# Patient Record
Sex: MALE | Race: BLACK OR AFRICAN AMERICAN | NOT HISPANIC OR LATINO | URBAN - METROPOLITAN AREA
[De-identification: names, ages, dates, MRNs, and addresses within clinical notes are randomized per-mention and may not be internally consistent; named-entity substitution may affect disease eponyms.]

---

## 2019-10-19 ENCOUNTER — INPATIENT (INPATIENT)
Facility: HOSPITAL | Age: 79
LOS: 1 days | Discharge: HOME | DRG: 312 | End: 2019-10-21
Attending: HOSPITALIST | Admitting: HOSPITALIST
Payer: COMMERCIAL

## 2019-10-19 VITALS
HEART RATE: 79 BPM | TEMPERATURE: 98 F | DIASTOLIC BLOOD PRESSURE: 77 MMHG | RESPIRATION RATE: 16 BRPM | OXYGEN SATURATION: 96 % | WEIGHT: 164.91 LBS | HEIGHT: 68 IN | SYSTOLIC BLOOD PRESSURE: 122 MMHG

## 2019-10-19 LAB
APTT BLD: 30 SEC — SIGNIFICANT CHANGE UP (ref 27.5–36.3)
BASOPHILS # BLD AUTO: 0.04 K/UL — SIGNIFICANT CHANGE UP (ref 0–0.2)
BASOPHILS NFR BLD AUTO: 0.4 % — SIGNIFICANT CHANGE UP (ref 0–2)
EOSINOPHIL # BLD AUTO: 0.06 K/UL — SIGNIFICANT CHANGE UP (ref 0–0.5)
EOSINOPHIL NFR BLD AUTO: 0.7 % — SIGNIFICANT CHANGE UP (ref 0–6)
HCT VFR BLD CALC: 40.6 % — SIGNIFICANT CHANGE UP (ref 39–50)
HGB BLD-MCNC: 13 G/DL — SIGNIFICANT CHANGE UP (ref 13–17)
IMM GRANULOCYTES NFR BLD AUTO: 0.2 % — SIGNIFICANT CHANGE UP (ref 0–1.5)
INR BLD: 0.98 RATIO — SIGNIFICANT CHANGE UP (ref 0.88–1.16)
LYMPHOCYTES # BLD AUTO: 1.27 K/UL — SIGNIFICANT CHANGE UP (ref 1–3.3)
LYMPHOCYTES # BLD AUTO: 14.3 % — SIGNIFICANT CHANGE UP (ref 13–44)
MCHC RBC-ENTMCNC: 24.6 PG — LOW (ref 27–34)
MCHC RBC-ENTMCNC: 32 GM/DL — SIGNIFICANT CHANGE UP (ref 32–36)
MCV RBC AUTO: 76.7 FL — LOW (ref 80–100)
MONOCYTES # BLD AUTO: 0.4 K/UL — SIGNIFICANT CHANGE UP (ref 0–0.9)
MONOCYTES NFR BLD AUTO: 4.5 % — SIGNIFICANT CHANGE UP (ref 2–14)
NEUTROPHILS # BLD AUTO: 7.12 K/UL — SIGNIFICANT CHANGE UP (ref 1.8–7.4)
NEUTROPHILS NFR BLD AUTO: 79.9 % — HIGH (ref 43–77)
OB PNL STL: NEGATIVE — SIGNIFICANT CHANGE UP
PLATELET # BLD AUTO: 272 K/UL — SIGNIFICANT CHANGE UP (ref 150–400)
PROTHROM AB SERPL-ACNC: 11.3 SEC — SIGNIFICANT CHANGE UP (ref 10–12.9)
RBC # BLD: 5.29 M/UL — SIGNIFICANT CHANGE UP (ref 4.2–5.8)
RBC # FLD: 14.7 % — HIGH (ref 10.3–14.5)
WBC # BLD: 8.91 K/UL — SIGNIFICANT CHANGE UP (ref 3.8–10.5)
WBC # FLD AUTO: 8.91 K/UL — SIGNIFICANT CHANGE UP (ref 3.8–10.5)

## 2019-10-19 PROCEDURE — 93010 ELECTROCARDIOGRAM REPORT: CPT

## 2019-10-19 PROCEDURE — 70450 CT HEAD/BRAIN W/O DYE: CPT | Mod: 26

## 2019-10-19 PROCEDURE — 99285 EMERGENCY DEPT VISIT HI MDM: CPT

## 2019-10-19 PROCEDURE — 71046 X-RAY EXAM CHEST 2 VIEWS: CPT | Mod: 26

## 2019-10-19 RX ORDER — PANTOPRAZOLE SODIUM 20 MG/1
40 TABLET, DELAYED RELEASE ORAL ONCE
Refills: 0 | Status: COMPLETED | OUTPATIENT
Start: 2019-10-19 | End: 2019-10-19

## 2019-10-19 NOTE — ED ADULT NURSE NOTE - OBJECTIVE STATEMENT
pt lying in bed on a cardiac monitor and pulse ox with family at bedside. pt is alert and oriented x3. pt c/o of syncopal episode at home witnessed by family. as per pt he vomited dark brown emesis then "passed out". as per pt family he did not hit his head

## 2019-10-19 NOTE — ED PROVIDER NOTE - PHYSICAL EXAMINATION
Constitutional : Appears comfortably, talking in full sentences  Head :NC AT , no swelling  Eyes :eomi, no swelling  Mouth :mm moist,  Neck : supple, trachea in midline  Chest :Sebastien air entry, symm chest expansion, no distress  Heart :S1 S2 distant  Abdomen :abd soft, non tender, anal tone intact, stool looks yellow in color  Musc/Skel :ext no swelling, no deformity, no spine tenderness, distal pulses present  Neuro: AAO 3 no focal deficits

## 2019-10-19 NOTE — ED PROVIDER NOTE - ATTESTATION, MLM
I have reviewed and confirmed nurses' triage notes for patient's medications, allergies, medical history, and surgical history.

## 2019-10-19 NOTE — ED PROVIDER NOTE - OBJECTIVE STATEMENT
80 y/o M pt with PMHx of DM and HTN presents to the ED c/o 1x episode of syncope 3 hours ago. Pt reports that he ate well yesterday, slept well last night, woke up this morning at 7AM, ate fish/french fries this morning for breakfast at 9:30AM, ate fried chicken at 1PM for lunch, drank orange juice and coffee at 7:30PM. After drinking coffee, pt felt "hot and sweaty" and vomited immediately after. His emesis was orange and dark "like coffee." Pt reports that the next thing he remembers was that he woke up outside his house, but was feeling "his normal self" at the time. His wife convinced him to call EMS. No nausea, CP, SOB, palpitations, fever, chills, abd pain, urinary symptoms, numbness, tingling, vision changes.   Pt says that he went to [place] 20 years ago, took medication for his prostate while exhausted, and passed out. No cardiac PMHx.   PMD: [place]. 78 y/o M pt with PMHx of DM and HTN presents to the ED c/o 1x episode of syncope 3 hours ago. Pt reports that he ate well yesterday, slept well last night, woke up this morning at 7AM, ate fish/french fries this morning for breakfast at 9:30AM, ate fried chicken at 1PM for lunch, drank orange juice and coffee at 7:30PM. After drinking coffee, pt felt "hot and sweaty" and vomited immediately after. His emesis was orange and dark "like coffee." Pt reports that the next thing he remembers was that he woke up outside his house, but was feeling "his normal self" at the time. His wife convinced him to call EMS. No nausea, CP, SOB, palpitations, fever, chills, abd pain, urinary symptoms, numbness, tingling, vision changes.   Pt says that similarly, he went to [place] 20 years ago, "took medication for his prostate" while exhausted, and passed out. No cardiac PMHx.   PMD: [place].

## 2019-10-19 NOTE — ED ADULT NURSE NOTE - CHIEF COMPLAINT QUOTE
Pt A&Ox4 states "I was drinking coffee and I got warm and vomited what looked like coffee and they said I passed out."  BIBA c/o vomiting and syncope. Patient has DM, had syncopal episode after vomiting dark in color. EKG completed

## 2019-10-19 NOTE — ED PROVIDER NOTE - CLINICAL SUMMARY MEDICAL DECISION MAKING FREE TEXT BOX
80 y/o M with Hx DM, HTN presents to the ED c/o loss of time post vomiting coffee grounds, pt was drinking coffee prior to episode, plan to check serial cbc, CT, labs, guaiac stool, and admit to hospital.

## 2019-10-20 DIAGNOSIS — R55 SYNCOPE AND COLLAPSE: ICD-10-CM

## 2019-10-20 LAB
ALBUMIN SERPL ELPH-MCNC: 4.2 G/DL — SIGNIFICANT CHANGE UP (ref 3.3–5.2)
ALP SERPL-CCNC: 102 U/L — SIGNIFICANT CHANGE UP (ref 40–120)
ALT FLD-CCNC: 15 U/L — SIGNIFICANT CHANGE UP
ANION GAP SERPL CALC-SCNC: 16 MMOL/L — SIGNIFICANT CHANGE UP (ref 5–17)
APPEARANCE UR: CLEAR — SIGNIFICANT CHANGE UP
AST SERPL-CCNC: 15 U/L — SIGNIFICANT CHANGE UP
BILIRUB SERPL-MCNC: 0.2 MG/DL — LOW (ref 0.4–2)
BILIRUB UR-MCNC: NEGATIVE — SIGNIFICANT CHANGE UP
BUN SERPL-MCNC: 16 MG/DL — SIGNIFICANT CHANGE UP (ref 8–20)
CALCIUM SERPL-MCNC: 10.2 MG/DL — SIGNIFICANT CHANGE UP (ref 8.6–10.2)
CHLORIDE SERPL-SCNC: 101 MMOL/L — SIGNIFICANT CHANGE UP (ref 98–107)
CO2 SERPL-SCNC: 24 MMOL/L — SIGNIFICANT CHANGE UP (ref 22–29)
COLOR SPEC: YELLOW — SIGNIFICANT CHANGE UP
CREAT SERPL-MCNC: 1.11 MG/DL — SIGNIFICANT CHANGE UP (ref 0.5–1.3)
DIFF PNL FLD: NEGATIVE — SIGNIFICANT CHANGE UP
GLUCOSE BLDC GLUCOMTR-MCNC: 100 MG/DL — HIGH (ref 70–99)
GLUCOSE BLDC GLUCOMTR-MCNC: 101 MG/DL — HIGH (ref 70–99)
GLUCOSE BLDC GLUCOMTR-MCNC: 106 MG/DL — HIGH (ref 70–99)
GLUCOSE BLDC GLUCOMTR-MCNC: 93 MG/DL — SIGNIFICANT CHANGE UP (ref 70–99)
GLUCOSE SERPL-MCNC: 115 MG/DL — SIGNIFICANT CHANGE UP (ref 70–115)
GLUCOSE UR QL: NEGATIVE MG/DL — SIGNIFICANT CHANGE UP
KETONES UR-MCNC: NEGATIVE — SIGNIFICANT CHANGE UP
LEUKOCYTE ESTERASE UR-ACNC: NEGATIVE — SIGNIFICANT CHANGE UP
MAGNESIUM SERPL-MCNC: 2.2 MG/DL — SIGNIFICANT CHANGE UP (ref 1.6–2.6)
NITRITE UR-MCNC: NEGATIVE — SIGNIFICANT CHANGE UP
PH UR: 8 — SIGNIFICANT CHANGE UP (ref 5–8)
POTASSIUM SERPL-MCNC: 4.7 MMOL/L — SIGNIFICANT CHANGE UP (ref 3.5–5.3)
POTASSIUM SERPL-SCNC: 4.7 MMOL/L — SIGNIFICANT CHANGE UP (ref 3.5–5.3)
PROT SERPL-MCNC: 7.6 G/DL — SIGNIFICANT CHANGE UP (ref 6.6–8.7)
PROT UR-MCNC: NEGATIVE MG/DL — SIGNIFICANT CHANGE UP
SODIUM SERPL-SCNC: 141 MMOL/L — SIGNIFICANT CHANGE UP (ref 135–145)
SP GR SPEC: 1.01 — SIGNIFICANT CHANGE UP (ref 1.01–1.02)
TROPONIN T SERPL-MCNC: <0.01 NG/ML — SIGNIFICANT CHANGE UP (ref 0–0.06)
TSH SERPL-MCNC: 2.18 UIU/ML — SIGNIFICANT CHANGE UP (ref 0.27–4.2)
UROBILINOGEN FLD QL: NEGATIVE MG/DL — SIGNIFICANT CHANGE UP

## 2019-10-20 PROCEDURE — 12345: CPT | Mod: NC

## 2019-10-20 PROCEDURE — 95819 EEG AWAKE AND ASLEEP: CPT | Mod: 26

## 2019-10-20 PROCEDURE — 70498 CT ANGIOGRAPHY NECK: CPT | Mod: 26

## 2019-10-20 PROCEDURE — 70450 CT HEAD/BRAIN W/O DYE: CPT | Mod: 26,59

## 2019-10-20 PROCEDURE — 70496 CT ANGIOGRAPHY HEAD: CPT | Mod: 26

## 2019-10-20 PROCEDURE — 99223 1ST HOSP IP/OBS HIGH 75: CPT

## 2019-10-20 RX ORDER — INSULIN LISPRO 100/ML
VIAL (ML) SUBCUTANEOUS
Refills: 0 | Status: DISCONTINUED | OUTPATIENT
Start: 2019-10-20 | End: 2019-10-21

## 2019-10-20 RX ORDER — DEXTROSE 50 % IN WATER 50 %
25 SYRINGE (ML) INTRAVENOUS ONCE
Refills: 0 | Status: DISCONTINUED | OUTPATIENT
Start: 2019-10-20 | End: 2019-10-21

## 2019-10-20 RX ORDER — SODIUM CHLORIDE 9 MG/ML
1000 INJECTION, SOLUTION INTRAVENOUS
Refills: 0 | Status: DISCONTINUED | OUTPATIENT
Start: 2019-10-20 | End: 2019-10-21

## 2019-10-20 RX ORDER — ONDANSETRON 8 MG/1
4 TABLET, FILM COATED ORAL EVERY 6 HOURS
Refills: 0 | Status: DISCONTINUED | OUTPATIENT
Start: 2019-10-20 | End: 2019-10-21

## 2019-10-20 RX ORDER — DEXTROSE 50 % IN WATER 50 %
15 SYRINGE (ML) INTRAVENOUS ONCE
Refills: 0 | Status: DISCONTINUED | OUTPATIENT
Start: 2019-10-20 | End: 2019-10-21

## 2019-10-20 RX ORDER — INSULIN LISPRO 100/ML
VIAL (ML) SUBCUTANEOUS AT BEDTIME
Refills: 0 | Status: DISCONTINUED | OUTPATIENT
Start: 2019-10-20 | End: 2019-10-21

## 2019-10-20 RX ORDER — GLUCAGON INJECTION, SOLUTION 0.5 MG/.1ML
1 INJECTION, SOLUTION SUBCUTANEOUS ONCE
Refills: 0 | Status: DISCONTINUED | OUTPATIENT
Start: 2019-10-20 | End: 2019-10-21

## 2019-10-20 RX ORDER — DEXTROSE 50 % IN WATER 50 %
12.5 SYRINGE (ML) INTRAVENOUS ONCE
Refills: 0 | Status: DISCONTINUED | OUTPATIENT
Start: 2019-10-20 | End: 2019-10-21

## 2019-10-20 RX ADMIN — PANTOPRAZOLE SODIUM 40 MILLIGRAM(S): 20 TABLET, DELAYED RELEASE ORAL at 00:19

## 2019-10-20 RX ADMIN — Medication 5 MILLIGRAM(S): at 06:37

## 2019-10-20 NOTE — ED ADULT NURSE REASSESSMENT NOTE - GENERAL PATIENT STATE
comfortable appearance
comfortable appearance/resting/sleeping/family/SO at bedside
comfortable appearance/smiling/interactive

## 2019-10-20 NOTE — CONSULT NOTE ADULT - SUBJECTIVE AND OBJECTIVE BOX
CHIEF COMPLAINT: syncope    HPI: 79yMale  78 y/o male with PMH of DM-2, prostate ca came to the ED complaining s/p syncope. Patient said he felt sick after drinking coffee; he was nauseous and sweaty, ran to the bathroom and vomited once; dark color emesis. His grandson followed him and caught him as he was fall on the bathroom floor. The next thing he remember was being outside. As per the wife at bed side, he was out for about 2-5 minutes. He has no HA, trauma to head, weakness, numbness, bladder or bowel incontinence, blurry vision, dizziness, chest pain, shortness of breath, palpitation, fever, chills.     No h/o cva, tia, seizures.  Has bird shot above left eye- no MRi    PAST MEDICAL & SURGICAL HISTORY:  DM (diabetes mellitus)  Prostate cancer    MEDICATIONS  (STANDING):  dextrose 5%. 1000 milliLiter(s) (50 mL/Hr) IV Continuous <Continuous>  dextrose 50% Injectable 12.5 Gram(s) IV Push once  dextrose 50% Injectable 25 Gram(s) IV Push once  dextrose 50% Injectable 25 Gram(s) IV Push once  enalapril 5 milliGRAM(s) Oral daily  insulin lispro (HumaLOG) corrective regimen sliding scale   SubCutaneous three times a day before meals  insulin lispro (HumaLOG) corrective regimen sliding scale   SubCutaneous at bedtime    MEDICATIONS  (PRN):  dextrose 40% Gel 15 Gram(s) Oral once PRN Blood Glucose LESS THAN 70 milliGRAM(s)/deciliter  glucagon  Injectable 1 milliGRAM(s) IntraMuscular once PRN Glucose LESS THAN 70 milligrams/deciliter  ondansetron Injectable 4 milliGRAM(s) IV Push every 6 hours PRN Nausea and/or Vomiting    Allergies    No Known Allergies    Intolerances        FAMILY HISTORY:  FH: diabetes mellitus          SOCIAL HISTORY:    Tobacco:  no  Alcohol:  no  Drugs:  no        REVIEW OF SYSTEMS:    Relevant systems are negative except as noted in the chart, HPI, and PMH      VITAL SIGNS:  Vital Signs Last 24 Hrs  T(C): 37.1 (20 Oct 2019 07:41), Max: 37.1 (20 Oct 2019 07:41)  T(F): 98.7 (20 Oct 2019 07:41), Max: 98.7 (20 Oct 2019 07:41)  HR: 64 (20 Oct 2019 07:41) (64 - 80)  BP: 132/66 (20 Oct 2019 07:41) (122/77 - 132/66)  BP(mean): --  RR: 18 (20 Oct 2019 07:41) (16 - 20)  SpO2: 97% (20 Oct 2019 07:41) (96% - 98%)    PHYSICAL EXAMINATION:    General: Well-developed, well nourished, in no acute distress.  Cardiac:  Regular rate and rhythm. No carotid bruits appreciated.  Eyes: Fundoscopic examination was deferred.  Neurologic:  - Mental Status:  Alert, awake, oriented to person, place, and time; Speech is fluent. Language is normal. Follows commands well.  Insight and knowledge appear appropriate.  Cranial Nerves II-XII:    II:  Visual acuity is normal for age ; Visual fields are full to confrontation; Pupils are equal, round, and reactive to light.  III, IV, VI:  Extraocular movements are intact without nystagmus.  V:  Facial sensation is intact in the V1-V3 distribution bilaterally.  VII:  Face is symmetric with normal eye closure and smile  VIII:  Hearing is grossly intact  IX, X, XII:  speech is clear  XI:  Head turning and shoulder shrug are intact.  - Motor:  Strength is 5/5 x 4.   There is no pronator drift. .  - Reflexes:  2+ and symmetric at the knees.  Plantar responses flexor.  - Sensory:  Symmetric to light touch  - Coordination:  Finger-nose-finger is normal. Rapid alternating hand and foot  movements are intact. Dexterity appears normal      LABS:                          13.0   8.91  )-----------( 272      ( 19 Oct 2019 23:32 )             40.6     19 Oct 2019 23:32    141    |  101    |  16.0   ----------------------------<  115    4.7     |  24.0   |  1.11     Ca    10.2       19 Oct 2019 23:32  Mg     2.2       19 Oct 2019 23:32    TPro  7.6    /  Alb  4.2    /  TBili  0.2    /  DBili  x      /  AST  15     /  ALT  15     /  AlkPhos  102    19 Oct 2019 23:32    LIVER FUNCTIONS - ( 19 Oct 2019 23:32 )  Alb: 4.2 g/dL / Pro: 7.6 g/dL / ALK PHOS: 102 U/L / ALT: 15 U/L / AST: 15 U/L / GGT: x           PT/INR - ( 19 Oct 2019 23:33 )   PT: 11.3 sec;   INR: 0.98 ratio         PTT - ( 19 Oct 2019 23:33 )  PTT:30.0 sec      RADIOLOGY & ADDITIONAL STUDIES:    < from: CT Head No Cont (10.20.19 @ 06:52) >  IMPRESSION:   1.  Stable hyperdense lesion in the left frontal lobe. This may represent   a cavernoma, though other etiologies are not excluded.   2.  Two 3 mm round metallic foreign bodies in the left orbit.    < end of copied text >      IMPRESSION:    Syncope    PLAN:  1. Medical and Cardiac evaluation and treatment as indicated  2.  CTA  3. EEG  4. Outpatient autonomic testing  5.

## 2019-10-20 NOTE — H&P ADULT - HISTORY OF PRESENT ILLNESS
78 y/o male with PMH of DM-2, prostate ca came to the ED complaining s/p syncope. Patient said he felt sick after drinking coffee; he was nauseous and sweaty, ran to the bathroom and vomited once; dark color emesis. His grandson followed him and caught him as he was fall on the bathroom floor. The next thing he remember was being outside. As per the wife at bed side, he was out for about 2-5 minutes. He has no HA, trauma to head, weakness, numbness, bladder or bowel incontinence, blurry vision, dizziness, chest pain, shortness of breath, palpitation, fever, chills.

## 2019-10-20 NOTE — H&P ADULT - NEUROLOGICAL DETAILS
normal strength/sensation intact/deep reflexes intact/cranial nerves intact/alert and oriented x 3/responds to verbal commands

## 2019-10-20 NOTE — CHART NOTE - NSCHARTNOTEFT_GEN_A_CORE
patient is seen and evaluated, chart reviewed, Patient CT head showed Stable hyperdense lesion in the left frontal lobe. This may represent a cavernoma, though other etiologies are not excluded, Two 3 mm round metallic foreign bodies in the left orbit, seen by Neurosurgery recommended MRI, can not get it as patient has metallic foreign bodies, seen by Neurology, CT angio of head and neck, EEG, will get Orthostatics, Neuro checks, PT eval.

## 2019-10-20 NOTE — H&P ADULT - NSHPSOCIALHISTORY_GEN_ALL_CORE
Active smoker 3-4 cigarette per day, drinks alcohol socially, no illicit drug use. , lives with family

## 2019-10-20 NOTE — CONSULT NOTE ADULT - ASSESSMENT
79yM PMH DM2 on Metformin, Enalapril, Prostate CA treated in 2010, on ASA 81, presents to [facility] s/p syncope after vomiting  - CT head with small left indeterminate frontal lesion without surrounding edema, favoring cavernoma although neoplasm cannot be excluded with possible hemorrhagic component  - GCS 15, no focal neurologic deficit    PLAN:  - Will d/w attending  - Given possible hemorrhage, admit to ICU, Q1 neuro checks, HOB 30 degrees  - Repeat CT head ~ 06:00 AM, stat with any AMS  - MRI brain w/wo contrast pending  - Normotensive BP goals  - Nausea control PRN- currently resolved  - Further recommendations pending imaging results  - Hold ACT/APT for now  - SCDs for DVT ppx  - Supportive care/further medical management per primary team

## 2019-10-20 NOTE — H&P ADULT - ASSESSMENT
80 y/o male with PMH of DM-2, prostate ca came to the ED complaining s/p syncope. Patient said he felt sick after drinking coffee; he was nauseous and sweaty, ran to the bathroom and vomited once; dark color emesis. His grandson followed him and caught him as he was fall on the bathroom floor. The next thing he remember was being outside. As per the wife at bed side, he was out for about 2-5 minutes.    Syncope likely neurological etiology   Admit to SDU   CT head with small left indeterminate frontal lesion without surrounding edema, favoring cavernoma although neoplasm cannot be excluded with possible hemorrhagic component  Neuro check q2h   Neurosurgery on board   Neurology consult   Repeat CT head @ 6AM ordered   MRI brain ordered     Brain lesion   Plan as above     DM-2   Hold PO medications  Insulin sliding scale     Prostate ca   On Lupron q6h    Supportive   DVT prophylaxis: CSD   Diet: consistent carbohydrate

## 2019-10-20 NOTE — CONSULT NOTE ADULT - SUBJECTIVE AND OBJECTIVE BOX
HISTORY OF PRESENT ILLNESS:   79yM PMH DM2 on Metformin, Enalapril, Prostate CA treated in 2010, on ASA 81, presents to [facility] after syncope while vomiting. Daughter at bedside states he did not eat well yesterday- since he is a diabetic, usually watches what he eats but yesterday ate a lot of food he usually doesn't such as fried foods and syrup. Patient states he then drank some coffee, and after he finished, he felt "warm" throughout his entire body, and felt nauseous, going to the bathroom to vomit. Attests to orange and dark colored emesis, "like coffee." He does not remember what happens after he vomited other than waking up outside his house with his family at his side. Daughter endorses that her son found him passed out in the bathroom, they brought him outside, and he woke up within 2-3 minutes. Denies current nausea. Denies headache, weakness, paresthesias, seizure like activity, dizziness, chest pain, palpitations, SOB, abdominal pain.    PAST MEDICAL & SURGICAL HISTORY:  DM2 on Metformin, Enalapril  Takes ASA 81  Prostate CA treated in 2010    SOCIAL HISTORY:  Lives at home with his wife    Allergies  No Known Allergies    REVIEW OF SYSTEMS  Negative except as noted in HPI    HOME MEDICATIONS:  Home Medications:  Metformin 500mg  Enalapril 5 mg  ASA 81    MEDICATIONS:  Antibiotics:    Neuro:    Anticoagulation:    OTHER:    IVF:    Vital Signs Last 24 Hrs  T(C): 36.6 (19 Oct 2019 21:07), Max: 36.6 (19 Oct 2019 21:07)  T(F): 97.9 (19 Oct 2019 21:07), Max: 97.9 (19 Oct 2019 21:07)  HR: 79 (19 Oct 2019 21:07) (79 - 79)  BP: 122/77 (19 Oct 2019 21:07) (122/77 - 122/77)  BP(mean): --  RR: 16 (19 Oct 2019 21:07) (16 - 16)  SpO2: 96% (19 Oct 2019 21:07) (96% - 96%)    PHYSICAL EXAM:  GENERAL: NAD, well-groomed, well-developed  HEAD:  Atraumatic, normocephalic  EYES: Conjunctiva and sclera clear  ENMT: moist mucous membranes, good dentition, no lesions  NECK: Supple  YANIRA COMA SCORE: E- 4 V- 5 M- 6=15  MENTAL STATUS: AAO x3; Appropriately conversant without aphasia; following commands  CRANIAL NERVES: PERRL. EOMI without nystagmus. Facial sensation intact V1-3 distribution b/l. Face symmetric w/ normal eye closure and smile, tongue midline. Hearing grossly intact. Speech clear  MOTOR: strength 5/5 b/l upper and lower extremities; no drift  SENSATION: grossly intact to light touch all extremities  CHEST/LUNG: Nonlabored breathing, no respiratory distress  HEART: Regular rate  ABDOMEN: Soft, nontender, nondistended    LABS:                        13.0   8.91  )-----------( 272      ( 19 Oct 2019 23:32 )             40.6     10-19    141  |  101  |  16.0  ----------------------------<  115  4.7   |  24.0  |  1.11    Ca    10.2      19 Oct 2019 23:32  Mg     2.2     10-19    TPro  7.6  /  Alb  4.2  /  TBili  0.2<L>  /  DBili  x   /  AST  15  /  ALT  15  /  AlkPhos  102  10-19    PT/INR - ( 19 Oct 2019 23:33 )   PT: 11.3 sec;   INR: 0.98 ratio       PTT - ( 19 Oct 2019 23:33 )  PTT:30.0 sec    RADIOLOGY & ADDITIONAL STUDIES:  CT Head No Cont (10.19.19 @ 23:45)  IMPRESSION:   Small indeterminate left frontal lesion without significant surrounding   edema, favoring a cavernoma although underlying neoplasm (possibly   hemorrhagic) cannot be excluded. Follow-up nonemergent contrast-enhanced   MRI would be helpful for further evaluation.  Nonspecific right mastoid opacification. Recommend clinical correlation   to assess acute mastoiditis.  Additional findings as described.  Discussed with Dr. Bloom.

## 2019-10-20 NOTE — ED ADULT NURSE REASSESSMENT NOTE - NS ED NURSE REASSESS COMMENT FT1
pt sleeping in bed on a cardiac monitor and pulse ox. pt is arousable to verbal stimuli. pt denies any pain or discomfort at this time. repeat vital signs taken. will continue to monitor and reassess
Neuro PA at bedside for evaluation. pt remains in stable condition at this time. pt awaiting bed on floor. wife remains at bedside. pt educated on plan of care, pt able to successfully teach back plan of care to RN, RN will continue to reeducate pt during hospital stay.
EEG at bedside. Pt is resting in bed comfortably at this time, no apparent distress noted at this time. pt safety maintained. Pt denies any complaints at this time. pt educated on plan of care, plan of care taught back to RN. plan of care education deemed proficient through successful teach back. will continue to reeducate pt regarding plan of care.
pt care assumed at 0730, no apparent distress noted at this time, charting as noted. pt received Alert and Oriented to person, place, situation and time resting in bed comfortably with wife at bedside. pt denies complaints at this time. vital signs remain stable. pt awaiting bed on 4brk. pt is NSR on cardiac monitor. pt educated on plan of care, pt able to successfully teach back plan of care to RN, RN will continue to reeducate pt during hospital stay.

## 2019-10-21 ENCOUNTER — TRANSCRIPTION ENCOUNTER (OUTPATIENT)
Age: 79
End: 2019-10-21

## 2019-10-21 VITALS
SYSTOLIC BLOOD PRESSURE: 112 MMHG | OXYGEN SATURATION: 100 % | DIASTOLIC BLOOD PRESSURE: 70 MMHG | HEART RATE: 76 BPM | RESPIRATION RATE: 18 BRPM

## 2019-10-21 LAB
ANION GAP SERPL CALC-SCNC: 14 MMOL/L — SIGNIFICANT CHANGE UP (ref 5–17)
BUN SERPL-MCNC: 15 MG/DL — SIGNIFICANT CHANGE UP (ref 8–20)
CALCIUM SERPL-MCNC: 9.3 MG/DL — SIGNIFICANT CHANGE UP (ref 8.6–10.2)
CHLORIDE SERPL-SCNC: 102 MMOL/L — SIGNIFICANT CHANGE UP (ref 98–107)
CHOLEST SERPL-MCNC: 149 MG/DL — SIGNIFICANT CHANGE UP (ref 110–199)
CO2 SERPL-SCNC: 22 MMOL/L — SIGNIFICANT CHANGE UP (ref 22–29)
CREAT SERPL-MCNC: 0.87 MG/DL — SIGNIFICANT CHANGE UP (ref 0.5–1.3)
CULTURE RESULTS: NO GROWTH — SIGNIFICANT CHANGE UP
GLUCOSE BLDC GLUCOMTR-MCNC: 107 MG/DL — HIGH (ref 70–99)
GLUCOSE BLDC GLUCOMTR-MCNC: 133 MG/DL — HIGH (ref 70–99)
GLUCOSE SERPL-MCNC: 94 MG/DL — SIGNIFICANT CHANGE UP (ref 70–115)
HBA1C BLD-MCNC: 7.1 % — HIGH (ref 4–5.6)
HCT VFR BLD CALC: 39 % — SIGNIFICANT CHANGE UP (ref 39–50)
HDLC SERPL-MCNC: 38 MG/DL — LOW
HGB BLD-MCNC: 12.7 G/DL — LOW (ref 13–17)
LIPID PNL WITH DIRECT LDL SERPL: 81 MG/DL — SIGNIFICANT CHANGE UP
MCHC RBC-ENTMCNC: 24.4 PG — LOW (ref 27–34)
MCHC RBC-ENTMCNC: 32.6 GM/DL — SIGNIFICANT CHANGE UP (ref 32–36)
MCV RBC AUTO: 75 FL — LOW (ref 80–100)
PLATELET # BLD AUTO: 243 K/UL — SIGNIFICANT CHANGE UP (ref 150–400)
POTASSIUM SERPL-MCNC: 4.6 MMOL/L — SIGNIFICANT CHANGE UP (ref 3.5–5.3)
POTASSIUM SERPL-SCNC: 4.6 MMOL/L — SIGNIFICANT CHANGE UP (ref 3.5–5.3)
RBC # BLD: 5.2 M/UL — SIGNIFICANT CHANGE UP (ref 4.2–5.8)
RBC # FLD: 14.5 % — SIGNIFICANT CHANGE UP (ref 10.3–14.5)
SODIUM SERPL-SCNC: 138 MMOL/L — SIGNIFICANT CHANGE UP (ref 135–145)
SPECIMEN SOURCE: SIGNIFICANT CHANGE UP
TOTAL CHOLESTEROL/HDL RATIO MEASUREMENT: 4 RATIO — SIGNIFICANT CHANGE UP (ref 3.4–9.6)
TRIGL SERPL-MCNC: 150 MG/DL — SIGNIFICANT CHANGE UP (ref 10–200)
TROPONIN T SERPL-MCNC: <0.01 NG/ML — SIGNIFICANT CHANGE UP (ref 0–0.06)
WBC # BLD: 5.51 K/UL — SIGNIFICANT CHANGE UP (ref 3.8–10.5)
WBC # FLD AUTO: 5.51 K/UL — SIGNIFICANT CHANGE UP (ref 3.8–10.5)

## 2019-10-21 PROCEDURE — 99232 SBSQ HOSP IP/OBS MODERATE 35: CPT

## 2019-10-21 PROCEDURE — 99239 HOSP IP/OBS DSCHRG MGMT >30: CPT

## 2019-10-21 PROCEDURE — 93306 TTE W/DOPPLER COMPLETE: CPT | Mod: 26

## 2019-10-21 RX ORDER — ASPIRIN/CALCIUM CARB/MAGNESIUM 324 MG
1 TABLET ORAL
Qty: 0 | Refills: 0 | DISCHARGE

## 2019-10-21 RX ORDER — ASPIRIN/CALCIUM CARB/MAGNESIUM 324 MG
1 TABLET ORAL
Qty: 0 | Refills: 0 | DISCHARGE
Start: 2019-10-21

## 2019-10-21 RX ORDER — ASPIRIN/CALCIUM CARB/MAGNESIUM 324 MG
81 TABLET ORAL DAILY
Refills: 0 | Status: DISCONTINUED | OUTPATIENT
Start: 2019-10-21 | End: 2019-10-21

## 2019-10-21 RX ADMIN — Medication 81 MILLIGRAM(S): at 12:00

## 2019-10-21 RX ADMIN — Medication 5 MILLIGRAM(S): at 05:31
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
## 2019-10-21 NOTE — DISCHARGE NOTE PROVIDER - CARE PROVIDERS DIRECT ADDRESSES
,DirectAddress_Unknown,[email or web address],DirectAddress_Unknown ,DirectAddress_Unknown,DirectAddress_Unknown,[email or web address] ,DirectAddress_Unknown,DirectAddress_Unknown,[email or web address]

## 2019-10-21 NOTE — DISCHARGE NOTE NURSING/CASE MANAGEMENT/SOCIAL WORK - PATIENT PORTAL LINK FT
You can access the FollowMyHealth Patient Portal offered by [facility] by registering at the following website: [email or web address]. By joining [facility]’s FollowMyHealth portal, you will also be able to view your health information using other applications (apps) compatible with our system.

## 2019-10-21 NOTE — DISCHARGE NOTE PROVIDER - CARE PROVIDER_API CALL
Charles Smith (MD)  Medicine  [facility] [place] [address]  Phone: [phone]  Fax: [phone]  Follow Up Time:     Kemar Birch; PhD)  Neurosurgery  [address] [facility], [address] [place] [address]  Phone: [phone]  Fax: [phone]  Follow Up Time:     Matt Munoz)  Neurology  [address] [place] [address]  Phone: [phone]  Fax: [phone]  Follow Up Time: Charles Smith)  Medicine  [address] [place] [address]  Phone: [phone]  Fax: [phone]  Follow Up Time:     Matt Munoz)  Neurology  [address] [place] [address]  Phone: [phone]  Fax: [phone]  Follow Up Time:     Quoc Wright)  Neurological Surgery  [address] [place] [address]  Phone: [phone]  Fax: [phone]  Follow Up Time: Charles Smith)  Medicine  [address] [place] [address]  Phone: [phone]  Fax: [phone]  Follow Up Time:     Matt Munoz)  Neurology  [address] [place] [address]  Phone: [phone]  Fax: [phone]  Follow Up Time:     Quoc Wright)  Neurological Surgery  [address] [place] [address]  Phone: [phone]  Fax: [phone]  Follow Up Time:     Marvel Mitchell; MPH)  Cardiology; Internal Medicine  [address] [place] [address]  Phone: [phone]  Fax: [phone]  Follow Up Time:
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
## 2019-10-21 NOTE — DISCHARGE NOTE PROVIDER - NSDCCPCAREPLAN_GEN_ALL_CORE_FT
PRINCIPAL DISCHARGE DIAGNOSIS  Diagnosis: Syncope  Assessment and Plan of Treatment: Follow up with cardiology and neurology as outpatient.      SECONDARY DISCHARGE DIAGNOSES  Diagnosis: Hypertension  Assessment and Plan of Treatment: Continue home meds.    Diagnosis: Diabetes mellitus  Assessment and Plan of Treatment: Continue home meds.    Diagnosis: Prostate CA  Assessment and Plan of Treatment: Continue home meds.    Diagnosis: Cerebral mass  Assessment and Plan of Treatment: Follow up with neurosurgery as outpatient. PRINCIPAL DISCHARGE DIAGNOSIS  Diagnosis: Syncope  Assessment and Plan of Treatment: Stable. Follow up with cardiology and neurosurgey as an outpatient      SECONDARY DISCHARGE DIAGNOSES  Diagnosis: Brain lesion  Assessment and Plan of Treatment: You were seen by neurosurgery team in the [facility]. No acute intervention was needed. Please follow up with neurosurgeon Dr. Wright on discharge in 2 weeks. Contact info provided.    Diagnosis: Hypertension  Assessment and Plan of Treatment: Continue home meds    Diagnosis: Diabetes mellitus  Assessment and Plan of Treatment: Continue home meds    Diagnosis: Prostate CA  Assessment and Plan of Treatment: Continue home meds. PRINCIPAL DISCHARGE DIAGNOSIS  Diagnosis: Syncope  Assessment and Plan of Treatment: Stable. Follow up with cardiology and neurosurgey as an outpatient- will need further cardiac stress testing outpatient      SECONDARY DISCHARGE DIAGNOSES  Diagnosis: Brain lesion  Assessment and Plan of Treatment: You were seen by neurosurgery team in the [facility]. No acute intervention was needed. Please follow up with neurosurgeon Dr. Wright on discharge in 2 weeks. Contact info provided.    Diagnosis: Hypertension  Assessment and Plan of Treatment: Continue home meds    Diagnosis: Diabetes mellitus  Assessment and Plan of Treatment: Continue home meds    Diagnosis: Prostate CA  Assessment and Plan of Treatment: Continue home meds.

## 2019-10-21 NOTE — PROGRESS NOTE ADULT - SUBJECTIVE AND OBJECTIVE BOX
Patient is a 79y old  Male who presents with a chief complaint of Syncope (21 Oct 2019 10:09)      Patient seen and examined at bedside.   ALLERGIES:  No Known Allergies    MEDICATIONS  (STANDING):  aspirin enteric coated 81 milliGRAM(s) Oral daily  dextrose 5%. 1000 milliLiter(s) (50 mL/Hr) IV Continuous <Continuous>  dextrose 50% Injectable 12.5 Gram(s) IV Push once  dextrose 50% Injectable 25 Gram(s) IV Push once  dextrose 50% Injectable 25 Gram(s) IV Push once  enalapril 5 milliGRAM(s) Oral daily  insulin lispro (HumaLOG) corrective regimen sliding scale   SubCutaneous three times a day before meals  insulin lispro (HumaLOG) corrective regimen sliding scale   SubCutaneous at bedtime    MEDICATIONS  (PRN):  dextrose 40% Gel 15 Gram(s) Oral once PRN Blood Glucose LESS THAN 70 milliGRAM(s)/deciliter  glucagon  Injectable 1 milliGRAM(s) IntraMuscular once PRN Glucose LESS THAN 70 milligrams/deciliter  ondansetron Injectable 4 milliGRAM(s) IV Push every 6 hours PRN Nausea and/or Vomiting    Vital Signs Last 24 Hrs  T(F): 98 (21 Oct 2019 08:03), Max: 98.8 (20 Oct 2019 17:15)  HR: 71 (21 Oct 2019 08:00) (71 - 78)  BP: 119/69 (21 Oct 2019 08:00) (105/60 - 149/91)  RR: 18 (21 Oct 2019 08:00) (18 - 21)  SpO2: 97% (21 Oct 2019 08:00) (96% - 100%)  I&O's Summary    20 Oct 2019 07:  -  21 Oct 2019 07:00  --------------------------------------------------------  IN: 120 mL / OUT: 950 mL / NET: -830 mL    21 Oct 2019 07:  -  21 Oct 2019 11:25  --------------------------------------------------------  IN: 0 mL / OUT: 350 mL / NET: -350 mL      PHYSICAL EXAM:  General: NAD, A/O x 3, elderly  ENT: MMM, no thrush  Neck: Supple, No JVD  Lungs: Clear to auscultation bilaterally, good air entry, non-labored breathing  Cardio: +s1/s2, No pitting edema  Abdomen: Soft, Nontender, Nondistended; Bowel sounds present  Extremities: No calf tenderness    LABS:                        12.7   5.51  )-----------( 243      ( 21 Oct 2019 06:32 )             39.0     10-21    138  |  102  |  15.0  ----------------------------<  94  4.6   |  22.0  |  0.87    Ca    9.3      21 Oct 2019 06:32  Mg     2.2     10-19    TPro  7.6  /  Alb  4.2  /  TBili  0.2  /  DBili  x   /  AST  15  /  ALT  15  /  AlkPhos  102  10-19      Lipase, Serum: 31 U/L (10-19-19 @ 23:32)    eGFR if Non African American: 82 mL/min/1.73M2 (10-21-19 @ 06:32)  eGFR if African American: 94 mL/min/1.73M2 (10-21-19 @ 06:32)    PT/INR - ( 19 Oct 2019 23:33 )   PT: 11.3 sec;   INR: 0.98 ratio    PTT - ( 19 Oct 2019 23:33 )  PTT:30.0 sec      CARDIAC MARKERS ( 21 Oct 2019 06:32 )  x     / <0.01 ng/mL / x     / x     / x      CARDIAC MARKERS ( 19 Oct 2019 23:32 )  x     / <0.01 ng/mL / x     / x     / x          10-21 Chol 149 mg/dL LDL 81 mg/dL HDL 38 mg/dL Trig 150 mg/dL  TSH 2.18   TSH with FT4 reflex --  Total T3 --    Glucose  POCT Blood Glucose.: 107 mg/dL (21 Oct 2019 08:06)  POCT Blood Glucose.: 93 mg/dL (20 Oct 2019 21:21)  POCT Blood Glucose.: 106 mg/dL (20 Oct 2019 16:51)  POCT Blood Glucose.: 101 mg/dL (20 Oct 2019 13:01)    10-21 [id number] 7.1    Urinalysis Basic - ( 20 Oct 2019 03:26 )  Color: Yellow / Appearance: Clear / S.010 / pH: x  Gluc: x / Ketone: Negative  / Bili: Negative / Urobili: Negative mg/dL   Blood: x / Protein: Negative mg/dL / Nitrite: Negative   Leuk Esterase: Negative / RBC: x / WBC x   Sq Epi: x / Non Sq Epi: x / Bacteria: x    Culture  Culture - Urine (collected 20 Oct 2019 03:26)  Source: .Urine  Final Report (21 Oct 2019 09:31):    No growth      RADIOLOGY & ADDITIONAL TESTS:  < from: CT Angio Neck w/ IV Cont (10.20.19 @ 13:42) >  IMPRESSION:   CTA head and neck:  1.  Essentially no contrast enhancement in the left vertebral arteryV1   and V2 segments suggesting occlusion. Contrast opacification of the left   V3 and V4 segments suggests reconstitution from collateral flow versus   retrograde flow. Moderate to severe stenosis of the left V3 and V4   segments.   2.  Mild narrowing of the left subclavian artery proximal to the   vertebral artery origin due to atherosclerotic plaque.  3.  No evidence of large arteriovenous reformation.  Brain CT without contrast:   Stable hyperdense lesion in the left frontal lobe.  < end of copied text >    < from: Xray Chest 2 Views PA/Lat (10.19.19 @ 23:59) >  Impression:  Elevation of left hemidiaphragm with mild left basilar atelectasis..  < end of copied text >    < from: CT Head No Cont (10.19.19 @ 23:45) >  IMPRESSION:   Small indeterminate left frontal lesion without significant surrounding   edema, favoring a cavernoma although underlying neoplasm (possibly   hemorrhagic) cannot be excluded. Follow-up nonemergent contrast-enhanced   MRI would be helpful for further evaluation.  Nonspecific right mastoid opacification. Recommend clinical correlation   to assess acute mastoiditis.  < end of copied text >    EEG SUMMARY/CLASSIFICATION  Normal EEG in the awake, drowsy and asleep states.  _____________________________________________________________  EEG IMPRESSION/CLINICAL CORRELATE    Normal EEG study.  No epileptiform pattern or seizure seen.
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
## 2019-10-21 NOTE — PROGRESS NOTE ADULT - SUBJECTIVE AND OBJECTIVE BOX
INTERVAL HISTORY:  no further spells      VITAL SIGNS:  Vital Signs Last 24 Hrs  T(C): 36.7 (21 Oct 2019 08:03), Max: 37.1 (20 Oct 2019 17:15)  T(F): 98 (21 Oct 2019 08:03), Max: 98.8 (20 Oct 2019 17:15)  HR: 71 (21 Oct 2019 08:00) (71 - 78)  BP: 119/69 (21 Oct 2019 08:00) (105/60 - 149/91)  BP(mean): 81 (21 Oct 2019 04:06) (75 - 86)  RR: 18 (21 Oct 2019 08:00) (18 - 21)  SpO2: 97% (21 Oct 2019 08:00) (96% - 100%)    PHYSICAL EXAMINATION:    Mentation:  nl  Language/Speech: nl  CN: nl  Visual Fields: full  Motor: nl  Sensory:nl  DTR:  Babinski:      MEDS:  MEDICATIONS  (STANDING):  aspirin enteric coated 81 milliGRAM(s) Oral daily  dextrose 5%. 1000 milliLiter(s) (50 mL/Hr) IV Continuous <Continuous>  dextrose 50% Injectable 12.5 Gram(s) IV Push once  dextrose 50% Injectable 25 Gram(s) IV Push once  dextrose 50% Injectable 25 Gram(s) IV Push once  enalapril 5 milliGRAM(s) Oral daily  insulin lispro (HumaLOG) corrective regimen sliding scale   SubCutaneous three times a day before meals  insulin lispro (HumaLOG) corrective regimen sliding scale   SubCutaneous at bedtime    MEDICATIONS  (PRN):  dextrose 40% Gel 15 Gram(s) Oral once PRN Blood Glucose LESS THAN 70 milliGRAM(s)/deciliter  glucagon  Injectable 1 milliGRAM(s) IntraMuscular once PRN Glucose LESS THAN 70 milligrams/deciliter  ondansetron Injectable 4 milliGRAM(s) IV Push every 6 hours PRN Nausea and/or Vomiting      LABS:                          12.7   5.51  )-----------( 243      ( 21 Oct 2019 06:32 )             39.0     10-21    138  |  102  |  15.0  ----------------------------<  94  4.6   |  22.0  |  0.87    Ca    9.3      21 Oct 2019 06:32  Mg     2.2     10-19    TPro  7.6  /  Alb  4.2  /  TBili  0.2<L>  /  DBili  x   /  AST  15  /  ALT  15  /  AlkPhos  102  10-19    LIVER FUNCTIONS - ( 19 Oct 2019 23:32 )  Alb: 4.2 g/dL / Pro: 7.6 g/dL / ALK PHOS: 102 U/L / ALT: 15 U/L / AST: 15 U/L / GGT: x               RADIOLOGY & ADDITIONAL STUDIES:    EEG- normal  CTA  < from: CT Angio Head w/ IV Cont (10.20.19 @ 13:42) >  CTA head and neck:  1.  Essentially no contrast enhancement in the left vertebral arteryV1   and V2 segments suggesting occlusion. Contrast opacification of the left   V3 and V4 segments suggests reconstitution from collateral flow versus   retrograde flow. Moderate to severe stenosis of the left V3 and V4   segments.   2.  Mild narrowing of the left subclavian artery proximal to the   vertebral artery origin due to atherosclerotic plaque.  3.  No evidence of large arteriovenous reformation.    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 Doubt cause of syncope episode    REC;  Cerebrovascular risk factor assessment and management  Antiplatelet therapy as prescribed.  Medical and Cardiac evaluation and treatment as indicated  Will not actively follow.   Neurologically cleared for discharge/disposition.  Please recontact as needed.

## 2019-10-21 NOTE — EEG REPORT - NS EEG TEXT BOX
[facility]   COMPREHENSIVE EPILEPSY CENTER   REPORT OF ROUTINE VIDEO EEG     [facility]: [address], [place] [address], Ph#: [phone]  [facility]: [address], [place] [address], Ph#: [phone]  [facility]: [address] [place] [address], Ph#: [phone]    Patient Name: PETER GAMBOA  Age and : 79y (39)  MRN #: [id number]  Location: [place] [address]  Referring Physician: Bautista Cuevas    Study Date: 10-20-19    _____________________________________________________________  TECHNICAL INFORMATION    Placement and Labeling of Electrodes:  The EEG was performed utilizing 20 channels referential EEG connections (coronal over temporal over parasagittal montage) using all standard 10-20 electrode placements with EKG.  Recording was at a sampling rate of 256 samples per second per channel.  Time synchronized digital video recording was done simultaneously with EEG recording.  A low light infrared camera was used for low light recording.  Mil and seizure detection algorithms were utilized.    _____________________________________________________________  HISTORY    Patient is a 79y old  Male who presents with a chief complaint of Syncope (21 Oct 2019 00:31)      PERTINENT MEDICATION:  none    _____________________________________________________________  STUDY INTERPRETATION    Findings: The background was continuous, spontaneously variable and reactive. During wakefulness, the posterior dominant rhythm consisted of symmetric, well-modulated 9 Hz activity, with amplitude to 30 uV, that attenuated to eye opening.  Low amplitude frontal beta was noted in wakefulness.    Background Slowing:  No generalized background slowing was present.    Focal Slowing:   None were present.    Sleep Background:  Drowsiness was characterized by fragmentation, attenuation, and slowing of the background activity.    Sleep was characterized by the presence of vertex waves, symmetric sleep spindles and K-complexes.    Other Non-Epileptiform Findings:  None were present.    Interictal Epileptiform Activity:   None were present.    Events:  Clinical events: None recorded.  Seizures: None recorded.    Activation Procedures:   Hyperventilation was not performed.    Photic stimulation was performed and did not elicit any abnormality.     Artifacts:  Intermittent myogenic and movement artifacts were noted.    ECG:  The heart rate on single channel ECG was predominantly between 70-80 BPM.    _____________________________________________________________  EEG SUMMARY/CLASSIFICATION    Normal EEG in the awake, drowsy and asleep states.    _____________________________________________________________  EEG IMPRESSION/CLINICAL CORRELATE    Normal EEG study.  No epileptiform pattern or seizure seen.    _____________________________________________________________    Lesia Harrington MD  Attending Physician, [facility]ile[facility] [place]

## 2019-10-21 NOTE — DISCHARGE NOTE PROVIDER - PROVIDER TOKENS
PROVIDER:[[id number]]],PROVIDER:[[id number]]],PROVIDER:[[id number]]] PROVIDER:[TOKEN:[[id number]]],PROVIDER:[TOKEN:[[id number]]],PROVIDER:[TOKEN:[[id number]]] PROVIDER:[TOKEN:[[id number]]],PROVIDER:[TOKEN:[[id number]]],PROVIDER:[[id number]]],PROVIDER:[TOKEN:[[id number]]]

## 2019-10-21 NOTE — PHYSICAL THERAPY INITIAL EVALUATION ADULT - ADDITIONAL COMMENTS
Pt lives in a mother-daughter house with spouse, daughter and grandchildren (who live upstairs) with 3 ARJUN no hrs front entrance, 0 ARJUN side door entrance and 5 + 5 stairs 2 hrs to 2nd floor. Pt's PLOF was independent in all ADL's + ambulation without an AD and +driving.
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
## 2019-11-12 PROCEDURE — 80061 LIPID PANEL: CPT

## 2019-11-12 PROCEDURE — 70496 CT ANGIOGRAPHY HEAD: CPT

## 2019-11-12 PROCEDURE — 96374 THER/PROPH/DIAG INJ IV PUSH: CPT

## 2019-11-12 PROCEDURE — 70450 CT HEAD/BRAIN W/O DYE: CPT

## 2019-11-12 PROCEDURE — 87086 URINE CULTURE/COLONY COUNT: CPT

## 2019-11-12 PROCEDURE — 84443 ASSAY THYROID STIM HORMONE: CPT

## 2019-11-12 PROCEDURE — 71046 X-RAY EXAM CHEST 2 VIEWS: CPT

## 2019-11-12 PROCEDURE — 97110 THERAPEUTIC EXERCISES: CPT

## 2019-11-12 PROCEDURE — 99285 EMERGENCY DEPT VISIT HI MDM: CPT | Mod: 25

## 2019-11-12 PROCEDURE — 83036 HEMOGLOBIN GLYCOSYLATED A1C: CPT

## 2019-11-12 PROCEDURE — 83735 ASSAY OF MAGNESIUM: CPT

## 2019-11-12 PROCEDURE — 82272 OCCULT BLD FECES 1-3 TESTS: CPT

## 2019-11-12 PROCEDURE — 85610 PROTHROMBIN TIME: CPT

## 2019-11-12 PROCEDURE — 36415 COLL VENOUS BLD VENIPUNCTURE: CPT

## 2019-11-12 PROCEDURE — 85730 THROMBOPLASTIN TIME PARTIAL: CPT

## 2019-11-12 PROCEDURE — 83690 ASSAY OF LIPASE: CPT

## 2019-11-12 PROCEDURE — 82962 GLUCOSE BLOOD TEST: CPT

## 2019-11-12 PROCEDURE — 85027 COMPLETE CBC AUTOMATED: CPT

## 2019-11-12 PROCEDURE — 70498 CT ANGIOGRAPHY NECK: CPT

## 2019-11-12 PROCEDURE — 81003 URINALYSIS AUTO W/O SCOPE: CPT

## 2019-11-12 PROCEDURE — 80048 BASIC METABOLIC PNL TOTAL CA: CPT

## 2019-11-12 PROCEDURE — 93005 ELECTROCARDIOGRAM TRACING: CPT

## 2019-11-12 PROCEDURE — 80053 COMPREHEN METABOLIC PANEL: CPT

## 2019-11-12 PROCEDURE — 84484 ASSAY OF TROPONIN QUANT: CPT

## 2019-11-12 PROCEDURE — 97116 GAIT TRAINING THERAPY: CPT

## 2019-11-12 PROCEDURE — 95819 EEG AWAKE AND ASLEEP: CPT

## 2019-11-12 PROCEDURE — 97163 PT EVAL HIGH COMPLEX 45 MIN: CPT

## 2019-11-12 PROCEDURE — 93306 TTE W/DOPPLER COMPLETE: CPT

## 2020-07-22 PROBLEM — E11.9 TYPE 2 DIABETES MELLITUS WITHOUT COMPLICATIONS: Chronic | Status: ACTIVE | Noted: 2019-10-20

## 2020-07-22 PROBLEM — C61 MALIGNANT NEOPLASM OF PROSTATE: Chronic | Status: ACTIVE | Noted: 2019-10-20

## 2020-07-23 ENCOUNTER — OUTPATIENT (OUTPATIENT)
Dept: OUTPATIENT SERVICES | Facility: HOSPITAL | Age: 80
LOS: 1 days | Discharge: HOME | End: 2020-07-23
Payer: MEDICARE

## 2020-07-23 DIAGNOSIS — C18.9 MALIGNANT NEOPLASM OF COLON, UNSPECIFIED: ICD-10-CM

## 2020-07-24 DIAGNOSIS — Z00.00 ENCOUNTER FOR GENERAL ADULT MEDICAL EXAMINATION W/OUT ABNORMAL FINDINGS: ICD-10-CM

## 2020-07-27 ENCOUNTER — RESULT REVIEW (OUTPATIENT)
Age: 80
End: 2020-07-27

## 2020-07-27 ENCOUNTER — APPOINTMENT (OUTPATIENT)
Dept: HEMATOLOGY ONCOLOGY | Facility: CLINIC | Age: 80
End: 2020-07-27
Payer: MEDICARE

## 2020-07-27 VITALS
DIASTOLIC BLOOD PRESSURE: 63 MMHG | HEART RATE: 70 BPM | HEIGHT: 66 IN | BODY MASS INDEX: 20.77 KG/M2 | WEIGHT: 129.25 LBS | OXYGEN SATURATION: 100 % | SYSTOLIC BLOOD PRESSURE: 106 MMHG

## 2020-07-27 LAB
ANISOCYTOSIS BLD QL: SIGNIFICANT CHANGE UP
BASOPHILS # BLD AUTO: 0 K/UL — SIGNIFICANT CHANGE UP (ref 0–0.2)
BASOPHILS NFR BLD AUTO: 0.8 % — SIGNIFICANT CHANGE UP (ref 0–2)
ELLIPTOCYTES BLD QL SMEAR: SLIGHT — SIGNIFICANT CHANGE UP
EOSINOPHIL # BLD AUTO: 0.1 K/UL — SIGNIFICANT CHANGE UP (ref 0–0.5)
EOSINOPHIL NFR BLD AUTO: 1.2 % — SIGNIFICANT CHANGE UP (ref 0–6)
HCT VFR BLD CALC: 27.6 % — LOW (ref 39–50)
HGB BLD-MCNC: 8.1 G/DL — LOW (ref 13–17)
LG PLATELETS BLD QL AUTO: SLIGHT — SIGNIFICANT CHANGE UP
LYMPHOCYTES # BLD AUTO: 1.5 K/UL — SIGNIFICANT CHANGE UP (ref 1–3.3)
LYMPHOCYTES # BLD AUTO: 28.9 % — SIGNIFICANT CHANGE UP (ref 13–44)
MACROCYTES BLD QL: SLIGHT — SIGNIFICANT CHANGE UP
MCHC RBC-ENTMCNC: 17.7 PG — LOW (ref 27–34)
MCHC RBC-ENTMCNC: 29.3 G/DL — LOW (ref 32–36)
MCV RBC AUTO: 60.3 FL — LOW (ref 80–100)
MICROCYTES BLD QL: SIGNIFICANT CHANGE UP
MONOCYTES # BLD AUTO: 0.5 K/UL — SIGNIFICANT CHANGE UP (ref 0–0.9)
MONOCYTES NFR BLD AUTO: 8.9 % — SIGNIFICANT CHANGE UP (ref 2–14)
NEUTROPHILS # BLD AUTO: 3.1 K/UL — SIGNIFICANT CHANGE UP (ref 1.8–7.4)
NEUTROPHILS NFR BLD AUTO: 60.1 % — SIGNIFICANT CHANGE UP (ref 43–77)
OVALOCYTES BLD QL SMEAR: SLIGHT — SIGNIFICANT CHANGE UP
PLAT MORPH BLD: NORMAL — SIGNIFICANT CHANGE UP
PLATELET # BLD AUTO: 360 K/UL — SIGNIFICANT CHANGE UP (ref 150–400)
POIKILOCYTOSIS BLD QL AUTO: SLIGHT — SIGNIFICANT CHANGE UP
POLYCHROMASIA BLD QL SMEAR: SLIGHT — SIGNIFICANT CHANGE UP
RBC # BLD: 4.57 M/UL — SIGNIFICANT CHANGE UP (ref 4.2–5.8)
RBC # FLD: 21.1 % — HIGH (ref 10.3–14.5)
RBC BLD AUTO: ABNORMAL
SCHISTOCYTES BLD QL AUTO: SLIGHT — SIGNIFICANT CHANGE UP
TARGETS BLD QL SMEAR: SIGNIFICANT CHANGE UP
WBC # BLD: 5.1 K/UL — SIGNIFICANT CHANGE UP (ref 3.8–10.5)
WBC # FLD AUTO: 5.1 K/UL — SIGNIFICANT CHANGE UP (ref 3.8–10.5)

## 2020-07-27 PROCEDURE — 99205 OFFICE O/P NEW HI 60 MIN: CPT

## 2020-07-28 LAB
ALBUMIN SERPL ELPH-MCNC: 3.3 G/DL
ALP BLD-CCNC: 99 U/L
ALT SERPL-CCNC: 8 U/L
ANION GAP SERPL CALC-SCNC: 11 MMOL/L
AST SERPL-CCNC: 11 U/L
BILIRUB SERPL-MCNC: <0.2 MG/DL
BUN SERPL-MCNC: 10 MG/DL
CALCIUM SERPL-MCNC: 8.8 MG/DL
CEA SERPL-MCNC: 10.8 NG/ML
CHLORIDE SERPL-SCNC: 103 MMOL/L
CO2 SERPL-SCNC: 24 MMOL/L
CREAT SERPL-MCNC: 0.87 MG/DL
GLUCOSE SERPL-MCNC: 85 MG/DL
HBV CORE IGG+IGM SER QL: NONREACTIVE
HBV SURFACE AB SER QL: NONREACTIVE
HBV SURFACE AG SER QL: NONREACTIVE
HCV AB SER QL: NONREACTIVE
HCV S/CO RATIO: 0.11 S/CO
INR PPP: 1 RATIO
MAGNESIUM SERPL-MCNC: 2.2 MG/DL
POTASSIUM SERPL-SCNC: 4.3 MMOL/L
PROT SERPL-MCNC: 5.6 G/DL
PT BLD: 11.8 SEC
SODIUM SERPL-SCNC: 138 MMOL/L

## 2020-07-28 PROCEDURE — 93010 ELECTROCARDIOGRAM REPORT: CPT

## 2020-07-29 ENCOUNTER — RESULT REVIEW (OUTPATIENT)
Age: 80
End: 2020-07-29

## 2020-07-29 PROCEDURE — 88321 CONSLTJ&REPRT SLD PREP ELSWR: CPT

## 2020-07-30 LAB — SURGICAL PATHOLOGY STUDY: SIGNIFICANT CHANGE UP

## 2020-07-31 DIAGNOSIS — Z01.818 ENCOUNTER FOR OTHER PREPROCEDURAL EXAMINATION: ICD-10-CM

## 2020-08-01 ENCOUNTER — APPOINTMENT (OUTPATIENT)
Dept: DISASTER EMERGENCY | Facility: CLINIC | Age: 80
End: 2020-08-01

## 2020-08-02 LAB — SARS-COV-2 N GENE NPH QL NAA+PROBE: NOT DETECTED

## 2020-08-05 ENCOUNTER — RESULT REVIEW (OUTPATIENT)
Age: 80
End: 2020-08-05

## 2020-08-05 ENCOUNTER — OUTPATIENT (OUTPATIENT)
Dept: OUTPATIENT SERVICES | Facility: HOSPITAL | Age: 80
LOS: 1 days | End: 2020-08-05
Payer: COMMERCIAL

## 2020-08-05 ENCOUNTER — APPOINTMENT (OUTPATIENT)
Dept: INTERVENTIONAL RADIOLOGY/VASCULAR | Facility: CLINIC | Age: 80
End: 2020-08-05

## 2020-08-05 VITALS
TEMPERATURE: 98 F | DIASTOLIC BLOOD PRESSURE: 70 MMHG | RESPIRATION RATE: 16 BRPM | OXYGEN SATURATION: 99 % | HEART RATE: 70 BPM | SYSTOLIC BLOOD PRESSURE: 122 MMHG

## 2020-08-05 VITALS
HEART RATE: 70 BPM | OXYGEN SATURATION: 100 % | DIASTOLIC BLOOD PRESSURE: 61 MMHG | HEIGHT: 67 IN | WEIGHT: 128.97 LBS | SYSTOLIC BLOOD PRESSURE: 132 MMHG | TEMPERATURE: 98 F

## 2020-08-05 DIAGNOSIS — C16.9 MALIGNANT NEOPLASM OF STOMACH, UNSPECIFIED: ICD-10-CM

## 2020-08-05 PROCEDURE — 76942 ECHO GUIDE FOR BIOPSY: CPT

## 2020-08-05 PROCEDURE — C1751: CPT

## 2020-08-05 PROCEDURE — 76937 US GUIDE VASCULAR ACCESS: CPT | Mod: 26

## 2020-08-05 PROCEDURE — 36561 INSERT TUNNELED CV CATH: CPT | Mod: RT

## 2020-08-05 PROCEDURE — 77001 FLUOROGUIDE FOR VEIN DEVICE: CPT

## 2020-08-05 PROCEDURE — 77001 FLUOROGUIDE FOR VEIN DEVICE: CPT | Mod: 26

## 2020-08-05 PROCEDURE — 36561 INSERT TUNNELED CV CATH: CPT

## 2020-08-05 RX ORDER — CEFAZOLIN SODIUM 1 G
2000 VIAL (EA) INJECTION ONCE
Refills: 0 | Status: COMPLETED | OUTPATIENT
Start: 2020-08-05 | End: 2020-08-05

## 2020-08-05 RX ADMIN — Medication 100 MILLIGRAM(S): at 13:09

## 2020-08-07 NOTE — ASSESSMENT
[FreeTextEntry1] : The incidence of gastric cancer has been declining steadily since the 1930s, yet it remains a major cause of cancer death in the [place].  The high mortality rate reflects the prevalence of advanced disease at presentation. In population based series, the five-year survival rate for patients with resected stage I gastric cancer is approximately 70 to 75 percent, and it drops to 35 percent or less for stage IIB disease and beyond.  Efforts to improve treatment results beyond those obtained with surgery alone have included adjuvant and neoadjuvant strategies. The positive impact of such therapies on survival in patients with resected gastric cancer has become clearer over time, although there is no consensus as to the best approach.\par \par For patients with potentially resectable noncardia gastric cancer, randomized trials and meta-analyses indicate a significant survival benefit over surgery alone for a number of approaches, including adjuvant chemoradiotherapy (as was used in two [place] intergroup trials: Intergroup [INT] 0116 and Cancer and Leukemia Group B [CALGB] 50672), perioperative chemotherapy as was used in the MAGIC trial, and docetaxel , oxaliplatin , leucovorin, and 5FU in the FLOT trials. \par \par For most patients with resectable gastric adenocarcinoma with invasion beyond the submucosa (clinical stage T2N0 or higher, combined modality therapy is recommended over surgery alone.  Studies suggest neoadjuvant therapy over initial surgery followed by adjuvant therapy, especially for those with a high likelihood of developing distant metastases (bulky T3/T4 tumors, perigastric nodes, or linitis plastica). There are no randomized trials demonstrating better outcomes from neoadjuvant therapy versus initial surgery followed by any form of adjuvant therapy. Neoadjuvant chemotherapy may be administered as a means of "downstaging" a locally advanced tumor prior to an attempt at curative resection.  However, given the greater chance of delivering therapy in the preoperative setting and the fact that patients who are at high risk of developing distant metastases may be spared the morbidity of unnecessary gastrectomy if evidence of distant metastases emerges after chemotherapy, neoadjuvant chemotherapy is recommended as an upfront approach. Upfront surgery followed by adjuvant therapy remains an accepted approach, especially for patients with clinically staged, nonbulky T2 or T3 tumors without visible perigastric nodes.\par \par For most patients with a good performance status and without significant comorbidities who are able to tolerate intensive chemotherapy, the FLOT regimen is recommended rather than ECF or ECX, given the survival benefit for FLOT compared with ECF in the FLOT4-AIO trial.  The FLOT regimen includes docetaxel (50 mg/m2),, oxaliplatin (85 mg/m2), LV (200 mg/m2) with short-term infusional FU (2600 mg/m2 as a 24-hour infusion), on day 1 q2 weeks. The FLOT4-AIO trial compared the FLOT regimen (4 preoperative and 4 postoperative two-week cycles) with epirubicin-based triplet therapy (3 preoperative and 3 postoperative three-week cycles of epirubicin [50 mg/m2] and cisplatin [60 mg/m2], both on day 1, and either FU [200 mg/m2 daily as a continuous infusion] on days 1 to 21 [ECF] or capecitabine [1250 mg/m2 orally daily] on days 1 to 21 [ECX]).  In a report of the 300 patients with gastric or EGJ adenocarcinoma who were enrolled in the open-label phase II part of the trial, the FLOT regimen was associated with a higher pathologic complete response rate (16 versus 8 percent), and toxicity appeared generally more favorable. The phase III component of the trial enrolled 716 patients with resectable gastric (44 percent) or GEJ (56 percent) tumors.  The primary endpoint was overall survival. At a median follow-up of 43 months, FLOT was associated with significantly greater median overall survival (50 versus 35 months) and three-year overall survival (57 versus 48 percent). For patients with a lower performance status or multiple comorbidities, FOLFOX and CAPOX are appropriate alternative regimens.

## 2020-08-07 NOTE — RESULTS/DATA
[FreeTextEntry1] : 7/20/20 PET:\par Small hypermetabolic foci in the mediastinum and kings, corresponding to small mediastinal and bilateral hilar lymph nodes on CT.  The imaging appearance of these lymph nodes is nonspecific and favors reactive lymph nodes.  Hypermetabolic activity in the right upper quadrant abdomen, corresponding to extensive thickening of the distal stomach.  4.7 cm of stomach is involved.  SUV 8.2-11.2.  Proximal to this area of hypermetabolic thickening of the distal stomach, the proximal stomach is distended and contained air fluid contrast level.  The hypermetabolic activity in the thickening of the stomach is inseparable from perigastric lymph nodes.  Perigastric lymph node measures 2.5x1.6 cm SUV 12.5.  No evidence of additional enlarged intra-abdominal, pelvic or inguinal lymphadenopathy.  Radiation seeds noted in prostate.  No other abnormal hypermetabolic activity to suggest additional sites of malignancy.\par \par 7/10/20 Pathology:\par Gastric, Antrum, Ulcerated mass: moderately differentiated adenocarcinoma\par \par 6/19/20 CT A/P:\par In the upper central abdomen abutting the posterior antrum of the stomach and neck of the pancreas, there is a nonspecific heterogeneously enhancing circumscribed 3 x 2 x 3 cm mass.  There is an additional heterogeneous enhancing 2.7 x 2.3 x 2.5 lobulated mass in the ventral upper abdominal peritoneal cavity anterosuperiorly to the antrum of the stomach with internal and surrounding enhancing vessels.  Extensive enhancing severe bowel wall thickening of the entire coon and rectum compatible with colitis/proctitis which may be infectious/inflammatory. Moderate rugal fold thickening of the fundus of the stomach which may represent gastritis.

## 2020-08-07 NOTE — CONSULT LETTER
[Dear  ___] : Dear  [unfilled], [Consult Letter:] : I had the pleasure of evaluating your patient, [unfilled]. [Please see my note below.] : Please see my note below. [Consult Closing:] : Thank you very much for allowing me to participate in the care of this patient.  If you have any questions, please do not hesitate to contact me. [Sincerely,] : Sincerely, [FreeTextEntry3] : Dr. Eugenia Glass

## 2020-08-07 NOTE — HISTORY OF PRESENT ILLNESS
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  He was sent for CT by his PCP, Dr. Charles Smith, due to anemia.  CT showed in the upper central abdomen abutting the posterior antrum of the stomach and neck of the pancreas there is a  3 x 2 x 3 cm mass.  There is an additional heterogeneous enhancing 2.7 x 2.3 x 2.5 lobulated mass in the ventral upper abdominal peritoneal cavity anterosuperiorly to the antrum of the stomach with internal and surrounding enhancing vessels.  Endoscopy with biopsy of the gastric mass was c/w moderately differentiated adenocarcinoma.  Staging PET showed hypermetabolic thickening of the distal stomach, consistent with primary gastric cancer.  Hypermetabolic activity in the distal stomach, inseparable from the perigastric lymphadenopathy, consistent with metastatic kong disease. [de-identified] : Pt reports weight loss of ~ 50 pounds over past 5 months.  No N/V/D/C/.  No abndominal pain.  Poor appetitie.  No melena or BRBPR.  Denies all other ROS,.  See detailes below.

## 2020-08-13 ENCOUNTER — APPOINTMENT (OUTPATIENT)
Dept: HEMATOLOGY ONCOLOGY | Facility: CLINIC | Age: 80
End: 2020-08-13
Payer: MEDICARE

## 2020-08-13 ENCOUNTER — APPOINTMENT (OUTPATIENT)
Dept: HEMATOLOGY ONCOLOGY | Facility: CLINIC | Age: 80
End: 2020-08-13

## 2020-08-13 ENCOUNTER — LABORATORY RESULT (OUTPATIENT)
Age: 80
End: 2020-08-13

## 2020-08-13 ENCOUNTER — APPOINTMENT (OUTPATIENT)
Dept: SURGICAL ONCOLOGY | Facility: CLINIC | Age: 80
End: 2020-08-13
Payer: MEDICARE

## 2020-08-13 VITALS
HEART RATE: 79 BPM | TEMPERATURE: 98.6 F | BODY MASS INDEX: 20.25 KG/M2 | SYSTOLIC BLOOD PRESSURE: 125 MMHG | OXYGEN SATURATION: 100 % | HEIGHT: 67 IN | WEIGHT: 129 LBS | RESPIRATION RATE: 16 BRPM | DIASTOLIC BLOOD PRESSURE: 86 MMHG

## 2020-08-13 PROCEDURE — 99204 OFFICE O/P NEW MOD 45 MIN: CPT

## 2020-08-13 NOTE — ASSESSMENT
[FreeTextEntry1] : IMP:\par Newly diagnosed gastric cancer, staged at least T3N1 by EUS criteria.  Given his excellent performance status and despite his age, I would aggressively treat with perioperative chemotherapy FLOT regimen.  He does not have any clear evidence of metastatic disease on imaging.\par \par PLAN:\par 1) Neoadjuvant chemotherapy per medical oncology\par 2) RTO after 3rd cycle of chemotherapy in order to tentatively plan for surgical exploration and preoperative imaging

## 2020-08-13 NOTE — HISTORY OF PRESENT ILLNESS
[de-identified] : 79 year-old male presents for an initial consultation, referred by Dr. Eugenia Glass.  In June 2020 his PCP referred him for a CT scan due to anemia.  Study revealed a 3 cm mass in the upper central abdomen abutting the posterior antrum of the stomach and neck of the pancreas.   There is an additional heterogeneous enhancing 2.7 cm lobulated mass in the ventral upper abdominal peritoneal cavity anterosuperiorly to the antrum of the stomach with internal and surrounding enhancing vessels. \par \par EUS was performed on 7/10/20 and revealed a large firm malignant appearing circumferential in the gastric antrum, causing partial gastric outlet obstruction.  There are several necrotic and malignant appearing lymph nodes in the peritumoral region.  There is transmural involvement concerning for at least a T3N1 lesion. Pathology was consistent with moderately differentiated adenocarcinoma.\par \par Staging PET showed hypermetabolic thickening of the distal stomach, consistent with primary gastric cancer. Hypermetabolic activity in the distal stomach, inseparable from the perigastric lymphadenopathy, consistent with metastatic kong disease.\par \par 7/27/20 CEA: 10.8 ng/ml\par \par He consulted with Dr. Eugenia Glass from medical oncology who has recommended neoadjuvant chemotherapy (FLOT regimen).  Patient agreed to proceed.\par \par His past medical history includes type 2 diabetes, COPD and prostate cancer s/p brachytherapy 2010.\par \par 50 lb weight loss.

## 2020-08-13 NOTE — CONSULT LETTER
[Dear  ___] : Dear  [unfilled], [Courtesy Letter:] : I had the pleasure of seeing your patient, [unfilled], in my office today. [Sincerely,] : Sincerely, [Consult Closing:] : Thank you very much for allowing me to participate in the care of this patient.  If you have any questions, please do not hesitate to contact me. [Please see my note below.] : Please see my note below. [FreeTextEntry3] : Franklin Young MD, MPH, FACS\par Surgical Oncology\par [facility]\par Associate Professor of Surgery\par Department of General Surgery\par [employer] at [facility]  [DrRobert  ___] : Dr. LARRY

## 2020-08-17 ENCOUNTER — RESULT REVIEW (OUTPATIENT)
Age: 80
End: 2020-08-17

## 2020-08-17 ENCOUNTER — APPOINTMENT (OUTPATIENT)
Dept: HEMATOLOGY ONCOLOGY | Facility: CLINIC | Age: 80
End: 2020-08-17

## 2020-08-17 ENCOUNTER — APPOINTMENT (OUTPATIENT)
Age: 80
End: 2020-08-17

## 2020-08-17 ENCOUNTER — OUTPATIENT (OUTPATIENT)
Dept: OUTPATIENT SERVICES | Facility: HOSPITAL | Age: 80
LOS: 1 days | End: 2020-08-17
Payer: COMMERCIAL

## 2020-08-17 ENCOUNTER — LABORATORY RESULT (OUTPATIENT)
Age: 80
End: 2020-08-17

## 2020-08-17 DIAGNOSIS — C18.9 MALIGNANT NEOPLASM OF COLON, UNSPECIFIED: ICD-10-CM

## 2020-08-17 LAB
ABO RH CONFIRMATION: SIGNIFICANT CHANGE UP
BASOPHILS # BLD AUTO: 0.1 K/UL — SIGNIFICANT CHANGE UP (ref 0–0.2)
BASOPHILS NFR BLD AUTO: 1 % — SIGNIFICANT CHANGE UP (ref 0–2)
BLD GP AB SCN SERPL QL: SIGNIFICANT CHANGE UP
EOSINOPHIL # BLD AUTO: 0.1 K/UL — SIGNIFICANT CHANGE UP (ref 0–0.5)
EOSINOPHIL NFR BLD AUTO: 1.9 % — SIGNIFICANT CHANGE UP (ref 0–6)
HCT VFR BLD CALC: 27.6 % — LOW (ref 39–50)
HGB BLD-MCNC: 7.9 G/DL — LOW (ref 13–17)
LYMPHOCYTES # BLD AUTO: 1.9 K/UL — SIGNIFICANT CHANGE UP (ref 1–3.3)
LYMPHOCYTES # BLD AUTO: 24.7 % — SIGNIFICANT CHANGE UP (ref 13–44)
MCHC RBC-ENTMCNC: 17.5 PG — LOW (ref 27–34)
MCHC RBC-ENTMCNC: 28.7 G/DL — LOW (ref 32–36)
MCV RBC AUTO: 61 FL — LOW (ref 80–100)
MONOCYTES # BLD AUTO: 0.7 K/UL — SIGNIFICANT CHANGE UP (ref 0–0.9)
MONOCYTES NFR BLD AUTO: 9.3 % — SIGNIFICANT CHANGE UP (ref 2–14)
NEUTROPHILS # BLD AUTO: 4.8 K/UL — SIGNIFICANT CHANGE UP (ref 1.8–7.4)
NEUTROPHILS NFR BLD AUTO: 63 % — SIGNIFICANT CHANGE UP (ref 43–77)
PLATELET # BLD AUTO: 288 K/UL — SIGNIFICANT CHANGE UP (ref 150–400)
RBC # BLD: 4.52 M/UL — SIGNIFICANT CHANGE UP (ref 4.2–5.8)
RBC # FLD: 18.8 % — HIGH (ref 10.3–14.5)
WBC # BLD: 7.7 K/UL — SIGNIFICANT CHANGE UP (ref 3.8–10.5)
WBC # FLD AUTO: 7.7 K/UL — SIGNIFICANT CHANGE UP (ref 3.8–10.5)

## 2020-08-17 RX ORDER — METFORMIN HYDROCHLORIDE 850 MG/1
2 TABLET ORAL
Qty: 0 | Refills: 0 | DISCHARGE

## 2020-08-18 ENCOUNTER — APPOINTMENT (OUTPATIENT)
Age: 80
End: 2020-08-18

## 2020-08-18 DIAGNOSIS — C16.9 MALIGNANT NEOPLASM OF STOMACH, UNSPECIFIED: ICD-10-CM

## 2020-08-18 DIAGNOSIS — Z51.11 ENCOUNTER FOR ANTINEOPLASTIC CHEMOTHERAPY: ICD-10-CM

## 2020-08-18 DIAGNOSIS — R11.2 NAUSEA WITH VOMITING, UNSPECIFIED: ICD-10-CM

## 2020-08-18 PROCEDURE — P9040: CPT

## 2020-08-18 PROCEDURE — 36415 COLL VENOUS BLD VENIPUNCTURE: CPT

## 2020-08-18 PROCEDURE — 86900 BLOOD TYPING SEROLOGIC ABO: CPT

## 2020-08-18 PROCEDURE — 86923 COMPATIBILITY TEST ELECTRIC: CPT

## 2020-08-18 PROCEDURE — 86901 BLOOD TYPING SEROLOGIC RH(D): CPT

## 2020-08-18 PROCEDURE — 86850 RBC ANTIBODY SCREEN: CPT

## 2020-08-19 ENCOUNTER — APPOINTMENT (OUTPATIENT)
Age: 80
End: 2020-08-19

## 2020-08-19 ENCOUNTER — APPOINTMENT (OUTPATIENT)
Dept: HEMATOLOGY ONCOLOGY | Facility: CLINIC | Age: 80
End: 2020-08-19
Payer: MEDICARE

## 2020-08-19 VITALS
SYSTOLIC BLOOD PRESSURE: 110 MMHG | HEIGHT: 67 IN | DIASTOLIC BLOOD PRESSURE: 69 MMHG | TEMPERATURE: 97.3 F | HEART RATE: 79 BPM | BODY MASS INDEX: 20.57 KG/M2 | WEIGHT: 131.06 LBS | OXYGEN SATURATION: 95 %

## 2020-08-19 PROCEDURE — 99213 OFFICE O/P EST LOW 20 MIN: CPT

## 2020-08-19 NOTE — HISTORY OF PRESENT ILLNESS
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  He was sent for CT by his PCP, Dr. Charles Smith, due to anemia.  CT showed in the upper central abdomen abutting the posterior antrum of the stomach and neck of the pancreas there is a  3 x 2 x 3 cm mass.  There is an additional heterogeneous enhancing 2.7 x 2.3 x 2.5 lobulated mass in the ventral upper abdominal peritoneal cavity anterosuperiorly to the antrum of the stomach with internal and surrounding enhancing vessels.  Endoscopy with biopsy of the gastric mass was c/w moderately differentiated adenocarcinoma.  Staging PET showed hypermetabolic thickening of the distal stomach, consistent with primary gastric cancer.  Hypermetabolic activity in the distal stomach, inseparable from the perigastric lymphadenopathy, consistent with metastatic kong disease. [de-identified] : Patient presents on C1D3 FLOT (docetaxel (50 mg/m2), oxaliplatin (85 mg/m2), LV (200 mg/m2) with short-term infusional FU (2600 mg/m2 as a 24-hour infusion) Q2 weeks for gastric adenocarcinoma.  \par + Edema.  No fatigue.  No alopecia.  No N/V/D.  No stomatitis, mucositis.  No myalgia. No cold intolerance.  No H/F syndrome.  No neuropathy.  No fevers, no cough, no SOB.

## 2020-08-20 DIAGNOSIS — Z51.89 ENCOUNTER FOR OTHER SPECIFIED AFTERCARE: ICD-10-CM

## 2020-08-26 ENCOUNTER — OUTPATIENT (OUTPATIENT)
Dept: OUTPATIENT SERVICES | Facility: HOSPITAL | Age: 80
LOS: 1 days | Discharge: HOME | End: 2020-08-26

## 2020-08-26 DIAGNOSIS — C16.9 MALIGNANT NEOPLASM OF STOMACH, UNSPECIFIED: ICD-10-CM

## 2020-09-01 ENCOUNTER — APPOINTMENT (OUTPATIENT)
Age: 80
End: 2020-09-01

## 2020-09-01 ENCOUNTER — RESULT REVIEW (OUTPATIENT)
Age: 80
End: 2020-09-01

## 2020-09-01 ENCOUNTER — APPOINTMENT (OUTPATIENT)
Dept: HEMATOLOGY ONCOLOGY | Facility: CLINIC | Age: 80
End: 2020-09-01

## 2020-09-01 LAB
ANISOCYTOSIS BLD QL: SIGNIFICANT CHANGE UP
BASOPHILS # BLD AUTO: 0.1 K/UL — SIGNIFICANT CHANGE UP (ref 0–0.2)
BASOPHILS NFR BLD AUTO: 2 % — SIGNIFICANT CHANGE UP (ref 0–2)
BITE CELLS BLD QL SMEAR: PRESENT — SIGNIFICANT CHANGE UP
ELLIPTOCYTES BLD QL SMEAR: SLIGHT — SIGNIFICANT CHANGE UP
EOSINOPHIL # BLD AUTO: 0.1 K/UL — SIGNIFICANT CHANGE UP (ref 0–0.5)
GIANT PLATELETS BLD QL SMEAR: PRESENT — SIGNIFICANT CHANGE UP
HCT VFR BLD CALC: 31.8 % — LOW (ref 39–50)
HGB BLD-MCNC: 9.1 G/DL — LOW (ref 13–17)
HYPOCHROMIA BLD QL: SIGNIFICANT CHANGE UP
LG PLATELETS BLD QL AUTO: SLIGHT — SIGNIFICANT CHANGE UP
LYMPHOCYTES # BLD AUTO: 1.2 K/UL — SIGNIFICANT CHANGE UP (ref 1–3.3)
LYMPHOCYTES # BLD AUTO: 49 % — HIGH (ref 13–44)
MACROCYTES BLD QL: SLIGHT — SIGNIFICANT CHANGE UP
MCHC RBC-ENTMCNC: 18.6 PG — LOW (ref 27–34)
MCHC RBC-ENTMCNC: 28.8 G/DL — LOW (ref 32–36)
MCV RBC AUTO: 64.7 FL — LOW (ref 80–100)
MICROCYTES BLD QL: SIGNIFICANT CHANGE UP
MONOCYTES # BLD AUTO: 0.5 K/UL — SIGNIFICANT CHANGE UP (ref 0–0.9)
MONOCYTES NFR BLD AUTO: 21 % — HIGH (ref 2–14)
NEUTROPHILS # BLD AUTO: 0.7 K/UL — LOW (ref 1.8–7.4)
NEUTROPHILS NFR BLD AUTO: 27 % — LOW (ref 43–77)
OVALOCYTES BLD QL SMEAR: SLIGHT — SIGNIFICANT CHANGE UP
PLAT MORPH BLD: NORMAL — SIGNIFICANT CHANGE UP
PLATELET # BLD AUTO: 272 K/UL — SIGNIFICANT CHANGE UP (ref 150–400)
POIKILOCYTOSIS BLD QL AUTO: SLIGHT — SIGNIFICANT CHANGE UP
POLYCHROMASIA BLD QL SMEAR: SLIGHT — SIGNIFICANT CHANGE UP
RBC # BLD: 4.91 M/UL — SIGNIFICANT CHANGE UP (ref 4.2–5.8)
RBC # FLD: 20.5 % — HIGH (ref 10.3–14.5)
RBC BLD AUTO: ABNORMAL
ROULEAUX BLD QL SMEAR: PRESENT — SIGNIFICANT CHANGE UP
SCHISTOCYTES BLD QL AUTO: SLIGHT — SIGNIFICANT CHANGE UP
TARGETS BLD QL SMEAR: SIGNIFICANT CHANGE UP
VARIANT LYMPHS # BLD: 1 % — SIGNIFICANT CHANGE UP (ref 0–6)
WBC # BLD: 2.7 K/UL — LOW (ref 3.8–10.5)
WBC # FLD AUTO: 2.7 K/UL — LOW (ref 3.8–10.5)

## 2020-09-02 ENCOUNTER — APPOINTMENT (OUTPATIENT)
Age: 80
End: 2020-09-02

## 2020-09-02 DIAGNOSIS — Z51.89 ENCOUNTER FOR OTHER SPECIFIED AFTERCARE: ICD-10-CM

## 2020-09-03 ENCOUNTER — APPOINTMENT (OUTPATIENT)
Age: 80
End: 2020-09-03

## 2020-09-09 ENCOUNTER — LABORATORY RESULT (OUTPATIENT)
Age: 80
End: 2020-09-09

## 2020-09-09 ENCOUNTER — APPOINTMENT (OUTPATIENT)
Age: 80
End: 2020-09-09

## 2020-09-09 ENCOUNTER — APPOINTMENT (OUTPATIENT)
Dept: HEMATOLOGY ONCOLOGY | Facility: CLINIC | Age: 80
End: 2020-09-09

## 2020-09-09 ENCOUNTER — RESULT REVIEW (OUTPATIENT)
Age: 80
End: 2020-09-09

## 2020-09-09 LAB
BASOPHILS # BLD AUTO: 0.2 K/UL — SIGNIFICANT CHANGE UP (ref 0–0.2)
BASOPHILS NFR BLD AUTO: 1.8 % — SIGNIFICANT CHANGE UP (ref 0–2)
EOSINOPHIL # BLD AUTO: 0.2 K/UL — SIGNIFICANT CHANGE UP (ref 0–0.5)
EOSINOPHIL NFR BLD AUTO: 2.1 % — SIGNIFICANT CHANGE UP (ref 0–6)
HCT VFR BLD CALC: 30.5 % — LOW (ref 39–50)
HGB BLD-MCNC: 9.1 G/DL — LOW (ref 13–17)
LYMPHOCYTES # BLD AUTO: 1.8 K/UL — SIGNIFICANT CHANGE UP (ref 1–3.3)
LYMPHOCYTES # BLD AUTO: 19.8 % — SIGNIFICANT CHANGE UP (ref 13–44)
MCHC RBC-ENTMCNC: 19.4 PG — LOW (ref 27–34)
MCHC RBC-ENTMCNC: 29.8 G/DL — LOW (ref 32–36)
MCV RBC AUTO: 64.9 FL — LOW (ref 80–100)
MONOCYTES # BLD AUTO: 0.9 K/UL — SIGNIFICANT CHANGE UP (ref 0–0.9)
MONOCYTES NFR BLD AUTO: 9.9 % — SIGNIFICANT CHANGE UP (ref 2–14)
NEUTROPHILS # BLD AUTO: 5.9 K/UL — SIGNIFICANT CHANGE UP (ref 1.8–7.4)
NEUTROPHILS NFR BLD AUTO: 66.4 % — SIGNIFICANT CHANGE UP (ref 43–77)
PLATELET # BLD AUTO: 247 K/UL — SIGNIFICANT CHANGE UP (ref 150–400)
RBC # BLD: 4.69 M/UL — SIGNIFICANT CHANGE UP (ref 4.2–5.8)
RBC # FLD: 21.5 % — HIGH (ref 10.3–14.5)
WBC # BLD: 8.9 K/UL — SIGNIFICANT CHANGE UP (ref 3.8–10.5)
WBC # FLD AUTO: 8.9 K/UL — SIGNIFICANT CHANGE UP (ref 3.8–10.5)

## 2020-09-10 DIAGNOSIS — R11.2 NAUSEA WITH VOMITING, UNSPECIFIED: ICD-10-CM

## 2020-09-10 DIAGNOSIS — Z51.11 ENCOUNTER FOR ANTINEOPLASTIC CHEMOTHERAPY: ICD-10-CM

## 2020-09-11 ENCOUNTER — APPOINTMENT (OUTPATIENT)
Dept: HEMATOLOGY ONCOLOGY | Facility: CLINIC | Age: 80
End: 2020-09-11
Payer: MEDICARE

## 2020-09-11 ENCOUNTER — APPOINTMENT (OUTPATIENT)
Age: 80
End: 2020-09-11

## 2020-09-11 VITALS
HEIGHT: 67 IN | DIASTOLIC BLOOD PRESSURE: 64 MMHG | BODY MASS INDEX: 20.72 KG/M2 | HEART RATE: 82 BPM | OXYGEN SATURATION: 96 % | TEMPERATURE: 97.4 F | WEIGHT: 132 LBS | SYSTOLIC BLOOD PRESSURE: 102 MMHG

## 2020-09-11 PROCEDURE — 99213 OFFICE O/P EST LOW 20 MIN: CPT

## 2020-09-11 NOTE — HISTORY OF PRESENT ILLNESS
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  He was sent for CT by his PCP, Dr. Charles Smith, due to anemia.  CT showed in the upper central abdomen abutting the posterior antrum of the stomach and neck of the pancreas there is a  3 x 2 x 3 cm mass.  There is an additional heterogeneous enhancing 2.7 x 2.3 x 2.5 lobulated mass in the ventral upper abdominal peritoneal cavity anterosuperiorly to the antrum of the stomach with internal and surrounding enhancing vessels.  Endoscopy with biopsy of the gastric mass was c/w moderately differentiated adenocarcinoma.  Staging PET showed hypermetabolic thickening of the distal stomach, consistent with primary gastric cancer.  Hypermetabolic activity in the distal stomach, inseparable from the perigastric lymphadenopathy, consistent with metastatic kong disease. [de-identified] : Patient presents on C2D3 FLOT (docetaxel (50 mg/m2), oxaliplatin (85 mg/m2), LV (200 mg/m2) with short-term infusional FU (2600 mg/m2 as a 24-hour infusion) Q2 weeks for gastric adenocarcinoma.  C2 delayed by neutropenia.  + Edema.  + Early alopecia. + Decreased appetite initially but now improved and weight stable. + Dysgeusia.  Grade 1 H/F syndrome, mild hyperpigmentation. No fatigue, weakness.  No N/V/D.  No stomatitis, mucositis.  No myalgia, arthralgia. No epistaxis. No cold intolerance.  No nail changes. No neuropathy.  No fevers, no cough, no SOB.

## 2020-09-13 ENCOUNTER — INPATIENT (INPATIENT)
Facility: HOSPITAL | Age: 80
LOS: 2 days | Discharge: HOME | DRG: 41 | End: 2020-09-16
Attending: STUDENT IN AN ORGANIZED HEALTH CARE EDUCATION/TRAINING PROGRAM | Admitting: INTERNAL MEDICINE
Payer: COMMERCIAL

## 2020-09-13 VITALS
OXYGEN SATURATION: 99 % | HEART RATE: 77 BPM | TEMPERATURE: 98 F | RESPIRATION RATE: 16 BRPM | SYSTOLIC BLOOD PRESSURE: 110 MMHG | HEIGHT: 67 IN | WEIGHT: 130.07 LBS | DIASTOLIC BLOOD PRESSURE: 72 MMHG

## 2020-09-13 LAB
ALBUMIN SERPL ELPH-MCNC: 3.6 G/DL — SIGNIFICANT CHANGE UP (ref 3.3–5.2)
ALP SERPL-CCNC: 130 U/L — HIGH (ref 40–120)
ALT FLD-CCNC: 8 U/L — SIGNIFICANT CHANGE UP
ANION GAP SERPL CALC-SCNC: 13 MMOL/L — SIGNIFICANT CHANGE UP (ref 5–17)
APTT BLD: 27.1 SEC — LOW (ref 27.5–35.5)
AST SERPL-CCNC: 18 U/L — SIGNIFICANT CHANGE UP
BASOPHILS # BLD AUTO: 0 K/UL — SIGNIFICANT CHANGE UP (ref 0–0.2)
BASOPHILS NFR BLD AUTO: 0 % — SIGNIFICANT CHANGE UP (ref 0–2)
BILIRUB SERPL-MCNC: 0.3 MG/DL — LOW (ref 0.4–2)
BUN SERPL-MCNC: 14 MG/DL — SIGNIFICANT CHANGE UP (ref 8–20)
CALCIUM SERPL-MCNC: 9.2 MG/DL — SIGNIFICANT CHANGE UP (ref 8.6–10.2)
CHLORIDE SERPL-SCNC: 96 MMOL/L — LOW (ref 98–107)
CO2 SERPL-SCNC: 23 MMOL/L — SIGNIFICANT CHANGE UP (ref 22–29)
CREAT SERPL-MCNC: 0.69 MG/DL — SIGNIFICANT CHANGE UP (ref 0.5–1.3)
EOSINOPHIL # BLD AUTO: 0 K/UL — SIGNIFICANT CHANGE UP (ref 0–0.5)
EOSINOPHIL NFR BLD AUTO: 0 % — SIGNIFICANT CHANGE UP (ref 0–6)
GLUCOSE SERPL-MCNC: 134 MG/DL — HIGH (ref 70–99)
HCT VFR BLD CALC: 28.5 % — LOW (ref 39–50)
HGB BLD-MCNC: 8.8 G/DL — LOW (ref 13–17)
INR BLD: 1.11 RATIO — SIGNIFICANT CHANGE UP (ref 0.88–1.16)
LYMPHOCYTES # BLD AUTO: 0.97 K/UL — LOW (ref 1–3.3)
LYMPHOCYTES # BLD AUTO: 1.8 % — LOW (ref 13–44)
MAGNESIUM SERPL-MCNC: 2.2 MG/DL — SIGNIFICANT CHANGE UP (ref 1.8–2.6)
MCHC RBC-ENTMCNC: 20 PG — LOW (ref 27–34)
MCHC RBC-ENTMCNC: 30.9 GM/DL — LOW (ref 32–36)
MCV RBC AUTO: 64.9 FL — LOW (ref 80–100)
MONOCYTES # BLD AUTO: 0 K/UL — SIGNIFICANT CHANGE UP (ref 0–0.9)
MONOCYTES NFR BLD AUTO: 0 % — LOW (ref 2–14)
NEUTROPHILS # BLD AUTO: 52.85 K/UL — HIGH (ref 1.8–7.4)
NEUTROPHILS NFR BLD AUTO: 94.6 % — HIGH (ref 43–77)
PLATELET # BLD AUTO: 151 K/UL — SIGNIFICANT CHANGE UP (ref 150–400)
POTASSIUM SERPL-MCNC: 3.7 MMOL/L — SIGNIFICANT CHANGE UP (ref 3.5–5.3)
POTASSIUM SERPL-SCNC: 3.7 MMOL/L — SIGNIFICANT CHANGE UP (ref 3.5–5.3)
PROT SERPL-MCNC: 6.3 G/DL — LOW (ref 6.6–8.7)
PROTHROM AB SERPL-ACNC: 12.8 SEC — SIGNIFICANT CHANGE UP (ref 10.6–13.6)
RBC # BLD: 4.39 M/UL — SIGNIFICANT CHANGE UP (ref 4.2–5.8)
RBC # FLD: 26.9 % — HIGH (ref 10.3–14.5)
SODIUM SERPL-SCNC: 132 MMOL/L — LOW (ref 135–145)
TROPONIN T SERPL-MCNC: <0.01 NG/ML — SIGNIFICANT CHANGE UP (ref 0–0.06)
WBC # BLD: 53.82 K/UL — CRITICAL HIGH (ref 3.8–10.5)
WBC # FLD AUTO: 53.82 K/UL — CRITICAL HIGH (ref 3.8–10.5)

## 2020-09-13 PROCEDURE — 70450 CT HEAD/BRAIN W/O DYE: CPT | Mod: 26

## 2020-09-13 PROCEDURE — 99233 SBSQ HOSP IP/OBS HIGH 50: CPT

## 2020-09-13 PROCEDURE — 99291 CRITICAL CARE FIRST HOUR: CPT

## 2020-09-13 PROCEDURE — 71045 X-RAY EXAM CHEST 1 VIEW: CPT | Mod: 26

## 2020-09-13 PROCEDURE — 93010 ELECTROCARDIOGRAM REPORT: CPT

## 2020-09-13 PROCEDURE — 99222 1ST HOSP IP/OBS MODERATE 55: CPT

## 2020-09-13 RX ORDER — SODIUM CHLORIDE 9 MG/ML
1000 INJECTION INTRAMUSCULAR; INTRAVENOUS; SUBCUTANEOUS ONCE
Refills: 0 | Status: COMPLETED | OUTPATIENT
Start: 2020-09-13 | End: 2020-09-13

## 2020-09-13 RX ORDER — ONDANSETRON 8 MG/1
4 TABLET, FILM COATED ORAL ONCE
Refills: 0 | Status: COMPLETED | OUTPATIENT
Start: 2020-09-13 | End: 2020-09-13

## 2020-09-13 RX ADMIN — SODIUM CHLORIDE 1000 MILLILITER(S): 9 INJECTION INTRAMUSCULAR; INTRAVENOUS; SUBCUTANEOUS at 18:48

## 2020-09-13 RX ADMIN — ONDANSETRON 4 MILLIGRAM(S): 8 TABLET, FILM COATED ORAL at 18:48

## 2020-09-13 NOTE — CONSULT NOTE ADULT - ASSESSMENT
81 yo M w/ PMHX Prostate CA 2010, Gastric CA 2020, HTN, s/p 2nd chemo tx on 9/9, no longer diabetic s/p 50lb weight loss, presents c/o dizziness, unwitnessed syncopal episode, ?LOC, ?head trauma.         Plan  - Q1 neuro checks  - Repeat 6 hour CTH (3AM)  - Hold all antiplatelet/ anticoagulation at this time  - No NSAIDs   - b/l SCDs   - Recommend admission to MICU for syncopal/cardiac workup   - Will continue to follow   79 yo M w/ PMHX Prostate CA 2010, Gastric CA 2020, HTN, s/p 2nd chemo tx on 9/9, no longer diabetic s/p 50lb weight loss, presents c/o dizziness, unwitnessed syncopal episode, ?LOC, ?head trauma.         Plan  - Q1 neuro checks  - Repeat 6 hour CTH (3AM)  - Hold all antiplatelet/ anticoagulation at this time  - No NSAIDs   - b/l SCDs   - Recommend Keppra 500 BID  - Recommend admission to MICU for syncopal/cardiac workup   - Will continue to follow

## 2020-09-13 NOTE — ED PROVIDER NOTE - PROGRESS NOTE DETAILS
recevied sign out rafat ly we reviewed ct findingds with radiologist agree right sided subdural I spoke with fermin ramachandran who is comintg to evaluate pt nova: Pt signed out to Dr. sarabia pending neurosurg eval.

## 2020-09-13 NOTE — ED ADULT TRIAGE NOTE - CHIEF COMPLAINT QUOTE
Pt was found on the ground in the backyard by family. Pt was A&O x's 3 but was unsure if he fell or had a syncopal episode. Pt denies any complaints.

## 2020-09-13 NOTE — ED PROVIDER NOTE - OBJECTIVE STATEMENT
Pt is an 79 yo M brought in for syncope.  PMHx significant for gastric CA on chemo, htn. Pt states that he was in his backyard when he became dizzy and doesn't really remember what happened.  his wife states that she found him in the yard. Pt uncertain if he hit his head. Pt now only complaining of nausea. no fever/chills. no cp. no sob. no other complaints.

## 2020-09-13 NOTE — ED PROVIDER NOTE - CLINICAL SUMMARY MEDICAL DECISION MAKING FREE TEXT BOX
neuro intact [facility] pt and pts wife at bedside agree to plan of care

## 2020-09-13 NOTE — ED ADULT NURSE NOTE - OBJECTIVE STATEMENT
pt unsure why he is here. pt states he think he was outside and felt dizzy, doesn't remember falling or htiting head. denies blood thinners. pt reports having emesis since last night multiple times. pt reports poor intake bc of this. RR even unlabored. pt ambulatory as baseline. pt educated on plan of care, pt able to successfully teach back plan of care to RN, RN will continue to reeducate pt during hospital stay.

## 2020-09-14 DIAGNOSIS — S06.5X9A TRAUMATIC SUBDURAL HEMORRHAGE WITH LOSS OF CONSCIOUSNESS OF UNSPECIFIED DURATION, INITIAL ENCOUNTER: ICD-10-CM

## 2020-09-14 LAB
A1C WITH ESTIMATED AVERAGE GLUCOSE RESULT: 6.1 % — HIGH (ref 4–5.6)
BLD GP AB SCN SERPL QL: SIGNIFICANT CHANGE UP
CHOLEST SERPL-MCNC: 175 MG/DL — SIGNIFICANT CHANGE UP (ref 110–199)
ESTIMATED AVERAGE GLUCOSE: 128 MG/DL — HIGH (ref 68–114)
HDLC SERPL-MCNC: 46 MG/DL — SIGNIFICANT CHANGE UP
LIPID PNL WITH DIRECT LDL SERPL: 102 MG/DL — SIGNIFICANT CHANGE UP
SARS-COV-2 RNA SPEC QL NAA+PROBE: SIGNIFICANT CHANGE UP
TOTAL CHOLESTEROL/HDL RATIO MEASUREMENT: 4 RATIO — SIGNIFICANT CHANGE UP (ref 3.4–9.6)
TRIGL SERPL-MCNC: 134 MG/DL — SIGNIFICANT CHANGE UP (ref 10–200)
TSH SERPL-MCNC: 2.25 UIU/ML — SIGNIFICANT CHANGE UP (ref 0.27–4.2)

## 2020-09-14 PROCEDURE — 99232 SBSQ HOSP IP/OBS MODERATE 35: CPT

## 2020-09-14 PROCEDURE — 93880 EXTRACRANIAL BILAT STUDY: CPT | Mod: 26

## 2020-09-14 PROCEDURE — 93306 TTE W/DOPPLER COMPLETE: CPT | Mod: 26

## 2020-09-14 PROCEDURE — 99233 SBSQ HOSP IP/OBS HIGH 50: CPT

## 2020-09-14 PROCEDURE — 70450 CT HEAD/BRAIN W/O DYE: CPT | Mod: 26

## 2020-09-14 RX ORDER — CHLORHEXIDINE GLUCONATE 213 G/1000ML
1 SOLUTION TOPICAL
Refills: 0 | Status: DISCONTINUED | OUTPATIENT
Start: 2020-09-14 | End: 2020-09-16

## 2020-09-14 RX ORDER — PANTOPRAZOLE SODIUM 20 MG/1
40 TABLET, DELAYED RELEASE ORAL
Refills: 0 | Status: DISCONTINUED | OUTPATIENT
Start: 2020-09-14 | End: 2020-09-16

## 2020-09-14 RX ORDER — POLYETHYLENE GLYCOL 3350 17 G/17G
17 POWDER, FOR SOLUTION ORAL DAILY
Refills: 0 | Status: DISCONTINUED | OUTPATIENT
Start: 2020-09-14 | End: 2020-09-16

## 2020-09-14 RX ORDER — PROCHLORPERAZINE MALEATE 5 MG
10 TABLET ORAL EVERY 6 HOURS
Refills: 0 | Status: DISCONTINUED | OUTPATIENT
Start: 2020-09-14 | End: 2020-09-16

## 2020-09-14 RX ORDER — LEVETIRACETAM 250 MG/1
500 TABLET, FILM COATED ORAL
Refills: 0 | Status: DISCONTINUED | OUTPATIENT
Start: 2020-09-14 | End: 2020-09-16

## 2020-09-14 RX ORDER — SUCRALFATE 1 G
1 TABLET ORAL
Refills: 0 | Status: DISCONTINUED | OUTPATIENT
Start: 2020-09-14 | End: 2020-09-16

## 2020-09-14 RX ORDER — HYDRALAZINE HCL 50 MG
10 TABLET ORAL EVERY 6 HOURS
Refills: 0 | Status: DISCONTINUED | OUTPATIENT
Start: 2020-09-14 | End: 2020-09-16

## 2020-09-14 RX ADMIN — LEVETIRACETAM 500 MILLIGRAM(S): 250 TABLET, FILM COATED ORAL at 06:03

## 2020-09-14 RX ADMIN — LEVETIRACETAM 500 MILLIGRAM(S): 250 TABLET, FILM COATED ORAL at 17:02

## 2020-09-14 RX ADMIN — Medication 1 GRAM(S): at 07:21

## 2020-09-14 RX ADMIN — POLYETHYLENE GLYCOL 3350 17 GRAM(S): 17 POWDER, FOR SOLUTION ORAL at 20:25

## 2020-09-14 RX ADMIN — CHLORHEXIDINE GLUCONATE 1 APPLICATION(S): 213 SOLUTION TOPICAL at 05:13

## 2020-09-14 RX ADMIN — PANTOPRAZOLE SODIUM 40 MILLIGRAM(S): 20 TABLET, DELAYED RELEASE ORAL at 07:20

## 2020-09-14 RX ADMIN — Medication 5 MILLIGRAM(S): at 06:03

## 2020-09-14 RX ADMIN — Medication 1 GRAM(S): at 17:02

## 2020-09-14 NOTE — H&P ADULT - ASSESSMENT
79 y/o male with pmhx of prostate CA (2010), gastric cancer (diagnosed June 2020) on his second round of chemo therapy last dose given 9/9, active smoker, previous DM resolved after 50 pound weight loss now with:    1. acute right SDH    NEURO: q1 hour neurochecks. not on any a/c or antiplatelets at home. repeat head ct in 6 hours. neurosurgery following.    CVS: tight bp control goal 120-140. restart home enalapril.   PULM: no active issues  GI: no active issues  RENAL: no active issues  ID:  ENDO:  HEME:  DISPO: full code 81 y/o male with pmhx of prostate CA (2010), gastric cancer (diagnosed June 2020) on his second round of chemo therapy last dose given 9/9, active smoker, previous DM resolved after 50 pound weight loss now with:    1. acute right SDH    NEURO: q1 hour neurochecks. not on any a/c or antiplatelets at home. repeat head ct in 6 hours. neurosurgery following.    CVS: tight bp control goal 120-140. restart home enalapril.   PULM: no active issues  GI: no active issues  RENAL: no active issues  ID: no active issues  ENDO: check tsh, hgb A1C  HEME: hold chemical dvt prophylaxis. SCDs  DISPO: full code 81 y/o male with pmhx of prostate CA (2010), gastric cancer (diagnosed June 2020) on his second round of chemo therapy last dose given 9/9, active smoker, previous DM resolved after 50 pound weight loss now with:    1. acute right SDH    NEURO: q1 hour neurochecks. not on any a/c or antiplatelets at home. repeat head ct in 6 hours. keppra for seizure prophylaxis x 7 days. neurosurgery following.    CVS: tight bp control goal 120-140. restart home enalapril.   PULM: no active issues  GI: no active issues  RENAL: no active issues  ID: no active issues  ENDO: check tsh, hgb A1C  HEME: hold chemical dvt prophylaxis. SCDs  DISPO: full code    discussed with harriet urbano 79 y/o male with pmhx of prostate CA (2010), gastric cancer (diagnosed June 2020) on his second round of chemo therapy last dose given 9/9, active smoker, previous DM resolved after 50 pound weight loss now with:    1. acute right SDH    NEURO: q1 hour neurochecks. not on any a/c or antiplatelets at home. repeat head ct in 6 hours. keppra for seizure prophylaxis x 7 days. neurosurgery following.    CVS: tight bp control goal 120-140. restart home enalapril. EKG NSR. TTE pending. carotid u/s. tele monitoring. consult cardio in am.  PULM: no active issues  GI: no active issues  RENAL: no active issues  ID: no active issues  ENDO: check tsh, hgb A1C  HEME: hold chemical dvt prophylaxis. SCDs  DISPO: full code    discussed with harriet urbano

## 2020-09-14 NOTE — PROGRESS NOTE ADULT - ASSESSMENT
80ym s/p fall with right frontal and parietal convexity subdural hematoma.  Stable mass effect and shift.      Plan  1. Pt's ct of the brain stable   2. Cardiology following Pt has a known RBBB, Echo will be repeated.     3. Pt's blood pressure under control on enalapril. Medical care as per Medicine and Cardio  4. Pt will need to follow up with Dr Woo in 2 weeks as an out patient

## 2020-09-14 NOTE — PROGRESS NOTE ADULT - ASSESSMENT
ASSESSMENT   1.   2.   3.   4.   5.     PLAN     NEURO:     PULM:      CV:     GI:      :     ENDO:     ID:     HEME/DVT PPX:     ETHICS        CODE STATUS: ***      Care discussed with bedside provider and eICU attending.     Time Spent: ***min ASSESSMENT   1.   2.   3.   4.   5.     PLAN     NEURO:     PULM:      CV:     GI:      :     ENDO:     ID:     HEME/DVT PPX:     ETHICS        CODE STATUS: Full Code       Care discussed with bedside provider and eICU attending.     Time Spent: ***min ASSESSMENT  81 y/o male with pmhx of prostate CA (2010), gastric cancer (diagnosed June 2020) on his second round of chemo therapy last dose given 9/9, active smoker, previous DM resolved after 50 pound weight loss who presents s/p syncopal episode at home. Patient does not remember surrounding events or events leading up to syncopal episode. He can recall this event happening once before about one year ago. Found to have right 1.2 cm SDH with 5 mm midline shift. Neurologically he is asymptomatic, A&O x 4.     Events last 24 hours:   Repeat CT head with improved midline shift and unchanged SDH, pt otherwise asymptomatic and neurologically intact, pt seem by NS team who cleared for q2h neuro checks. Pt with plan to be downgraded to step down today. Cardiology called form workup of syncope - TTE pending as well as carotid doppler. Cards recommending eventual loop recorder     1. SDH with midline shift    PLAN   -pt neurologically intact   -rrepeat CT head stable   -NS cleared to change neuro checks from q1h to q2h   -Pt had syncopal workup - cards called and following case - recommending loop recorder   -pt can be downgraded to step down service     ETHICS        CODE STATUS: Full Code       Care discussed with bedside provider and eICU attending.     Time Spent: 40 min

## 2020-09-14 NOTE — CHART NOTE - NSCHARTNOTEFT_GEN_A_CORE
Acceptance note     79 y/o male with pmhx of prostate CA (2010), gastric cancer (diagnosed June 2020) on his second round of chemo therapy last dose given 9/9, active smoker, previous DM resolved after 50 pound weight loss who presents s/p syncopal episode at home. Patient does not remember surrounding events or events leading up to syncopal episode. He can recall this event happening once before about one year ago. Found to have right 1.2 cm SDH with 5 mm midline shift. Neurologically he is asymptomatic, A&O x 4. Initially admitted to ICU and now stable for downgrade to the floor.     Vital Signs Last 24 Hrs  T(C): 37 (14 Sep 2020 15:15), Max: 37 (14 Sep 2020 15:15)  T(F): 98.6 (14 Sep 2020 15:15), Max: 98.6 (14 Sep 2020 15:15)  HR: 74 (14 Sep 2020 15:15) (67 - 80)  BP: 101/67 (14 Sep 2020 15:15) (92/62 - 177/68)  BP(mean): 68 (14 Sep 2020 14:30) (68 - 93)  RR: 18 (14 Sep 2020 15:15) (11 - 33)  SpO2: 99% (14 Sep 2020 15:15) (96% - 100%)    1. Acute subdural hematoma   Repeat CT head done; stable hematoma   c/w neuro check q4   Strict BP control.   c/w Enalapril 5 mg po daily   Keppra 500 mg po twice daily for seizure prevention     2. Leukocytosis likely Neupogen induced   3. Hx of gastric CA on active chemotherapy   4. Normocytic anemia of chronic disease  5. Constipation: give Miralax daily   6. DVT prophylaxis: compression boots

## 2020-09-14 NOTE — ED ADULT NURSE REASSESSMENT NOTE - GENERAL PATIENT STATE
comfortable appearance/cooperative
comfortable appearance/cooperative
comfortable appearance/cooperative/resting/sleeping
cooperative/comfortable appearance
cooperative/resting/sleeping/comfortable appearance
comfortable appearance/resting/sleeping/cooperative
comfortable appearance/cooperative

## 2020-09-14 NOTE — ED ADULT NURSE REASSESSMENT NOTE - NS ED NURSE REASSESS COMMENT FT1
Assumed pt. care at 1930 form off going RN. Pt. A&Ox3. Pt. on cardiac monitor and continuous pulse ox. Pt. in NSR in lead II. Pt. respirations even and unlabored. Pt. resting comfortably in no apparent distress. Pt. denies any chest pain, SOB, dizziness or blurry vision. Pt. informed of plan of care that he is waiting for CT.
Pt. mental status unchanged. Pt. respirations even and unlabored. Neuro just at bedside to eval pt. Pt. ambulated to bathroom with steady gait with RN.
pt status unchanged, refer to flowsheet and chart, pt safety maintained, pt hemodynamically stable
Handoff received from offgoing RN, charting as noted. Pt. received alert and oriented x 4, VSS, in NAD. Wife at bedside. Pt. ADMITTED to MICU at this time, awaiting bed.
pt status unchanged, refer to flowsheet and chart, pt safety maintained, pt hemodynamically stable

## 2020-09-14 NOTE — CONSULT NOTE ADULT - ASSESSMENT
80y Male with prior history of prostate CA (2010), gastric cancer (diagnosed June 2020) on his second round of chemo therapy last dose given 9/9, active smoker, previous DM resolved after 50 pound weight loss prior episodes of syncope thought to be vasovagal by history with an episode of syncope. Found to have right 1.2 cm SDH with 5 mm midline shift. At the time of evaluation, pt reports feeling well. He does not remember events around the episode.    Syncope  - RBBB known from before  - Echo with normal LV function. Repeat Echo pending  - Will arrange ILR prior to d/c    HTN  - BP controlled

## 2020-09-14 NOTE — CHART NOTE - NSCHARTNOTEFT_GEN_A_CORE
Casr D/w JULES Cunningham, re: ICU Admission  Mr Petty is an 79 y/o male with pmhx of prostate CA, recently dxed gastric cancee, s/p second round of chemo last week, presented to the ED last night via ambulance s/p syncopal episode.  Pt was A and O x 3 but does not recall how he fell or passed out.  CT head reports a new predominantly isodense right frontal and parietal convexity subdural hematoma measuring up to 1.2 cm in thickness over the frontal convexity and 1.8 cm in thickness of the parietal convexity. Mass effect upon the right cerebrum with 5 mm shift of the midline towards the left.  Hyperdense focus in the left frontal lobe measuring 1.2 cm unchanged compared to prior exam from 10/19/2019 and may represent a calcification or cavernoma.  Rounded subcentimeter metallic foreign bodies in the left preseptal and post septal soft tissues, unchanged.   Pt noted to have a WBC of 54,000, states he just received Neupogen recently.  Denies fever, chills, chest pain, cough. He has mild nausea, but no vomiting.  LABS: 09/13/20                        8.8    53.82 )-----------( 151                  28.5       132<L>  |  96<L>  |  14.0  ----------------------------<  134<H>  3.7   |  23.0  |  0.69    Ca    9.2      13 Sep 2020 18:47  Mg     2.2     09-13  TPro  6.3<L>  /  Alb  3.6  /  TBili  0.3<L>  /  DBili  x   /  AST  18  /  ALT  8   /  AlkPhos  130<H>  09-13    CARDIAC MARKERS ( 13 Sep 2020 18:47 )  x     / <0.01 ng/mL / x     / x     / x        PT/INR - ( 13 Sep 2020 22:27 )   PT: 12.8 sec;   INR: 1.11 ratio    PTT - ( 13 Sep 2020 22:27 )  PTT:27.1 sec    Assessment/Plan: As above, new predominantly isodense right frontal and parietal subdural hematoma measuring up to 1.2 cm in thickness over the frontal convexity and 1.8 cm in thickness of the parietal convexity with mass effect upon the right cerebrum with 5 mm shift of the midline towards the left  S/p head trauma, syncopal episode  hx of gastric Cancer, s/p recent chemo  Elev WBC, s/p Neupogen  Hyponmatremia, mild    Hourly Neuro checks  Seizure prophylaxis with Keppra 500 mg twice a day  FFup CT Head this AM  Neuro surg ffup  FFup CBC, ffyp lytes  No antiplatelets or pharmacologic thromboprophylaxis  DVT prophylaxis with IPC Case D/w JULES Cunningham, re: ICU Admission  Mr Petty is an 81 y/o male with pmhx of prostate CA, recently dxed gastric cancee, s/p second round of chemo last week, presented to the ED last night via ambulance s/p syncopal episode.  Pt was A and O x 3 but does not recall how he fell or passed out.  CT head reports a new predominantly isodense right frontal and parietal convexity subdural hematoma measuring up to 1.2 cm in thickness over the frontal convexity and 1.8 cm in thickness of the parietal convexity. Mass effect upon the right cerebrum with 5 mm shift of the midline towards the left.  Hyperdense focus in the left frontal lobe measuring 1.2 cm unchanged compared to prior exam from 10/19/2019 and may represent a calcification or cavernoma.  Rounded subcentimeter metallic foreign bodies in the left preseptal and post septal soft tissues, unchanged.   Pt noted to have a WBC of 54,000, states he just received Neupogen recently.  Denies fever, chills, chest pain, cough. He has mild nausea, but no vomiting.  LABS: 09/13/20                        8.8    53.82 )-----------( 151                  28.5       132<L>  |  96<L>  |  14.0  ----------------------------<  134<H>  3.7   |  23.0  |  0.69    Ca    9.2      13 Sep 2020 18:47  Mg     2.2     09-13  TPro  6.3<L>  /  Alb  3.6  /  TBili  0.3<L>  /  DBili  x   /  AST  18  /  ALT  8   /  AlkPhos  130<H>  09-13    CARDIAC MARKERS ( 13 Sep 2020 18:47 )  x     / <0.01 ng/mL / x     / x     / x        PT/INR - ( 13 Sep 2020 22:27 )   PT: 12.8 sec;   INR: 1.11 ratio    PTT - ( 13 Sep 2020 22:27 )  PTT:27.1 sec    Assessment/Plan: As above, new predominantly isodense right frontal and parietal subdural hematoma measuring up to 1.2 cm in thickness over the frontal convexity and 1.8 cm in thickness of the parietal convexity with mass effect upon the right cerebrum with 5 mm shift of the midline towards the left  S/p head trauma, syncopal episode  hx of gastric Cancer, s/p recent chemo  Elev WBC, s/p Neupogen  Hyponmatremia, mild    Hourly Neuro checks  Seizure prophylaxis with Keppra 500 mg twice a day  FFup CT Head this AM  Neuro surg ffup  FFup CBC, ffyp lytes  No antiplatelets or pharmacologic thromboprophylaxis  DVT prophylaxis with IPC

## 2020-09-14 NOTE — H&P ADULT - HISTORY OF PRESENT ILLNESS
79 y/o male with pmhx of prostate CA (2010), gastric cancer (diagnosed June 2020) on his second round of chemo therapy last dose given 9/9, active smoker, previous DM resolved after 50 pound weight loss who presents s/p syncopal episode at home. Patient does not remember surrounding events or events leading up to syncopal episode. He can recall this event happening once before about one year ago. Found to have right 1.2 cm SDH with 5 mm midline shift. Neurologically he is asymptomatic, A&O x 4.     He received a dose of neupogen recently. WBC elevated to 54.

## 2020-09-14 NOTE — ED ADULT NURSE REASSESSMENT NOTE - ED NEURO NIH ASSESS
within defined limits
baseline

## 2020-09-14 NOTE — ED ADULT NURSE REASSESSMENT NOTE - NURSING NEURO LEVEL OF CONSCIOUSNESS
alert and awake
alert and awake/follows commands
follows commands/alert and awake
alert and awake/follows commands

## 2020-09-14 NOTE — ED ADULT NURSE REASSESSMENT NOTE - GLASGOW COMA SCALE: BEST MOTOR RESPONSE
(M6) obeys commands

## 2020-09-14 NOTE — ED ADULT NURSE REASSESSMENT NOTE - GLASGOW COMA SCALE: EYE OPENING
(E4) spontaneous

## 2020-09-14 NOTE — ED ADULT NURSE REASSESSMENT NOTE - NEURO SENSATION
sensory intact

## 2020-09-14 NOTE — H&P ADULT - NSHPSOCIALHISTORY_GEN_ALL_CORE
active smoker. smokes about 1 pack over the course of a week but daughter states he smokes more. smoking for 50 + years. denies etoh.

## 2020-09-14 NOTE — CONSULT NOTE ADULT - SUBJECTIVE AND OBJECTIVE BOX
Patient is a 80y old Male who presents with a chief complaint of syncope     HISTORY OF PRESENT ILLNESS:   80 year old male w/ PMHX Prostate CA , Gastric CA , HTN, s/p 2nd chemo tx on , no longer diabetic s/p 50lb weight loss, presents c/o dizziness, unwitnessed syncopal episode, ?LOC. Found by wife after hearing a crash in the kitchen, unsure if head trauma occurred, called EMS. Wife bedside, states pts grandson brought pt outside after fall thinking he "needed some air." Denies any recent falls, changes in vision, HA. Hx of fall in 2019. Admits to nausea, fatigue, vomiting yesterday s/p chemo w/ another vomiting episode in ED. GCS=15.       PAST MEDICAL & SURGICAL HISTORY:  DM (diabetes mellitus)  Prostate cancer      FAMILY HISTORY:  FH: diabetes mellitus        SOCIAL HISTORY:  Tobacco Use: unknown  EtOH use: unknown  Substance:    Allergies    No Known Allergies    Intolerances        REVIEW OF SYSTEMS  Negative except as noted in HPI    HOME MEDICATIONS:  enalapril 5 mg oral tablet: 1 tab(s) orally once a day (01 Sep 2020 17:07)  ondansetron 8 mg oral tablet, disintegratin tab(s) orally every 8 hours, As Needed - for nausea (01 Sep 2020 17:07)  pantoprazole 40 mg oral delayed release tablet: 1 tab(s) orally once a day (01 Sep 2020 17:07)  prochlorperazine 10 mg oral tablet: 1 tab(s) orally every 6 hours, As Needed - for nausea (01 Sep 2020 17:07)  sucralfate 1 g/10 mL oral suspension: 10 milliliter(s) orally 4 times a day (before meals and at bedtime) (01 Sep 2020 17:07)      MEDICATIONS:  Antibiotics:    Neuro:    Anticoagulation:    OTHER:    IVF:      Vital Signs Last 24 Hrs  T(C): 36.5 (13 Sep 2020 18:01), Max: 36.5 (13 Sep 2020 18:01)  T(F): 97.7 (13 Sep 2020 18:01), Max: 97.7 (13 Sep 2020 18:01)  HR: 77 (13 Sep 2020 18:01) (77 - 77)  BP: 110/72 (13 Sep 2020 18:01) (110/72 - 110/72)  BP(mean): --  RR: 16 (13 Sep 2020 18:01) (16 - 16)  SpO2: 99% (13 Sep 2020 18:01) (99% - 99%)      PHYSICAL EXAM:  GENERAL: NAD, well-groomed, well-developed. Pleasant  HEAD: Atraumatic, normocephalic  ENMT: Moist mucous membranes  YANIRA COMA SCORE: E-4 V-5 M-6 =15  MENTAL STATUS: AAO x3; Awake; Opens eyes spontaneously, Appropriately conversant without aphasia. Following 2 step commands  CRANIAL NERVES: Visual acuity normal for age, PERRL. EOMI without nystagmus. Face symmetric w/ normal eye closure and smile, tongue midline. Hearing grossly intact. Speech clear.   MOTOR: strength 5/5 b/l upper and lower extremities. No pronator drift in UE b/l   SENSATION: grossly intact to light touch all extremities      LABS:                        8.8    53.82 )-----------( 151      ( 13 Sep 2020 18:47 )             28.5     13    132<L>  |  96<L>  |  14.0  ----------------------------<  134<H>  3.7   |  23.0  |  0.69    Ca    9.2      13 Sep 2020 18:47  Mg     2.2         TPro  6.3<L>  /  Alb  3.6  /  TBili  0.3<L>  /  DBili  x   /  AST  18  /  ALT  8   /  AlkPhos  130<H>  09    PT/INR - ( 13 Sep 2020 22:27 )   PT: 12.8 sec;   INR: 1.11 ratio         PTT - ( 13 Sep 2020 22:27 )  PTT:27.1 sec      CULTURES:        RADIOLOGY & ADDITIONAL STUDIES:  CT Head No Cont (20 @ 21:39)   IMPRESSION:  New predominantly isodense right frontal and parietal convexity subdural hematoma measuring up to 1.2 cm in thickness over the frontal convexity and 1.8 cm in thickness of the parietal convexity. Mass effect upon the right cerebrum with 5 mm shift of the midline towards the left.  Hyperdense focus in the left frontal lobe measuring 1.2 cm unchanged compared to prior exam from 10/19/2019 and may represent a calcification or cavernoma.  Rounded subcentimeter metallic foreign bodies in the left preseptal and post septal soft tissues, unchanged.  Critical findings were discussed with Dr. Burrows of the emergency Department at 10:10PM on 2020.      CAPRINI SCORE [CLOT]:  Patient has an estimated Caprini score of greater than 5.  However, the patient's unique clinical situation will be addressed in an individual manner to determine appropriate anticoagulation treatment, if any.
[facility], located within [facility]                              [address] [address]                                                 PHONE: [phone]                                                 FAX: [phone]  ------------------------------------------------------------------------------------------------    80y Male with past medical history as under who presents s/p syncopal episode at home. Patient does not remember surrounding events or events leading up to syncopal episode. Found by wife after hearing a crash in the kitchen, unsure if head trauma occurred, called EMS. Pts grandson brought pt outside after fall thinking he "needed some air." Found to have right 1.2 cm SDH with 5 mm midline shift.   At the time of evaluation, pt reports feeling well. He does not remember events around the episode. Neurologically he is asymptomatic, A&O x 4. He has had prior episodes of syncope that were deemed vasovagal by history.     PAST MEDICAL & SURGICAL HISTORY:  DM (diabetes mellitus)    Prostate cancer    No Known Allergies    Review of Systems:  Positive for  syncope.  Rest of the systems were reviewed and was negative.     Family history:   No pertinent family history in first degree relative of early CAD, sudden cardiac death or  congenital heart disease    Social History:  No smoking   No alcohol  No other drug use    Vital Signs Last 24 Hrs  T(C): 36.9 (14 Sep 2020 08:45), Max: 36.9 (14 Sep 2020 05:13)  T(F): 98.5 (14 Sep 2020 08:45), Max: 98.5 (14 Sep 2020 08:45)  HR: 67 (14 Sep 2020 10:30) (67 - 80)  BP: 112/56 (14 Sep 2020 10:30) (108/51 - 177/68)  BP(mean): 79 (14 Sep 2020 10:30) (76 - 93)  RR: 16 (14 Sep 2020 10:30) (16 - 30)  SpO2: 98% (14 Sep 2020 10:30) (97% - 100%)    PHYSICAL EXAM:  Constitutional: Appears well developed, well nourished; alert and co-operative  HEENT:     Head: Normocephalic and atraumatic  Eyes: Conjunctiva normal, No scleral icterus  Neck: Supple, No JVD  Mouth/Throat: Mucous membranes are normal. Mucosa are not pale or dry.  Cardiovascular: regular S1, S2  Respiratory: Lungs clear to auscultation; no crepitations, no wheeze  Gastrointestinal:  Soft, Non-tender, + BS	  Musculoskeletal: Normal range of motion. No edema  Skin: Warm and dry. No cyanosis . No diaphoresis.  Neurologic: Alert oriented to time place and person. Normal strength and no tremor.  Psychiatric: Normal mood and affect, Speech is normal and behavior is normal.        LABS:                        8.8    53.82 )-----------( 151      ( 13 Sep 2020 18:47 )             28.5     09-13    132<L>  |  96<L>  |  14.0  ----------------------------<  134<H>  3.7   |  23.0  |  0.69    Ca    9.2      13 Sep 2020 18:47  Mg     2.2     09-13    TPro  6.3<L>  /  Alb  3.6  /  TBili  0.3<L>  /  DBili  x   /  AST  18  /  ALT  8   /  AlkPhos  130<H>  09-13    CARDIAC MARKERS ( 13 Sep 2020 18:47 )  x     / <0.01 ng/mL / x     / x     / x        PT/INR - ( 13 Sep 2020 22:27 )   PT: 12.8 sec;   INR: 1.11 ratio       PTT - ( 13 Sep 2020 22:27 )  PTT:27.1 sec    RADIOLOGY & ADDITIONAL STUDIES: (reviewed)  CXR was independently visualized/reviewed and demonstrated: lung nodules    CARDIOLOGY TESTING:(reviewed)     ECG (Independent visualization): NSR with RBBB    ECHOCARDIOGRAM :   1. Technically difficult study with poor endocardial visualization.   2. Endocardial visualization was enhanced with intravenous echo contrast.   3. Normal global left ventricular systolic function.   4. Left ventricular ejection fraction, by visual estimation, is 60 to   65%.   5. Spectral Doppler shows impaired relaxation pattern of left   ventricular myocardial filling (Grade I diastolic dysfunction).   6. No regional LV wall motion abnormalties visualized.   7. RV appears grossly normal in size in limited view; subcostal view.   8. There is no evidence of pericardial effusion.   9. Mild mitral annular calcification.  10. Thickening of the anterior and posterior mitral valve leaflets.  11. Sclerotic aortic valve with normal opening.  12. Recommend clinical correlation with the above findings.  13. No prior study for comparison purposes.    MEDICATIONS:(reviewed)  Home Medications:  `Home Medications:  enalapril 5 mg oral tablet: 1 tab(s) orally once a day (14 Sep 2020 08:47)  Neupogen:  (14 Sep 2020 08:)  ondansetron 8 mg oral tablet, disintegratin tab(s) orally every 8 hours, As Needed - for nausea (14 Sep 2020 08:)  pantoprazole 40 mg oral delayed release tablet: 1 tab(s) orally 2 times a day (14 Sep 2020 08:47)  prochlorperazine 10 mg oral tablet: 1 tab(s) orally every 6 hours, As Needed - for nausea (14 Sep 2020 08:47)  sucralfate 1 g/10 mL oral suspension: 10 milliliter(s) orally 4 times a day (before meals and at bedtime) (14 Sep 2020 08:47)      MEDICATIONS  (STANDING):  chlorhexidine 2% Cloths 1 Application(s) Topical <User Schedule>  enalapril 5 milliGRAM(s) Oral daily  levETIRAcetam 500 milliGRAM(s) Oral two times a day  pantoprazole    Tablet 40 milliGRAM(s) Oral before breakfast  sucralfate 1 Gram(s) Oral two times a day    MEDICATIONS  (PRN):  hydrALAZINE Injectable 10 milliGRAM(s) IV Push every 6 hours PRN SBP > 150  prochlorperazine   Tablet 10 milliGRAM(s) Oral every 6 hours PRN for nausea

## 2020-09-14 NOTE — PROGRESS NOTE ADULT - SUBJECTIVE AND OBJECTIVE BOX
CC:  Patient is a 80y old  Male who presents with a chief complaint of SDH (14 Sep 2020 11:01)      HPI/BRIEF HOSPITAL COURSE: ***    Events last 24 hours: ***    PAST MEDICAL & SURGICAL HISTORY:  DM (diabetes mellitus)    Prostate cancer      Allergies    No Known Allergies    Intolerances      FAMILY HISTORY:  FH: diabetes mellitus        Review of Systems:  CONSTITUTIONAL: No fever, chills, or fatigue  EYES: No eye pain, visual disturbances, or discharge  ENMT:  No difficulty hearing, tinnitus, vertigo; No sinus or throat pain  NECK: No pain or stiffness  RESPIRATORY: No cough, wheezing, chills or hemoptysis; No shortness of breath  CARDIOVASCULAR: No chest pain, palpitations, dizziness, or leg swelling  GASTROINTESTINAL: No abdominal or epigastric pain. No nausea, vomiting, or hematemesis; No diarrhea or constipation. No melena or hematochezia.  GENITOURINARY: No dysuria, frequency, hematuria, or incontinence  NEUROLOGICAL: No headaches, memory loss, loss of strength, numbness, or tremors  SKIN: No itching, burning, rashes, or lesions   MUSCULOSKELETAL: No joint pain or swelling; No muscle, back, or extremity pain  PSYCHIATRIC: No depression, anxiety, mood swings, or difficulty sleeping      Medications:    enalapril 5 milliGRAM(s) Oral daily  hydrALAZINE Injectable 10 milliGRAM(s) IV Push every 6 hours PRN      levETIRAcetam 500 milliGRAM(s) Oral two times a day  prochlorperazine   Tablet 10 milliGRAM(s) Oral every 6 hours PRN        pantoprazole    Tablet 40 milliGRAM(s) Oral before breakfast  sucralfate 1 Gram(s) Oral two times a day            chlorhexidine 2% Cloths 1 Application(s) Topical <User Schedule>            ICU Vital Signs Last 24 Hrs  T(C): 37 (14 Sep 2020 15:15), Max: 37 (14 Sep 2020 15:15)  T(F): 98.6 (14 Sep 2020 15:15), Max: 98.6 (14 Sep 2020 15:15)  HR: 74 (14 Sep 2020 15:15) (67 - 80)  BP: 101/67 (14 Sep 2020 15:15) (92/62 - 177/68)  BP(mean): 68 (14 Sep 2020 14:30) (68 - 93)  ABP: --  ABP(mean): --  RR: 18 (14 Sep 2020 15:15) (11 - 33)  SpO2: 99% (14 Sep 2020 15:15) (96% - 100%)    Vital Signs Last 24 Hrs  T(C): 37 (14 Sep 2020 15:15), Max: 37 (14 Sep 2020 15:15)  T(F): 98.6 (14 Sep 2020 15:15), Max: 98.6 (14 Sep 2020 15:15)  HR: 74 (14 Sep 2020 15:15) (67 - 80)  BP: 101/67 (14 Sep 2020 15:15) (92/62 - 177/68)  BP(mean): 68 (14 Sep 2020 14:30) (68 - 93)  RR: 18 (14 Sep 2020 15:15) (11 - 33)  SpO2: 99% (14 Sep 2020 15:15) (96% - 100%)        I&O's Detail        LABS:                        8.8    53.82 )-----------( 151      ( 13 Sep 2020 18:47 )             28.5     09-13    132<L>  |  96<L>  |  14.0  ----------------------------<  134<H>  3.7   |  23.0  |  0.69    Ca    9.2      13 Sep 2020 18:47  Mg     2.2     09-13    TPro  6.3<L>  /  Alb  3.6  /  TBili  0.3<L>  /  DBili  x   /  AST  18  /  ALT  8   /  AlkPhos  130<H>  09-13      CARDIAC MARKERS ( 13 Sep 2020 18:47 )  x     / <0.01 ng/mL / x     / x     / x          CAPILLARY BLOOD GLUCOSE      POCT Blood Glucose.: 147 mg/dL (13 Sep 2020 18:02)    PT/INR - ( 13 Sep 2020 22:27 )   PT: 12.8 sec;   INR: 1.11 ratio         PTT - ( 13 Sep 2020 22:27 )  PTT:27.1 sec    CULTURES:        Physical Examination:  General: No acute distress.  Alert, oriented, interactive, nonfocal  NEURO: A&O X3, motor function 5/5 BL UE/LE  HEENT: Pupils equal, reactive to light.  Symmetric.  PULM: CTA BL, no significant sputum production, no wheezes, rales, rhonchi  CVS: Regular rate and rhythm, no murmurs, rubs, or gallops  ABD: Soft, nondistended, nontender, normoactive bowel sounds, no masses  EXT: No edema, nontender  SKIN: Warm and well perfused, no rashes noted      EKG: ***    RADIOLOGY: ***      CENTRAL LINE: N          DATE INSERTED:                  HU: N                        DATE INSERTED:                  A-LINE: N                       DATE INSERTED:                  GLOBAL ISSUE/BEST PRACTICE:  Analgesia: N/A  Sedation: N/A  HOB elevation: yes  Stress ulcer prophylaxis: protonix   VTE prophylaxis: SCD/Heparin SQ/Lovenox SQ  Glycemic control: N/A  Nutrition: NPO/Diet/TF       CC:  Patient is a 80y old  Male who presents with a chief complaint of SDH (14 Sep 2020 11:01)      HPI/BRIEF HOSPITAL COURSE:     79 y/o male with pmhx of prostate CA (2010), gastric cancer (diagnosed June 2020) on his second round of chemo therapy last dose given 9/9, active smoker, previous DM resolved after 50 pound weight loss who presents s/p syncopal episode at home. Patient does not remember surrounding events or events leading up to syncopal episode. He can recall this event happening once before about one year ago. Found to have right 1.2 cm SDH with 5 mm midline shift. Neurologically he is asymptomatic, A&O x 4.     Events last 24 hours:   Repeat CT head with improved midline shift and unchanged SDH, pt otherwise asymptomatic and neurologically intact, pt seem by NS team who cleared for q2h neuro checks. Pt with plan to be downgraded to step down today. Cardiology called form workup of syncope - TTE pending as well as carotid doppler. Cards recommending eventual loop recorder     PAST MEDICAL & SURGICAL HISTORY:  DM (diabetes mellitus)    Prostate cancer      Allergies    No Known Allergies    Intolerances      FAMILY HISTORY:  FH: diabetes mellitus        Review of Systems:  CONSTITUTIONAL: No fever, chills, or fatigue  EYES: No visual disturbances  ENMT:  No sinus or throat pain  NECK: No pain or stiffness  RESPIRATORY: No cough, wheezing, chills or hemoptysis; No shortness of breath  CARDIOVASCULAR: No chest pain, palpitations, dizziness, or leg swelling  GASTROINTESTINAL: No abdominal or epigastric pain. No nausea, vomiting, or hematemesis; No diarrhea or constipation. No melena  GENITOURINARY: No dysuria, frequency, hematuria, or incontinence  NEUROLOGICAL: No headaches, memory loss, loss of strength, numbness, or tremors  SKIN: No itching, burning, rashes, or lesions   MUSCULOSKELETAL: No joint pain or swelling; No muscle, back, or extremity pain  PSYCHIATRIC: No difficulty sleeping      Medications:  enalapril 5 milliGRAM(s) Oral daily  hydrALAZINE Injectable 10 milliGRAM(s) IV Push every 6 hours PRN  levETIRAcetam 500 milliGRAM(s) Oral two times a day  prochlorperazine   Tablet 10 milliGRAM(s) Oral every 6 hours PRN  pantoprazole    Tablet 40 milliGRAM(s) Oral before breakfast  sucralfate 1 Gram(s) Oral two times a day  chlorhexidine 2% Cloths 1 Application(s) Topical <User Schedule>      ICU Vital Signs Last 24 Hrs  T(C): 37 (14 Sep 2020 15:15), Max: 37 (14 Sep 2020 15:15)  T(F): 98.6 (14 Sep 2020 15:15), Max: 98.6 (14 Sep 2020 15:15)  HR: 74 (14 Sep 2020 15:15) (67 - 80)  BP: 101/67 (14 Sep 2020 15:15) (92/62 - 177/68)  BP(mean): 68 (14 Sep 2020 14:30) (68 - 93)  ABP: --  ABP(mean): --  RR: 18 (14 Sep 2020 15:15) (11 - 33)  SpO2: 99% (14 Sep 2020 15:15) (96% - 100%)    Vital Signs Last 24 Hrs  T(C): 37 (14 Sep 2020 15:15), Max: 37 (14 Sep 2020 15:15)  T(F): 98.6 (14 Sep 2020 15:15), Max: 98.6 (14 Sep 2020 15:15)  HR: 74 (14 Sep 2020 15:15) (67 - 80)  BP: 101/67 (14 Sep 2020 15:15) (92/62 - 177/68)  BP(mean): 68 (14 Sep 2020 14:30) (68 - 93)  RR: 18 (14 Sep 2020 15:15) (11 - 33)  SpO2: 99% (14 Sep 2020 15:15) (96% - 100%)        I&O's Detail        LABS:                        8.8    53.82 )-----------( 151      ( 13 Sep 2020 18:47 )             28.5     09-13    132<L>  |  96<L>  |  14.0  ----------------------------<  134<H>  3.7   |  23.0  |  0.69    Ca    9.2      13 Sep 2020 18:47  Mg     2.2     09-13    TPro  6.3<L>  /  Alb  3.6  /  TBili  0.3<L>  /  DBili  x   /  AST  18  /  ALT  8   /  AlkPhos  130<H>  09-13      CARDIAC MARKERS ( 13 Sep 2020 18:47 )  x     / <0.01 ng/mL / x     / x     / x          CAPILLARY BLOOD GLUCOSE      POCT Blood Glucose.: 147 mg/dL (13 Sep 2020 18:02)    PT/INR - ( 13 Sep 2020 22:27 )   PT: 12.8 sec;   INR: 1.11 ratio         PTT - ( 13 Sep 2020 22:27 )  PTT:27.1 sec    CULTURES:        Physical Examination:  General: No acute distress.  Alert, oriented, interactive, nonfocal  NEURO: A&O X3, motor function 5/5 BL UE/LE  HEENT: Pupils equal, reactive to light.  Symmetric.  PULM: CTA BL, no significant sputum production, no wheezes, rales, rhonchi  CVS: Regular rate and rhythm, no murmurs, rubs, or gallops  ABD: Soft, nondistended, nontender, normoactive bowel sounds, no masses  EXT: No edema, nontender  SKIN: Warm and well perfused, no rashes noted      EKG: NSR RBBB (old)     RADIOLOGY: < from: US Duplex Carotid Arteries Complete, Bilateral (09.14.20 @ 12:10) >    There is no evidence of increased peak systolic velocities or flow alterations to suggest stenosis in the above mentioned arteries bilaterally.  Flow in the vertebral arteries was antegrade.    IMPRESSION: Mild atherosclerotic disease without a hemodynamic abnormality.    < end of copied text >        CENTRAL LINE: N          DATE INSERTED:                  HU: N                        DATE INSERTED:                  A-LINE: N                       DATE INSERTED:                  GLOBAL ISSUE/BEST PRACTICE:  Analgesia: N/A  Sedation: N/A  HOB elevation: yes  Stress ulcer prophylaxis: protonix   VTE prophylaxis: SCD  Glycemic control: N/A  Nutrition: NPO

## 2020-09-14 NOTE — H&P ADULT - NSHPPHYSICALEXAM_GEN_ALL_CORE
General: Normal appearing in no acute distress resting comfortably in bed. Well nourished and well groomed.  Head: Normocephalic, atraumatic.   EENT: Sclera white. PERRL, EOM intact. Secretions normal. no cervical lymphadenopathy  PULM: Breath sounds clear to auscultation symmetrically throughout all lung fields. No wheezing, rhonchi, or rales. No use of accessory muscles.  CVS: Regular rate and rhythm. No murmurs or rubs. Pulses 2+ on upper and lower extremities bilaterally.   GI: nondistended with bowel sounds normoactive in all four quadrants. No tenderness to light or deep palpation.   Neuro: A&O x 4. strength 5/5 in b/l upper and lower extremities. CN II-XII grossly intact. nonfocal.   Skin: No lesions, rashes, ulcers. Extremities equally warm bilaterally.

## 2020-09-14 NOTE — ED ADULT NURSE REASSESSMENT NOTE - NURSING NEURO ORIENTATION
oriented to person, place and time

## 2020-09-14 NOTE — PROGRESS NOTE ADULT - SUBJECTIVE AND OBJECTIVE BOX
INTERVAL HPI/OVERNIGHT EVENTS:  admitted on 9/13  80 year old male w/ PMHX Prostate CA 2010, Gastric CA 2020, HTN, s/p 2nd chemo tx on 9/9, no longer diabetic s/p 50lb weight loss, presents c/o dizziness, unwitnessed syncopal episode, ?LOC. Found by wife after hearing a crash in the kitchen, unsure if head trauma occurred, called EMS. Wife bedside, states pts grandson brought pt outside after fall thinking he "needed some air." Denies any recent falls, changes in vision, HA. Hx of fall in Nov 2019. Admits to nausea, fatigue, vomiting yesterday s/p chemo w/ another vomiting episode in ED. GCS=15.   Pt seen in sitting in bed.  Pt denies headache his complain is having poor appetite and unable to taste food. Wife at bedside and attributes these sxs to chemo.   PAST MEDICAL & SURGICAL HISTORY:  DM (diabetes mellitus)  Prostate cancer    MEDICATIONS  (STANDING):  chlorhexidine 2% Cloths 1 Application(s) Topical <User Schedule>  enalapril 5 milliGRAM(s) Oral daily  levETIRAcetam 500 milliGRAM(s) Oral two times a day  pantoprazole    Tablet 40 milliGRAM(s) Oral before breakfast  sucralfate 1 Gram(s) Oral two times a day    MEDICATIONS  (PRN):  hydrALAZINE Injectable 10 milliGRAM(s) IV Push every 6 hours PRN SBP > 150  prochlorperazine   Tablet 10 milliGRAM(s) Oral every 6 hours PRN for nausea    Allergies  No Known Allergies  Intolerances    Vital Signs Last 24 Hrs  T(C): 37 (14 Sep 2020 15:15), Max: 37 (14 Sep 2020 15:15)  T(F): 98.6 (14 Sep 2020 15:15), Max: 98.6 (14 Sep 2020 15:15)  HR: 74 (14 Sep 2020 15:15) (67 - 80)  BP: 101/67 (14 Sep 2020 15:15) (92/62 - 177/68)  BP(mean): 68 (14 Sep 2020 14:30) (68 - 93)  RR: 18 (14 Sep 2020 15:15) (11 - 33)  SpO2: 99% (14 Sep 2020 15:15) (96% - 100%) BMI (kg/m2): 20.4 (09-13-20 @ 18:01)    PHYSICAL EXAM  GENERAL: NAD, well-groomed, well-developed  HEAD:  Atraumatic, Normocephalic  EYES: EOMI, PERRLA, conjunctiva and sclera clear  ENMT: No tonsillar erythema, exudates, or enlargement; Moist mucous membranes, Good dentition, No facial  NECK: Supple,   NERVOUS SYSTEM:  Alert & Oriented X3, Motor Strength 5/5 B/L upper and lower extremities; DTRs 2+ intact and symmetric  CHEST/LUNG: Clear BS bilat No rales, rhonchi, wheezing, or rubs  HEART: Regular rate and rhythm; No murmurs, rubs, or gallops  ABDOMEN: Soft, Nontender, Nondistended; Bowel sounds present  EXTREMITIES:  2+ Peripheral Pulses, No edema  SKIN: No rashes or lesions      LABS:                          8.8    53.82 )-----------( 151      ( 13 Sep 2020 18:47 )             28.5     09-13    132<L>  |  96<L>  |  14.0  ----------------------------<  134<H>  3.7   |  23.0  |  0.69    Ca    9.2      13 Sep 2020 18:47  Mg     2.2     09-13    TPro  6.3<L>  /  Alb  3.6  /  TBili  0.3<L>  /  DBili  x   /  AST  18  /  ALT  8   /  AlkPhos  130<H>  09-13    PT/INR - ( 13 Sep 2020 22:27 )   PT: 12.8 sec;   INR: 1.11 ratio         PTT - ( 13 Sep 2020 22:27 )  PTT:27.1 sec    I&O's Detail    RADIOLOGY & ADDITIONAL TESTS:  < from: CT Head No Cont (09.14.20 @ 05:10) >   EXAM:  CT BRAIN                        PROCEDURE DATE:  09/14/2020    IMPRESSION:  No interval change in size of right frontal and parietal convexity subdural hematoma. No interval change in degree of mass effect with 4 mm shift of the midline towards the left.  < end of copied text >

## 2020-09-15 LAB
ANION GAP SERPL CALC-SCNC: 11 MMOL/L — SIGNIFICANT CHANGE UP (ref 5–17)
BUN SERPL-MCNC: 10 MG/DL — SIGNIFICANT CHANGE UP (ref 8–20)
CALCIUM SERPL-MCNC: 8.7 MG/DL — SIGNIFICANT CHANGE UP (ref 8.6–10.2)
CHLORIDE SERPL-SCNC: 100 MMOL/L — SIGNIFICANT CHANGE UP (ref 98–107)
CO2 SERPL-SCNC: 24 MMOL/L — SIGNIFICANT CHANGE UP (ref 22–29)
CREAT SERPL-MCNC: 0.59 MG/DL — SIGNIFICANT CHANGE UP (ref 0.5–1.3)
GLUCOSE SERPL-MCNC: 68 MG/DL — LOW (ref 70–99)
HCT VFR BLD CALC: 26.5 % — LOW (ref 39–50)
HGB BLD-MCNC: 8.1 G/DL — LOW (ref 13–17)
MAGNESIUM SERPL-MCNC: 2.1 MG/DL — SIGNIFICANT CHANGE UP (ref 1.8–2.6)
MCHC RBC-ENTMCNC: 19.7 PG — LOW (ref 27–34)
MCHC RBC-ENTMCNC: 30.6 GM/DL — LOW (ref 32–36)
MCV RBC AUTO: 64.5 FL — LOW (ref 80–100)
PHOSPHATE SERPL-MCNC: 3.2 MG/DL — SIGNIFICANT CHANGE UP (ref 2.4–4.7)
PLATELET # BLD AUTO: 117 K/UL — LOW (ref 150–400)
POTASSIUM SERPL-MCNC: 4 MMOL/L — SIGNIFICANT CHANGE UP (ref 3.5–5.3)
POTASSIUM SERPL-SCNC: 4 MMOL/L — SIGNIFICANT CHANGE UP (ref 3.5–5.3)
RBC # BLD: 4.11 M/UL — LOW (ref 4.2–5.8)
RBC # FLD: 26.4 % — HIGH (ref 10.3–14.5)
SARS-COV-2 IGG SERPL QL IA: NEGATIVE — SIGNIFICANT CHANGE UP
SARS-COV-2 IGM SERPL IA-ACNC: 0.03 INDEX — SIGNIFICANT CHANGE UP
SODIUM SERPL-SCNC: 135 MMOL/L — SIGNIFICANT CHANGE UP (ref 135–145)
WBC # BLD: 32.67 K/UL — HIGH (ref 3.8–10.5)
WBC # FLD AUTO: 32.67 K/UL — HIGH (ref 3.8–10.5)

## 2020-09-15 PROCEDURE — 99233 SBSQ HOSP IP/OBS HIGH 50: CPT

## 2020-09-15 RX ORDER — SODIUM CHLORIDE 9 MG/ML
500 INJECTION INTRAMUSCULAR; INTRAVENOUS; SUBCUTANEOUS ONCE
Refills: 0 | Status: DISCONTINUED | OUTPATIENT
Start: 2020-09-15 | End: 2020-09-16

## 2020-09-15 RX ADMIN — LEVETIRACETAM 500 MILLIGRAM(S): 250 TABLET, FILM COATED ORAL at 17:08

## 2020-09-15 RX ADMIN — Medication 5 MILLIGRAM(S): at 05:10

## 2020-09-15 RX ADMIN — CHLORHEXIDINE GLUCONATE 1 APPLICATION(S): 213 SOLUTION TOPICAL at 05:51

## 2020-09-15 RX ADMIN — LEVETIRACETAM 500 MILLIGRAM(S): 250 TABLET, FILM COATED ORAL at 05:10

## 2020-09-15 RX ADMIN — PANTOPRAZOLE SODIUM 40 MILLIGRAM(S): 20 TABLET, DELAYED RELEASE ORAL at 05:10

## 2020-09-15 RX ADMIN — Medication 1 GRAM(S): at 17:08

## 2020-09-15 RX ADMIN — POLYETHYLENE GLYCOL 3350 17 GRAM(S): 17 POWDER, FOR SOLUTION ORAL at 17:08

## 2020-09-15 RX ADMIN — Medication 1 GRAM(S): at 05:10

## 2020-09-15 NOTE — PHYSICAL THERAPY INITIAL EVALUATION ADULT - CRITERIA FOR SKILLED THERAPEUTIC INTERVENTIONS
rehab potential/predicted duration of therapy intervention/risk reduction/prevention/anticipated discharge recommendation/functional limitations in following categories/anticipated equipment needs at discharge/impairments found/therapy frequency

## 2020-09-15 NOTE — PHYSICAL THERAPY INITIAL EVALUATION ADULT - IMPAIRMENTS FOUND, PT EVAL
gross motor/poor safety awareness/muscle strength/aerobic capacity/endurance/gait, locomotion, and balance

## 2020-09-15 NOTE — PROGRESS NOTE ADULT - ASSESSMENT
79 yo male presented with syncope     1. Acute subdural hematoma   Repeat CT head done; stable hematoma   c/w neuro check q4   Strict BP control.   c/w Enalapril 5 mg po daily   Keppra 500 mg po twice daily for seizure prevention     2. Syncope: no events on telemetry. Plan for implantable loop recorder   PT evaluation ordered.   2. Leukocytosis likely Neupogen induced   3. Hx of gastric CA on active chemotherapy   4. Normocytic anemia of chronic disease  5. Constipation: give Miralax daily   6. DVT prophylaxis: compression boots.  Can be discharged after PT evaluation and loop recorder placement

## 2020-09-15 NOTE — PHYSICAL THERAPY INITIAL EVALUATION ADULT - ADDITIONAL COMMENTS
Pt lives with lives with spouse, daughter and two grown grandchildren in a private home with no ARJUN(via backdoor entrance, does not go through front door) and no stairs inside and bed&bath on ground level. Pt's PLOF was independent in all ADL's + ambulation without an Assistive Device and was driving PTA. Pt has [facility] at home. At this time, RW is recommended upon d/c

## 2020-09-15 NOTE — PROGRESS NOTE ADULT - SUBJECTIVE AND OBJECTIVE BOX
Patient is a 80y old  Male who presents with a chief complaint of SDH (15 Sep 2020 08:54)      INTERVAL HPI/OVERNIGHT EVENTS: seen and examined. He came to hospital s/p syncope, was found to have on the floor by his wife. Himself does not remember what happened to him. Found to have right frontal and parietal convexity subdural hematoma. Was initially admitted to ICu and now downgraded to the floor.       MEDICATIONS  (STANDING):  chlorhexidine 2% Cloths 1 Application(s) Topical <User Schedule>  enalapril 5 milliGRAM(s) Oral daily  levETIRAcetam 500 milliGRAM(s) Oral two times a day  pantoprazole    Tablet 40 milliGRAM(s) Oral before breakfast  polyethylene glycol 3350 17 Gram(s) Oral daily  sucralfate 1 Gram(s) Oral two times a day    MEDICATIONS  (PRN):  hydrALAZINE Injectable 10 milliGRAM(s) IV Push every 6 hours PRN SBP > 150  prochlorperazine   Tablet 10 milliGRAM(s) Oral every 6 hours PRN for nausea      Allergies    No Known Allergies    Intolerances        REVIEW OF SYSTEMS:  CONSTITUTIONAL: No fever, weight loss, or fatigue  RESPIRATORY: No cough, wheezing, chills or hemoptysis; No shortness of breath  CARDIOVASCULAR: No chest pain, palpitations, dizziness, or leg swelling  GASTROINTESTINAL: No abdominal or epigastric pain. No nausea, vomiting, or hematemesis; No diarrhea or constipation. No melena or hematochezia.  NEUROLOGICAL: No headaches, memory loss, loss of strength, numbness, or tremors  MUSCULOSKELETAL: No joint pain or swelling; No muscle, back, or extremity pain      Vital Signs Last 24 Hrs  T(C): 36.7 (15 Sep 2020 12:00), Max: 37 (14 Sep 2020 15:15)  T(F): 98.1 (15 Sep 2020 12:00), Max: 98.6 (14 Sep 2020 15:15)  HR: 77 (15 Sep 2020 12:00) (61 - 79)  BP: 100/52 (15 Sep 2020 12:00) (87/48 - 115/53)  BP(mean): 67 (15 Sep 2020 05:00) (55 - 76)  RR: 19 (15 Sep 2020 12:00) (16 - 33)  SpO2: 100% (15 Sep 2020 12:00) (96% - 100%)    PHYSICAL EXAM:  GENERAL: very pleasant thin elderly male, laying in bed, NAD   HEAD:  temporal wasting   EYES: conjunctiva and sclera clear  NECK: Supple, No JVD  NERVOUS SYSTEM:  Alert & Oriented X3, No gross focal deficits  CHEST/LUNG: Clear to percussion bilaterally; No rales, rhonchi, wheezing, or rubs  HEART: Regular rate and rhythm; No murmurs, rubs, or gallops  ABDOMEN: Soft, Nontender, Nondistended; Bowel sounds present  EXTREMITIES:  No clubbing, cyanosis, or edema  SKIN: No rashes or lesions    LABS:                        8.1    32.67 )-----------( 117      ( 15 Sep 2020 06:09 )             26.5     09-15    135  |  100  |  10.0  ----------------------------<  68<L>  4.0   |  24.0  |  0.59    Ca    8.7      15 Sep 2020 06:09  Phos  3.2     09-15  Mg     2.1     09-15    TPro  6.3<L>  /  Alb  3.6  /  TBili  0.3<L>  /  DBili  x   /  AST  18  /  ALT  8   /  AlkPhos  130<H>  09-13    PT/INR - ( 13 Sep 2020 22:27 )   PT: 12.8 sec;   INR: 1.11 ratio         PTT - ( 13 Sep 2020 22:27 )  PTT:27.1 sec    CAPILLARY BLOOD GLUCOSE  RADIOLOGY & ADDITIONAL TESTS:    Imaging Personally Reviewed:  [ ] YES  [ ] NO    Consultant(s) Notes Reviewed:  [ ] YES  [ ] NO    Care Discussed with Consultants/Other Providers [ ] YES  [ ] NO    Plan of Care discussed with Housestaff [ ]YES [ ] NO

## 2020-09-15 NOTE — PROGRESS NOTE ADULT - SUBJECTIVE AND OBJECTIVE BOX
[facility]                                   [address] [place] [address]                                                      PHONE: [phone]                                                         FAX: [phone]  [email or web address]  ---------------------------------------------------------------------------------------------------------------------------------    Overnight events/patient complaints: tele NSR, echo unremarkable normal EF      No Known Allergies    MEDICATIONS  (STANDING):  chlorhexidine 2% Cloths 1 Application(s) Topical <User Schedule>  enalapril 5 milliGRAM(s) Oral daily  levETIRAcetam 500 milliGRAM(s) Oral two times a day  pantoprazole    Tablet 40 milliGRAM(s) Oral before breakfast  polyethylene glycol 3350 17 Gram(s) Oral daily  sucralfate 1 Gram(s) Oral two times a day    MEDICATIONS  (PRN):  hydrALAZINE Injectable 10 milliGRAM(s) IV Push every 6 hours PRN SBP > 150  prochlorperazine   Tablet 10 milliGRAM(s) Oral every 6 hours PRN for nausea      Vital Signs Last 24 Hrs  T(C): 36.8 (15 Sep 2020 08:00), Max: 37 (14 Sep 2020 15:15)  T(F): 98.3 (15 Sep 2020 08:00), Max: 98.6 (14 Sep 2020 15:15)  HR: 71 (15 Sep 2020 08:00) (67 - 79)  BP: 103/58 (15 Sep 2020 08:00) (87/48 - 129/58)  BP(mean): 67 (15 Sep 2020 05:00) (55 - 88)  RR: 19 (15 Sep 2020 08:00) (11 - 33)  SpO2: 100% (15 Sep 2020 08:00) (96% - 100%)  Daily     Daily Weight in k.1 (15 Sep 2020 07:00)  ICU Vital Signs Last 24 Hrs  PETER GAMBOA  I&O's Detail    14 Sep 2020 07:01  -  15 Sep 2020 07:00  --------------------------------------------------------  IN:    Oral Fluid: 120 mL  Total IN: 120 mL    OUT:  Total OUT: 0 mL    Total NET: 120 mL        I&O's Summary    14 Sep 2020 07:  -  15 Sep 2020 07:00  --------------------------------------------------------  IN: 120 mL / OUT: 0 mL / NET: 120 mL      Drug Dosing Weight  PETER GAMBOA      PHYSICAL EXAM:  General: Appears well developed, well nourished alert and cooperative.  HEENT: Head; normocephalic, atraumatic.  Eyes: Pupils reactive, cornea wnl.  Neck: Supple, no nodes adenopathy, no NVD or carotid bruit or thyromegaly.  CARDIOVASCULAR: Normal S1 and S2, No murmur, rub, gallop or lift.   LUNGS: No rales, rhonchi or wheeze. Normal breath sounds bilaterally.  ABDOMEN: Soft, nontender without mass or organomegaly. bowel sounds normoactive.  EXTREMITIES: No clubbing, cyanosis or edema. Distal pulses wnl.   SKIN: warm and dry with normal turgor.  NEURO: Alert/oriented x 3/normal motor exam. No pathologic reflexes.    PSYCH: normal affect.        LABS:                        8.1    32.67 )-----------( 117      ( 15 Sep 2020 06:09 )             26.5     09-15    135  |  100  |  10.0  ----------------------------<  68<L>  4.0   |  24.0  |  0.59    Ca    8.7      15 Sep 2020 06:09  Phos  3.2     09-15  Mg     2.1     -15    TPro  6.3<L>  /  Alb  3.6  /  TBili  0.3<L>  /  DBili  x   /  AST  18  /  ALT  8   /  AlkPhos  130<H>      PETER GAMBOA  CARDIAC MARKERS ( 13 Sep 2020 18:47 )  x     / <0.01 ng/mL / x     / x     / x          PT/INR - ( 13 Sep 2020 22:27 )   PT: 12.8 sec;   INR: 1.11 ratio         PTT - ( 13 Sep 2020 22:27 )  PTT:27.1 sec      RADIOLOGY & ADDITIONAL STUDIES:    INTERPRETATION OF TELEMETRY (personally reviewed):    ECG:< from: 12 Lead ECG (20 @ 18:04) >    Diagnosis Line *** Poor data quality, interpretation may be adversely affected  Normal sinus rhythm  Right bundle branch block  Abnormal ECG    Confirmed by James Fabian ([id number]) on 2020 1:44:06 AM    < end of copied text >      ECHO:< from: TTE Echo Complete w/o Contrast w/ Doppler (20 @ 11:17) >  Summary:   1. Left ventricular ejection fraction, by visual estimation, is 60 to 65%.   2. Normal global left ventricular systolic function.   3. Spectral Doppler shows impaired relaxation pattern.   4. Normal RV size and function, estimated PASP of 27 mmHg.   5. Mild mitral annular calcification.   6. Sclerotic aortic valve with decreased opening.   7. There is no evidence of pericardial effusion.    Krishna Springer DO Electronically signed on 2020 at 2:25:39 PM        < end of copied text >

## 2020-09-15 NOTE — PHYSICAL THERAPY INITIAL EVALUATION ADULT - PHYSICAL ASSIST/NONPHYSICAL ASSIST: GAIT, REHAB EVAL
verbal cues/pt required supervision for safety + falls prevention. verbal cues re proper gait sequence + proper use of RW; pt demonstrated mild increased impulsivity and decreased safety awareness/supervision

## 2020-09-15 NOTE — PROGRESS NOTE ADULT - ASSESSMENT
Assessment  syncope unclear etiology  gastric Cancer currently on chemo  elevated WBC secondary to recent neupogen  normal EF no sig valvular disease      Rec  for implantable loop today then may dc home will FU as outpt

## 2020-09-15 NOTE — PHYSICAL THERAPY INITIAL EVALUATION ADULT - GENERAL OBSERVATIONS, REHAB EVAL
Pt received on [facility], HELLEN Powers'via ok'ed pt for PT. pt observed sitting upright in bed with BP cuff, telemonitor with , pleasant, cooperative, A&O and c/o 0/10 pain

## 2020-09-15 NOTE — PHYSICAL THERAPY INITIAL EVALUATION ADULT - PERTINENT HX OF CURRENT PROBLEM, REHAB EVAL
pt was brought into ED secondary to being found on the ground in the backyard with family/unwitnessed fall, possibly syncopal in nature with CT head demonstrating right frontoparietal subdural hematoma and mass right cerebellum with mid line shift

## 2020-09-16 ENCOUNTER — TRANSCRIPTION ENCOUNTER (OUTPATIENT)
Age: 80
End: 2020-09-16

## 2020-09-16 VITALS
DIASTOLIC BLOOD PRESSURE: 76 MMHG | TEMPERATURE: 98 F | RESPIRATION RATE: 18 BRPM | SYSTOLIC BLOOD PRESSURE: 122 MMHG | HEART RATE: 76 BPM | OXYGEN SATURATION: 98 %

## 2020-09-16 PROCEDURE — 85025 COMPLETE CBC W/AUTO DIFF WBC: CPT

## 2020-09-16 PROCEDURE — 93005 ELECTROCARDIOGRAM TRACING: CPT

## 2020-09-16 PROCEDURE — 83036 HEMOGLOBIN GLYCOSYLATED A1C: CPT

## 2020-09-16 PROCEDURE — U0003: CPT

## 2020-09-16 PROCEDURE — 93880 EXTRACRANIAL BILAT STUDY: CPT

## 2020-09-16 PROCEDURE — 82962 GLUCOSE BLOOD TEST: CPT

## 2020-09-16 PROCEDURE — 80061 LIPID PANEL: CPT

## 2020-09-16 PROCEDURE — 86769 SARS-COV-2 COVID-19 ANTIBODY: CPT

## 2020-09-16 PROCEDURE — 80053 COMPREHEN METABOLIC PANEL: CPT

## 2020-09-16 PROCEDURE — 86901 BLOOD TYPING SEROLOGIC RH(D): CPT

## 2020-09-16 PROCEDURE — 87040 BLOOD CULTURE FOR BACTERIA: CPT

## 2020-09-16 PROCEDURE — 70450 CT HEAD/BRAIN W/O DYE: CPT

## 2020-09-16 PROCEDURE — 86900 BLOOD TYPING SEROLOGIC ABO: CPT

## 2020-09-16 PROCEDURE — 84443 ASSAY THYROID STIM HORMONE: CPT

## 2020-09-16 PROCEDURE — 93306 TTE W/DOPPLER COMPLETE: CPT

## 2020-09-16 PROCEDURE — 85730 THROMBOPLASTIN TIME PARTIAL: CPT

## 2020-09-16 PROCEDURE — 99239 HOSP IP/OBS DSCHRG MGMT >30: CPT

## 2020-09-16 PROCEDURE — 97163 PT EVAL HIGH COMPLEX 45 MIN: CPT

## 2020-09-16 PROCEDURE — 84484 ASSAY OF TROPONIN QUANT: CPT

## 2020-09-16 PROCEDURE — 36415 COLL VENOUS BLD VENIPUNCTURE: CPT

## 2020-09-16 PROCEDURE — 85610 PROTHROMBIN TIME: CPT

## 2020-09-16 PROCEDURE — 99285 EMERGENCY DEPT VISIT HI MDM: CPT | Mod: 25

## 2020-09-16 PROCEDURE — 71045 X-RAY EXAM CHEST 1 VIEW: CPT

## 2020-09-16 PROCEDURE — 85027 COMPLETE CBC AUTOMATED: CPT

## 2020-09-16 PROCEDURE — 96374 THER/PROPH/DIAG INJ IV PUSH: CPT

## 2020-09-16 PROCEDURE — 83735 ASSAY OF MAGNESIUM: CPT

## 2020-09-16 PROCEDURE — 86850 RBC ANTIBODY SCREEN: CPT

## 2020-09-16 PROCEDURE — 84100 ASSAY OF PHOSPHORUS: CPT

## 2020-09-16 PROCEDURE — 80048 BASIC METABOLIC PNL TOTAL CA: CPT

## 2020-09-16 RX ORDER — FILGRASTIM 480MCG/1.6
0 VIAL (ML) INJECTION
Qty: 0 | Refills: 0 | DISCHARGE

## 2020-09-16 RX ORDER — LEVETIRACETAM 250 MG/1
1 TABLET, FILM COATED ORAL
Qty: 0 | Refills: 0 | DISCHARGE
Start: 2020-09-16

## 2020-09-16 RX ORDER — PROCHLORPERAZINE MALEATE 5 MG
1 TABLET ORAL
Qty: 0 | Refills: 0 | DISCHARGE
Start: 2020-09-16

## 2020-09-16 RX ORDER — LEVETIRACETAM 250 MG/1
1 TABLET, FILM COATED ORAL
Qty: 60 | Refills: 0
Start: 2020-09-16 | End: 2020-10-15

## 2020-09-16 NOTE — PROGRESS NOTE ADULT - SUBJECTIVE AND OBJECTIVE BOX
Pt s/p uncomplicated loop recorder implantation.  See report in chart for details.  Stable for discharge home from cardiology perspective.  Remove opsite in AM, leave steristrips intact.  Ok to shower after opsite removed.  Will arrange outpt wound check one week.  Please call if further questions or cardiac issues.    Fabian Justice MD

## 2020-09-16 NOTE — DISCHARGE NOTE PROVIDER - NSDCMRMEDTOKEN_GEN_ALL_CORE_FT
enalapril 5 mg oral tablet: 1 tab(s) orally once a day  Neupogen:   ondansetron 8 mg oral tablet, disintegratin tab(s) orally every 8 hours, As Needed - for nausea  pantoprazole 40 mg oral delayed release tablet: 1 tab(s) orally 2 times a day  prochlorperazine 10 mg oral tablet: 1 tab(s) orally every 6 hours, As Needed - for nausea  sucralfate 1 g/10 mL oral suspension: 10 milliliter(s) orally 4 times a day (before meals and at bedtime)   enalapril 5 mg oral tablet: 1 tab(s) orally once a day  levETIRAcetam 500 mg oral tablet: 1 tab(s) orally 2 times a day  pantoprazole 40 mg oral delayed release tablet: 1 tab(s) orally 2 times a day  prochlorperazine 10 mg oral tablet: 1 tab(s) orally every 6 hours, As needed, for nausea  sucralfate 1 g/10 mL oral suspension: 10 milliliter(s) orally 4 times a day (before meals and at bedtime)

## 2020-09-16 NOTE — DISCHARGE NOTE PROVIDER - CARE PROVIDER_API CALL
Fabian Justice  CARDIAC ELECTROPHYSIOLOGY  [address] [place] [address]  Phone: [phone]  Fax: [phone]  Follow Up Time:    Fabian Justice  CARDIAC ELECTROPHYSIOLOGY  [address] [place] [address]  Phone: [phone]  Fax: [phone]  Follow Up Time:     Mark Woo  NEUROSURGERY  [address] [place] [address]  Phone: [phone]  Fax: [phone]  Follow Up Time:

## 2020-09-16 NOTE — DISCHARGE NOTE PROVIDER - HOSPITAL COURSE
81 yo male presented with syncope. Initially admitted to ICU secondary to acute subdural hematoma. No weakness or any other symptoms.   Cardiology consult was called. No events on telemetry noted. Implantable loop recorder placed for concerns of sick sinus syndrome  PT evaluation done and stable for discharge to go home with PT at home     ROS: negative   PE: general: think, happy, elderly male, NAD   Head: temporal wasting,   Cardio: regular rate   Pulm: clear breath sounds   GI: abdomen is soft   Extr: no edema     Vital Signs Last 24 Hrs  T(C): 36.8 (16 Sep 2020 09:54), Max: 36.8 (16 Sep 2020 09:54)  T(F): 98.2 (16 Sep 2020 09:54), Max: 98.2 (16 Sep 2020 09:54)  HR: 70 (16 Sep 2020 12:30) (65 - 80)  BP: 129/67 (16 Sep 2020 12:30) (90/53 - 129/72)  BP(mean): 76 (16 Sep 2020 12:30) (61 - 88)  RR: 18 (16 Sep 2020 12:30) (16 - 18)  SpO2: 99% (16 Sep 2020 12:30) (98% - 100%)    1. Acute subdural hematoma   Repeat CT head done; stable hematoma   BP controlled    c/w Enalapril 5 mg po daily   Keppra 500 mg po twice daily for seizure prevention     2. Syncope: no events on telemetry. Plan for implantable loop recorder   PT evaluation: home with   home PT   2. Leukocytosis likely Neupogen induced , trending down   3. Hx of gastric CA on active chemotherapy   4. Normocytic anemia of chronic disease  5. Constipation: give Miralax daily     Stable for discharge

## 2020-09-16 NOTE — DISCHARGE NOTE NURSING/CASE MANAGEMENT/SOCIAL WORK - PATIENT PORTAL LINK FT
You can access the FollowMyHealth Patient Portal offered by [facility] by registering at the following website: [email or web address]. By joining [employer]’s FollowMyHealth portal, you will also be able to view your health information using other applications (apps) compatible with our system.

## 2020-09-16 NOTE — DISCHARGE NOTE PROVIDER - PROVIDER TOKENS
PROVIDER:[TOKEN:[[id number]]] PROVIDER:[TOKEN:[[id number]]],PROVIDER:[TOKEN:[[id number]]]

## 2020-09-16 NOTE — DISCHARGE NOTE PROVIDER - NSDCCPTREATMENT_GEN_ALL_CORE_FT
PRINCIPAL PROCEDURE  Procedure: Insertion, loop recorder  Findings and Treatment: Loop Recorder Incision Care:     - Do not touch the incision until it is completely healed.   - There is Dermabond (skin glue) on your incision, which will start to flake off on its own over the next 2-3 weeks. Do not pick at or peel off the Dermabond.   - Do not apply soaps, creams, lotions, ointments or powders to the incision until it is completely healed.  - You should call the doctor if you notice redness, drainage, swelling, increased tenderness, hot sensation around the  incision, bleeding or incision edges pulling apart.

## 2020-09-16 NOTE — PROGRESS NOTE ADULT - SUBJECTIVE AND OBJECTIVE BOX
Pt doing well s/p loop recorder implant. Denies complaint.     Incision: Dermabond C/D/I; no bleeding, hematoma, erythema, exudate or edema    Plan:   Resume PO intake.   Ambulate w/ assist, then progress as tolerated.     Pain control with PO analgesia PRN.   Resume medications.   Outpt f/up in 1-2 weeks    Pt doing well s/p loop recorder implant. Denies complaint.     Incision: Dermabond C/D/I; no bleeding, hematoma, erythema, exudate or edema    Plan:   Resume PO intake.   Ambulate w/ assist, then progress as tolerated.     Pain control with PO analgesia PRN.   Resume medications.   Outpt f/up in 1-2 weeks with Dr Justice

## 2020-09-16 NOTE — DISCHARGE NOTE PROVIDER - NSDCCPCAREPLAN_GEN_ALL_CORE_FT
PRINCIPAL DISCHARGE DIAGNOSIS  Diagnosis: Subdural hematoma  Assessment and Plan of Treatment: Stable. Follow up with neurosergery - Dr. Woo  within 2 weeks      SECONDARY DISCHARGE DIAGNOSES  Diagnosis: Chronic anemia  Assessment and Plan of Treatment: Stable    Diagnosis: Syncope  Assessment and Plan of Treatment: Inplantable loop recorder placed. Follow up with your cardiologist

## 2020-09-16 NOTE — DISCHARGE NOTE PROVIDER - NSDCFUSCHEDAPPT_GEN_ALL_CORE_FT
GAMBOA, PETER ; 09/22/2020 ; [facility]  GAMBOA, PETER ; 09/22/2020 ; [facility]  GAMOBA, PETER ; 09/24/2020 ; [facility] [address]  GAMBOA, PETER ; 09/24/2020 ; [facility]  GAMBOA, PETER ; 10/05/2020 ; [facility]  GAMBOA, PETER ; 10/05/2020 ; [facility]  GAMBOA, PETER ; 10/07/2020 ; [facility]  GAMBOA, PETER ; 10/07/2020 ; [facility]  GAMBOA, PETER ; 10/19/2020 ; [facility]  GAMBOA, PETER ; 10/19/2020 ; [facility]  GAMBOA, PETER ; 10/21/2020 ; [facility]  GAMBOA, PETER ; 10/21/2020 ; [facility]

## 2020-09-19 LAB
CULTURE RESULTS: SIGNIFICANT CHANGE UP
SPECIMEN SOURCE: SIGNIFICANT CHANGE UP

## 2020-09-21 NOTE — CDI QUERY NOTE - NSCDIOTHERTXTBX_GEN_ALL_CORE_HH
Can you please specify the type of subdural hematoma?    A.	Non-traumatic subdural hematoma  B.	Traumatic subdural hematoma  C.	Subdural hematoma unspecified   D.	Other, please specify  E.	Not clinically significant    Supporting Documentations:    Consult Note Neurosurgery:  • Assessment	  81 yo M w/ PMHX Prostate CA 2010, Gastric CA 2020, HTN, s/p 2nd chemo tx on 9/9, no longer diabetic s/p 50lb weight loss, presents c/o dizziness, unwitnessed syncopal episode, ?LOC, ?head trauma.      H&P Adult:  History of Present Illness:   81 y/o male with pmhx of prostate CA (2010), gastric cancer (diagnosed June 2020) on his second round of chemo therapy last dose given 9/9, active smoker, previous DM resolved after 50 pound weight loss who presents s/p syncopal episode at home. Patient does not remember surrounding events or events leading up to syncopal episode. He can recall this event happening once before about one year ago. Found to have right 1.2 cm SDH with 5 mm midline shift. Neurologically he is asymptomatic, A&O x 4.

## 2020-09-22 ENCOUNTER — RESULT REVIEW (OUTPATIENT)
Age: 80
End: 2020-09-22

## 2020-09-22 ENCOUNTER — APPOINTMENT (OUTPATIENT)
Dept: HEMATOLOGY ONCOLOGY | Facility: CLINIC | Age: 80
End: 2020-09-22

## 2020-09-22 ENCOUNTER — LABORATORY RESULT (OUTPATIENT)
Age: 80
End: 2020-09-22

## 2020-09-22 ENCOUNTER — APPOINTMENT (OUTPATIENT)
Age: 80
End: 2020-09-22

## 2020-09-22 LAB
ANISOCYTOSIS BLD QL: SIGNIFICANT CHANGE UP
BASOPHILS # BLD AUTO: 0.2 K/UL — SIGNIFICANT CHANGE UP (ref 0–0.2)
BITE CELLS BLD QL SMEAR: PRESENT — SIGNIFICANT CHANGE UP
ELLIPTOCYTES BLD QL SMEAR: SLIGHT — SIGNIFICANT CHANGE UP
EOSINOPHIL # BLD AUTO: 0.1 K/UL — SIGNIFICANT CHANGE UP (ref 0–0.5)
HCT VFR BLD CALC: 31.4 % — LOW (ref 39–50)
HGB BLD-MCNC: 9.8 G/DL — LOW (ref 13–17)
HYPOCHROMIA BLD QL: SIGNIFICANT CHANGE UP
LG PLATELETS BLD QL AUTO: SLIGHT — SIGNIFICANT CHANGE UP
LYMPHOCYTES # BLD AUTO: 2.7 K/UL — SIGNIFICANT CHANGE UP (ref 1–3.3)
LYMPHOCYTES # BLD AUTO: 27 % — SIGNIFICANT CHANGE UP (ref 13–44)
MACROCYTES BLD QL: SLIGHT — SIGNIFICANT CHANGE UP
MCHC RBC-ENTMCNC: 19.6 PG — LOW (ref 27–34)
MCHC RBC-ENTMCNC: 31.3 G/DL — LOW (ref 32–36)
MCV RBC AUTO: 62.8 FL — LOW (ref 80–100)
MICROCYTES BLD QL: SIGNIFICANT CHANGE UP
MONOCYTES # BLD AUTO: 1.3 K/UL — HIGH (ref 0–0.9)
MONOCYTES NFR BLD AUTO: 7 % — SIGNIFICANT CHANGE UP (ref 2–14)
NEUTROPHILS # BLD AUTO: 15.2 K/UL — HIGH (ref 1.8–7.4)
NEUTROPHILS NFR BLD AUTO: 66 % — SIGNIFICANT CHANGE UP (ref 43–77)
OVALOCYTES BLD QL SMEAR: SLIGHT — SIGNIFICANT CHANGE UP
PLAT MORPH BLD: NORMAL — SIGNIFICANT CHANGE UP
PLATELET # BLD AUTO: 285 K/UL — SIGNIFICANT CHANGE UP (ref 150–400)
POIKILOCYTOSIS BLD QL AUTO: SLIGHT — SIGNIFICANT CHANGE UP
POLYCHROMASIA BLD QL SMEAR: SIGNIFICANT CHANGE UP
RBC # BLD: 5.01 M/UL — SIGNIFICANT CHANGE UP (ref 4.2–5.8)
RBC # FLD: 22.2 % — HIGH (ref 10.3–14.5)
RBC BLD AUTO: ABNORMAL
SCHISTOCYTES BLD QL AUTO: SLIGHT — SIGNIFICANT CHANGE UP
SPHEROCYTES BLD QL SMEAR: SLIGHT — SIGNIFICANT CHANGE UP
TARGETS BLD QL SMEAR: SIGNIFICANT CHANGE UP
TOXIC GRANULES BLD QL SMEAR: PRESENT — SIGNIFICANT CHANGE UP
WBC # BLD: 19.5 K/UL — HIGH (ref 3.8–10.5)
WBC # FLD AUTO: 19.5 K/UL — HIGH (ref 3.8–10.5)

## 2020-09-23 NOTE — ASSESSMENT
[FreeTextEntry1] : IMP:\par Newly diagnosed gastric cancer, staged at least T3N1 by EUS criteria.  Given his excellent performance status and despite his age, I would aggressively treat with perioperative chemotherapy FLOT regimen.  He does not have any clear evidence of metastatic disease on imaging.\par \par Neoadjuvant chemotherapy in progress (s/p Cycle 3).\par \par PLAN:\par 1)

## 2020-09-24 ENCOUNTER — APPOINTMENT (OUTPATIENT)
Dept: SURGICAL ONCOLOGY | Facility: CLINIC | Age: 80
End: 2020-09-24
Payer: MEDICARE

## 2020-09-24 ENCOUNTER — APPOINTMENT (OUTPATIENT)
Age: 80
End: 2020-09-24

## 2020-09-24 VITALS
SYSTOLIC BLOOD PRESSURE: 114 MMHG | DIASTOLIC BLOOD PRESSURE: 76 MMHG | BODY MASS INDEX: 18.99 KG/M2 | HEART RATE: 78 BPM | RESPIRATION RATE: 12 BRPM | WEIGHT: 121 LBS | HEIGHT: 67 IN

## 2020-09-24 PROCEDURE — 99214 OFFICE O/P EST MOD 30 MIN: CPT

## 2020-09-25 NOTE — ED PROVIDER NOTE - CARE PLAN
Family Medicine Follow-Up Office Visit  Darshana Galaviz 75 y o  female   MRN: [id number] : 1953  ENCOUNTER: 2020 12:41 PM    Assessment & Plan   Low blood pressure  Pressures soft but improved from last visit  Denies lightheadedness, dizziness, chest pain  Repeat at 2 week f/u  Rheumatoid arthritis (HCC)  Hand deformity with ulnar deviation in hands B/L  Right knee limited ROM (unable to extend fully)  Pain mostly in knees  Son d/c all meds including prednisone over a year back  She now gets ibuprofen or tylenol for pain prn  Plan: will have her see rheumatology once she is established with psych then geriatrics  Son very reluctant to see multiple specialists so will have to tread carefully without overwhelming  Psychiatric disorder  Not yet scheduled with psych  Pts son (POA/caregiver) reluctant to take her to other clinics in order to "avoid getting her sick " He also does not want her on meds  We discussed that our first step needs to be for her to have a visit with psych and then we can discuss meds if that is what psych recommends  Pts son finally agrees and states he deleon schedule appt with psych  Pt denies thoughts of harming self  F/u 2 weeks to ensure this appt scheduled  Cognitive decline  Per son  Due to urinary incontinence pts son believes she has severe dementia absed on online research  Geriatrics recommends first seeing psych as her psych conditions may be playing a role in cognitive/memory decline  Will have pt f/u with geriatrics once we have recommendations from psych  Urinary incontinence  Needs supplies  Will have preferred company ("[employer] contact clinic and provide paperwork  Will complete paperwork when received and get pt her supplies  Need for follow-up by   Pts son reports he has not yet received a call from social work   He states he is not always able to get to his phone and that social work should leave [facility] and he will call them back      Plan: follow up with social work recommendations, consider area on aging  All orders for this visit as below:      Subjective     Chief Complaint     Chief Complaint   Patient presents with    Follow-up   F/u for psychiatric conditions, weight loss/poor appetite, and memory/cognitive problems    History of Present Illness   Viktor Lopez is a 75y o -year-old female who presents today for f/u of psychiatric conditions, weight loss and decreased appetite, and memory issues  Son is here with pt and he is POA/caregiver  Home health aid also in room  Son is primary historian thought pt does respond to some questions  Psych: not yet followed up with psych  Appointment not yet scheduled  Pts son voices concern that "all they do is push pills and she is doing fine since being off of her lithium and other psych meds " He states she is calm and that she doesn't need meds  He also states he doesn't want to take her to multiple medical sites for concern of her getting sick  He ultimately agrees to call psych to ask for a virtual visit and discuss his concerns about the meds with them as well  · During visit, pt became tearful and stated that she sometimes feels sad when she thinks of her past but other times she feels happy  Weight/appetite: has improved slightly since last visit  Now eats some of her meal then will drink boost nutrition supplement if doesn't eat much  Memory issues: Seems to have waxing and waning course  Sometimes needs reorientation per son  Son believes she has "when researched this online it said she has severe dementia because she is urinary incontinence " States Geriatrics contacted him and said they dont take pts insurance, though chart review reveals they have done intake and advised she see psych prior to [facility] and this was all relayed to pt  Urinary incontinence: needs supplies  Will call [employer] and ask them to contact our clinic       Has not yet completed blood work  Will do this week  Past Medical History:   Diagnosis Date    Anxiety     Depression     High cholesterol     Hypertension     Psychiatric disorder     Rheumatoid arthritis ([facility] )      family history includes Colon cancer in her father; Glaucoma in her mother; Heart attack in her mother; Hypertension in her father and mother; Thyroid disease in her father    reports that she has quit smoking  She quit after 30 00 years of use  She does not have any smokeless tobacco history on file  She reports current alcohol use  Review of Systems   Review of Systems   Constitutional: Positive for appetite change (though her appetite is improving)  Negative for activity change (but still wheelchair bound due to RA), fever and unexpected weight change  Respiratory: Negative for cough and shortness of breath  Cardiovascular: Negative for chest pain, palpitations and leg swelling  Gastrointestinal: Negative for abdominal pain, blood in stool, diarrhea, nausea and vomiting  Genitourinary:        Urinary incontinence    Musculoskeletal: Positive for arthralgias (hands, knees, ankles, feet  )  Neurological: Negative for dizziness, syncope and light-headedness  Psychiatric/Behavioral: Positive for dysphoric mood (intermittently)  Negative for suicidal ideas  Active Problem List     Patient Active Problem List   Diagnosis    Psychiatric disorder    Rheumatoid arthritis ([facility] )    Low blood pressure    Cognitive decline    Urinary incontinence    Coronary artery disease involving native coronary artery of native heart with angina pectoris ([facility] )    Other hyperlipidemia    Low body temperature    Need for follow-up by        Past Medical History, Past Surgical History, Family History, and Social History were reviewed and updated today as appropriate      Objective   /70   Pulse 90   Temp 97 7 °F (36 5 °C)   Wt 31 8 kg (70 lb)   SpO2 100% Physical Exam  Constitutional:       Comments: cachectic   HENT:      Head:      Comments: Temporal wasting  Eyes:      General: No scleral icterus  Extraocular Movements: Extraocular movements intact  Pupils: Pupils are equal, round, and reactive to light  Cardiovascular:      Rate and Rhythm: Normal rate and regular rhythm  Pulses: Normal pulses  Heart sounds: Normal heart sounds  Pulmonary:      Effort: Pulmonary effort is normal       Breath sounds: Normal breath sounds  Abdominal:      General: Abdomen is flat  Bowel sounds are normal  There is no distension  Palpations: Abdomen is soft  Musculoskeletal:         General: Deformity (hands bilaterally) present  Right lower leg: No edema  Left lower leg: No edema  Comments: Prominent clavicles and ribs  Skin:     General: Skin is dry  Neurological:      Mental Status: She is alert and oriented to person, place, and time  Psychiatric:      Comments: Became tearful twice during encounter, later was smiling and laughing  Pertinent Laboratory/Diagnostic Studies: not yet done  No results found for: GLUCOSE, BUN, CREATININE, CALCIUM, NA, K, CO2, CL  No results found for: ALT, AST, GGT, ALKPHOS, BILITOT    No results found for: WBC, HGB, HCT, MCV, PLT    No results found for: TSH    No results found for: CHOL  No results found for: TRIG  No results found for: HDL  No results found for: LDLCALC  No results found for: HGBA1C    No results found for this or any previous visit  No orders of the defined types were placed in this encounter  Current Medications     Current Outpatient Medications   Medication Sig Dispense Refill    ibuprofen (MOTRIN) 400 mg tablet Take by mouth every 6 (six) hours as needed      Nutritional Supplements (Ensure Nutrition Shake) LIQD Take 1 Bottle by mouth 3 (three) times a day When able to tolerate oral intake, take at 10 am, 2 pm, and at bedtime   90 Bottle 2     No current facility-administered medications for this visit  --Nutrition supplement ordered at this visit  ALLERGIES:  No Known Allergies    Health Maintenance     Health Maintenance   Topic Date Due    Hepatitis C Screening  1953    Medicare Annual Wellness Visit (AWV)  1953    MAMMOGRAM  1953    BMI: Adult  02/04/1970    DTaP,Tdap,and Td Vaccines (1 - Tdap) 02/04/1973    Colorectal Cancer Screening  02/04/2002    Pneumococcal Vaccine: 65+ Years (1 of 1 - PPSV23) 02/04/2017    Influenza Vaccine  07/01/2020    Fall Risk  09/03/2021    Depression Screening PHQ  09/04/2021    HIB Vaccine  Aged Out    Hepatitis B Vaccine  Aged Out    IPV Vaccine  Aged Out    Hepatitis A Vaccine  Aged Out    Meningococcal ACWY Vaccine  Aged Out    HPV Vaccine  Aged Out       There is no immunization history on file for this patient  Curry Martin MD   [address]  9/25/2020  12:41 PM    Parts of this note were dictated using [employer] dictation software and may have sounds-like errors due to variation in pronunciation  Principal Discharge DX:	Subdural hematoma   Principal Discharge DX:	Subdural hematoma  Secondary Diagnosis:	Syncope

## 2020-09-26 ENCOUNTER — OUTPATIENT (OUTPATIENT)
Dept: OUTPATIENT SERVICES | Facility: HOSPITAL | Age: 80
LOS: 1 days | Discharge: HOME | End: 2020-09-26

## 2020-09-26 DIAGNOSIS — C16.9 MALIGNANT NEOPLASM OF STOMACH, UNSPECIFIED: ICD-10-CM

## 2020-09-30 DIAGNOSIS — Z51.89 ENCOUNTER FOR OTHER SPECIFIED AFTERCARE: ICD-10-CM

## 2020-10-05 ENCOUNTER — RESULT REVIEW (OUTPATIENT)
Age: 80
End: 2020-10-05

## 2020-10-05 ENCOUNTER — APPOINTMENT (OUTPATIENT)
Age: 80
End: 2020-10-05

## 2020-10-05 ENCOUNTER — LABORATORY RESULT (OUTPATIENT)
Age: 80
End: 2020-10-05

## 2020-10-05 ENCOUNTER — APPOINTMENT (OUTPATIENT)
Dept: HEMATOLOGY ONCOLOGY | Facility: CLINIC | Age: 80
End: 2020-10-05

## 2020-10-05 LAB
ANISOCYTOSIS BLD QL: SIGNIFICANT CHANGE UP
BASOPHILS # BLD AUTO: 0.1 K/UL — SIGNIFICANT CHANGE UP (ref 0–0.2)
BASOPHILS NFR BLD AUTO: 1 % — SIGNIFICANT CHANGE UP (ref 0–2)
DACRYOCYTES BLD QL SMEAR: SLIGHT — SIGNIFICANT CHANGE UP
DOHLE BOD BLD QL SMEAR: PRESENT — SIGNIFICANT CHANGE UP
ELLIPTOCYTES BLD QL SMEAR: SLIGHT — SIGNIFICANT CHANGE UP
EOSINOPHIL # BLD AUTO: 0.1 K/UL — SIGNIFICANT CHANGE UP (ref 0–0.5)
GIANT PLATELETS BLD QL SMEAR: PRESENT — SIGNIFICANT CHANGE UP
HCT VFR BLD CALC: 35.4 % — LOW (ref 39–50)
HGB BLD-MCNC: 11.1 G/DL — LOW (ref 13–17)
HYPOCHROMIA BLD QL: SIGNIFICANT CHANGE UP
LG PLATELETS BLD QL AUTO: SLIGHT — SIGNIFICANT CHANGE UP
LYMPHOCYTES # BLD AUTO: 2 K/UL — SIGNIFICANT CHANGE UP (ref 1–3.3)
LYMPHOCYTES # BLD AUTO: 6 % — LOW (ref 13–44)
MACROCYTES BLD QL: SLIGHT — SIGNIFICANT CHANGE UP
MCHC RBC-ENTMCNC: 20 PG — LOW (ref 27–34)
MCHC RBC-ENTMCNC: 31.5 G/DL — LOW (ref 32–36)
MCV RBC AUTO: 63.6 FL — LOW (ref 80–100)
MICROCYTES BLD QL: SIGNIFICANT CHANGE UP
MONOCYTES # BLD AUTO: 2 K/UL — HIGH (ref 0–0.9)
MONOCYTES NFR BLD AUTO: 7 % — SIGNIFICANT CHANGE UP (ref 2–14)
MYELOCYTES NFR BLD: 2 % — HIGH (ref 0–0)
NEUTROPHILS # BLD AUTO: 25.2 K/UL — HIGH (ref 1.8–7.4)
NEUTROPHILS NFR BLD AUTO: 84 % — HIGH (ref 43–77)
OVALOCYTES BLD QL SMEAR: SLIGHT — SIGNIFICANT CHANGE UP
PLAT MORPH BLD: NORMAL — SIGNIFICANT CHANGE UP
PLATELET # BLD AUTO: 156 K/UL — SIGNIFICANT CHANGE UP (ref 150–400)
POIKILOCYTOSIS BLD QL AUTO: SLIGHT — SIGNIFICANT CHANGE UP
POLYCHROMASIA BLD QL SMEAR: SIGNIFICANT CHANGE UP
RBC # BLD: 5.56 M/UL — SIGNIFICANT CHANGE UP (ref 4.2–5.8)
RBC # FLD: 22 % — HIGH (ref 10.3–14.5)
RBC BLD AUTO: ABNORMAL
SCHISTOCYTES BLD QL AUTO: SLIGHT — SIGNIFICANT CHANGE UP
SPHEROCYTES BLD QL SMEAR: SLIGHT — SIGNIFICANT CHANGE UP
TARGETS BLD QL SMEAR: SIGNIFICANT CHANGE UP
TOXIC GRANULES BLD QL SMEAR: PRESENT — SIGNIFICANT CHANGE UP
WBC # BLD: 29.4 K/UL — HIGH (ref 3.8–10.5)
WBC # FLD AUTO: 29.4 K/UL — HIGH (ref 3.8–10.5)

## 2020-10-06 DIAGNOSIS — R11.2 NAUSEA WITH VOMITING, UNSPECIFIED: ICD-10-CM

## 2020-10-06 DIAGNOSIS — Z51.11 ENCOUNTER FOR ANTINEOPLASTIC CHEMOTHERAPY: ICD-10-CM

## 2020-10-07 ENCOUNTER — APPOINTMENT (OUTPATIENT)
Age: 80
End: 2020-10-07

## 2020-10-07 ENCOUNTER — APPOINTMENT (OUTPATIENT)
Dept: HEMATOLOGY ONCOLOGY | Facility: CLINIC | Age: 80
End: 2020-10-07
Payer: MEDICARE

## 2020-10-07 VITALS
WEIGHT: 117.01 LBS | HEIGHT: 67 IN | OXYGEN SATURATION: 100 % | BODY MASS INDEX: 18.36 KG/M2 | SYSTOLIC BLOOD PRESSURE: 99 MMHG | TEMPERATURE: 97.2 F | DIASTOLIC BLOOD PRESSURE: 69 MMHG | HEART RATE: 107 BPM

## 2020-10-07 PROCEDURE — 99213 OFFICE O/P EST LOW 20 MIN: CPT

## 2020-10-07 NOTE — HISTORY OF PRESENT ILLNESS
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  He was sent for CT by his PCP, Dr. Charles Smith, due to anemia.  CT showed in the upper central abdomen abutting the posterior antrum of the stomach and neck of the pancreas there is a  3 x 2 x 3 cm mass.  There is an additional heterogeneous enhancing 2.7 x 2.3 x 2.5 lobulated mass in the ventral upper abdominal peritoneal cavity anterosuperiorly to the antrum of the stomach with internal and surrounding enhancing vessels.  Endoscopy with biopsy of the gastric mass was c/w moderately differentiated adenocarcinoma.  Staging PET showed hypermetabolic thickening of the distal stomach, consistent with primary gastric cancer.  Hypermetabolic activity in the distal stomach, inseparable from the perigastric lymphadenopathy, consistent with metastatic kong disease. [de-identified] : Patient presents on C4D3 FLOT (docetaxel (50 mg/m2), oxaliplatin (85 mg/m2), LV (200 mg/m2) with short-term infusional FU (2600 mg/m2 as a 24-hour infusion) Q2 weeks for gastric adenocarcinoma.  C2 delayed by neutropenia. + Fatigue. + N/V. + Alopecia. + Decreased appetite with subsequent weight loss. + Dysgeusia. 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 + Cold intolerance, mouth only.  Edema resolved. No D/C. No stomatitis, mucositis.  No myalgia, arthralgia. No epistaxis. No nail changes. No neuropathy.  No fevers, no cough, no SOB.

## 2020-10-09 ENCOUNTER — INPATIENT (INPATIENT)
Facility: HOSPITAL | Age: 80
LOS: 1 days | Discharge: HOME | DRG: 640 | End: 2020-10-11
Attending: INTERNAL MEDICINE | Admitting: HOSPITALIST
Payer: COMMERCIAL

## 2020-10-09 VITALS
OXYGEN SATURATION: 98 % | DIASTOLIC BLOOD PRESSURE: 76 MMHG | HEART RATE: 81 BPM | RESPIRATION RATE: 16 BRPM | TEMPERATURE: 97 F | WEIGHT: 149.91 LBS | SYSTOLIC BLOOD PRESSURE: 105 MMHG | HEIGHT: 67 IN

## 2020-10-09 DIAGNOSIS — Z95.828 PRESENCE OF OTHER VASCULAR IMPLANTS AND GRAFTS: Chronic | ICD-10-CM

## 2020-10-09 DIAGNOSIS — R55 SYNCOPE AND COLLAPSE: ICD-10-CM

## 2020-10-09 LAB
ALBUMIN SERPL ELPH-MCNC: 3.9 G/DL — SIGNIFICANT CHANGE UP (ref 3.3–5.2)
ALP SERPL-CCNC: 179 U/L — HIGH (ref 40–120)
ALT FLD-CCNC: 14 U/L — SIGNIFICANT CHANGE UP
ANION GAP SERPL CALC-SCNC: 14 MMOL/L — SIGNIFICANT CHANGE UP (ref 5–17)
ANISOCYTOSIS BLD QL: SIGNIFICANT CHANGE UP
APPEARANCE UR: CLEAR — SIGNIFICANT CHANGE UP
APTT BLD: 28 SEC — SIGNIFICANT CHANGE UP (ref 27.5–35.5)
AST SERPL-CCNC: 29 U/L — SIGNIFICANT CHANGE UP
BASOPHILS # BLD AUTO: 0 K/UL — SIGNIFICANT CHANGE UP (ref 0–0.2)
BASOPHILS NFR BLD AUTO: 0 % — SIGNIFICANT CHANGE UP (ref 0–2)
BILIRUB SERPL-MCNC: 0.6 MG/DL — SIGNIFICANT CHANGE UP (ref 0.4–2)
BILIRUB UR-MCNC: NEGATIVE — SIGNIFICANT CHANGE UP
BUN SERPL-MCNC: 12 MG/DL — SIGNIFICANT CHANGE UP (ref 8–20)
CALCIUM SERPL-MCNC: 9.1 MG/DL — SIGNIFICANT CHANGE UP (ref 8.6–10.2)
CHLORIDE SERPL-SCNC: 97 MMOL/L — LOW (ref 98–107)
CO2 SERPL-SCNC: 22 MMOL/L — SIGNIFICANT CHANGE UP (ref 22–29)
COLOR SPEC: YELLOW — SIGNIFICANT CHANGE UP
CREAT SERPL-MCNC: 0.68 MG/DL — SIGNIFICANT CHANGE UP (ref 0.5–1.3)
DIFF PNL FLD: NEGATIVE — SIGNIFICANT CHANGE UP
ELLIPTOCYTES BLD QL SMEAR: SLIGHT — SIGNIFICANT CHANGE UP
EOSINOPHIL # BLD AUTO: 0 K/UL — SIGNIFICANT CHANGE UP (ref 0–0.5)
EOSINOPHIL NFR BLD AUTO: 0 % — SIGNIFICANT CHANGE UP (ref 0–6)
GIANT PLATELETS BLD QL SMEAR: PRESENT — SIGNIFICANT CHANGE UP
GLUCOSE SERPL-MCNC: 194 MG/DL — HIGH (ref 70–99)
GLUCOSE UR QL: 250 MG/DL
HCT VFR BLD CALC: 35.5 % — LOW (ref 39–50)
HGB BLD-MCNC: 11 G/DL — LOW (ref 13–17)
HYPOCHROMIA BLD QL: SLIGHT — SIGNIFICANT CHANGE UP
INR BLD: 1.12 RATIO — SIGNIFICANT CHANGE UP (ref 0.88–1.16)
KETONES UR-MCNC: ABNORMAL
LEUKOCYTE ESTERASE UR-ACNC: NEGATIVE — SIGNIFICANT CHANGE UP
LYMPHOCYTES # BLD AUTO: 1.69 K/UL — SIGNIFICANT CHANGE UP (ref 1–3.3)
LYMPHOCYTES # BLD AUTO: 11.3 % — LOW (ref 13–44)
MANUAL SMEAR VERIFICATION: SIGNIFICANT CHANGE UP
MCHC RBC-ENTMCNC: 20.2 PG — LOW (ref 27–34)
MCHC RBC-ENTMCNC: 31 GM/DL — LOW (ref 32–36)
MCV RBC AUTO: 65.3 FL — LOW (ref 80–100)
MICROCYTES BLD QL: SIGNIFICANT CHANGE UP
MONOCYTES # BLD AUTO: 0 K/UL — SIGNIFICANT CHANGE UP (ref 0–0.9)
MONOCYTES NFR BLD AUTO: 0 % — LOW (ref 2–14)
MYELOCYTES NFR BLD: 1.7 % — HIGH (ref 0–0)
NEUTROPHILS # BLD AUTO: 13.01 K/UL — HIGH (ref 1.8–7.4)
NEUTROPHILS NFR BLD AUTO: 80 % — HIGH (ref 43–77)
NEUTS BAND # BLD: 7 % — SIGNIFICANT CHANGE UP (ref 0–8)
NITRITE UR-MCNC: NEGATIVE — SIGNIFICANT CHANGE UP
NT-PROBNP SERPL-SCNC: 103 PG/ML — SIGNIFICANT CHANGE UP (ref 0–300)
PH UR: 6 — SIGNIFICANT CHANGE UP (ref 5–8)
PLAT MORPH BLD: NORMAL — SIGNIFICANT CHANGE UP
PLATELET # BLD AUTO: 80 K/UL — LOW (ref 150–400)
POIKILOCYTOSIS BLD QL AUTO: SLIGHT — SIGNIFICANT CHANGE UP
POLYCHROMASIA BLD QL SMEAR: SLIGHT — SIGNIFICANT CHANGE UP
POTASSIUM SERPL-MCNC: 3.9 MMOL/L — SIGNIFICANT CHANGE UP (ref 3.5–5.3)
POTASSIUM SERPL-SCNC: 3.9 MMOL/L — SIGNIFICANT CHANGE UP (ref 3.5–5.3)
PROT SERPL-MCNC: 6.2 G/DL — LOW (ref 6.6–8.7)
PROT UR-MCNC: 30 MG/DL
PROTHROM AB SERPL-ACNC: 12.9 SEC — SIGNIFICANT CHANGE UP (ref 10.6–13.6)
RBC # BLD: 5.44 M/UL — SIGNIFICANT CHANGE UP (ref 4.2–5.8)
RBC # FLD: 26 % — HIGH (ref 10.3–14.5)
RBC BLD AUTO: ABNORMAL
RBC CASTS # UR COMP ASSIST: NEGATIVE /HPF — SIGNIFICANT CHANGE UP (ref 0–4)
SCHISTOCYTES BLD QL AUTO: SLIGHT — SIGNIFICANT CHANGE UP
SODIUM SERPL-SCNC: 133 MMOL/L — LOW (ref 135–145)
SP GR SPEC: 1.02 — SIGNIFICANT CHANGE UP (ref 1.01–1.02)
TARGETS BLD QL SMEAR: SLIGHT — SIGNIFICANT CHANGE UP
TROPONIN T SERPL-MCNC: <0.01 NG/ML — SIGNIFICANT CHANGE UP (ref 0–0.06)
TROPONIN T SERPL-MCNC: <0.01 NG/ML — SIGNIFICANT CHANGE UP (ref 0–0.06)
UROBILINOGEN FLD QL: NEGATIVE MG/DL — SIGNIFICANT CHANGE UP
WBC # BLD: 14.95 K/UL — HIGH (ref 3.8–10.5)
WBC # FLD AUTO: 14.95 K/UL — HIGH (ref 3.8–10.5)
WBC UR QL: SIGNIFICANT CHANGE UP

## 2020-10-09 PROCEDURE — 99221 1ST HOSP IP/OBS SF/LOW 40: CPT

## 2020-10-09 PROCEDURE — 93010 ELECTROCARDIOGRAM REPORT: CPT

## 2020-10-09 PROCEDURE — 70450 CT HEAD/BRAIN W/O DYE: CPT | Mod: 26

## 2020-10-09 PROCEDURE — 71045 X-RAY EXAM CHEST 1 VIEW: CPT | Mod: 26

## 2020-10-09 PROCEDURE — 99285 EMERGENCY DEPT VISIT HI MDM: CPT

## 2020-10-09 PROCEDURE — 72125 CT NECK SPINE W/O DYE: CPT | Mod: 26

## 2020-10-09 PROCEDURE — 99223 1ST HOSP IP/OBS HIGH 75: CPT

## 2020-10-09 RX ORDER — PANTOPRAZOLE SODIUM 20 MG/1
40 TABLET, DELAYED RELEASE ORAL
Refills: 0 | Status: DISCONTINUED | OUTPATIENT
Start: 2020-10-09 | End: 2020-10-11

## 2020-10-09 RX ORDER — SODIUM CHLORIDE 9 MG/ML
1000 INJECTION, SOLUTION INTRAVENOUS
Refills: 0 | Status: DISCONTINUED | OUTPATIENT
Start: 2020-10-09 | End: 2020-10-11

## 2020-10-09 RX ORDER — SODIUM CHLORIDE 9 MG/ML
1000 INJECTION INTRAMUSCULAR; INTRAVENOUS; SUBCUTANEOUS ONCE
Refills: 0 | Status: COMPLETED | OUTPATIENT
Start: 2020-10-09 | End: 2020-10-09

## 2020-10-09 RX ORDER — ONDANSETRON 8 MG/1
4 TABLET, FILM COATED ORAL EVERY 8 HOURS
Refills: 0 | Status: DISCONTINUED | OUTPATIENT
Start: 2020-10-09 | End: 2020-10-11

## 2020-10-09 RX ORDER — LEVETIRACETAM 250 MG/1
500 TABLET, FILM COATED ORAL
Refills: 0 | Status: DISCONTINUED | OUTPATIENT
Start: 2020-10-09 | End: 2020-10-11

## 2020-10-09 RX ORDER — SUCRALFATE 1 G
1 TABLET ORAL
Refills: 0 | Status: DISCONTINUED | OUTPATIENT
Start: 2020-10-09 | End: 2020-10-11

## 2020-10-09 RX ADMIN — SODIUM CHLORIDE 1000 MILLILITER(S): 9 INJECTION INTRAMUSCULAR; INTRAVENOUS; SUBCUTANEOUS at 18:48

## 2020-10-09 RX ADMIN — SODIUM CHLORIDE 75 MILLILITER(S): 9 INJECTION, SOLUTION INTRAVENOUS at 23:32

## 2020-10-09 RX ADMIN — SODIUM CHLORIDE 1000 MILLILITER(S): 9 INJECTION INTRAMUSCULAR; INTRAVENOUS; SUBCUTANEOUS at 21:01

## 2020-10-09 NOTE — H&P ADULT - NSHPPHYSICALEXAM_GEN_ALL_CORE
Vital Signs Last 24 Hrs  T(C): 36.6 (09 Oct 2020 23:24), Max: 36.6 (09 Oct 2020 23:24)  T(F): 97.8 (09 Oct 2020 23:24), Max: 97.8 (09 Oct 2020 23:24)  HR: 75 (09 Oct 2020 23:24) (75 - 87)  BP: 128/76 (09 Oct 2020 23:24) (101/60 - 128/76)  BP(mean): --  RR: 18 (09 Oct 2020 23:24) (16 - 18)  SpO2: 100% (09 Oct 2020 23:24) (98% - 100%)    Constitutional: Chronically-ill appearing, NAD, VSS  Head: NC/AT  Eyes: Anicteric sclera, conjunctiva WNL  ENT: Normal Pharynx, Dry oral mucosa  Neck: Supple, Non-tender  Chest: Non-tender, no rashes, +R ACW Port  Cardio: RRR, s1/s2, no appreciable murmurs/rubs/gallops  Resp: BS CTA bilaterally, no wheezing/rhonchi/rales  Abd: Soft, Non-tender, Non-distended, no rebound/guarding/rigidity  MSK: moving all extremities, no motor weakness, full ROM x4  Ext: palpable distal pulses, good capillary refill  Psych: appropriate, cooperative  Neuro: CN II-XII grossly intact, no focal deficits  Skin: Warm/Dry. No rashes.

## 2020-10-09 NOTE — ED PROVIDER NOTE - PHYSICAL EXAMINATION
Gen: no acute distress, cachectic  Head: normocephalic, atraumatic  Lung: CTAB, no respiratory distress, no wheezing, rales, rhonchi  CV: normal s1/s2, rrr, port in R chest wall  Abd: soft, non-tender, non-distended  MSK: No edema, no visible deformities, full range of motion in all 4 extremities  Neuro: No focal neurologic deficits  Skin: No rash   Psych: normal affect

## 2020-10-09 NOTE — CONSULT NOTE ADULT - SUBJECTIVE AND OBJECTIVE BOX
Patient is a 80y old  Male who presents with a chief complaint of syncopal fall   HPI: 80M with recent admission 1 month ago where R SDH found, no surgical intervention offered at that time, pt was able to be discharged home without issue. He presents today after a syncopal episode with fall, ED CT head shows evolution of previously known R SDH without acute component. ROS significant for loss of appetite, generalized weakness, increased fall, denies headache, changes in vision, focal weakness. Denies AC or anti platelet use.       PAST MEDICAL & SURGICAL HISTORY:  DM (diabetes mellitus)    Prostate cancer    Gastric cancer currently undergoing chemo therapy    FAMILY HISTORY:  FH: diabetes mellitus        SOCIAL HISTORY:  Tobacco Use: Denies   EtOH use: Denies   Substance: Denies     Allergies    No Known Allergies    Intolerances        REVIEW OF SYSTEMS  Negative except as noted in HPI  CONSTITUTIONAL: No fever, + fatigue, generalized weakness   EYES: No eye pain, visual disturbances, or discharge  ENMT:  No difficulty hearing, tinnitus, vertigo; No sinus or throat pain  NECK: No pain or stiffness  RESPIRATORY: No cough, wheezing, chills or hemoptysis; No shortness of breath  CARDIOVASCULAR: No chest pain, palpitations, dizziness, or leg swelling  GASTROINTESTINAL: + abdominal pain, loss of appetite, nausea, No diarrhea or constipation. No melena or hematochezia.  GENITOURINARY: No dysuria, frequency, hematuria, or incontinence  NEUROLOGICAL: No headaches, memory loss, loss of strength, numbness, or tremors  SKIN: No itching, burning, rashes, or lesions   LYMPH NODES: No enlarged glands  ENDOCRINE: No heat or cold intolerance; No hair loss  MUSCULOSKELETAL: No joint pain or swelling; No muscle, back, or extremity pain  PSYCHIATRIC: No depression, anxiety, mood swings, or difficulty sleeping  HEME/LYMPH: No easy bruising, or bleeding gums  ALLERY AND IMMUNOLOGIC: No hives or eczema    HOME MEDICATIONS:  Home Medications:  enalapril 5 mg oral tablet: 1 tab(s) orally once a day (05 Oct 2020 15:39)  pantoprazole 40 mg oral delayed release tablet: 1 tab(s) orally 2 times a day (05 Oct 2020 15:39)  prochlorperazine 10 mg oral tablet: 1 tab(s) orally every 6 hours, As needed, for nausea (05 Oct 2020 15:39)  sucralfate 1 g/10 mL oral suspension: 10 milliliter(s) orally 4 times a day (before meals and at bedtime) (05 Oct 2020 15:39)        IVF:  sodium chloride 0.9% Bolus 1000 milliLiter(s) IV Bolus once      Vital Signs Last 24 Hrs  T(C): 36.3 (09 Oct 2020 17:00), Max: 36.3 (09 Oct 2020 17:00)  T(F): 97.4 (09 Oct 2020 17:00), Max: 97.4 (09 Oct 2020 17:00)  HR: 81 (09 Oct 2020 17:00) (81 - 81)  BP: 105/76 (09 Oct 2020 17:00) (105/76 - 105/76)  BP(mean): --  RR: 16 (09 Oct 2020 17:00) (16 - 16)  SpO2: 98% (09 Oct 2020 17:00) (98% - 98%)      PHYSICAL EXAM:  GENERAL: NAD, cachetic   HEAD:  Atraumatic, normocephalic  EYES: Conjunctiva and sclera clear; corneal reflex intact  ENMT: No tonsillar erythema, exudates, or enlargement; moist mucous membranes, good dentition, no lesions  NECK: Supple, no JVD, dormal thyroid  YANIRA COMA SCORE: E-4 V-5 M-6 = 15  MENTAL STATUS: AAO x3; Awake Opens eyes spontaneously. Appropriately conversant without aphasia following simple commands.  CRANIAL NERVES: Visual acuity normal for age, visual fields full to confrontation, PERRL. EOMI without nystagmus. Facial sensation intact V1-3 distribution b/l. Face symmetric w/ normal eye closure and smile, tongue midline. Hearing grossly intact. Speech clear. Head turning and shoulder shrug intact.   MOTOR: strength 5/5 b/l upper and lower extremities  SENSATION: grossly intact to light touch all extremities  COORDINATION: Gait intact; rapid alternating movements intact b/l upper extremities; no upper extremity dysmetria  CHEST/LUNG: Clear to auscultation bilaterally  HEART: RRR  ABDOMEN: Soft, epigastric tenderness   EXTREMITIES:  2+ peripheral pulses, no clubbing, cyanosis, or edema  LYMPH: No lymphadenopathy noted  SKIN: Warm, dry; no rashes or lesions    LABS:                        11.0   14.95 )-----------( x        ( 09 Oct 2020 17:59 )             35.5     10-09    133<L>  |  97<L>  |  12.0  ----------------------------<  194<H>  3.9   |  22.0  |  0.68    Ca    9.1      09 Oct 2020 17:59    TPro  6.2<L>  /  Alb  3.9  /  TBili  0.6  /  DBili  x   /  AST  29  /  ALT  14  /  AlkPhos  179<H>  10-09    PT/INR - ( 09 Oct 2020 17:59 )   PT: 12.9 sec;   INR: 1.12 ratio         PTT - ( 09 Oct 2020 17:59 )  PTT:28.0 sec        RADIOLOGY & ADDITIONAL STUDIES:  h< from: CT Head No Cont (10.09.20 @ 17:35) >  IMPRESSION:    Mixed attenuation subdural hemorrhage overlying the right frontal parietal lobes appears somewhat evolved since prior exam measuring up to 1.9 cm in width. There is mild to moderate mass effect on underlying parenchyma. Minimal right to left midline shift of approximately 2 to 3 mm, minimally improved since prior exam. 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    < end of copied text >      CAPRINI SCORE [CLOT]:  Patient has an estimated Caprini score of greater than 5.  However, the patient's unique clinical situation will be addressed in an individual manner to determine appropriate anticoagulation treatment, if any.

## 2020-10-09 NOTE — ED ADULT NURSE REASSESSMENT NOTE - NS ED NURSE REASSESS COMMENT FT1
Report given to [facility] RN. Pt. informed that they are admitted and waiting for a bed. Pt. verbalizes understanding. Pt. to be moved to desired location. Repeat troponin sent.

## 2020-10-09 NOTE — ED PROVIDER NOTE - ATTENDING CONTRIBUTION TO CARE
HPI: 81yo male with h/o HTN, prostate ca, on chemo (last chemo Monday), gastric ca, presenting with episode of syncope today. Patient states that he was in bathroom when next thing he knew he woke up on floor. Unclear if hit head. per wife, she heard patient fall while transferring from bed to commode. Patient has h/o recurrent syncope, last syncopized 1 month ago that resulted in placement of loop recorder, also was found to have SDH with midline shift.     PE:  Gen: NAD  Head: NCAT  HEENT: Oral mucosa moist, normal conjunctiva  CV: RRR no murmurs, normal perfusion  Resp: CTA b/l, no wheezing, rales, rhonchi, no respiratory distress  GI: Abd Soft NTND  Neuro: No focal neuro deficits  MSK: FROM all 4 extremities, no deformity  Skin: No rash, no bruising  Psych: Normal affect    MDM: Patient with syncopal episode, h/o recurrent syncope with implantable loop recorder- will interrogate device, c/s cardiology, CT head/c-spine. Patient also with active cancer and recent hospital stay- will obtain CTA r/o PE. Alice Burns DO         I performed a history and physical exam of the patient and discussed their management with the resident. I reviewed the resident's note and agree with the documented findings and plan of care. My medical decision making and observations are found above.

## 2020-10-09 NOTE — H&P ADULT - NSHPLABSRESULTS_GEN_ALL_CORE
CTH: IMPRESSION:  Mixed attenuation subdural hemorrhage overlying the right frontal parietal lobes appears somewhat evolved since prior exam measuring up to 1.9 cm in width. There is mild to moderate mass effect on underlying parenchyma. Minimal right to left midline shift of approximately 2 to 3 mm, minimally improved since prior exam. 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    CT Neck: IMPRESSION:     No vertebral fracture is recognized.  Multilevel degenerative changes of the cervical spine are present    CXR: No acute findings

## 2020-10-09 NOTE — ED PROVIDER NOTE - CLINICAL SUMMARY MEDICAL DECISION MAKING FREE TEXT BOX
Pt with hx of htn and gastric ca currently on iv chemo, last tx 4 days prior presenting after syncopal epidose today at home. Recently d/c after SDH. Will priority CT head/neck. Labs, ekg, trop coags, cxr, interrogate loop recorder and reeval.

## 2020-10-09 NOTE — CONSULT NOTE ADULT - ASSESSMENT
A 79 yo M w h/o diabetes, prostate cancer and gastric cancer, currently on neoadjuvant chemotherapy, who was consulted to Trauma Service for syncopal fall from ground levels w LOC. CT head performed and showed a R SDH measuring 1.9 cm w shift of 2-3 mm. He had a prior fall on 9/13 with a CT head showing similar findings of 1.8 cm SDH w 5 mm shift. Neurosurgery evaluated patient. Please refer to note.    Recommend:    - No surgical intervention required at this time  - Cleared by Trauma   - Plan as per primary       Thank you for allowing us to participate in the care of this patient. Please call us with any questions.

## 2020-10-09 NOTE — H&P ADULT - HISTORY OF PRESENT ILLNESS
81 y/o M with PMHX COPD, HTN, Prostate CA (2010), Gastric Cancer (2020) s/p R ACW Mediport currently on chemotherapy Pegfilgrastim (last dose 4 days ago) s/p recent admission to [facility] discharged 9/2020 for Fall with Syncope c/b R SDH and midline shift presents to [facility] ER BIBEMS s/p unwitnessed syncope. Patient was using the bathroom when his wife heard him fall and found him on the floor. Wife states he was trying to transfer from bed to commode. Unknown if there was +head trauma. Prior admission with SDH with midline shift at that time. Pt seen/examined by Trauma Surgery, Neurosurgery and Cardio in ER. CT imaging similar to prior imaging last admission. Wife reports decreased PO intake since chemo treatment - did not eat or drink anything during the day prior to event. Positive orthostatic VS noted in ER. Also had reported episode ectopy on tele SVT v NSVT. Significant leukocytosis on admission WBC 29.4 with repeat CBC showing improvement s/p 2L IVFB NSS with WBC 14.95 and Bandemia 7% (and compensatory drop in Plt). Pt seen/examined. Denies any current complaints.  Says he is tolerating chemo well but later admits he isnt eating/drinking very much. No current CP/SOB/MORROW, cough, Fever/Chills, N/V/D, urinary complaints, abd pain, or focal deficits. ROS negative unless mentioned above.

## 2020-10-09 NOTE — H&P ADULT - ASSESSMENT
A/P:  Recurrent Syncope, Orthostatic Hypotension likely Vasovagal Syncope   -Recent episode discharged 9/2020 s/p ILR  -TTE Echo 9/2020 LVEF 60-65%, normal LV systolic function, normal RV size/function  -Doppler US Carotids BL 9/2020 with mild atherosclerotic disease, no hemodynamically significant abnormalities.   -s/p recent syncope workup last month will hold off on repeat echo and carotids for now pending w/u  -admit to medicine  -monitor telemetry for recurrent ectopy/arrhythmia   -F/u ILR interrogation   -trend cardiac enzymes (Trop Neg x2)  -check CK in AM  -positive orthostatic VS in ER s/p 2L IVFB NSS  -continue IVF LR @75cc/hr  -electrolytes WNL. repeat post bolus and replace PRN for K>4 and Mg>2  -[facility] consulted: suspect Vasovagal Syncope, recc check ILR and monitor tele, possible DC in AM  -no VTE PPX given hx SDH    Chronic R SDH with Midline Shift  -CT Head with evolving changes, mid-mod mass effect on underlying parenchyma, minmal R to L midline shift of 2-3mm minimally improved from prior exam  -repeat labs. monitor tele. neurochecks Q4. fall precautions.  -hold pharmacological VTE PPX or antiplatelets/AC/NSAIDs  -Keppra 500mg PO BID for PPX  -Defer repeat imaging to NeuroSx  -Neurosurgery consulted in ER.   -No acute neurosurgical intervention.     Leukocytosis  -2/2 malignancy v GCSF-induced v infection v hemoconcentration   -check UA/UCX and CXR for poss source  -if patient develops fever or WBC worsens obtain BCX x2 with +[employer] BITEX RACW in place.  -repeat CBC in AM/trend WBC and bandemia    HTN  -Enalapril 5mg PO Q24  -monitor BP/Orthostatics     GERD  -Pantorpazole 40mg PO BID  -Sucralfate 1g PO QID    Gastric CA  -Outpatient F/u and Chemo Pegfilgrastim as scheduled. Antiemetics PRN N/V.    Anemia  -2/2 Gastric CA/Chemo. H&H stable. improved from previous admission. Trend H&H.

## 2020-10-09 NOTE — ED PROVIDER NOTE - PROGRESS NOTE DETAILS
Orthostatic vitals taken by rn and acknowledged. - Raad Vasquez, PGY-2 Notified by RN that pt had a prolonged episode of vtach. Pt now back in nsr at this time. Will admit. - Raad Vasquez, PGY-2 Interrogation of medtronic loop recorder unsuccessful in ED, device interrogator not working, medtronic technician called, will evaluate patient. Cardiology consulted ([place]) and will see patient in ED tonight. While taking orthostatics, patient had 22 beat run of Vtach, resolved on its own. Hospitalist accepted patient. Alice Burns DO Interrogation of medtronic loop recorder unsuccessful in ED by ED staff, device interrogator not working, medtronic technician called, will evaluate patient. Cardiology consulted ([place]) and will see patient in ED tonight. While taking orthostatics, patient had 22 beat run of Vtach on ED monitor, resolved on its own. Patient also noted to have orthostatic hypotension. Fluids given. Hospitalist accepted patient to medicine on telemetry. Alice Burns DO Interrogation of medtronic loop recorder unsuccessful in ED by ED staff, device interrogator not working, medtronic technician called, will evaluate patient. Cardiology consulted ([place]) and will see patient in ED tonight. While taking orthostatics, patient had 22 beat run of Vtach on ED monitor, resolved on its own. Patient also noted to have orthostatic hypotension. Fluids given. Hospitalist aware of episode of vtach and pending cardiology consult, accepted patient to medicine on telemetry. Alice Burns DO Interrogation of medtronic loop recorder unsuccessful in ED by ED staff, device interrogator not working, medtronic technician called, will evaluate patient. Cardiology consulted ([place]) and will see patient in ED tonight. While taking orthostatic vital signs, patient had 22 beat run of Vtach on ED monitor, resolved on its own. Rhythm strip uploaded to alpha. Patient also noted to have orthostatic hypotension. Fluids given. Hospitalist aware of episode of vtach and pending cardiology consult, accepted patient to medicine on telemetry. Alice Burns DO

## 2020-10-09 NOTE — CONSULT NOTE ADULT - SUBJECTIVE AND OBJECTIVE BOX
[place] [facility]                                   [address] [place] [address]                                                      PHONE: [phone]                                                         FAX: [phone]  [email or web address]  ---------------------------------------------------------------------------------------------------------------------------------    HPI:  PETER GAMBOA is an 80y Male  PMHX HTN, HLD, DM, smoker, RBBB, prostate CA,  gastric cancer (Dx 2020- currently on Chemo) who presented to [facility] ED with syncope.   [facility].  He does not recall the evants that occured.  SHe denied chest pain, palps, sob. PT was recently admitted s/p fal with subsequent SDH.  He had a loop recorder placed at that time.     PAST MEDICAL & SURGICAL HISTORY:  DM (diabetes mellitus)    Prostate cancer        No Known Allergies      MEDICATIONS  (STANDING):    MEDICATIONS  (PRN):      Family History: Pt denies hx of early cad, SCD, or congenital heart disease.      Social History:  Cigarettes:   former                 Alchohol:   no              Illicit Drug Abuse:  no    ROS:    Extensively Reviewed with pertinents as per HPI the remainder were negative.      Vital Signs Last 24 Hrs  T(C): 36.3 (09 Oct 2020 17:00), Max: 36.3 (09 Oct 2020 17:00)  T(F): 97.4 (09 Oct 2020 17:00), Max: 97.4 (09 Oct 2020 17:00)  HR: 81 (09 Oct 2020 17:00) (81 - 81)  BP: 105/76 (09 Oct 2020 17:00) (105/76 - 105/76)  BP(mean): --  RR: 16 (09 Oct 2020 17:00) (16 - 16)  SpO2: 98% (09 Oct 2020 17:00) (98% - 98%)  ICU Vital Signs Last 24 Hrs  PETER GAMBOA  I&O's Detail    I&O's Summary    Drug Dosing Weight  PETER GAMBOA      PHYSICAL EXAM:  General: Appears well developed, well nourished alert and cooperative.  HEENT: Head; normocephalic, atraumatic.  Eyes: Pupils reactive, cornea wnl.  Neck: Supple, no nodes adenopathy, no NVD or carotid bruit or thyromegaly.  CARDIOVASCULAR: Normal S1 and S2, No murmur, rub, gallop or lift.   LUNGS: No rales, rhonchi or wheeze. Normal breath sounds bilaterally.  ABDOMEN: Soft, nontender without mass or organomegaly. bowel sounds normoactive.  EXTREMITIES: No clubbing, cyanosis or edema. Distal pulses wnl.   SKIN: warm and dry with normal turgor.  NEURO: Alert/oriented x 3/normal motor exam. No pathologic reflexes.    PSYCH: normal affect.        LABS:                        11.0   14.95 )-----------( 80       ( 09 Oct 2020 17:59 )             35.5     10-09    133<L>  |  97<L>  |  12.0  ----------------------------<  194<H>  3.9   |  22.0  |  0.68    Ca    9.1      09 Oct 2020 17:59    TPro  6.2<L>  /  Alb  3.9  /  TBili  0.6  /  DBili  x   /  AST  29  /  ALT  14  /  AlkPhos  179<H>  10-09    PETER GAMBOA  CARDIAC MARKERS ( 09 Oct 2020 17:59 )  x     / <0.01 ng/mL / x     / x     / x          PT/INR - ( 09 Oct 2020 17:59 )   PT: 12.9 sec;   INR: 1.12 ratio         PTT - ( 09 Oct 2020 17:59 )  PTT:28.0 sec      RADIOLOGY & ADDITIONAL STUDIES:    INTERPRETATION OF TELEMETRY (personally reviewed):         ECHO:  9/2020    Summary:   1. Left ventricular ejection fraction, by visual estimation, is 60 to 65%.   2. Normal global left ventricular systolic function.   3. Spectral Doppler shows impaired relaxation pattern.   4. Normal RV size and function, estimated PASP of 27 mmHg.   5. Mild mitral annular calcification.   6. Sclerotic aortic valve with decreased opening.   7. There is no evidence of pericardial effusion.      Assessment and Plan:  In summary, PETER GAMBOA is an 80y Male with past medical history significant for     Syncope:  etiology- likely vagal   check loop recorder   monitor overnight on tele  likely d/c home in AM if feeling tell      Thank you for allowing [facility] to participate in the care of this patient.  Please feel free to call with any questions or concerns. [place] [facility], [facility]                                   [address] [place] [address]                                                      PHONE: [phone]                                                         FAX: [phone]  [email or web address]  ---------------------------------------------------------------------------------------------------------------------------------    HPI:  PETER GAMBOA is an 80y Male  PMHX HTN, HLD, DM, smoker, RBBB, prostate CA,  gastric cancer (Dx 2020- currently on Chemo) who presented to [facility] ED with syncope.   Pt was going to the bathroom today when was washing after having a BM he suddenly woke up on the floor.  He does not recall the events that occurred  He denied chest pain, palps, sob. PT was recently admitted s/p fall with subsequent SDH.  He had a loop recorder placed at that time.   Pt's wife informs me that he did not eat or drink anything today.  Since being started on chemo he has had a decreased appetite/Poor po fluid intake.     PAST MEDICAL & SURGICAL HISTORY:  DM (diabetes mellitus)    Prostate cancer        No Known Allergies      MEDICATIONS  (STANDING):    MEDICATIONS  (PRN):      Family History: Pt denies hx of early cad, SCD, or congenital heart disease.      Social History:  Cigarettes:   former                 Alchohol:   no              Illicit Drug Abuse:  no    ROS:    Extensively Reviewed with pertinents as per HPI the remainder were negative.      Vital Signs Last 24 Hrs  T(C): 36.3 (09 Oct 2020 17:00), Max: 36.3 (09 Oct 2020 17:00)  T(F): 97.4 (09 Oct 2020 17:00), Max: 97.4 (09 Oct 2020 17:00)  HR: 81 (09 Oct 2020 17:00) (81 - 81)  BP: 105/76 (09 Oct 2020 17:00) (105/76 - 105/76)  BP(mean): --  RR: 16 (09 Oct 2020 17:00) (16 - 16)  SpO2: 98% (09 Oct 2020 17:00) (98% - 98%)  ICU Vital Signs Last 24 Hrs  PETER GAMBOA  I&O's Detail    I&O's Summary    Drug Dosing Weight  PETER GAMBOA      PHYSICAL EXAM:  General: Appears well developed, well nourished alert and cooperative.  HEENT: Head; normocephalic, atraumatic.  Eyes: Pupils reactive, cornea wnl.  Neck: Supple, no nodes adenopathy, no NVD or carotid bruit or thyromegaly.  CARDIOVASCULAR: Normal S1 and S2, No murmur, rub, gallop or lift.   LUNGS: No rales, rhonchi or wheeze. Normal breath sounds bilaterally.  ABDOMEN: Soft, nontender without mass or organomegaly. bowel sounds normoactive.  EXTREMITIES: No clubbing, cyanosis or edema. Distal pulses wnl.   SKIN: warm and dry with normal turgor.  NEURO: Alert/oriented x 3/normal motor exam. No pathologic reflexes.    PSYCH: normal affect.        LABS:                        11.0   14.95 )-----------( 80       ( 09 Oct 2020 17:59 )             35.5     10-09    133<L>  |  97<L>  |  12.0  ----------------------------<  194<H>  3.9   |  22.0  |  0.68    Ca    9.1      09 Oct 2020 17:59    TPro  6.2<L>  /  Alb  3.9  /  TBili  0.6  /  DBili  x   /  AST  29  /  ALT  14  /  AlkPhos  179<H>  10-09    PETER GAMBOA  CARDIAC MARKERS ( 09 Oct 2020 17:59 )  x     / <0.01 ng/mL / x     / x     / x          PT/INR - ( 09 Oct 2020 17:59 )   PT: 12.9 sec;   INR: 1.12 ratio         PTT - ( 09 Oct 2020 17:59 )  PTT:28.0 sec      RADIOLOGY & ADDITIONAL STUDIES:    INTERPRETATION OF TELEMETRY (personally reviewed):         ECHO:  9/2020    Summary:   1. Left ventricular ejection fraction, by visual estimation, is 60 to 65%.   2. Normal global left ventricular systolic function.   3. Spectral Doppler shows impaired relaxation pattern.   4. Normal RV size and function, estimated PASP of 27 mmHg.   5. Mild mitral annular calcification.   6. Sclerotic aortic valve with decreased opening.   7. There is no evidence of pericardial effusion.      Assessment and Plan:  In summary, PETER GAMBOA is an 80y Male  PMHX HTN, HLD, DM, smoker, RBBB, prostate CA,  gastric cancer (Dx 2020- currently on Chemo) who presented to [facility] ED with syncope.   Pt was going to the bathroom today when was washing after having a BM he suddenly woke up on the floor.  He does not recall the events that occurred  He denied chest pain, palps, sob. PT was recently admitted s/p fall with subsequent SDH.  He had a loop recorder placed at that time.   Pt's wife informs me that he did not eat or drink anything today.  Since being started on chemo he has had a decreased appetite/Poor po fluid intake.     Syncope:  etiology- vasovagal   check loop recorder   monitor overnight on tele  NS @ 75 cc/hr overnight   possible d/c home in AM if feeling well      Thank you for allowing [facility] to participate in the care of this patient.  Please feel free to call with any questions or concerns.

## 2020-10-09 NOTE — CONSULT NOTE ADULT - SUBJECTIVE AND OBJECTIVE BOX
HPI:  A 79 yo M consulted to Trauma Service for fall from ground level w/ +LOC. HPI taken from Wife, who is at bedside and patient. Patient had an unwitnessed fall in bathroom. Wife heard the fall and found him down between sink and toilet bowl. He doesn't remember event. At present, has no complaints. Denies headaches, change in vision, neuromuscular deficits, or pain. No CP, SOB, F/C/N/V.     PAST MEDICAL & SURGICAL HISTORY:  DM (diabetes mellitus)    Prostate cancer        REVIEW OF SYSTEMS  As in HPI    MEDICATIONS  (STANDING):  sodium chloride 0.9% Bolus 1000 milliLiter(s) IV Bolus once    MEDICATIONS  (PRN):      Allergies    No Known Allergies    Intolerances        SOCIAL HISTORY:    FAMILY HISTORY:  FH: diabetes mellitus        Vital Signs Last 24 Hrs  T(C): 36.3 (09 Oct 2020 17:00), Max: 36.3 (09 Oct 2020 17:00)  T(F): 97.4 (09 Oct 2020 17:00), Max: 97.4 (09 Oct 2020 17:00)  HR: 87 (09 Oct 2020 19:23) (81 - 87)  BP: 119/67 (09 Oct 2020 19:23) (105/76 - 119/67)  BP(mean): --  RR: 16 (09 Oct 2020 19:23) (16 - 16)  SpO2: 98% (09 Oct 2020 19:23) (98% - 98%)    PHYSICAL EXAM:      Constitutional: AAOx3, GCS 15     Eyes: EOMI, PERRLA    Neck: Supple, trachea midline     Respiratory: Non-labored, CTAB; chest: Mediport in right chest, no erythema.     Cardiovascular: regular rate     Gastrointestinal: soft, ND    Extremities: ROM intact, no neuromuscular deficits.     Vascular: palpable pulses all throughout           LABS:                        11.0   14.95 )-----------( 80       ( 09 Oct 2020 17:59 )             35.5     10-09    133<L>  |  97<L>  |  12.0  ----------------------------<  194<H>  3.9   |  22.0  |  0.68    Ca    9.1      09 Oct 2020 17:59    TPro  6.2<L>  /  Alb  3.9  /  TBili  0.6  /  DBili  x   /  AST  29  /  ALT  14  /  AlkPhos  179<H>  10-09    PT/INR - ( 09 Oct 2020 17:59 )   PT: 12.9 sec;   INR: 1.12 ratio         PTT - ( 09 Oct 2020 17:59 )  PTT:28.0 sec

## 2020-10-09 NOTE — ED ADULT NURSE NOTE - CHIEF COMPLAINT QUOTE
unwitnessed episode of syncope in bathroom today. no complaints at this time. no external signs of trauma. - blood thinners. a and o x3. fingerstick 152.

## 2020-10-09 NOTE — ED ADULT NURSE NOTE - OBJECTIVE STATEMENT
pt had unwitnessed episode of syncope.  pt denies headache, currently alert resting comfortably on monitor Resident Christina at bedside,

## 2020-10-09 NOTE — ED PROVIDER NOTE - OBJECTIVE STATEMENT
81 y/o M pt with hx of HTN and prostate ca (2010), and gastric cancer (2020), currently on IV chemo with port in place in R chest wall presenting today after unwitnessed syncopal episode today at home. Pt states that he felt in his normal state of health today and then while in the bathroom syncopized and woke up on the floor. Wife heard the pt fall and came to him. Pt does not know if he hit his head. Denies any injury or complaint currently. Pt was recently d/c from [facility] after admission for a SDH. Per cardiology notes, pt also has a loop recorder in place. Pt denies any chest pain, dyspnea, fevers, n/v/d, abdominal pain, dysuria, headache, cough, congestion, sore throat, neck pain, back pain, weakness, numbness, tingling, dizziness, or other complaint.

## 2020-10-09 NOTE — H&P ADULT - NSICDXPASTMEDICALHX_GEN_ALL_CORE_FT
PAST MEDICAL HISTORY:  Benign essential HTN     DM (diabetes mellitus)     Gastric cancer on chemo    Prostate cancer

## 2020-10-09 NOTE — ED ADULT TRIAGE NOTE - CHIEF COMPLAINT QUOTE
unwitnessed episode of syncope in bathroom today. no complaints at this time. no external signs of trauma. - blood thinners. a and o x3. unwitnessed episode of syncope in bathroom today. no complaints at this time. no external signs of trauma. - blood thinners. a and o x3. fingerstick 152.

## 2020-10-09 NOTE — ED ADULT NURSE NOTE - NS_SISCREENINGSR_GEN_ALL_ED
Subjective:      Zelalem Kothari is a 6 y.o. female here with mother. Patient brought in for Otalgia and Nasal Congestion      HPI:  Ear Pain  Patient presents with left ear pain that began yesterday. Symptoms include congestion and coryza  which began 1 week ago and there has been little improvement since that time. Patient denies fever. History of previous ear infections: yes.  Home medication includes bromofed PRN with some relief.    Review of Systems   Constitutional: Positive for appetite change. Negative for fever.   HENT: Positive for congestion, ear pain and rhinorrhea.    Skin: Negative for pallor and rash.       Objective:     Physical Exam   Constitutional: She appears well-developed and well-nourished.   HENT:   Right Ear: Tympanic membrane normal.   Left Ear: Tympanic membrane normal.   Nose: No nasal discharge.   Mouth/Throat: Mucous membranes are moist. No tonsillar exudate. Oropharynx is clear. Pharynx is normal.   Eyes: Conjunctivae and EOM are normal. Right eye exhibits no discharge. Left eye exhibits no discharge.   Cardiovascular: Normal rate, regular rhythm, S1 normal and S2 normal. Pulses are palpable.   No murmur heard.  Pulmonary/Chest: Effort normal and breath sounds normal. There is normal air entry. She has no wheezes. She exhibits no retraction.   Abdominal: Soft. Bowel sounds are normal. She exhibits no mass. There is no hepatosplenomegaly. There is no tenderness.   Musculoskeletal: Normal range of motion. She exhibits no deformity.   Neurological: She is alert. She displays normal reflexes.   Skin: Skin is warm. No rash noted.       Assessment:        1. Acute otalgia, left    2. Acute rhinitis         Plan:     1. Reassurance provided. 2. Symptomatic care discussed. 3. Call or RTC if symptoms persist or worsen.   Negative

## 2020-10-09 NOTE — ED ADULT NURSE REASSESSMENT NOTE - NS ED NURSE REASSESS COMMENT FT1
Assumed pt. care at 1930 from off going RN. Pt. A&Ox3. Pt. on cardiac monitor and continuos pulse ox. Pt. in NSR in lead II. Pt. respirations even and unlabored. Pt. in no apparent distress. Pt. denies any pain at this time. Pt. informed of plan of care that he is going to be admitted. COVID sent.

## 2020-10-09 NOTE — CONSULT NOTE ADULT - ASSESSMENT
80M with chronic right SDH, no acute component at this time     Plan   No acute surgical intervention at this time, as patient is neurologically intact   Recommend medicine consult / admission for syncope   No anticoagulants or anti platelets at this time   Pt should follow with Dr. Wright in 2 weeks, or keep appointment scheduled from September admission.

## 2020-10-10 LAB
ALBUMIN SERPL ELPH-MCNC: 3.1 G/DL — LOW (ref 3.3–5.2)
ALP SERPL-CCNC: 139 U/L — HIGH (ref 40–120)
ALT FLD-CCNC: 10 U/L — SIGNIFICANT CHANGE UP
ANION GAP SERPL CALC-SCNC: 10 MMOL/L — SIGNIFICANT CHANGE UP (ref 5–17)
ANISOCYTOSIS BLD QL: SLIGHT — SIGNIFICANT CHANGE UP
AST SERPL-CCNC: 19 U/L — SIGNIFICANT CHANGE UP
BASOPHILS # BLD AUTO: 0 K/UL — SIGNIFICANT CHANGE UP (ref 0–0.2)
BASOPHILS NFR BLD AUTO: 0 % — SIGNIFICANT CHANGE UP (ref 0–2)
BILIRUB SERPL-MCNC: 0.5 MG/DL — SIGNIFICANT CHANGE UP (ref 0.4–2)
BUN SERPL-MCNC: 11 MG/DL — SIGNIFICANT CHANGE UP (ref 8–20)
CALCIUM SERPL-MCNC: 8.2 MG/DL — LOW (ref 8.6–10.2)
CHLORIDE SERPL-SCNC: 103 MMOL/L — SIGNIFICANT CHANGE UP (ref 98–107)
CK SERPL-CCNC: 45 U/L — SIGNIFICANT CHANGE UP (ref 30–200)
CO2 SERPL-SCNC: 24 MMOL/L — SIGNIFICANT CHANGE UP (ref 22–29)
CREAT SERPL-MCNC: 0.57 MG/DL — SIGNIFICANT CHANGE UP (ref 0.5–1.3)
EOSINOPHIL # BLD AUTO: 0 K/UL — SIGNIFICANT CHANGE UP (ref 0–0.5)
EOSINOPHIL NFR BLD AUTO: 0 % — SIGNIFICANT CHANGE UP (ref 0–6)
GIANT PLATELETS BLD QL SMEAR: PRESENT — SIGNIFICANT CHANGE UP
GLUCOSE SERPL-MCNC: 119 MG/DL — HIGH (ref 70–99)
HCT VFR BLD CALC: 28.2 % — LOW (ref 39–50)
HGB BLD-MCNC: 8.7 G/DL — LOW (ref 13–17)
HYPOCHROMIA BLD QL: SIGNIFICANT CHANGE UP
LYMPHOCYTES # BLD AUTO: 0.39 K/UL — LOW (ref 1–3.3)
LYMPHOCYTES # BLD AUTO: 2.6 % — LOW (ref 13–44)
MAGNESIUM SERPL-MCNC: 2 MG/DL — SIGNIFICANT CHANGE UP (ref 1.6–2.6)
MANUAL SMEAR VERIFICATION: SIGNIFICANT CHANGE UP
MCHC RBC-ENTMCNC: 20 PG — LOW (ref 27–34)
MCHC RBC-ENTMCNC: 30.9 GM/DL — LOW (ref 32–36)
MCV RBC AUTO: 64.8 FL — LOW (ref 80–100)
MICROCYTES BLD QL: SIGNIFICANT CHANGE UP
MONOCYTES # BLD AUTO: 0.27 K/UL — SIGNIFICANT CHANGE UP (ref 0–0.9)
MONOCYTES NFR BLD AUTO: 1.8 % — LOW (ref 2–14)
MYELOCYTES NFR BLD: 8.8 % — HIGH (ref 0–0)
NEUTROPHILS # BLD AUTO: 12.82 K/UL — HIGH (ref 1.8–7.4)
NEUTROPHILS NFR BLD AUTO: 77.9 % — HIGH (ref 43–77)
NEUTS BAND # BLD: 8 % — SIGNIFICANT CHANGE UP (ref 0–8)
OVALOCYTES BLD QL SMEAR: SLIGHT — SIGNIFICANT CHANGE UP
PHOSPHATE SERPL-MCNC: 2.7 MG/DL — SIGNIFICANT CHANGE UP (ref 2.4–4.7)
PLAT MORPH BLD: NORMAL — SIGNIFICANT CHANGE UP
PLATELET # BLD AUTO: 62 K/UL — LOW (ref 150–400)
POIKILOCYTOSIS BLD QL AUTO: SLIGHT — SIGNIFICANT CHANGE UP
POLYCHROMASIA BLD QL SMEAR: SLIGHT — SIGNIFICANT CHANGE UP
POTASSIUM SERPL-MCNC: 4 MMOL/L — SIGNIFICANT CHANGE UP (ref 3.5–5.3)
POTASSIUM SERPL-SCNC: 4 MMOL/L — SIGNIFICANT CHANGE UP (ref 3.5–5.3)
PROT SERPL-MCNC: 4.8 G/DL — LOW (ref 6.6–8.7)
RBC # BLD: 4.35 M/UL — SIGNIFICANT CHANGE UP (ref 4.2–5.8)
RBC # FLD: 25.2 % — HIGH (ref 10.3–14.5)
RBC BLD AUTO: ABNORMAL
SARS-COV-2 IGG SERPL QL IA: NEGATIVE — SIGNIFICANT CHANGE UP
SARS-COV-2 IGM SERPL IA-ACNC: <3.8 AU/ML — SIGNIFICANT CHANGE UP
SARS-COV-2 RNA SPEC QL NAA+PROBE: SIGNIFICANT CHANGE UP
SCHISTOCYTES BLD QL AUTO: SLIGHT — SIGNIFICANT CHANGE UP
SODIUM SERPL-SCNC: 137 MMOL/L — SIGNIFICANT CHANGE UP (ref 135–145)
TARGETS BLD QL SMEAR: SIGNIFICANT CHANGE UP
TROPONIN T SERPL-MCNC: <0.01 NG/ML — SIGNIFICANT CHANGE UP (ref 0–0.06)
TSH SERPL-MCNC: 1.45 UIU/ML — SIGNIFICANT CHANGE UP (ref 0.27–4.2)
VARIANT LYMPHS # BLD: 0.9 % — SIGNIFICANT CHANGE UP (ref 0–6)
WBC # BLD: 14.93 K/UL — HIGH (ref 3.8–10.5)
WBC # FLD AUTO: 14.93 K/UL — HIGH (ref 3.8–10.5)

## 2020-10-10 PROCEDURE — 93010 ELECTROCARDIOGRAM REPORT: CPT

## 2020-10-10 PROCEDURE — 99233 SBSQ HOSP IP/OBS HIGH 50: CPT

## 2020-10-10 RX ORDER — METOPROLOL TARTRATE 50 MG
12.5 TABLET ORAL
Refills: 0 | Status: DISCONTINUED | OUTPATIENT
Start: 2020-10-10 | End: 2020-10-11

## 2020-10-10 RX ADMIN — Medication 1 GRAM(S): at 00:08

## 2020-10-10 RX ADMIN — Medication 1 GRAM(S): at 05:19

## 2020-10-10 RX ADMIN — LEVETIRACETAM 500 MILLIGRAM(S): 250 TABLET, FILM COATED ORAL at 16:54

## 2020-10-10 RX ADMIN — Medication 1 GRAM(S): at 16:54

## 2020-10-10 RX ADMIN — Medication 5 MILLIGRAM(S): at 05:19

## 2020-10-10 RX ADMIN — Medication 1 GRAM(S): at 23:39

## 2020-10-10 RX ADMIN — Medication 1 GRAM(S): at 11:00

## 2020-10-10 RX ADMIN — LEVETIRACETAM 500 MILLIGRAM(S): 250 TABLET, FILM COATED ORAL at 05:19

## 2020-10-10 RX ADMIN — PANTOPRAZOLE SODIUM 40 MILLIGRAM(S): 20 TABLET, DELAYED RELEASE ORAL at 05:19

## 2020-10-10 RX ADMIN — PANTOPRAZOLE SODIUM 40 MILLIGRAM(S): 20 TABLET, DELAYED RELEASE ORAL at 16:55

## 2020-10-10 RX ADMIN — Medication 12.5 MILLIGRAM(S): at 18:23

## 2020-10-10 RX ADMIN — SODIUM CHLORIDE 75 MILLILITER(S): 9 INJECTION, SOLUTION INTRAVENOUS at 16:55

## 2020-10-10 NOTE — PROGRESS NOTE ADULT - SUBJECTIVE AND OBJECTIVE BOX
cc: syncope       interval hx : patiet seen and eval. comfortable. in no acute distress. denies any dizziness , palpitations , cp , sob     Vital Signs Last 24 Hrs  T(C): 36.6 (10 Oct 2020 15:07), Max: 36.8 (10 Oct 2020 05:03)  T(F): 97.9 (10 Oct 2020 15:07), Max: 98.3 (10 Oct 2020 05:03)  HR: 70 (10 Oct 2020 15:07) (70 - 87)  BP: 109/71 (10 Oct 2020 15:07) (101/60 - 128/76)  BP(mean): --  RR: 18 (10 Oct 2020 15:07) (16 - 18)  SpO2: 100% (10 Oct 2020 15:07) (97% - 100%)    Constitutional: comfortable. in no acute distress   Head: NC/AT  Eyes: Anicteric sclera, conjunctiva WNL  ENT: Normal Pharynx, Dry oral mucosa  Neck: Supple, Non-tender  Chest: Non-tender, no rashes, +R ACW Port  Cardio: RRR, s1/s2, no appreciable murmurs/rubs/gallops  Resp: BS CTA bilaterally, no wheezing/rhonchi/rales  Abd: Soft, Non-tender, Non-distended, no rebound/guarding/rigidity  MSK: moving all extremities, no motor weakness, full ROM x4  Ext: palpable distal pulses, good capillary refill  Neuro: no focal deficits                            8.7    14.93 )-----------( 62       ( 10 Oct 2020 03:17 )             28.2   10-10    137  |  103  |  11.0  ----------------------------<  119<H>  4.0   |  24.0  |  0.57    Ca    8.2<L>      10 Oct 2020 03:17  Phos  2.7     10-10  Mg     2.0     10-10    TPro  4.8<L>  /  Alb  3.1<L>  /  TBili  0.5  /  DBili  x   /  AST  19  /  ALT  10  /  AlkPhos  139<H>  10-10

## 2020-10-10 NOTE — PROGRESS NOTE ADULT - SUBJECTIVE AND OBJECTIVE BOX
[place] [facility]                                   [address] [place] [address]                                                      PHONE: [phone]                                                         FAX: [phone]  [email or web address]  ---------------------------------------------------------------------------------------------------------------------------------    Overnight events/patient complaints:    INTERPRETATION OF TELEMETRY (personally reviewed):    ECG:    ECHO:    STRESS TEST:    CARDIAC CATHETERIZATION:    I&O's Summary      PAST MEDICAL & SURGICAL HISTORY:  Benign essential HTN    Gastric cancer  on chemo    DM (diabetes mellitus)    Prostate cancer    Port-A-Cath in place        No Known Allergies    MEDICATIONS  (STANDING):  enalapril 5 milliGRAM(s) Oral daily  lactated ringers. 1000 milliLiter(s) (75 mL/Hr) IV Continuous <Continuous>  levETIRAcetam 500 milliGRAM(s) Oral two times a day  pantoprazole    Tablet 40 milliGRAM(s) Oral two times a day  sucralfate 1 Gram(s) Oral four times a day    MEDICATIONS  (PRN):  ondansetron Injectable 4 milliGRAM(s) IV Push every 8 hours PRN Nausea and/or Vomiting      Vital Signs Last 24 Hrs  T(C): 36.4 (10 Oct 2020 07:23), Max: 36.8 (10 Oct 2020 05:03)  T(F): 97.6 (10 Oct 2020 07:23), Max: 98.3 (10 Oct 2020 05:03)  HR: 79 (10 Oct 2020 07:23) (75 - 87)  BP: 105/60 (10 Oct 2020 07:23) (101/60 - 128/76)  BP(mean): --  RR: 18 (10 Oct 2020 07:23) (16 - 18)  SpO2: 100% (10 Oct 2020 07:23) (98% - 100%)  ICU Vital Signs Last 24 Hrs    PHYSICAL EXAM:  General: Appears well developed, well nourished alert and cooperative.  HEENT: Head; normocephalic, atraumatic.Pupils reactive, cornea wnl. Neck supple, no nodes adenopathy, no JVD  CARDIOVASCULAR: Normal S1 and S2, 1/6 LACHO, no rub, gallop or lift.   LUNGS: No rales, rhonchi or wheeze. Normal breath sounds bilaterally.  ABDOMEN: Soft, nontender without mass or organomegaly. bowel sounds normoactive.  EXTREMITIES: No clubbing, cyanosis or edema.   SKIN: warm and dry with normal turgor.  NEURO: Alert/oriented x 3/normal motor exam.   PSYCH: normal affect.        LABS:                        8.7    14.93 )-----------( 62       ( 10 Oct 2020 03:17 )             28.2     10-10    137  |  103  |  11.0  ----------------------------<  119<H>  4.0   |  24.0  |  0.57    Ca    8.2<L>      10 Oct 2020 03:17  Phos  2.7     10-10  Mg     2.0     10-10    TPro  4.8<L>  /  Alb  3.1<L>  /  TBili  0.5  /  DBili  x   /  AST  19  /  ALT  10  /  AlkPhos  139<H>  10-10    PETER GAMBOA  CARDIAC MARKERS ( 10 Oct 2020 03:17 )  x     / <0.01 ng/mL / 45 U/L / x     / x      CARDIAC MARKERS ( 09 Oct 2020 20:59 )  x     / <0.01 ng/mL / x     / x     / x      CARDIAC MARKERS ( 09 Oct 2020 17:59 )  x     / <0.01 ng/mL / x     / x     / x          PT/INR - ( 09 Oct 2020 17:59 )   PT: 12.9 sec;   INR: 1.12 ratio         PTT - ( 09 Oct 2020 17:59 )  PTT:28.0 sec      RADIOLOGY & ADDITIONAL STUDIES:    ASSESSMENT AND PLAN:  In summary, PETER GAMBOA is a 80y Male with past medical history significant for hypertension, dyslipdemia, chronic RBBB, COPD, gastric cancer (2020) s/p Mediport currently on chemotherapy s/p recent admission to [facility] for unwitnessed fall found to have right SDH and midline shift discharged 9/2020 post ILR implantation who has had recent poor oral intake who presents with recurrent unwitnessed fall post bowel movement    Syncope/unwitnessed fall  - orthostastic on presentation, suspect hypovolemia as trigger for likely vasovagal syncope  - s/p IVF  - ILR interrogation     Thank you for allowing [facility] to participate in the care of this patient.  Please feel free to call with any questions or concerns. [place] [facility], [facility]                                   [address] [place] [address]                                                      PHONE: [phone]                                                         FAX: [phone]  [email or web address]  ---------------------------------------------------------------------------------------------------------------------------------    Overnight events/patient complaints: No acute concerns     INTERPRETATION OF TELEMETRY (personally reviewed): NSVT     ECHO: < from: TTE Echo Complete w/o Contrast w/ Doppler (09.14.20 @ 11:17) >   1. Left ventricular ejection fraction, by visual estimation, is 60 to 65%.   2. Normal global left ventricular systolic function.   3. Spectral Doppler shows impaired relaxation pattern.   4. Normal RV size and function, estimated PASP of 27 mmHg.   5. Mild mitral annular calcification.   6. Sclerotic aortic valve with decreased opening.   7. There is no evidence of pericardial effusion.    I&O's Summary      PAST MEDICAL & SURGICAL HISTORY:  Benign essential HTN  Gastric cancer on chemo  DM (diabetes mellitus)  Prostate cancer  Port-A-Cath in place    No Known Allergies    MEDICATIONS  (STANDING):  enalapril 5 milliGRAM(s) Oral daily  lactated ringers. 1000 milliLiter(s) (75 mL/Hr) IV Continuous <Continuous>  levETIRAcetam 500 milliGRAM(s) Oral two times a day  pantoprazole    Tablet 40 milliGRAM(s) Oral two times a day  sucralfate 1 Gram(s) Oral four times a day    MEDICATIONS  (PRN):  ondansetron Injectable 4 milliGRAM(s) IV Push every 8 hours PRN Nausea and/or Vomiting      Vital Signs Last 24 Hrs  T(C): 36.4 (10 Oct 2020 07:23), Max: 36.8 (10 Oct 2020 05:03)  T(F): 97.6 (10 Oct 2020 07:23), Max: 98.3 (10 Oct 2020 05:03)  HR: 79 (10 Oct 2020 07:23) (75 - 87)  BP: 105/60 (10 Oct 2020 07:23) (101/60 - 128/76)  BP(mean): --  RR: 18 (10 Oct 2020 07:23) (16 - 18)  SpO2: 100% (10 Oct 2020 07:23) (98% - 100%)  ICU Vital Signs Last 24 Hrs    PHYSICAL EXAM:  General: Elderly man, chronically ill appearing   HEENT: Head; normocephalic, atraumatic.Pupils reactive, cornea wnl. Neck supple, no nodes adenopathy, no JVD  CARDIOVASCULAR: Normal S1 and S2, 1/6 LACHO, no rub, gallop or lift.   LUNGS: No rales, rhonchi or wheeze. Normal breath sounds bilaterally.  ABDOMEN: Soft, nontender without mass or organomegaly. bowel sounds normoactive.  EXTREMITIES: No clubbing, cyanosis or edema.   SKIN: warm and dry with normal turgor.  NEURO: Alert/oriented x 3/normal motor exam.   PSYCH: normal affect.        LABS:                        8.7    14.93 )-----------( 62       ( 10 Oct 2020 03:17 )             28.2     10-10    137  |  103  |  11.0  ----------------------------<  119<H>  4.0   |  24.0  |  0.57    Ca    8.2<L>      10 Oct 2020 03:17  Phos  2.7     10-10  Mg     2.0     10-10    TPro  4.8<L>  /  Alb  3.1<L>  /  TBili  0.5  /  DBili  x   /  AST  19  /  ALT  10  /  AlkPhos  139<H>  10-10    PETER GAMBOA  CARDIAC MARKERS ( 10 Oct 2020 03:17 )  x     / <0.01 ng/mL / 45 U/L / x     / x      CARDIAC MARKERS ( 09 Oct 2020 20:59 )  x     / <0.01 ng/mL / x     / x     / x      CARDIAC MARKERS ( 09 Oct 2020 17:59 )  x     / <0.01 ng/mL / x     / x     / x          PT/INR - ( 09 Oct 2020 17:59 )   PT: 12.9 sec;   INR: 1.12 ratio         PTT - ( 09 Oct 2020 17:59 )  PTT:28.0 sec      RADIOLOGY & ADDITIONAL STUDIES:    ASSESSMENT AND PLAN:  In summary, PETER GAMBOA is a 80y Male with past medical history significant for hypertension, dyslipdemia, chronic RBBB, COPD, gastric cancer (2020) s/p Mediport currently on chemotherapy s/p recent admission to [facility] for unwitnessed fall found to have right SDH and midline shift discharged 9/2020 post ILR implantation who has had recent poor oral intake who presents with recurrent unwitnessed fall post bowel movement    Syncope/unwitnessed fall  - ILR interrogation: SVT to 180 bpm, d/c enalapril and start metoprolol 12.5mg Q12, will plan for outpatient EP follow-up   - syncope likely multifactorial including: SVT, orthostastic on presentation, hypovolemia  - s/p IVF  - SDH management per neurosurgery  - presently without acute concerns, disposition planning per primary team      Thank you for allowing [facility] to participate in the care of this patient.  Please feel free to call with any questions or concerns.

## 2020-10-10 NOTE — PROGRESS NOTE ADULT - ASSESSMENT
80y Male with past medical history significant for hypertension, dyslipdemia, chronic RBBB, COPD, gastric cancer (2020) s/p Mediport currently on chemotherapy s/p recent admission to [facility] for unwitnessed fall found to have right SDH and midline shift discharged 9/2020 post ILR implantation who has had recent poor oral intake who presents with recurrent unwitnessed fall post bowel movement    >Recurrent Syncope, likely multifactorial including: SVT, orthostastic on presentation, hypovolemia  -Recent episode discharged 9/2020 s/p ILR  -TTE Echo 9/2020 LVEF 60-65%, normal LV systolic function, normal RV size/function  -Doppler US Carotids BL 9/2020 with mild atherosclerotic disease, no hemodynamically significant abnormalities.   -s/p recent syncope workup last month will hold off on repeat echo and carotids for now pending w/u  - ILR interrogation: SVT to 180 bpm, d/c enalapril and start metoprolol 12.5mg Q12, will plan for outpatient EP follow-up   - s/p IVF    > Chronic R SDH with Midline Shift  -CT Head with evolving changes, mid-mod mass effect on underlying parenchyma, minmal R to L midline shift of 2-3mm minimally improved from prior exam  -repeat labs. monitor tele. neurochecks Q4. fall precautions.  -hold pharmacological VTE PPX or antiplatelets/AC/NSAIDs  -Keppra 500mg PO BID for PPX  -Defer repeat imaging to NeuroSx  -Neurosurgery on board , No acute neurosurgical intervention.     >Leukocytosis/ tending down   -2/2 malignancy v GCSF-induced v infection v hemoconcentration   -check UA/UCX and CXR for poss source  -if patient develops fever or WBC worsens [facility]  -repeat CBC in AM/trend WBC and bandemia    >HTN  -Enalapril 5mg PO Q24  -monitor BP/Orthostatics     >GERD  -Pantorpazole 40mg PO BID  -Sucralfate 1g PO QID    >Gastric CA  -Outpatient F/u and Chemo Pegfilgrastim as scheduled. Antiemetics PRN N/V.    > Anemia of chronic dz   -2/2 Gastric CA/Chemo. H&H stable. improved from previous admission.   -Trend H&H.    >dvt ppx: scds , no pharmacological dvt ppx due to sdh    80y Male with past medical history significant for hypertension, dyslipdemia, chronic RBBB, COPD, gastric cancer (2020) s/p Mediport currently on chemotherapy s/p recent admission to [facility] for unwitnessed fall found to have right SDH and midline shift discharged 9/2020 post ILR implantation who has had recent poor oral intake who presents with recurrent unwitnessed fall post bowel movement    >Recurrent Syncope, likely multifactorial including: SVT, orthostastic on presentation, hypovolemia  -Recent episode discharged 9/2020 s/p ILR  -TTE Echo 9/2020 LVEF 60-65%, normal LV systolic function, normal RV size/function  -Doppler US Carotids BL 9/2020 with mild atherosclerotic disease, no hemodynamically significant abnormalities.   -s/p recent syncope workup last month will hold off on repeat echo and carotids for now pending w/u  - ILR interrogation: SVT to 180 bpm, enalapril dcd and started on  metoprolol 12.5mg Q12, plan for outpatient EP follow-up  as per cardio   - s/p IVF    > Chronic R SDH with Midline Shift  -CT Head with evolving changes, mid-mod mass effect on underlying parenchyma, minmal R to L midline shift of 2-3mm minimally improved from prior exam  -repeat labs. monitor tele. neurochecks Q4. fall precautions.  -hold pharmacological VTE PPX or antiplatelets/AC/NSAIDs  -Keppra 500mg PO BID for PPX  -Defer repeat imaging to NeuroSx  -Neurosurgery on board , No acute neurosurgical intervention.     >Leukocytosis/ tending down   -2/2 malignancy v GCSF-induced v infection v hemoconcentration   -check UA/UCX and CXR for poss source  -if patient develops fever or WBC worsens obtain BCX x2 with +[facility] RACW in place.  -repeat CBC in AM/trend WBC and bandemia    >HTN  -Enalapril 5mg PO Q24  -monitor BP/Orthostatics     >GERD  -Pantorpazole 40mg PO BID  -Sucralfate 1g PO QID    >Gastric CA  -Outpatient F/u and Chemo Pegfilgrastim as scheduled. Antiemetics PRN N/V.    > Anemia of chronic dz   -2/2 Gastric CA/Chemo. H&H stable. improved from previous admission.   -Trend H&H.    >dvt ppx: scds , no pharmacological dvt ppx due to sdh

## 2020-10-10 NOTE — PROGRESS NOTE ADULT - SUBJECTIVE AND OBJECTIVE BOX
HPI:  79 y/o M with PMHX COPD, HTN, Prostate CA (), Gastric Cancer () s/p R ACW Mediport currently on chemotherapy Pegfilgrastim (last dose 4 days ago) s/p recent admission to [facility] discharged 2020 for Fall with Syncope c/b R SDH and midline shift presents to [facility] ER BIBEMS s/p unwitnessed syncope. Patient was using the bathroom when his wife heard him fall and found him on the floor. Wife states he was trying to transfer from bed to commode. Unknown if there was +head trauma. Prior admission with SDH with midline shift at that time. Pt seen/examined by Trauma Surgery, Neurosurgery and Cardio in ER. CT imaging similar to prior imaging last admission. Wife reports decreased PO intake since chemo treatment - did not eat or drink anything during the day prior to event. Positive orthostatic VS noted in ER. Also had reported episode ectopy on tele SVT v NSVT. Significant leukocytosis on admission WBC 29.4 with repeat CBC showing improvement s/p 2L IVFB NSS with WBC 14.95 and Bandemia 7% (and compensatory drop in Plt). Pt seen/examined. Denies any current complaints.  Says he is tolerating chemo well but later admits he isnt eating/drinking very much. No current CP/SOB/MORROW, cough, Fever/Chills, N/V/D, urinary complaints, abd pain, or focal deficits. ROS negative unless mentioned above.  (09 Oct 2020 23:28)      INTERVAL HPI/OVERNIGHT EVENTS:  80y Male seen and examined at bedside lying comfortably in bed in no acute distress. Currently without complaints. Denies headache, dizziness, weakness, n/v, changes in vision, fevers, chills, chest pain, SOB.     Vital Signs Last 24 Hrs  T(C): 36.4 (10 Oct 2020 07:23), Max: 36.8 (10 Oct 2020 05:03)  T(F): 97.6 (10 Oct 2020 07:23), Max: 98.3 (10 Oct 2020 05:03)  HR: 79 (10 Oct 2020 07:23) (75 - 87)  BP: 105/60 (10 Oct 2020 07:23) (101/60 - 128/76)  BP(mean): --  RR: 18 (10 Oct 2020 07:23) (16 - 18)  SpO2: 100% (10 Oct 2020 07:23) (98% - 100%)    PHYSICAL EXAM:  GENERAL: NAD, well-groomed, well-developed  HEAD:  Atraumatic, normocephalic  YANIRA COMA SCORE: E4 V5 M6 =15       E: 4= opens eyes spontaneously 3= to voice 2= to noxious 1= no opening       V: 5= oriented 4= confused 3= inappropriate words 2= incomprehensible sounds 1= nonverbal 1T= intubated       M: 6= follows commands 5= localizes 4= withdraws 3= flexor posturing 2= extensor posturing 1= no movement  MENTAL STATUS: AAO x3; Awake; Appropriately conversant without aphasia; following commands  CRANIAL NERVES: Visual acuity normal for age, PERRL. EOMI without nystagmus. Facial sensation intact V1-3 distribution b/l. Face symmetric w/ normal eye closure and smile, tongue midline. Hearing grossly intact. Speech clear. Head turning and shoulder shrug intact.   MOTOR: strength 5/5 b/l upper and lower extremities  Uppers     Delt (C5/6)     Bicep (C5/6)     Wrist Extend (C6)     Tricep (C7)     HG (C8/T1)  R                     5/5                 5/5                         5/5                           5/5                   5/5  L                      5/5                 5/5                         5/5                           5/5                   5/5  Lowers      HF(L1/L2)     KE (L3)     DF (L4)     EHL (L5)     PF (S1)      R                     5/5              5/5           5/5           5/5            5/5  L                     5/5               5/5          5/5            5/5            5/5  SENSATION: grossly intact to light touch all extremities  LUNG: respirations non-labored  SKIN: Warm, dry    LABS:                        8.7    14.93 )-----------( 62       ( 10 Oct 2020 03:17 )             28.2     10-10    137  |  103  |  11.0  ----------------------------<  119<H>  4.0   |  24.0  |  0.57    Ca    8.2<L>      10 Oct 2020 03:17  Phos  2.7     10-10  Mg     2.0     10-10    TPro  4.8<L>  /  Alb  3.1<L>  /  TBili  0.5  /  DBili  x   /  AST  19  /  ALT  10  /  AlkPhos  139<H>  10-10    PT/INR - ( 09 Oct 2020 17:59 )   PT: 12.9 sec;   INR: 1.12 ratio         PTT - ( 09 Oct 2020 17:59 )  PTT:28.0 sec  Urinalysis Basic - ( 09 Oct 2020 23:40 )    Color: Yellow / Appearance: Clear / S.020 / pH: x  Gluc: x / Ketone: Trace  / Bili: Negative / Urobili: Negative mg/dL   Blood: x / Protein: 30 mg/dL / Nitrite: Negative   Leuk Esterase: Negative / RBC: Negative /HPF / WBC 0-2   Sq Epi: x / Non Sq Epi: x / Bacteria: x          RADIOLOGY & ADDITIONAL TESTS:  < from: CT Head No Cont (10.09.20 @ 17:35) >    IMPRESSION:    Mixed attenuation subdural hemorrhage overlying the right frontal parietal lobes appears somewhat evolved since prior exam measuring up to 1.9 cm in width. There is mild to moderate mass effect on underlying parenchyma. Minimal right to left midline shift of approximately 2 to 3 mm, minimally improved since prior exam. 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    < end of copied text >          CAPRINI SCORE [CLOT]:  Patient has an estimated Caprini score of greater than 5.  However, the patient's unique clinical situation will be addressed in an individual manner to determine appropriate anticoagulation treatment, if any.

## 2020-10-10 NOTE — PROGRESS NOTE ADULT - ASSESSMENT
80M with subacute to chronic right SDH,with local mass effect, though decreased from prior admission. Stable left frontal hyperdensity likely cavernoma vs calcification     Plan   - No acute surgical intervention at this time, continue to follow clinically   - No anticoagulants or anti platelets at this time   - Repeat head CT if there is a change in neurologic exam

## 2020-10-11 ENCOUNTER — TRANSCRIPTION ENCOUNTER (OUTPATIENT)
Age: 80
End: 2020-10-11

## 2020-10-11 VITALS
TEMPERATURE: 98 F | SYSTOLIC BLOOD PRESSURE: 103 MMHG | OXYGEN SATURATION: 100 % | HEART RATE: 57 BPM | DIASTOLIC BLOOD PRESSURE: 65 MMHG | RESPIRATION RATE: 18 BRPM

## 2020-10-11 LAB
ANION GAP SERPL CALC-SCNC: 11 MMOL/L — SIGNIFICANT CHANGE UP (ref 5–17)
BUN SERPL-MCNC: 9 MG/DL — SIGNIFICANT CHANGE UP (ref 8–20)
CALCIUM SERPL-MCNC: 8.2 MG/DL — LOW (ref 8.6–10.2)
CHLORIDE SERPL-SCNC: 101 MMOL/L — SIGNIFICANT CHANGE UP (ref 98–107)
CO2 SERPL-SCNC: 24 MMOL/L — SIGNIFICANT CHANGE UP (ref 22–29)
CREAT SERPL-MCNC: 0.52 MG/DL — SIGNIFICANT CHANGE UP (ref 0.5–1.3)
CULTURE RESULTS: SIGNIFICANT CHANGE UP
GLUCOSE SERPL-MCNC: 93 MG/DL — SIGNIFICANT CHANGE UP (ref 70–99)
HCT VFR BLD CALC: 27.1 % — LOW (ref 39–50)
HGB BLD-MCNC: 8.3 G/DL — LOW (ref 13–17)
MAGNESIUM SERPL-MCNC: 2 MG/DL — SIGNIFICANT CHANGE UP (ref 1.6–2.6)
MCHC RBC-ENTMCNC: 19.7 PG — LOW (ref 27–34)
MCHC RBC-ENTMCNC: 30.6 GM/DL — LOW (ref 32–36)
MCV RBC AUTO: 64.2 FL — LOW (ref 80–100)
PLATELET # BLD AUTO: 47 K/UL — LOW (ref 150–400)
POTASSIUM SERPL-MCNC: 3.9 MMOL/L — SIGNIFICANT CHANGE UP (ref 3.5–5.3)
POTASSIUM SERPL-SCNC: 3.9 MMOL/L — SIGNIFICANT CHANGE UP (ref 3.5–5.3)
RBC # BLD: 4.22 M/UL — SIGNIFICANT CHANGE UP (ref 4.2–5.8)
RBC # FLD: 25.4 % — HIGH (ref 10.3–14.5)
SODIUM SERPL-SCNC: 135 MMOL/L — SIGNIFICANT CHANGE UP (ref 135–145)
SPECIMEN SOURCE: SIGNIFICANT CHANGE UP
WBC # BLD: 6.68 K/UL — SIGNIFICANT CHANGE UP (ref 3.8–10.5)
WBC # FLD AUTO: 6.68 K/UL — SIGNIFICANT CHANGE UP (ref 3.8–10.5)

## 2020-10-11 PROCEDURE — 72125 CT NECK SPINE W/O DYE: CPT

## 2020-10-11 PROCEDURE — 84100 ASSAY OF PHOSPHORUS: CPT

## 2020-10-11 PROCEDURE — 87086 URINE CULTURE/COLONY COUNT: CPT

## 2020-10-11 PROCEDURE — 71045 X-RAY EXAM CHEST 1 VIEW: CPT

## 2020-10-11 PROCEDURE — 85730 THROMBOPLASTIN TIME PARTIAL: CPT

## 2020-10-11 PROCEDURE — 82962 GLUCOSE BLOOD TEST: CPT

## 2020-10-11 PROCEDURE — U0003: CPT

## 2020-10-11 PROCEDURE — 80053 COMPREHEN METABOLIC PANEL: CPT

## 2020-10-11 PROCEDURE — 36415 COLL VENOUS BLD VENIPUNCTURE: CPT

## 2020-10-11 PROCEDURE — 82550 ASSAY OF CK (CPK): CPT

## 2020-10-11 PROCEDURE — 84484 ASSAY OF TROPONIN QUANT: CPT

## 2020-10-11 PROCEDURE — 99239 HOSP IP/OBS DSCHRG MGMT >30: CPT

## 2020-10-11 PROCEDURE — 85025 COMPLETE CBC W/AUTO DIFF WBC: CPT

## 2020-10-11 PROCEDURE — 86769 SARS-COV-2 COVID-19 ANTIBODY: CPT

## 2020-10-11 PROCEDURE — 93005 ELECTROCARDIOGRAM TRACING: CPT

## 2020-10-11 PROCEDURE — 83735 ASSAY OF MAGNESIUM: CPT

## 2020-10-11 PROCEDURE — 99285 EMERGENCY DEPT VISIT HI MDM: CPT | Mod: 25

## 2020-10-11 PROCEDURE — 85610 PROTHROMBIN TIME: CPT

## 2020-10-11 PROCEDURE — 81001 URINALYSIS AUTO W/SCOPE: CPT

## 2020-10-11 PROCEDURE — 83880 ASSAY OF NATRIURETIC PEPTIDE: CPT

## 2020-10-11 PROCEDURE — 84443 ASSAY THYROID STIM HORMONE: CPT

## 2020-10-11 PROCEDURE — 80048 BASIC METABOLIC PNL TOTAL CA: CPT

## 2020-10-11 PROCEDURE — 70450 CT HEAD/BRAIN W/O DYE: CPT

## 2020-10-11 PROCEDURE — 85027 COMPLETE CBC AUTOMATED: CPT

## 2020-10-11 RX ORDER — METOPROLOL TARTRATE 50 MG
0.5 TABLET ORAL
Qty: 30 | Refills: 0
Start: 2020-10-11 | End: 2020-11-09

## 2020-10-11 RX ADMIN — Medication 1 GRAM(S): at 11:42

## 2020-10-11 RX ADMIN — SODIUM CHLORIDE 75 MILLILITER(S): 9 INJECTION, SOLUTION INTRAVENOUS at 10:19

## 2020-10-11 RX ADMIN — LEVETIRACETAM 500 MILLIGRAM(S): 250 TABLET, FILM COATED ORAL at 05:36

## 2020-10-11 RX ADMIN — Medication 12.5 MILLIGRAM(S): at 05:36

## 2020-10-11 RX ADMIN — PANTOPRAZOLE SODIUM 40 MILLIGRAM(S): 20 TABLET, DELAYED RELEASE ORAL at 05:36

## 2020-10-11 RX ADMIN — Medication 1 GRAM(S): at 05:36

## 2020-10-11 NOTE — PROGRESS NOTE ADULT - SUBJECTIVE AND OBJECTIVE BOX
HPI:  81 y/o M with PMHX COPD, HTN, Prostate CA (), Gastric Cancer () s/p R ACW Mediport currently on chemotherapy Pegfilgrastim (last dose 4 days ago) s/p recent admission to [facility] discharged 2020 for Fall with Syncope CT with R SDH and midline shift presents to [facility] ER BIBEMS s/p unwitnessed syncope. Patient was using the bathroom when his wife heard him fall and found him on the floor. Wife states he was trying to transfer from bed to commode. Unknown if there was +head trauma. Prior admission with SDH with midline shift at that time, no neurosurgical interventions at that time.       INTERVAL HPI/OVERNIGHT EVENTS:  80y Male seen and examined at bedside lying comfortably in bed in no acute distress. Currently without complaints. Denies headache, dizziness, weakness, n/v, changes in vision, fevers, chills, chest pain, SOB.       Vital Signs Last 24 Hrs  T(C): 36.4 (11 Oct 2020 07:47), Max: 36.8 (10 Oct 2020 19:26)  T(F): 97.6 (11 Oct 2020 07:47), Max: 98.2 (10 Oct 2020 19:26)  HR: 57 (11 Oct 2020 07:47) (54 - 75)  BP: 103/65 (11 Oct 2020 07:47) (101/64 - 177/66)  BP(mean): 85 (11 Oct 2020 05:00) (85 - 103)  RR: 18 (11 Oct 2020 07:47) (18 - 18)  SpO2: 100% (11 Oct 2020 07:47) (97% - 100%)      PHYSICAL EXAM:  GENERAL: NAD, well-groomed, well-developed  HEAD:  Atraumatic, normocephalic  YANIRA COMA SCORE: E4 V5 M6 =15       E: 4= opens eyes spontaneously 3= to voice 2= to noxious 1= no opening       V: 5= oriented 4= confused 3= inappropriate words 2= incomprehensible sounds 1= nonverbal 1T= intubated       M: 6= follows commands 5= localizes 4= withdraws 3= flexor posturing 2= extensor posturing 1= no movement  MENTAL STATUS: AAO x3; Awake; Appropriately conversant without aphasia; following commands  CRANIAL NERVES: Visual acuity normal for age, PERRL. EOMI without nystagmus. Facial sensation intact V1-3 distribution b/l. Face symmetric w/ normal eye closure and smile, tongue midline. Hearing grossly intact. Speech clear. Head turning and shoulder shrug intact.   MOTOR: strength 5/5 b/l upper and lower extremities  Uppers     Delt (C5/6)     Bicep (C5/6)     Wrist Extend (C6)     Tricep (C7)     HG (C8/T1)  R                     5/5                 5/5                         5/5                           5/5                   5/5  L                      5/5                 5/5                         5/5                           5/5                   5/5  Lowers      HF(L1/L2)     KE (L3)     DF (L4)     EHL (L5)     PF (S1)      R                     5/5              5/5           5/5           5/5            5/5  L                     5/5               5/5          5/5            5/5            5/5  SENSATION: grossly intact to light touch all extremities  LUNG: respirations non-labored  SKIN: Warm, dry      LABS:                        8.3    6.68  )-----------( 47       ( 11 Oct 2020 06:39 )             27.1     10-    135  |  101  |  9.0  ----------------------------<  93  3.9   |  24.0  |  0.52    Ca    8.2<L>      11 Oct 2020 06:40  Phos  2.7     10-10  Mg     2.0     10-11    TPro  4.8<L>  /  Alb  3.1<L>  /  TBili  0.5  /  DBili  x   /  AST  19  /  ALT  10  /  AlkPhos  139<H>  10-10    PT/INR - ( 09 Oct 2020 17:59 )   PT: 12.9 sec;   INR: 1.12 ratio         PTT - ( 09 Oct 2020 17:59 )  PTT:28.0 sec  Urinalysis Basic - ( 09 Oct 2020 23:40 )    Color: Yellow / Appearance: Clear / S.020 / pH: x  Gluc: x / Ketone: Trace  / Bili: Negative / Urobili: Negative mg/dL   Blood: x / Protein: 30 mg/dL / Nitrite: Negative   Leuk Esterase: Negative / RBC: Negative /HPF / WBC 0-2   Sq Epi: x / Non Sq Epi: x / Bacteria: x          RADIOLOGY & ADDITIONAL TESTS:  < from: CT Head No Cont (10.09.20 @ 17:35) >  IMPRESSION:    Mixed attenuation subdural hemorrhage overlying the right frontal parietal lobes appears somewhat evolved since prior exam measuring up to 1.9 cm in width. There is mild to moderate mass effect on underlying parenchyma. Minimal right to left midline shift of approximately 2 to 3 mm, minimally improved since prior exam. 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        < end of copied text >        CAPRINI SCORE [CLOT]:  Patient has an estimated Caprini score of greater than 5.  However, the patient's unique clinical situation will be addressed in an individual manner to determine appropriate anticoagulation treatment, if any.

## 2020-10-11 NOTE — DISCHARGE NOTE PROVIDER - NSDCFUSCHEDAPPT_GEN_ALL_CORE_FT
PETER GAMBOA ; 10/13/2020 ; NPP Neurosurg [address]  PETER GAMBOA ; 10/19/2020 ; NPP [facility]  PETER GAMBOA ; 10/19/2020 ; [facility]  PETER GAMBOA ; 10/21/2020 ; [facility]  PETER GAMBOA ; 10/21/2020 ; [facility]PP [facility]  PETER GAMBOA ; 10/26/2020 ; [facility]  PETER GAMBOA ; 11/09/2020 ; NPP Surgon[facility]  PETER GAMBOA ; 11/09/2020 ; [facility]

## 2020-10-11 NOTE — DISCHARGE NOTE PROVIDER - CARE PROVIDERS DIRECT ADDRESSES
[email or web address],DirectAddress_Unknown,DirectAddress_Unknown

## 2020-10-11 NOTE — PROGRESS NOTE ADULT - SUBJECTIVE AND OBJECTIVE BOX
[facility], [facility]                                   [address] [place] [address]                                                      PHONE: [phone]                                                         FAX: [phone]  [email or web address]  ---------------------------------------------------------------------------------------------------------------------------------    Overnight events/patient complaints:     INTERPRETATION OF TELEMETRY (personally reviewed):     ECHO: < from: TTE Echo Complete w/o Contrast w/ Doppler (09.14.20 @ 11:17) >   1. Left ventricular ejection fraction, by visual estimation, is 60 to 65%.   2. Normal global left ventricular systolic function.   3. Spectral Doppler shows impaired relaxation pattern.   4. Normal RV size and function, estimated PASP of 27 mmHg.   5. Mild mitral annular calcification.   6. Sclerotic aortic valve with decreased opening.   7. There is no evidence of pericardial effusion.      I&O's Summary      PAST MEDICAL & SURGICAL HISTORY:  Benign essential HTN    Gastric cancer  on chemo    DM (diabetes mellitus)    Prostate cancer    Port-A-Cath in place        No Known Allergies    MEDICATIONS  (STANDING):  lactated ringers. 1000 milliLiter(s) (75 mL/Hr) IV Continuous <Continuous>  levETIRAcetam 500 milliGRAM(s) Oral two times a day  metoprolol tartrate 12.5 milliGRAM(s) Oral two times a day  pantoprazole    Tablet 40 milliGRAM(s) Oral two times a day  sucralfate 1 Gram(s) Oral four times a day    MEDICATIONS  (PRN):  ondansetron Injectable 4 milliGRAM(s) IV Push every 8 hours PRN Nausea and/or Vomiting      Vital Signs Last 24 Hrs  T(C): 36.4 (11 Oct 2020 07:47), Max: 36.8 (10 Oct 2020 19:26)  T(F): 97.6 (11 Oct 2020 07:47), Max: 98.2 (10 Oct 2020 19:26)  HR: 57 (11 Oct 2020 07:47) (54 - 75)  BP: 103/65 (11 Oct 2020 07:47) (101/64 - 177/66)  BP(mean): 85 (11 Oct 2020 05:00) (85 - 103)  RR: 18 (11 Oct 2020 07:47) (18 - 18)  SpO2: 100% (11 Oct 2020 07:47) (97% - 100%)  ICU Vital Signs Last 24 Hrs    PHYSICAL EXAM:  General: Elderly man, chronically ill appearing   HEENT: Head; normocephalic, atraumatic.Pupils reactive, cornea wnl. Neck supple, no nodes adenopathy, no JVD  CARDIOVASCULAR: Normal S1 and S2, 1/6 LACHO, no rub, gallop or lift.   LUNGS: No rales, rhonchi or wheeze. Normal breath sounds bilaterally.  ABDOMEN: Soft, nontender without mass or organomegaly. bowel sounds normoactive.  EXTREMITIES: No clubbing, cyanosis or edema.   SKIN: warm and dry with normal turgor.  NEURO: Alert/oriented x 3/normal motor exam.   PSYCH: normal affect.        LABS:                        8.3    6.68  )-----------( 47       ( 11 Oct 2020 06:39 )             27.1     10-11    135  |  101  |  9.0  ----------------------------<  93  3.9   |  24.0  |  0.52    Ca    8.2<L>      11 Oct 2020 06:40  Phos  2.7     10-10  Mg     2.0     10-11    TPro  4.8<L>  /  Alb  3.1<L>  /  TBili  0.5  /  DBili  x   /  AST  19  /  ALT  10  /  AlkPhos  139<H>  10-10    PETER GAMBOA  CARDIAC MARKERS ( 10 Oct 2020 03:17 )  x     / <0.01 ng/mL / 45 U/L / x     / x      CARDIAC MARKERS ( 09 Oct 2020 20:59 )  x     / <0.01 ng/mL / x     / x     / x      CARDIAC MARKERS ( 09 Oct 2020 17:59 )  x     / <0.01 ng/mL / x     / x     / x          PT/INR - ( 09 Oct 2020 17:59 )   PT: 12.9 sec;   INR: 1.12 ratio         PTT - ( 09 Oct 2020 17:59 )  PTT:28.0 sec      RADIOLOGY & ADDITIONAL STUDIES:    ASSESSMENT AND PLAN:  In summary, PETER GAMBOA is a 80y Male with past medical history significant for hypertension, dyslipdemia, chronic RBBB, COPD, gastric cancer (2020) s/p Mediport currently on chemotherapy s/p recent admission to [facility] for unwitnessed fall found to have right SDH and midline shift discharged 9/2020 post ILR implantation who has had recent poor oral intake who presents with recurrent unwitnessed fall post bowel movement    Syncope/unwitnessed fall  - ILR interrogation: SVT to 180 bpm, continue metoprolol 12.5mg Q12, will plan for outpatient EP follow-up   - syncope likely multifactorial including: SVT, orthostastic on presentation, hypovolemia  - s/p IVF  - SDH management per neurosurgery  - presently without acute concerns, disposition planning per primary team     Thank you for allowing [facility] to participate in the care of this patient.  Please feel free to call with any questions or concerns.

## 2020-10-11 NOTE — DISCHARGE NOTE PROVIDER - NSDCCPCAREPLAN_GEN_ALL_CORE_FT
PRINCIPAL DISCHARGE DIAGNOSIS  Diagnosis: Syncope  Assessment and Plan of Treatment: likely due to combination of factors. dehydration, svt.   drink plenty of fluids.   follow up with cardiology as out patient in 1-3 days to be referred to Electrophysiology cardiology for further interrogation of loop recorder.   follow up with oncology as rpreviously scheduled.         SECONDARY DISCHARGE DIAGNOSES  Diagnosis: Chronic subdural hematoma  Assessment and Plan of Treatment: follow up with neurosurgery dr. Wright in 2 weeks.   please call neurosurgery office tomorrow ( monday 10/12 to schedule appointment)

## 2020-10-11 NOTE — DISCHARGE NOTE NURSING/CASE MANAGEMENT/SOCIAL WORK - PATIENT PORTAL LINK FT
You can access the FollowMyHealth Patient Portal offered by [facility] by registering at the following website: [email or web address]. By joining [facility]’s FollowMyHealth portal, you will also be able to view your health information using other applications (apps) compatible with our system.

## 2020-10-11 NOTE — DISCHARGE NOTE PROVIDER - HOSPITAL COURSE
80y Male with past medical history significant for hypertension, dyslipdemia, chronic RBBB, COPD, gastric cancer (2020) s/p Mediport currently on chemotherapy s/p recent admission to [facility] for unwitnessed fall found to have right SDH and midline shift discharged 9/2020 post ILR implantation who has had recent poor oral intake who presents with recurrent unwitnessed fall/ syncope  post bowel movement. Recurrent Syncope, likely multifactorial including: SVT, orthostastic on presentation, hypovolemia. Recent episode discharged 9/2020 s/p ILR. TTE Echo 9/2020 LVEF 60-65%, normal LV systolic function, normal RV size/function. Doppler US Carotids BL 9/2020 with mild atherosclerotic disease, no hemodynamically significant abnormalities. s/p recent syncope workup last month will hold off on repeat echo and carotids for now pending w/u  ILR interrogation: SVT to 180 bpm, enalapril dcd and started on  metoprolol 12.5mg Q12, plan for outpatient EP follow-up  as per cardio . s/p IVF. today denies any dizziness or syncope. denies any HA, dizziness or palpitations.   for Chronic R SDH with Midline Shift. CT Head with evolving changes, mid-mod mass effect on underlying parenchyma, minmal R to L midline shift of 2-3mm minimally improved from prior exam , patient was observed with neurochecks Q4. fall precautions. no pharmacological VTE PPX or antiplatelets/AC/NSAIDs. c/w Keppra 500mg PO BID for PPX. seen by Neurosurgery , No acute neurosurgical intervention at this time. patient stable. patient cleared for discharge home as per neurosurgery. patient to follow up with neuro surgery dr. Wright in 2 weeks.     Patient had Leukocytosis on admission / likely reactive. now resolved.   patient afebrile.   for Gastric CA: Outpatient F/u and Chemo Pegfilgrastim as scheduled. Antiemetics PRN N/V.    Vital Signs Last 24 Hrs  T(C): 36.4 (11 Oct 2020 07:47), Max: 36.8 (10 Oct 2020 19:26)  T(F): 97.6 (11 Oct 2020 07:47), Max: 98.2 (10 Oct 2020 19:26)  HR: 57 (11 Oct 2020 07:47) (54 - 73)  BP: 103/65 (11 Oct 2020 07:47) (103/65 - 177/66)  BP(mean): 85 (11 Oct 2020 05:00) (85 - 103)  RR: 18 (11 Oct 2020 07:47) (18 - 18)  SpO2: 100% (11 Oct 2020 07:47) (97% - 100%)    time spent for this dc 44 mins     time spent for this dc 45 mins

## 2020-10-11 NOTE — PROGRESS NOTE ADULT - ASSESSMENT
80M with subacute to chronic right SDH, with local mass effect, though decreased from prior admission. Stable left frontal hyperdensity likely cavernoma vs calcification     Plan   - No acute surgical intervention at this time, continue to follow clinically   - No anticoagulants or anti platelets at this time   - Repeat head CT if there is a change in neurologic exam   - will continue to follow

## 2020-10-11 NOTE — DISCHARGE NOTE PROVIDER - CARE PROVIDER_API CALL
Quoc Wright  NEUROSURGERY  [address] [place] [id number]  Phone: [phone]  Fax: [phone]  Follow Up Time: 2 weeks    primary care,   Phone: (   )    -  Fax: (   )    -  Follow Up Time: 1-3 days    Katelynn Hendrickson)  Internal Medicine  [address] [place] [address]  Phone: [phone]  Fax: [phone]  Follow Up Time: 1-3 days

## 2020-10-11 NOTE — DISCHARGE NOTE PROVIDER - PROVIDER TOKENS
PROVIDER:[TOKEN:[[id number]],FOLLOWUP:[2 weeks]],FREE:[LAST:[primary care],PHONE:[(   )    -],FAX:[(   )    -],FOLLOWUP:[1-3 days]],PROVIDER:[TOKEN:[[id number]],FOLLOWUP:[1-3 days]]

## 2020-10-11 NOTE — DISCHARGE NOTE PROVIDER - NSDCMRMEDTOKEN_GEN_ALL_CORE_FT
enalapril 5 mg oral tablet: 1 tab(s) orally once a day  levETIRAcetam 500 mg oral tablet: 1 tab(s) orally 2 times a day  metoprolol tartrate 25 mg oral tablet: 0.5 tab(s) orally 2 times a day   pantoprazole 40 mg oral delayed release tablet: 1 tab(s) orally 2 times a day  prochlorperazine 10 mg oral tablet: 1 tab(s) orally every 6 hours, As needed, for nausea  sucralfate 1 g/10 mL oral suspension: 10 milliliter(s) orally 4 times a day (before meals and at bedtime)

## 2020-10-13 ENCOUNTER — APPOINTMENT (OUTPATIENT)
Dept: NEUROSURGERY | Facility: CLINIC | Age: 80
End: 2020-10-13
Payer: MEDICARE

## 2020-10-13 VITALS
DIASTOLIC BLOOD PRESSURE: 57 MMHG | HEIGHT: 67 IN | SYSTOLIC BLOOD PRESSURE: 81 MMHG | WEIGHT: 117 LBS | BODY MASS INDEX: 18.36 KG/M2 | TEMPERATURE: 97.8 F | OXYGEN SATURATION: 95 % | HEART RATE: 103 BPM

## 2020-10-13 DIAGNOSIS — Z86.39 PERSONAL HISTORY OF OTHER ENDOCRINE, NUTRITIONAL AND METABOLIC DISEASE: ICD-10-CM

## 2020-10-13 DIAGNOSIS — Z86.79 PERSONAL HISTORY OF OTHER DISEASES OF THE CIRCULATORY SYSTEM: ICD-10-CM

## 2020-10-13 PROCEDURE — 99214 OFFICE O/P EST MOD 30 MIN: CPT

## 2020-10-13 NOTE — HISTORY OF PRESENT ILLNESS
[FreeTextEntry1] : 81 yo right handed male with PMH of hypertension, dyslipidemia, chronic RBBB, COPD, gastric cancer (2020) s/p Mediport currently on chemotherapy s/p admission to [facility] on 9/13/20 for unwitnessed fall found to have right SDH and midline shift discharged 9/2020 who presents with recurrent unwitnessed fall/ syncope post bowel movement. Recurrent Syncope. CT head showed Chronic R SDH with Midline Shift. CT Head with evolving changes, mid-mod mass effect on underlying parenchyma, minimal R to L midline shift of 2-3mm minimally improved from prior exam. He is on Keppra 500mg PO BID for PPX.  No acute neurosurgical intervention at this time. \par He was discharged on 9/16/20 to home.\par \par Pt had a syncopal event s/p fall and was readmitted on Oct 11 to [facility]. Pt is having gastric mass removed on 11/9/2020.\par Today, pt is frail and weak. He is awake, responsive and oriented. He is very weak. He denies headaches, dizziness, weakness, vision or speech disturbance. Neuro exam wnl\par \par Plan: non contrast brain ct week on Nov 2 2020.\par remain on Keppra until next ct scan

## 2020-10-13 NOTE — PHYSICAL EXAM
[FreeTextEntry1] : Awake, alert, and oriented x 3. VSS. In no apparent distress.   Short and long term memory intact.  Speech is clear and appropriate.  Affect is normal.  Voice is strong.  Respirations easy and even.  Normal skin color and pigmentation.  The sclera and conjunctiva normal.  Ears, nose, and neck normal in appearance.  EOMI, no nystagmus, facial sensation intact symmetrically, face symmetrical, hearing intact bilaterally, tongue and palate midline, head turning and shoulder shrug symmetric and no tongue deviation with protrusion.  No pronator drift.   No past-pointing, no tremors noted, no dysdiadochokinesia, and finger to nose dysmetria was not present.    \par Right hand dominant.\par Pt very frail, weak and thin\par \par \par Gait is stable with the use of an assistive device cane.MS 1-2/5, generalized weakness. On chemo for gastric cancer\par

## 2020-10-13 NOTE — REVIEW OF SYSTEMS
[Feeling Tired] : feeling tired [Negative] : Heme/Lymph [Shortness Of Breath] : no shortness of breath [FreeTextEntry2] : frail weak, thin [FreeTextEntry7] : scheduled for gastric surgery Nov 9th,

## 2020-10-19 ENCOUNTER — APPOINTMENT (OUTPATIENT)
Age: 80
End: 2020-10-19

## 2020-10-19 ENCOUNTER — APPOINTMENT (OUTPATIENT)
Dept: HEMATOLOGY ONCOLOGY | Facility: CLINIC | Age: 80
End: 2020-10-19

## 2020-10-19 ENCOUNTER — LABORATORY RESULT (OUTPATIENT)
Age: 80
End: 2020-10-19

## 2020-10-19 ENCOUNTER — RESULT REVIEW (OUTPATIENT)
Age: 80
End: 2020-10-19

## 2020-10-19 LAB
ANISOCYTOSIS BLD QL: SLIGHT — SIGNIFICANT CHANGE UP
BASOPHILS # BLD AUTO: 0.2 K/UL — SIGNIFICANT CHANGE UP (ref 0–0.2)
BITE CELLS BLD QL SMEAR: PRESENT — SIGNIFICANT CHANGE UP
DOHLE BOD BLD QL SMEAR: PRESENT — SIGNIFICANT CHANGE UP
ELLIPTOCYTES BLD QL SMEAR: SLIGHT — SIGNIFICANT CHANGE UP
EOSINOPHIL # BLD AUTO: 0.2 K/UL — SIGNIFICANT CHANGE UP (ref 0–0.5)
EOSINOPHIL NFR BLD AUTO: 1 % — SIGNIFICANT CHANGE UP (ref 0–6)
GIANT PLATELETS BLD QL SMEAR: PRESENT — SIGNIFICANT CHANGE UP
HCT VFR BLD CALC: 32.6 % — LOW (ref 39–50)
HGB BLD-MCNC: 10.4 G/DL — LOW (ref 13–17)
HYPOCHROMIA BLD QL: SLIGHT — SIGNIFICANT CHANGE UP
LG PLATELETS BLD QL AUTO: SLIGHT — SIGNIFICANT CHANGE UP
LYMPHOCYTES # BLD AUTO: 14 % — SIGNIFICANT CHANGE UP (ref 13–44)
LYMPHOCYTES # BLD AUTO: 2.9 K/UL — SIGNIFICANT CHANGE UP (ref 1–3.3)
MACROCYTES BLD QL: SLIGHT — SIGNIFICANT CHANGE UP
MCHC RBC-ENTMCNC: 20.3 PG — LOW (ref 27–34)
MCHC RBC-ENTMCNC: 31.8 G/DL — LOW (ref 32–36)
MCV RBC AUTO: 64.1 FL — LOW (ref 80–100)
MONOCYTES # BLD AUTO: 2.6 K/UL — HIGH (ref 0–0.9)
MONOCYTES NFR BLD AUTO: 10 % — SIGNIFICANT CHANGE UP (ref 2–14)
NEUTROPHILS # BLD AUTO: 21.5 K/UL — HIGH (ref 1.8–7.4)
NEUTROPHILS NFR BLD AUTO: 74 % — SIGNIFICANT CHANGE UP (ref 43–77)
NEUTS BAND # BLD: 1 % — SIGNIFICANT CHANGE UP (ref 0–8)
NRBC # BLD: 2 /100 — HIGH (ref 0–0)
OVALOCYTES BLD QL SMEAR: SLIGHT — SIGNIFICANT CHANGE UP
PLAT MORPH BLD: NORMAL — SIGNIFICANT CHANGE UP
PLATELET # BLD AUTO: 93 K/UL — LOW (ref 150–400)
POIKILOCYTOSIS BLD QL AUTO: SLIGHT — SIGNIFICANT CHANGE UP
POLYCHROMASIA BLD QL SMEAR: SLIGHT — SIGNIFICANT CHANGE UP
RBC # BLD: 5.1 M/UL — SIGNIFICANT CHANGE UP (ref 4.2–5.8)
RBC # FLD: 23.2 % — HIGH (ref 10.3–14.5)
RBC BLD AUTO: ABNORMAL
ROULEAUX BLD QL SMEAR: PRESENT — SIGNIFICANT CHANGE UP
SCHISTOCYTES BLD QL AUTO: SLIGHT — SIGNIFICANT CHANGE UP
SPHEROCYTES BLD QL SMEAR: SLIGHT — SIGNIFICANT CHANGE UP
TARGETS BLD QL SMEAR: SLIGHT — SIGNIFICANT CHANGE UP
TOXIC GRANULES BLD QL SMEAR: PRESENT — SIGNIFICANT CHANGE UP
WBC # BLD: 27.1 K/UL — HIGH (ref 3.8–10.5)
WBC # FLD AUTO: 27.1 K/UL — HIGH (ref 3.8–10.5)

## 2020-10-20 DIAGNOSIS — E86.0 DEHYDRATION: ICD-10-CM

## 2020-10-20 PROBLEM — I10 ESSENTIAL (PRIMARY) HYPERTENSION: Chronic | Status: ACTIVE | Noted: 2020-10-09

## 2020-10-20 PROBLEM — C16.9 MALIGNANT NEOPLASM OF STOMACH, UNSPECIFIED: Chronic | Status: ACTIVE | Noted: 2020-10-09

## 2020-10-21 ENCOUNTER — APPOINTMENT (OUTPATIENT)
Dept: HEMATOLOGY ONCOLOGY | Facility: CLINIC | Age: 80
End: 2020-10-21
Payer: MEDICARE

## 2020-10-21 ENCOUNTER — APPOINTMENT (OUTPATIENT)
Age: 80
End: 2020-10-21

## 2020-10-21 VITALS
DIASTOLIC BLOOD PRESSURE: 77 MMHG | WEIGHT: 115.13 LBS | BODY MASS INDEX: 18.07 KG/M2 | HEIGHT: 67 IN | OXYGEN SATURATION: 96 % | HEART RATE: 65 BPM | SYSTOLIC BLOOD PRESSURE: 113 MMHG

## 2020-10-21 DIAGNOSIS — E87.6 HYPOKALEMIA: ICD-10-CM

## 2020-10-21 PROCEDURE — 99214 OFFICE O/P EST MOD 30 MIN: CPT

## 2020-10-21 NOTE — HISTORY OF PRESENT ILLNESS
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  He was sent for CT by his PCP, Dr. Charles Smith, due to anemia.  CT showed in the upper central abdomen abutting the posterior antrum of the stomach and neck of the pancreas there is a  3 x 2 x 3 cm mass.  There is an additional heterogeneous enhancing 2.7 x 2.3 x 2.5 lobulated mass in the ventral upper abdominal peritoneal cavity anterosuperiorly to the antrum of the stomach with internal and surrounding enhancing vessels.  Endoscopy with biopsy of the gastric mass was c/w moderately differentiated adenocarcinoma.  Staging PET showed hypermetabolic thickening of the distal stomach, consistent with primary gastric cancer.  Hypermetabolic activity in the distal stomach, inseparable from the perigastric lymphadenopathy, consistent with metastatic kong disease. [de-identified] : Patient presents s/p 4 cycles FLOT for gastric adenocarcinoma.  Fatigue. + N/V. + Alopecia. + Decreased appetite with subsequent weight loss. + Dysgeusia. 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 + Cold intolerance, mouth only. Pt had a syncopal event s/p fall and was readmitted on Oct 11 to [facility]. Pt is having gastric mass removed on 11/9/2020.  Today, pt is frail and weak. He is awake, responsive and oriented. He is very weak. He denies headaches, dizziness, weakness, vision or speech disturbance.

## 2020-10-21 NOTE — RESULTS/DATA
[FreeTextEntry1] : \par \par 7/20/20 PET:\par Small hypermetabolic foci in the mediastinum and kings, corresponding to small mediastinal and bilateral hilar lymph nodes on CT.  The imaging appearance of these lymph nodes is nonspecific and favors reactive lymph nodes.  Hypermetabolic activity in the right upper quadrant abdomen, corresponding to extensive thickening of the distal stomach.  4.7 cm of stomach is involved.  SUV 8.2-11.2.  Proximal to this area of hypermetabolic thickening of the distal stomach, the proximal stomach is distended and contained air fluid contrast level.  The hypermetabolic activity in the thickening of the stomach is inseparable from perigastric lymph nodes.  Perigastric lymph node measures 2.5x1.6 cm SUV 12.5.  No evidence of additional enlarged intra-abdominal, pelvic or inguinal lymphadenopathy.  Radiation seeds noted in prostate.  No other abnormal hypermetabolic activity to suggest additional sites of malignancy.\par \par 7/10/20 Pathology:\par Gastric, Antrum, Ulcerated mass: moderately differentiated adenocarcinoma\par \par 6/19/20 CT A/P:\par In the upper central abdomen abutting the posterior antrum of the stomach and neck of the pancreas, there is a nonspecific heterogeneously enhancing circumscribed 3 x 2 x 3 cm mass.  There is an additional heterogeneous enhancing 2.7 x 2.3 x 2.5 lobulated mass in the ventral upper abdominal peritoneal cavity anterosuperiorly to the antrum of the stomach with internal and surrounding enhancing vessels.  Extensive enhancing severe bowel wall thickening of the entire coon and rectum compatible with colitis/proctitis which may be infectious/inflammatory. Moderate rugal fold thickening of the fundus of the stomach which may represent gastritis.

## 2020-10-22 ENCOUNTER — APPOINTMENT (OUTPATIENT)
Age: 80
End: 2020-10-22

## 2020-10-23 ENCOUNTER — LABORATORY RESULT (OUTPATIENT)
Age: 80
End: 2020-10-23

## 2020-10-23 ENCOUNTER — APPOINTMENT (OUTPATIENT)
Age: 80
End: 2020-10-23

## 2020-10-26 ENCOUNTER — OUTPATIENT (OUTPATIENT)
Dept: OUTPATIENT SERVICES | Facility: HOSPITAL | Age: 80
LOS: 1 days | End: 2020-10-26
Payer: COMMERCIAL

## 2020-10-26 VITALS
TEMPERATURE: 97 F | HEART RATE: 60 BPM | WEIGHT: 115.52 LBS | DIASTOLIC BLOOD PRESSURE: 70 MMHG | RESPIRATION RATE: 20 BRPM | SYSTOLIC BLOOD PRESSURE: 120 MMHG | HEIGHT: 65 IN

## 2020-10-26 DIAGNOSIS — C16.9 MALIGNANT NEOPLASM OF STOMACH, UNSPECIFIED: ICD-10-CM

## 2020-10-26 DIAGNOSIS — Z01.818 ENCOUNTER FOR OTHER PREPROCEDURAL EXAMINATION: ICD-10-CM

## 2020-10-26 DIAGNOSIS — Z95.828 PRESENCE OF OTHER VASCULAR IMPLANTS AND GRAFTS: Chronic | ICD-10-CM

## 2020-10-26 DIAGNOSIS — I10 ESSENTIAL (PRIMARY) HYPERTENSION: ICD-10-CM

## 2020-10-26 DIAGNOSIS — Z95.818 PRESENCE OF OTHER CARDIAC IMPLANTS AND GRAFTS: Chronic | ICD-10-CM

## 2020-10-26 DIAGNOSIS — Z29.9 ENCOUNTER FOR PROPHYLACTIC MEASURES, UNSPECIFIED: ICD-10-CM

## 2020-10-26 LAB
A1C WITH ESTIMATED AVERAGE GLUCOSE RESULT: 6.5 % — HIGH (ref 4–5.6)
ACANTHOCYTES BLD QL SMEAR: SLIGHT — SIGNIFICANT CHANGE UP
ALBUMIN SERPL ELPH-MCNC: 3.5 G/DL — SIGNIFICANT CHANGE UP (ref 3.3–5.2)
ALP SERPL-CCNC: 141 U/L — HIGH (ref 40–120)
ALT FLD-CCNC: 12 U/L — SIGNIFICANT CHANGE UP
ANION GAP SERPL CALC-SCNC: 14 MMOL/L — SIGNIFICANT CHANGE UP (ref 5–17)
ANISOCYTOSIS BLD QL: SIGNIFICANT CHANGE UP
APTT BLD: 30.5 SEC — SIGNIFICANT CHANGE UP (ref 27.5–35.5)
AST SERPL-CCNC: 15 U/L — SIGNIFICANT CHANGE UP
BASOPHILS # BLD AUTO: 0.23 K/UL — HIGH (ref 0–0.2)
BASOPHILS NFR BLD AUTO: 0.9 % — SIGNIFICANT CHANGE UP (ref 0–2)
BILIRUB SERPL-MCNC: 0.4 MG/DL — SIGNIFICANT CHANGE UP (ref 0.4–2)
BLD GP AB SCN SERPL QL: SIGNIFICANT CHANGE UP
BUN SERPL-MCNC: 12 MG/DL — SIGNIFICANT CHANGE UP (ref 8–20)
CALCIUM SERPL-MCNC: 9.6 MG/DL — SIGNIFICANT CHANGE UP (ref 8.6–10.2)
CHLORIDE SERPL-SCNC: 98 MMOL/L — SIGNIFICANT CHANGE UP (ref 98–107)
CO2 SERPL-SCNC: 24 MMOL/L — SIGNIFICANT CHANGE UP (ref 22–29)
COMMENT - BLOOD BANK: SIGNIFICANT CHANGE UP
CREAT SERPL-MCNC: 1.05 MG/DL — SIGNIFICANT CHANGE UP (ref 0.5–1.3)
EOSINOPHIL # BLD AUTO: 0 K/UL — SIGNIFICANT CHANGE UP (ref 0–0.5)
EOSINOPHIL NFR BLD AUTO: 0 % — SIGNIFICANT CHANGE UP (ref 0–6)
ESTIMATED AVERAGE GLUCOSE: 140 MG/DL — HIGH (ref 68–114)
GIANT PLATELETS BLD QL SMEAR: PRESENT — SIGNIFICANT CHANGE UP
GLUCOSE SERPL-MCNC: 130 MG/DL — HIGH (ref 70–99)
HCT VFR BLD CALC: 29.2 % — LOW (ref 39–50)
HGB BLD-MCNC: 9.2 G/DL — LOW (ref 13–17)
HYPOCHROMIA BLD QL: SLIGHT — SIGNIFICANT CHANGE UP
INR BLD: 1.13 RATIO — SIGNIFICANT CHANGE UP (ref 0.88–1.16)
LYMPHOCYTES # BLD AUTO: 13.9 % — SIGNIFICANT CHANGE UP (ref 13–44)
LYMPHOCYTES # BLD AUTO: 3.61 K/UL — HIGH (ref 1–3.3)
MACROCYTES BLD QL: SLIGHT — SIGNIFICANT CHANGE UP
MANUAL SMEAR VERIFICATION: SIGNIFICANT CHANGE UP
MCHC RBC-ENTMCNC: 20.9 PG — LOW (ref 27–34)
MCHC RBC-ENTMCNC: 31.5 GM/DL — LOW (ref 32–36)
MCV RBC AUTO: 66.4 FL — LOW (ref 80–100)
MICROCYTES BLD QL: SIGNIFICANT CHANGE UP
MONOCYTES # BLD AUTO: 1.58 K/UL — HIGH (ref 0–0.9)
MONOCYTES NFR BLD AUTO: 6.1 % — SIGNIFICANT CHANGE UP (ref 2–14)
NEUTROPHILS # BLD AUTO: 20.53 K/UL — HIGH (ref 1.8–7.4)
NEUTROPHILS NFR BLD AUTO: 78.2 % — HIGH (ref 43–77)
NEUTS BAND # BLD: 0.9 % — SIGNIFICANT CHANGE UP (ref 0–8)
OVALOCYTES BLD QL SMEAR: SLIGHT — SIGNIFICANT CHANGE UP
PLAT MORPH BLD: NORMAL — SIGNIFICANT CHANGE UP
PLATELET # BLD AUTO: 409 K/UL — HIGH (ref 150–400)
POIKILOCYTOSIS BLD QL AUTO: SLIGHT — SIGNIFICANT CHANGE UP
POLYCHROMASIA BLD QL SMEAR: SLIGHT — SIGNIFICANT CHANGE UP
POTASSIUM SERPL-MCNC: 4.1 MMOL/L — SIGNIFICANT CHANGE UP (ref 3.5–5.3)
POTASSIUM SERPL-SCNC: 4.1 MMOL/L — SIGNIFICANT CHANGE UP (ref 3.5–5.3)
PROT SERPL-MCNC: 5.5 G/DL — LOW (ref 6.6–8.7)
PROTHROM AB SERPL-ACNC: 13 SEC — SIGNIFICANT CHANGE UP (ref 10.6–13.6)
RBC # BLD: 4.4 M/UL — SIGNIFICANT CHANGE UP (ref 4.2–5.8)
RBC # FLD: 28.3 % — HIGH (ref 10.3–14.5)
RBC BLD AUTO: ABNORMAL
SCHISTOCYTES BLD QL AUTO: SLIGHT — SIGNIFICANT CHANGE UP
SODIUM SERPL-SCNC: 136 MMOL/L — SIGNIFICANT CHANGE UP (ref 135–145)
TARGETS BLD QL SMEAR: SLIGHT — SIGNIFICANT CHANGE UP
WBC # BLD: 25.95 K/UL — HIGH (ref 3.8–10.5)
WBC # FLD AUTO: 25.95 K/UL — HIGH (ref 3.8–10.5)

## 2020-10-26 PROCEDURE — G0463: CPT

## 2020-10-26 PROCEDURE — 93010 ELECTROCARDIOGRAM REPORT: CPT

## 2020-10-26 PROCEDURE — 93005 ELECTROCARDIOGRAM TRACING: CPT

## 2020-10-26 NOTE — H&P PST ADULT - ATTENDING COMMENTS
Risks, benefits, and alternatives discussed with the patient - he expressed understanding and agrees to proceed with robotic-assisted laparoscopic partial gastrectomy, possible open, possible feeding jejunostomy tube placement.

## 2020-10-26 NOTE — PATIENT PROFILE ADULT - NSPREOP1_ABLETOREACHPT_GEN_A_NUR
[phone]    Denies domestic or international travel in the past 3 weeks yes/[phone]    Denies domestic or international travel in the past 3 weeks

## 2020-10-26 NOTE — H&P PST ADULT - NSICDXPASTSURGICALHX_GEN_ALL_CORE_FT
PAST SURGICAL HISTORY:  Port-A-Cath in place      PAST SURGICAL HISTORY:  Port-A-Cath in place 8/2020 right ACW    Status post placement of implantable loop recorder left ACW

## 2020-10-26 NOTE — H&P PST ADULT - ASSESSMENT
81 yo M 81 yo M Hx of PMH of HTN, HLD, chronic RBBB, COPD, gastric cancer  s/p Mediport placement in  (completed chemo), s/p admission to [facility] on 20 for unwitnessed fall, found to have right SDH and midline shift, loop recorder placed, discharged 2020. Currently on Keppra and f/b neurology (Dr Wright), CT brain scheduled for 2020. Accompanied by wife. Pt's wife reports 50 lbs weight loss from 2020 to 2020, and recurrent unwitnessed fall/syncope post bowel movement. Pt had a syncopal event s/p fall and was readmitted on Oct 11 to [facility]. Today, pt reports fatigue, weakness, and decreased appetite, occasional constipation, increased urinary frequency and edema of bilateral ankles and feet. Pt is awake, responsive and oriented. He denies fevers, chills, SOB, palpitations, headaches, dizziness, vision or speech disturbance. Ambulates w/cane. Preop assessment prior to robotic assisted distal gastrectomy, possible EGD, transoral diagnostic w/Dr Young on . Pt is to obtain medical, cardiac, and neurology clearances prior to surgery.      OPIOID RISK TOOL    POLO EACH BOX THAT APPLIES AND ADD TOTALS AT THE END    FAMILY HISTORY OF SUBSTANCE ABUSE                 FEMALE         MALE                                                Alcohol                             [  ]1 pt          [  ]3pts                                               Illegal Durgs                     [  ]2 pts        [  ]3pts                                               Rx Drugs                           [  ]4 pts        [  ]4 pts    PERSONAL HISTORY OF SUBSTANCE ABUSE                                                                                          Alcohol                             [  ]3 pts       [  ]3 pts                                               Illegal Drugs                     [  ]4 pts        [  ]4 pts                                               Rx Drugs                           [  ]5 pts        [  ]5 pts    AGE BETWEEN 16-45 YEARS                                      [  ]1 pt         [  ]1 pt    HISTORY OF PREADOLESCENT   SEXUAL ABUSE                                                             [  ]3 pts        [  ]0pts    PSYCHOLOGICAL DISEASE                     ADD, OCD, Bipolar, Schizophrenia        [  ]2 pts         [  ]2 pts                      Depression                                               [  ]1 pt           [  ]1 pt           SCORING TOTAL   (add numbers and type here)              ( 0 )                                     A score of 3 or lower indicated LOW risk for future opioid abuse  A score of 4 to 7 indicated moderate risk for future opioid abuse  A score of 8 or higher indicates a high risk for opioid abuse    CAPRINI SCORE [CLOT]    AGE RELATED RISK FACTORS                                                       MOBILITY RELATED FACTORS  [ ] Age 41-60 years                                            (1 Point)                  [ ] Bed rest                                                        (1 Point)  [ ] Age: 61-74 years                                           (2 Points)                 [ ] Plaster cast                                                   (2 Points)  [x ] Age= 75 years                                              (3 Points)                 [ ] Bed bound for more than 72 hours                 (2 Points)    DISEASE RELATED RISK FACTORS                                               GENDER SPECIFIC FACTORS  [ x] Edema in the lower extremities                       (1 Point)                  [ ] Pregnancy                                                     (1 Point)  [ ] Varicose veins                                               (1 Point)                  [ ] Post-partum < 6 weeks                                   (1 Point)             [ ] BMI > 25 Kg/m2                                            (1 Point)                  [ ] Hormonal therapy  or oral contraception          (1 Point)                 [ ] Sepsis (in the previous month)                        (1 Point)                  [ ] History of pregnancy complications                 (1 point)  [ ] Pneumonia or serious lung disease                                               [ ] Unexplained or recurrent                     (1 Point)           (in the previous month)                               (1 Point)  [ ] Abnormal pulmonary function test                     (1 Point)                 SURGERY RELATED RISK FACTORS  [ ] Acute myocardial infarction                              (1 Point)                 [ ]  Section                                             (1 Point)  [ ] Congestive heart failure (in the previous month)  (1 Point)               [ ] Minor surgery                                                  (1 Point)   [ ] Inflammatory bowel disease                             (1 Point)                 [ ] Arthroscopic surgery                                        (2 Points)  [ ] Central venous access                                      (2 Points)                [x ] General surgery lasting more than 45 minutes   (2 Points)       [ ] Stroke (in the previous month)                          (5 Points)               [ ] Elective arthroplasty                                         (5 Points)                                                                                                                                               HEMATOLOGY RELATED FACTORS                                                 TRAUMA RELATED RISK FACTORS  [ ] Prior episodes of VTE                                     (3 Points)                [ ] Fracture of the hip, pelvis, or leg                       (5 Points)  [ ] Positive family history for VTE                         (3 Points)                 [ ] Acute spinal cord injury (in the previous month)  (5 Points)  [ ] Prothrombin 83226 A                                     (3 Points)                 [ ] Paralysis  (less than 1 month)                             (5 Points)  [ ] Factor V Leiden                                             (3 Points)                  [ ] Multiple Trauma within 1 month                        (5 Points)  [ ] Lupus anticoagulants                                     (3 Points)                                                           [ ] Anticardiolipin antibodies                               (3 Points)                                                       [ ] High homocysteine in the blood                      (3 Points)                                             [ ] Other congenital or acquired thrombophilia      (3 Points)                                                [ ] Heparin induced thrombocytopenia                  (3 Points)                                          Total Score [   6       ]    Caprini Score 0 - 2:  Low Risk, No VTE Prophylaxis required for most patients, encourage ambulation  Caprini Score 3 - 6:  At Risk, pharmacologic VTE prophylaxis is indicated for most patients (in the absence of a contraindication)  Caprini Score Greater than or = 7:  High Risk, pharmacologic VTE prophylaxis is indicated for most patients (in the absence of a contraindication)

## 2020-10-26 NOTE — H&P PST ADULT - HISTORY OF PRESENT ILLNESS
79 yo M Hx of PMH of hypertension, dyslipidemia, chronic RBBB, COPD, gastric cancer (2020) s/p Mediport placed in August, 2020 currently on chemotherapy s/p admission to [facility] on 9/13/20 for unwitnessed fall found to have right SDH and midline shift discharged 9/2020 who presents with recurrent unwitnessed fall/ syncope post bowel movement. Recurrent Syncope. CT head showed Chronic R SDH with Midline Shift. CT Head with evolving changes, mid-mod mass effect on underlying parenchyma, minimal R to L midline shift of 2-3mm minimally improved from prior exam. He is on Keppra 500mg PO BID for PPX. No acute neurosurgical intervention at this time. He was discharged on 9/16/20 to home. Pt had a syncopal event s/p fall and was readmitted on Oct 11 to [facility]. Pt is having gastric mass removed on 11/9/2020.  Today, pt is frail and weak. He is awake, responsive and oriented. He is very weak. He denies headaches, dizziness, weakness, vision or speech disturbance. Neuro exam wnl    Plan: non contrast brain ct week on Nov 2 2020.  remain on Keppra until next ct scan    81 yo M Hx of PMH of hypertension, dyslipidemia, chronic RBBB, COPD, gastric cancer (2020) s/p Mediport placed in August, 2020 currently on chemotherapy s/p admission to [facility] on 9/13/20 for unwitnessed fall found to have right SDH and midline shift discharged 9/2020 who presents with recurrent unwitnessed fall/ syncope post bowel movement. Recurrent Syncope. CT head showed Chronic R SDH with Midline Shift. CT Head with evolving changes, mid-mod mass effect on underlying parenchyma, minimal R to L midline shift of 2-3mm minimally improved from prior exam. He is on Keppra 500mg PO BID for PPX. No acute neurosurgical intervention at this time. He was discharged on 9/16/20 to home. Pt had a syncopal event s/p fall and was readmitted on Oct 11 to [facility]. Pt is having gastric mass removed on 11/9/2020.  Today, pt is frail and weak. He is awake, responsive and oriented. He is very weak. He denies headaches, dizziness, weakness, vision or speech disturbance. Neuro exam wnl. Accompanied by wife.  will remain on keppra until non contrast brain ct week on Nov 2 2020.  Preop assessment prior to robotic assisted distal gastrectomy, possible EGD, transoral diagnostic w/Dr Young on 11/9   81 yo M Hx of PMH of HTN, HLD, chronic RBBB, COPD, gastric cancer 2020 s/p Mediport placement in August, 2020 (completed chemo), s/p admission to [facility] on 9/13/20 for unwitnessed fall, found to have right SDH and midline shift, loop recorder placed, discharged 9/2020. Currently on Keppra and f/b neurology (Dr Wright), CT brain scheduled for 11/2/2020. Accompanied by wife. Pt's wife reports 50 lbs weight loss from 2/2020 to 4/2020, and recurrent unwitnessed fall/syncope post bowel movement. Pt had a syncopal event s/p fall and was readmitted on Oct 11 to [facility]. Today, pt reports fatigue, weakness, and decreased appetite, occasional constipation, increased urinary frequency and edema of bilateral ankles and feet. Pt is awake, responsive and oriented. He denies fevers, chills, SOB, palpitations, headaches, dizziness, vision or speech disturbance. Ambulates w/cane. Preop assessment prior to robotic assisted distal gastrectomy, possible EGD, transoral diagnostic w/Dr Young on 11/9. Pt is to obtain medical, cardiac, and neurology clearances prior to surgery.

## 2020-10-26 NOTE — H&P PST ADULT - LAB RESULTS AND INTERPRETATION
labs sent faxed to DR Young office staff made aware of labs 11/6/20 jy  [phone] brandon /karissa

## 2020-10-26 NOTE — PATIENT PROFILE ADULT - NSPROHMSYMPCOND_GEN_A_NUR
Pre op teaching surgical scrub pain management instructions given to pt    Covid swab to be done Nov 6/none

## 2020-10-26 NOTE — H&P PST ADULT - NSICDXPROBLEM_GEN_ALL_CORE_FT
PROBLEM DIAGNOSES  Problem: Benign essential HTN  Assessment and Plan: preop assessment, medical and cardiac clearances pending, take BP/heart medications on DOP    Problem: Gastric cancer  Assessment and Plan: preop assessment, medical, cardiac, and neurology clearances pending, robotic assisted distal gastrectomy, possible EGD, possible open on 11/9    Problem: Need for prophylactic measure  Assessment and Plan: caprini score 6, moderate risk for dvt SCD ordered, surgical team to assess for dvt prophylaxis

## 2020-10-26 NOTE — H&P PST ADULT - NSICDXPASTMEDICALHX_GEN_ALL_CORE_FT
PAST MEDICAL HISTORY:  Benign essential HTN     DM (diabetes mellitus)     Gastric cancer     Gastric cancer on chemo    Prostate cancer      PAST MEDICAL HISTORY:  Benign essential HTN     DM (diabetes mellitus)     Gastric cancer on chemo    Prostate cancer     RBBB (right bundle branch block)     SDH (subdural hematoma)

## 2020-10-26 NOTE — H&P PST ADULT - NSANTHOSAYNRD_GEN_A_CORE
No. ARJ screening performed.  STOP BANG Legend: 0-2 = LOW Risk; 3-4 = INTERMEDIATE Risk; 5-8 = HIGH Risk

## 2020-10-27 RX ORDER — SODIUM CHLORIDE 9 MG/ML
3 INJECTION INTRAMUSCULAR; INTRAVENOUS; SUBCUTANEOUS EVERY 8 HOURS
Refills: 0 | Status: DISCONTINUED | OUTPATIENT
Start: 2020-11-09 | End: 2020-11-13

## 2020-11-03 PROBLEM — S06.5X9A TRAUMATIC SUBDURAL HEMORRHAGE WITH LOSS OF CONSCIOUSNESS OF UNSPECIFIED DURATION, INITIAL ENCOUNTER: Chronic | Status: ACTIVE | Noted: 2020-10-26

## 2020-11-03 PROBLEM — I45.10 UNSPECIFIED RIGHT BUNDLE-BRANCH BLOCK: Chronic | Status: ACTIVE | Noted: 2020-10-26

## 2020-11-04 ENCOUNTER — EMERGENCY (EMERGENCY)
Facility: HOSPITAL | Age: 80
LOS: 1 days | Discharge: HOME | End: 2020-11-04
Attending: EMERGENCY MEDICINE
Payer: COMMERCIAL

## 2020-11-04 VITALS
HEART RATE: 69 BPM | DIASTOLIC BLOOD PRESSURE: 70 MMHG | TEMPERATURE: 98 F | RESPIRATION RATE: 18 BRPM | OXYGEN SATURATION: 99 % | WEIGHT: 112.88 LBS | SYSTOLIC BLOOD PRESSURE: 106 MMHG | HEIGHT: 66 IN

## 2020-11-04 DIAGNOSIS — Z95.828 PRESENCE OF OTHER VASCULAR IMPLANTS AND GRAFTS: Chronic | ICD-10-CM

## 2020-11-04 DIAGNOSIS — Z95.818 PRESENCE OF OTHER CARDIAC IMPLANTS AND GRAFTS: Chronic | ICD-10-CM

## 2020-11-04 LAB
ACANTHOCYTES BLD QL SMEAR: SLIGHT — SIGNIFICANT CHANGE UP
ALBUMIN SERPL ELPH-MCNC: 3.6 G/DL — SIGNIFICANT CHANGE UP (ref 3.3–5.2)
ALP SERPL-CCNC: 87 U/L — SIGNIFICANT CHANGE UP (ref 40–120)
ALT FLD-CCNC: 14 U/L — SIGNIFICANT CHANGE UP
ANION GAP SERPL CALC-SCNC: 14 MMOL/L — SIGNIFICANT CHANGE UP (ref 5–17)
ANISOCYTOSIS BLD QL: SLIGHT — SIGNIFICANT CHANGE UP
APPEARANCE UR: CLEAR — SIGNIFICANT CHANGE UP
APTT BLD: 26.9 SEC — LOW (ref 27.5–35.5)
AST SERPL-CCNC: 18 U/L — SIGNIFICANT CHANGE UP
BASE EXCESS BLDV CALC-SCNC: -2.2 MMOL/L — LOW (ref -2–2)
BASOPHILS # BLD AUTO: 0.05 K/UL — SIGNIFICANT CHANGE UP (ref 0–0.2)
BASOPHILS NFR BLD AUTO: 0.9 % — SIGNIFICANT CHANGE UP (ref 0–2)
BILIRUB SERPL-MCNC: 0.7 MG/DL — SIGNIFICANT CHANGE UP (ref 0.4–2)
BILIRUB UR-MCNC: NEGATIVE — SIGNIFICANT CHANGE UP
BUN SERPL-MCNC: 13 MG/DL — SIGNIFICANT CHANGE UP (ref 8–20)
CA-I SERPL-SCNC: 1.18 MMOL/L — SIGNIFICANT CHANGE UP (ref 1.15–1.33)
CALCIUM SERPL-MCNC: 9.4 MG/DL — SIGNIFICANT CHANGE UP (ref 8.6–10.2)
CHLORIDE BLDV-SCNC: 103 MMOL/L — SIGNIFICANT CHANGE UP (ref 98–107)
CHLORIDE SERPL-SCNC: 100 MMOL/L — SIGNIFICANT CHANGE UP (ref 98–107)
CO2 SERPL-SCNC: 21 MMOL/L — LOW (ref 22–29)
COLOR SPEC: YELLOW — SIGNIFICANT CHANGE UP
CREAT SERPL-MCNC: 1.3 MG/DL — SIGNIFICANT CHANGE UP (ref 0.5–1.3)
DIFF PNL FLD: NEGATIVE — SIGNIFICANT CHANGE UP
ELLIPTOCYTES BLD QL SMEAR: SLIGHT — SIGNIFICANT CHANGE UP
EOSINOPHIL # BLD AUTO: 0.1 K/UL — SIGNIFICANT CHANGE UP (ref 0–0.5)
EOSINOPHIL NFR BLD AUTO: 1.7 % — SIGNIFICANT CHANGE UP (ref 0–6)
EPI CELLS # UR: SIGNIFICANT CHANGE UP
GAS PNL BLDV: 136 MMOL/L — SIGNIFICANT CHANGE UP (ref 135–145)
GAS PNL BLDV: SIGNIFICANT CHANGE UP
GAS PNL BLDV: SIGNIFICANT CHANGE UP
GLUCOSE BLDV-MCNC: 75 MG/DL — SIGNIFICANT CHANGE UP (ref 70–99)
GLUCOSE SERPL-MCNC: 79 MG/DL — SIGNIFICANT CHANGE UP (ref 70–99)
GLUCOSE UR QL: NEGATIVE MG/DL — SIGNIFICANT CHANGE UP
HCO3 BLDV-SCNC: 22 MMOL/L — SIGNIFICANT CHANGE UP (ref 20–26)
HCT VFR BLD CALC: 29.2 % — LOW (ref 39–50)
HCT VFR BLDA CALC: 31 — LOW (ref 39–50)
HGB BLD CALC-MCNC: 10.1 G/DL — LOW (ref 13–17)
HGB BLD-MCNC: 9.5 G/DL — LOW (ref 13–17)
INR BLD: 1.08 RATIO — SIGNIFICANT CHANGE UP (ref 0.88–1.16)
KETONES UR-MCNC: ABNORMAL
LACTATE BLDV-MCNC: 1.2 MMOL/L — SIGNIFICANT CHANGE UP (ref 0.5–2)
LEUKOCYTE ESTERASE UR-ACNC: NEGATIVE — SIGNIFICANT CHANGE UP
LIDOCAIN IGE QN: 23 U/L — SIGNIFICANT CHANGE UP (ref 22–51)
LYMPHOCYTES # BLD AUTO: 1.63 K/UL — SIGNIFICANT CHANGE UP (ref 1–3.3)
LYMPHOCYTES # BLD AUTO: 28.1 % — SIGNIFICANT CHANGE UP (ref 13–44)
MACROCYTES BLD QL: SLIGHT — SIGNIFICANT CHANGE UP
MAGNESIUM SERPL-MCNC: 1.9 MG/DL — SIGNIFICANT CHANGE UP (ref 1.8–2.6)
MANUAL SMEAR VERIFICATION: SIGNIFICANT CHANGE UP
MCHC RBC-ENTMCNC: 21.7 PG — LOW (ref 27–34)
MCHC RBC-ENTMCNC: 32.5 GM/DL — SIGNIFICANT CHANGE UP (ref 32–36)
MCV RBC AUTO: 66.7 FL — LOW (ref 80–100)
MONOCYTES # BLD AUTO: 0.31 K/UL — SIGNIFICANT CHANGE UP (ref 0–0.9)
MONOCYTES NFR BLD AUTO: 5.3 % — SIGNIFICANT CHANGE UP (ref 2–14)
NEUTROPHILS # BLD AUTO: 3.71 K/UL — SIGNIFICANT CHANGE UP (ref 1.8–7.4)
NEUTROPHILS NFR BLD AUTO: 64 % — SIGNIFICANT CHANGE UP (ref 43–77)
NITRITE UR-MCNC: NEGATIVE — SIGNIFICANT CHANGE UP
OSMOLALITY SERPL: 287 MOSMOL/KG — SIGNIFICANT CHANGE UP (ref 280–301)
OTHER CELLS CSF MANUAL: 7 ML/DL — LOW (ref 18–22)
OVALOCYTES BLD QL SMEAR: SLIGHT — SIGNIFICANT CHANGE UP
PCO2 BLDV: 42 MMHG — SIGNIFICANT CHANGE UP (ref 35–50)
PH BLDV: 7.35 — SIGNIFICANT CHANGE UP (ref 7.32–7.43)
PH UR: 6 — SIGNIFICANT CHANGE UP (ref 5–8)
PLAT MORPH BLD: NORMAL — SIGNIFICANT CHANGE UP
PLATELET # BLD AUTO: 187 K/UL — SIGNIFICANT CHANGE UP (ref 150–400)
PO2 BLDV: 33 MMHG — SIGNIFICANT CHANGE UP (ref 25–45)
POIKILOCYTOSIS BLD QL AUTO: SIGNIFICANT CHANGE UP
POLYCHROMASIA BLD QL SMEAR: SLIGHT — SIGNIFICANT CHANGE UP
POTASSIUM BLDV-SCNC: 4.5 MMOL/L — SIGNIFICANT CHANGE UP (ref 3.4–4.5)
POTASSIUM SERPL-MCNC: 4.7 MMOL/L — SIGNIFICANT CHANGE UP (ref 3.5–5.3)
POTASSIUM SERPL-SCNC: 4.7 MMOL/L — SIGNIFICANT CHANGE UP (ref 3.5–5.3)
PROT SERPL-MCNC: 5.9 G/DL — LOW (ref 6.6–8.7)
PROT UR-MCNC: 30 MG/DL
PROTHROM AB SERPL-ACNC: 12.5 SEC — SIGNIFICANT CHANGE UP (ref 10.6–13.6)
RBC # BLD: 4.38 M/UL — SIGNIFICANT CHANGE UP (ref 4.2–5.8)
RBC # FLD: 30.5 % — HIGH (ref 10.3–14.5)
RBC BLD AUTO: ABNORMAL
SAO2 % BLDV: 53 % — SIGNIFICANT CHANGE UP
SCHISTOCYTES BLD QL AUTO: SLIGHT — SIGNIFICANT CHANGE UP
SMUDGE CELLS # BLD: PRESENT — SIGNIFICANT CHANGE UP
SODIUM SERPL-SCNC: 135 MMOL/L — SIGNIFICANT CHANGE UP (ref 135–145)
SP GR SPEC: 1.01 — SIGNIFICANT CHANGE UP (ref 1.01–1.02)
TARGETS BLD QL SMEAR: SIGNIFICANT CHANGE UP
UROBILINOGEN FLD QL: NEGATIVE MG/DL — SIGNIFICANT CHANGE UP
WBC # BLD: 5.79 K/UL — SIGNIFICANT CHANGE UP (ref 3.8–10.5)
WBC # FLD AUTO: 5.79 K/UL — SIGNIFICANT CHANGE UP (ref 3.8–10.5)

## 2020-11-04 PROCEDURE — 82435 ASSAY OF BLOOD CHLORIDE: CPT

## 2020-11-04 PROCEDURE — 85014 HEMATOCRIT: CPT

## 2020-11-04 PROCEDURE — 82803 BLOOD GASES ANY COMBINATION: CPT

## 2020-11-04 PROCEDURE — 70450 CT HEAD/BRAIN W/O DYE: CPT | Mod: 26

## 2020-11-04 PROCEDURE — 85018 HEMOGLOBIN: CPT

## 2020-11-04 PROCEDURE — 85730 THROMBOPLASTIN TIME PARTIAL: CPT

## 2020-11-04 PROCEDURE — 83605 ASSAY OF LACTIC ACID: CPT

## 2020-11-04 PROCEDURE — 70450 CT HEAD/BRAIN W/O DYE: CPT

## 2020-11-04 PROCEDURE — 83690 ASSAY OF LIPASE: CPT

## 2020-11-04 PROCEDURE — 83930 ASSAY OF BLOOD OSMOLALITY: CPT

## 2020-11-04 PROCEDURE — 72125 CT NECK SPINE W/O DYE: CPT | Mod: 26

## 2020-11-04 PROCEDURE — 99285 EMERGENCY DEPT VISIT HI MDM: CPT

## 2020-11-04 PROCEDURE — 85610 PROTHROMBIN TIME: CPT

## 2020-11-04 PROCEDURE — 36415 COLL VENOUS BLD VENIPUNCTURE: CPT

## 2020-11-04 PROCEDURE — 80053 COMPREHEN METABOLIC PANEL: CPT

## 2020-11-04 PROCEDURE — 85025 COMPLETE CBC W/AUTO DIFF WBC: CPT

## 2020-11-04 PROCEDURE — 82330 ASSAY OF CALCIUM: CPT

## 2020-11-04 PROCEDURE — 99284 EMERGENCY DEPT VISIT MOD MDM: CPT | Mod: 25

## 2020-11-04 PROCEDURE — 72125 CT NECK SPINE W/O DYE: CPT

## 2020-11-04 PROCEDURE — 81001 URINALYSIS AUTO W/SCOPE: CPT

## 2020-11-04 PROCEDURE — 84132 ASSAY OF SERUM POTASSIUM: CPT

## 2020-11-04 PROCEDURE — 83735 ASSAY OF MAGNESIUM: CPT

## 2020-11-04 PROCEDURE — 84295 ASSAY OF SERUM SODIUM: CPT

## 2020-11-04 PROCEDURE — 87086 URINE CULTURE/COLONY COUNT: CPT

## 2020-11-04 PROCEDURE — 82947 ASSAY GLUCOSE BLOOD QUANT: CPT

## 2020-11-04 NOTE — ED PROVIDER NOTE - PATIENT PORTAL LINK FT
You can access the FollowMyHealth Patient Portal offered by [facility] by registering at the following website: [email or web address]. By joining [employer]’s FollowMyHealth portal, you will also be able to view your health information using other applications (apps) compatible with our system.

## 2020-11-04 NOTE — ED PROVIDER NOTE - PSH
Port-A-Cath in place  8/2020 right ACW  Status post placement of implantable loop recorder  left ACW

## 2020-11-04 NOTE — ED ADULT TRIAGE NOTE - CHIEF COMPLAINT QUOTE
pt arrives after wife got in touch with PMD about pt having intermittent periods of AMS and frequent falls since last discharge. wife reports all day long pt has been complaining he doesn't feel good. vomited on the way to hospital in car. MD Kumari order priority CT 1949.

## 2020-11-04 NOTE — ED PROVIDER NOTE - PMH
Benign essential HTN    DM (diabetes mellitus)    Gastric cancer  on chemo  Prostate cancer    RBBB (right bundle branch block)    SDH (subdural hematoma)

## 2020-11-04 NOTE — ED PROVIDER NOTE - HIV OFFER
Addended by: GLORIA HERNANDEZ on: 10/9/2019 08:00 AM     Modules accepted: Orders     Previously Declined (within the last year)

## 2020-11-04 NOTE — ED PROVIDER NOTE - CLINICAL SUMMARY MEDICAL DECISION MAKING FREE TEXT BOX
Pt with progressive behavior changes, wife concerned about acute bleeding or progression of mass effect in brain. No acute lab abnormalities. CTH with stable chronic SDH, evidence of sulcal effacement but unchanged from prior study. Wife does not want to pursue any other work up inpatient. Will keep follow up arranged neurology and plan for surgery for next week.

## 2020-11-04 NOTE — ED PROVIDER NOTE - OBJECTIVE STATEMENT
80yom w/ known chronic subdural, gastric cancer scheduled for surgery next week, brought in by wife for evaluation of mental status changes. Per wife he has been having episodes of confusion, sometimes wandering off or driving without family knowing, sometimes agitated/violent. He denies any complaints now. His primary doctor wanted him to get a CT of the head and labs done urgently today. His wife reports he has been eating less, sometimes vomiting, but this has been a chronic issue due to malignancy.

## 2020-11-04 NOTE — ED ADULT NURSE NOTE - OBJECTIVE STATEMENT
80y Male A&Ox4 c/o AMS. Pt wife bedside, states pt with hx of subdural bleed, stable, but pt going for gastric surgery and needs repeat scans. Per pt wife, he has been acting "differently" over the "past few days." Pt wife states pt has been more forgetful, changing his routine, driving his car at times he is not used to. Pt denies any pain or discomfort at this time, no obvious signs of trauma or injury.

## 2020-11-04 NOTE — ED ADULT NURSE NOTE - NSIMPLEMENTINTERV_GEN_ALL_ED
Implemented All Universal Safety Interventions:  [place] to call system. Call bell, personal items and telephone within reach. Instruct patient to call for assistance. Room bathroom lighting operational. Non-slip footwear when patient is off stretcher. Physically safe environment: no spills, clutter or unnecessary equipment. Stretcher in lowest position, wheels locked, appropriate side rails in place.

## 2020-11-06 ENCOUNTER — APPOINTMENT (OUTPATIENT)
Dept: NEUROSURGERY | Facility: CLINIC | Age: 80
End: 2020-11-06
Payer: MEDICARE

## 2020-11-06 ENCOUNTER — APPOINTMENT (OUTPATIENT)
Dept: DISASTER EMERGENCY | Facility: CLINIC | Age: 80
End: 2020-11-06

## 2020-11-06 ENCOUNTER — NON-APPOINTMENT (OUTPATIENT)
Age: 80
End: 2020-11-06

## 2020-11-06 VITALS
SYSTOLIC BLOOD PRESSURE: 94 MMHG | DIASTOLIC BLOOD PRESSURE: 64 MMHG | BODY MASS INDEX: 18 KG/M2 | WEIGHT: 112 LBS | HEART RATE: 59 BPM | OXYGEN SATURATION: 95 % | HEIGHT: 66 IN | TEMPERATURE: 97.6 F

## 2020-11-06 LAB
CULTURE RESULTS: SIGNIFICANT CHANGE UP
SPECIMEN SOURCE: SIGNIFICANT CHANGE UP

## 2020-11-06 PROCEDURE — 99072 ADDL SUPL MATRL&STAF TM PHE: CPT

## 2020-11-06 PROCEDURE — 99215 OFFICE O/P EST HI 40 MIN: CPT

## 2020-11-06 NOTE — HISTORY OF PRESENT ILLNESS
[FreeTextEntry1] : Mr. Petty had an unwitnessed fall and suffered a right sided subdural hematoma on 9/13/2020. Recent non contrast brain ct from 115/2020 was reviewed by Dr. Woo and me and there is a persistent area right holohemispheric subdural collection which has diminished in density as compared to the prior study. There is no new hemorrhage suggested. 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 No acute intraparenchymal bleed or infarct.\par Pt is scheduled to have robotic assisted distal gastrectomy possible EGD possible open for gastric adenocarcinoma next week. \par Pt denies headaches, weakness or any neurological symptoms. Exam intact.\par \par Plan: repeat non contrast ct after surgery\par Stay on Keppra until after surgery\par \par \par \par

## 2020-11-06 NOTE — PHYSICAL EXAM
[General Appearance - Alert] : alert [General Appearance - In No Acute Distress] : in no acute distress [Oriented To Time, Place, And Person] : oriented to person, place, and time [Impaired Insight] : insight and judgment were intact [Person] : oriented to person [Place] : oriented to place [Time] : oriented to time [Short Term Intact] : short term memory intact [Remote Intact] : remote memory intact [Span Intact] : the attention span was normal [Concentration Intact] : normal concentrating ability [Fluency] : fluency intact [Comprehension] : comprehension intact [Current Events] : adequate knowledge of current events [Past History] : adequate knowledge of personal past history [Vocabulary] : adequate range of vocabulary [Cranial Nerves Oculomotor (III)] : extraocular motion intact [Cranial Nerves Trigeminal (V)] : facial sensation intact symmetrically [Cranial Nerves Facial (VII)] : face symmetrical [Cranial Nerves Vestibulocochlear (VIII)] : hearing was intact bilaterally [Cranial Nerves Glossopharyngeal (IX)] : tongue and palate midline [Cranial Nerves Accessory (XI - Cranial And Spinal)] : head turning and shoulder shrug symmetric [Cranial Nerves Hypoglossal (XII)] : there was no tongue deviation with protrusion [Motor Tone] : muscle tone was normal in all four extremities [Motor Strength] : muscle strength was normal in all four extremities [No Muscle Atrophy] : normal bulk in all four extremities [Motor Handedness Right-Handed] : the patient is right hand dominant [Sensation Tactile Decrease] : light touch was intact [Limited Balance] : the patient's balance was impaired [Past-pointing] : there was no past-pointing [Tremor] : no tremor present [FreeTextEntry6] : no drift [FreeTextEntry8] : walks with cane [Extraocular Movements] : extraocular movements were intact [Outer Ear] : the ears and nose were normal in appearance [FreeTextEntry1] : thin [] : no respiratory distress [Respiration, Rhythm And Depth] : normal respiratory rhythm and effort [Exaggerated Use Of Accessory Muscles For Inspiration] : no accessory muscle use [Heart Rate And Rhythm] : heart rate was normal and rhythm regular [Involuntary Movements] : no involuntary movements were seen

## 2020-11-06 NOTE — REVIEW OF SYSTEMS
[Feeling Poorly] : feeling poorly [Feeling Tired] : feeling tired [Recent Weight Loss (___ Lbs)] : recent [unfilled] ~Ulb weight loss [Negative] : Heme/Lymph [FreeTextEntry2] : thin, frail

## 2020-11-08 ENCOUNTER — TRANSCRIPTION ENCOUNTER (OUTPATIENT)
Age: 80
End: 2020-11-08

## 2020-11-08 LAB — SARS-COV-2 N GENE NPH QL NAA+PROBE: NOT DETECTED

## 2020-11-09 ENCOUNTER — RESULT REVIEW (OUTPATIENT)
Age: 80
End: 2020-11-09

## 2020-11-09 ENCOUNTER — INPATIENT (INPATIENT)
Facility: HOSPITAL | Age: 80
LOS: 3 days | Discharge: HOME | DRG: 326 | End: 2020-11-13
Attending: SURGERY | Admitting: SURGERY
Payer: COMMERCIAL

## 2020-11-09 ENCOUNTER — APPOINTMENT (OUTPATIENT)
Dept: SURGICAL ONCOLOGY | Facility: HOSPITAL | Age: 80
End: 2020-11-09

## 2020-11-09 VITALS
DIASTOLIC BLOOD PRESSURE: 72 MMHG | HEART RATE: 59 BPM | HEIGHT: 66 IN | WEIGHT: 119.93 LBS | SYSTOLIC BLOOD PRESSURE: 136 MMHG | OXYGEN SATURATION: 100 % | RESPIRATION RATE: 17 BRPM | TEMPERATURE: 98 F

## 2020-11-09 DIAGNOSIS — Z95.828 PRESENCE OF OTHER VASCULAR IMPLANTS AND GRAFTS: Chronic | ICD-10-CM

## 2020-11-09 DIAGNOSIS — C16.9 MALIGNANT NEOPLASM OF STOMACH, UNSPECIFIED: ICD-10-CM

## 2020-11-09 DIAGNOSIS — Z95.818 PRESENCE OF OTHER CARDIAC IMPLANTS AND GRAFTS: Chronic | ICD-10-CM

## 2020-11-09 LAB
ACANTHOCYTES BLD QL SMEAR: SLIGHT — SIGNIFICANT CHANGE UP
ANION GAP SERPL CALC-SCNC: 15 MMOL/L — SIGNIFICANT CHANGE UP (ref 5–17)
ANISOCYTOSIS BLD QL: SLIGHT — SIGNIFICANT CHANGE UP
BASOPHILS # BLD AUTO: 0 K/UL — SIGNIFICANT CHANGE UP (ref 0–0.2)
BASOPHILS NFR BLD AUTO: 0 % — SIGNIFICANT CHANGE UP (ref 0–2)
BLD GP AB SCN SERPL QL: SIGNIFICANT CHANGE UP
BUN SERPL-MCNC: 12 MG/DL — SIGNIFICANT CHANGE UP (ref 8–20)
BURR CELLS BLD QL SMEAR: PRESENT — SIGNIFICANT CHANGE UP
CALCIUM SERPL-MCNC: 7.5 MG/DL — LOW (ref 8.6–10.2)
CHLORIDE SERPL-SCNC: 103 MMOL/L — SIGNIFICANT CHANGE UP (ref 98–107)
CO2 SERPL-SCNC: 15 MMOL/L — LOW (ref 22–29)
CREAT SERPL-MCNC: 1.13 MG/DL — SIGNIFICANT CHANGE UP (ref 0.5–1.3)
DACRYOCYTES BLD QL SMEAR: SLIGHT — SIGNIFICANT CHANGE UP
EOSINOPHIL # BLD AUTO: 0 K/UL — SIGNIFICANT CHANGE UP (ref 0–0.5)
EOSINOPHIL NFR BLD AUTO: 0 % — SIGNIFICANT CHANGE UP (ref 0–6)
GAS PNL BLDA: SIGNIFICANT CHANGE UP
GAS PNL BLDA: SIGNIFICANT CHANGE UP
GIANT PLATELETS BLD QL SMEAR: PRESENT — SIGNIFICANT CHANGE UP
GLUCOSE BLDC GLUCOMTR-MCNC: 92 MG/DL — SIGNIFICANT CHANGE UP (ref 70–99)
GLUCOSE SERPL-MCNC: 111 MG/DL — HIGH (ref 70–99)
HCT VFR BLD CALC: 24.6 % — LOW (ref 39–50)
HGB BLD-MCNC: 8.1 G/DL — LOW (ref 13–17)
HYPOCHROMIA BLD QL: SLIGHT — SIGNIFICANT CHANGE UP
LYMPHOCYTES # BLD AUTO: 0.17 K/UL — LOW (ref 1–3.3)
LYMPHOCYTES # BLD AUTO: 1.7 % — LOW (ref 13–44)
MACROCYTES BLD QL: SLIGHT — SIGNIFICANT CHANGE UP
MAGNESIUM SERPL-MCNC: 1.5 MG/DL — LOW (ref 1.6–2.6)
MANUAL SMEAR VERIFICATION: SIGNIFICANT CHANGE UP
MCHC RBC-ENTMCNC: 22.3 PG — LOW (ref 27–34)
MCHC RBC-ENTMCNC: 32.9 GM/DL — SIGNIFICANT CHANGE UP (ref 32–36)
MCV RBC AUTO: 67.6 FL — LOW (ref 80–100)
MICROCYTES BLD QL: SLIGHT — SIGNIFICANT CHANGE UP
MONOCYTES # BLD AUTO: 0 K/UL — SIGNIFICANT CHANGE UP (ref 0–0.9)
MONOCYTES NFR BLD AUTO: 0 % — LOW (ref 2–14)
NEUTROPHILS # BLD AUTO: 9.77 K/UL — HIGH (ref 1.8–7.4)
NEUTROPHILS NFR BLD AUTO: 98.3 % — HIGH (ref 43–77)
PHOSPHATE SERPL-MCNC: 3.7 MG/DL — SIGNIFICANT CHANGE UP (ref 2.4–4.7)
PLAT MORPH BLD: NORMAL — SIGNIFICANT CHANGE UP
PLATELET # BLD AUTO: 140 K/UL — LOW (ref 150–400)
POIKILOCYTOSIS BLD QL AUTO: SLIGHT — SIGNIFICANT CHANGE UP
POTASSIUM SERPL-MCNC: 4 MMOL/L — SIGNIFICANT CHANGE UP (ref 3.5–5.3)
POTASSIUM SERPL-SCNC: 4 MMOL/L — SIGNIFICANT CHANGE UP (ref 3.5–5.3)
RBC # BLD: 3.64 M/UL — LOW (ref 4.2–5.8)
RBC # FLD: 29.9 % — HIGH (ref 10.3–14.5)
RBC BLD AUTO: ABNORMAL
SCHISTOCYTES BLD QL AUTO: SLIGHT — SIGNIFICANT CHANGE UP
SODIUM SERPL-SCNC: 133 MMOL/L — LOW (ref 135–145)
TARGETS BLD QL SMEAR: SLIGHT — SIGNIFICANT CHANGE UP
WBC # BLD: 9.94 K/UL — SIGNIFICANT CHANGE UP (ref 3.8–10.5)
WBC # FLD AUTO: 9.94 K/UL — SIGNIFICANT CHANGE UP (ref 3.8–10.5)

## 2020-11-09 PROCEDURE — 88309 TISSUE EXAM BY PATHOLOGIST: CPT | Mod: 26

## 2020-11-09 PROCEDURE — 49905 OMENTAL FLAP INTRA-ABDOM: CPT | Mod: AS

## 2020-11-09 PROCEDURE — 88360 TUMOR IMMUNOHISTOCHEM/MANUAL: CPT | Mod: 26

## 2020-11-09 PROCEDURE — 88305 TISSUE EXAM BY PATHOLOGIST: CPT | Mod: 26

## 2020-11-09 PROCEDURE — 44186 LAP JEJUNOSTOMY: CPT

## 2020-11-09 PROCEDURE — 44186 LAP JEJUNOSTOMY: CPT | Mod: AS

## 2020-11-09 PROCEDURE — 49905 OMENTAL FLAP INTRA-ABDOM: CPT

## 2020-11-09 PROCEDURE — 43632 REMOVAL OF STOMACH PARTIAL: CPT

## 2020-11-09 PROCEDURE — S2900 ROBOTIC SURGICAL SYSTEM: CPT | Mod: NC

## 2020-11-09 PROCEDURE — 43632 REMOVAL OF STOMACH PARTIAL: CPT | Mod: AS

## 2020-11-09 PROCEDURE — 88331 PATH CONSLTJ SURG 1 BLK 1SPC: CPT | Mod: 26

## 2020-11-09 RX ORDER — CEFAZOLIN SODIUM 1 G
2000 VIAL (EA) INJECTION ONCE
Refills: 0 | Status: DISCONTINUED | OUTPATIENT
Start: 2020-11-09 | End: 2020-11-09

## 2020-11-09 RX ORDER — HYDROMORPHONE HYDROCHLORIDE 2 MG/ML
1 INJECTION INTRAMUSCULAR; INTRAVENOUS; SUBCUTANEOUS EVERY 4 HOURS
Refills: 0 | Status: DISCONTINUED | OUTPATIENT
Start: 2020-11-09 | End: 2020-11-12

## 2020-11-09 RX ORDER — BUPIVACAINE 13.3 MG/ML
20 INJECTION, SUSPENSION, LIPOSOMAL INFILTRATION ONCE
Refills: 0 | Status: DISCONTINUED | OUTPATIENT
Start: 2020-11-09 | End: 2020-11-09

## 2020-11-09 RX ORDER — SODIUM CHLORIDE 9 MG/ML
1000 INJECTION, SOLUTION INTRAVENOUS
Refills: 0 | Status: DISCONTINUED | OUTPATIENT
Start: 2020-11-09 | End: 2020-11-09

## 2020-11-09 RX ORDER — ONDANSETRON 8 MG/1
4 TABLET, FILM COATED ORAL ONCE
Refills: 0 | Status: DISCONTINUED | OUTPATIENT
Start: 2020-11-09 | End: 2020-11-09

## 2020-11-09 RX ORDER — HYDROMORPHONE HYDROCHLORIDE 2 MG/ML
0.5 INJECTION INTRAMUSCULAR; INTRAVENOUS; SUBCUTANEOUS
Refills: 0 | Status: DISCONTINUED | OUTPATIENT
Start: 2020-11-09 | End: 2020-11-09

## 2020-11-09 RX ORDER — ONDANSETRON 8 MG/1
4 TABLET, FILM COATED ORAL EVERY 6 HOURS
Refills: 0 | Status: DISCONTINUED | OUTPATIENT
Start: 2020-11-09 | End: 2020-11-13

## 2020-11-09 RX ORDER — PANTOPRAZOLE SODIUM 20 MG/1
40 TABLET, DELAYED RELEASE ORAL DAILY
Refills: 0 | Status: DISCONTINUED | OUTPATIENT
Start: 2020-11-09 | End: 2020-11-13

## 2020-11-09 RX ORDER — ENOXAPARIN SODIUM 100 MG/ML
40 INJECTION SUBCUTANEOUS DAILY
Refills: 0 | Status: DISCONTINUED | OUTPATIENT
Start: 2020-11-10 | End: 2020-11-13

## 2020-11-09 RX ORDER — HYDROMORPHONE HYDROCHLORIDE 2 MG/ML
0.5 INJECTION INTRAMUSCULAR; INTRAVENOUS; SUBCUTANEOUS EVERY 4 HOURS
Refills: 0 | Status: DISCONTINUED | OUTPATIENT
Start: 2020-11-09 | End: 2020-11-12

## 2020-11-09 RX ADMIN — SODIUM CHLORIDE 3 MILLILITER(S): 9 INJECTION INTRAMUSCULAR; INTRAVENOUS; SUBCUTANEOUS at 23:28

## 2020-11-09 NOTE — BRIEF OPERATIVE NOTE - NSICDXBRIEFPROCEDURE_GEN_ALL_CORE_FT
PROCEDURES:  Jejunostomy 09-Nov-2020 13:44:26  Kami Mayberry  Robot-assisted laparoscopic subtotal gastrectomy 09-Nov-2020 13:44:06  Kami Mayberry

## 2020-11-09 NOTE — BRIEF OPERATIVE NOTE - OPERATION/FINDINGS
Robotic Assisted Distal Gastrectomy with Bilroth II Reconstruction and Regional Lymphadenectomy, Creation Feeding Jejunostomy Tube, Laparoscopic TAP Block.  Tolerated procedure well.  Stapled division of stomach and first portion of duodenum.  Dense lymphadenopathy at level of common hepatic nodes.  Frozen section distal margin clean.

## 2020-11-10 LAB
ANION GAP SERPL CALC-SCNC: 16 MMOL/L — SIGNIFICANT CHANGE UP (ref 5–17)
ANISOCYTOSIS BLD QL: SIGNIFICANT CHANGE UP
BASOPHILS # BLD AUTO: 0 K/UL — SIGNIFICANT CHANGE UP (ref 0–0.2)
BASOPHILS NFR BLD AUTO: 0 % — SIGNIFICANT CHANGE UP (ref 0–2)
BUN SERPL-MCNC: 12 MG/DL — SIGNIFICANT CHANGE UP (ref 8–20)
BURR CELLS BLD QL SMEAR: PRESENT — SIGNIFICANT CHANGE UP
CALCIUM SERPL-MCNC: 8.6 MG/DL — SIGNIFICANT CHANGE UP (ref 8.6–10.2)
CHLORIDE SERPL-SCNC: 103 MMOL/L — SIGNIFICANT CHANGE UP (ref 98–107)
CO2 SERPL-SCNC: 17 MMOL/L — LOW (ref 22–29)
CREAT SERPL-MCNC: 1.02 MG/DL — SIGNIFICANT CHANGE UP (ref 0.5–1.3)
ELLIPTOCYTES BLD QL SMEAR: SLIGHT — SIGNIFICANT CHANGE UP
EOSINOPHIL # BLD AUTO: 0 K/UL — SIGNIFICANT CHANGE UP (ref 0–0.5)
EOSINOPHIL NFR BLD AUTO: 0 % — SIGNIFICANT CHANGE UP (ref 0–6)
GIANT PLATELETS BLD QL SMEAR: PRESENT — SIGNIFICANT CHANGE UP
GLUCOSE SERPL-MCNC: 91 MG/DL — SIGNIFICANT CHANGE UP (ref 70–99)
HCT VFR BLD CALC: 31.8 % — LOW (ref 39–50)
HGB BLD-MCNC: 10.3 G/DL — LOW (ref 13–17)
HYPOCHROMIA BLD QL: SLIGHT — SIGNIFICANT CHANGE UP
LYMPHOCYTES # BLD AUTO: 0.73 K/UL — LOW (ref 1–3.3)
LYMPHOCYTES # BLD AUTO: 2.6 % — LOW (ref 13–44)
MACROCYTES BLD QL: SLIGHT — SIGNIFICANT CHANGE UP
MAGNESIUM SERPL-MCNC: 1.7 MG/DL — SIGNIFICANT CHANGE UP (ref 1.6–2.6)
MANUAL SMEAR VERIFICATION: SIGNIFICANT CHANGE UP
MCHC RBC-ENTMCNC: 22.1 PG — LOW (ref 27–34)
MCHC RBC-ENTMCNC: 32.4 GM/DL — SIGNIFICANT CHANGE UP (ref 32–36)
MCV RBC AUTO: 68.2 FL — LOW (ref 80–100)
MONOCYTES # BLD AUTO: 0.25 K/UL — SIGNIFICANT CHANGE UP (ref 0–0.9)
MONOCYTES NFR BLD AUTO: 0.9 % — LOW (ref 2–14)
NEUTROPHILS # BLD AUTO: 27.05 K/UL — HIGH (ref 1.8–7.4)
NEUTROPHILS NFR BLD AUTO: 96.5 % — HIGH (ref 43–77)
OVALOCYTES BLD QL SMEAR: SLIGHT — SIGNIFICANT CHANGE UP
PHOSPHATE SERPL-MCNC: 3.8 MG/DL — SIGNIFICANT CHANGE UP (ref 2.4–4.7)
PLAT MORPH BLD: NORMAL — SIGNIFICANT CHANGE UP
PLATELET # BLD AUTO: 148 K/UL — LOW (ref 150–400)
POIKILOCYTOSIS BLD QL AUTO: SIGNIFICANT CHANGE UP
POLYCHROMASIA BLD QL SMEAR: SLIGHT — SIGNIFICANT CHANGE UP
POTASSIUM SERPL-MCNC: 4.2 MMOL/L — SIGNIFICANT CHANGE UP (ref 3.5–5.3)
POTASSIUM SERPL-SCNC: 4.2 MMOL/L — SIGNIFICANT CHANGE UP (ref 3.5–5.3)
RBC # BLD: 4.66 M/UL — SIGNIFICANT CHANGE UP (ref 4.2–5.8)
RBC # FLD: 31 % — HIGH (ref 10.3–14.5)
RBC BLD AUTO: ABNORMAL
SCHISTOCYTES BLD QL AUTO: SLIGHT — SIGNIFICANT CHANGE UP
SODIUM SERPL-SCNC: 135 MMOL/L — SIGNIFICANT CHANGE UP (ref 135–145)
TARGETS BLD QL SMEAR: SLIGHT — SIGNIFICANT CHANGE UP
WBC # BLD: 28.03 K/UL — HIGH (ref 3.8–10.5)
WBC # FLD AUTO: 28.03 K/UL — HIGH (ref 3.8–10.5)

## 2020-11-10 RX ORDER — SODIUM CHLORIDE 9 MG/ML
1000 INJECTION, SOLUTION INTRAVENOUS
Refills: 0 | Status: DISCONTINUED | OUTPATIENT
Start: 2020-11-10 | End: 2020-11-10

## 2020-11-10 RX ORDER — MAGNESIUM SULFATE 500 MG/ML
1 VIAL (ML) INJECTION ONCE
Refills: 0 | Status: COMPLETED | OUTPATIENT
Start: 2020-11-10 | End: 2020-11-10

## 2020-11-10 RX ORDER — METOPROLOL TARTRATE 50 MG
25 TABLET ORAL
Refills: 0 | Status: DISCONTINUED | OUTPATIENT
Start: 2020-11-10 | End: 2020-11-13

## 2020-11-10 RX ORDER — METOPROLOL TARTRATE 50 MG
25 TABLET ORAL
Refills: 0 | Status: DISCONTINUED | OUTPATIENT
Start: 2020-11-10 | End: 2020-11-10

## 2020-11-10 RX ORDER — LEVETIRACETAM 250 MG/1
500 TABLET, FILM COATED ORAL
Refills: 0 | Status: DISCONTINUED | OUTPATIENT
Start: 2020-11-10 | End: 2020-11-13

## 2020-11-10 RX ORDER — SODIUM CHLORIDE 9 MG/ML
1000 INJECTION, SOLUTION INTRAVENOUS
Refills: 0 | Status: DISCONTINUED | OUTPATIENT
Start: 2020-11-10 | End: 2020-11-11

## 2020-11-10 RX ADMIN — ENOXAPARIN SODIUM 40 MILLIGRAM(S): 100 INJECTION SUBCUTANEOUS at 11:02

## 2020-11-10 RX ADMIN — SODIUM CHLORIDE 100 MILLILITER(S): 9 INJECTION, SOLUTION INTRAVENOUS at 12:07

## 2020-11-10 RX ADMIN — SODIUM CHLORIDE 75 MILLILITER(S): 9 INJECTION, SOLUTION INTRAVENOUS at 18:22

## 2020-11-10 RX ADMIN — SODIUM CHLORIDE 3 MILLILITER(S): 9 INJECTION INTRAMUSCULAR; INTRAVENOUS; SUBCUTANEOUS at 06:07

## 2020-11-10 RX ADMIN — Medication 100 GRAM(S): at 12:07

## 2020-11-10 RX ADMIN — SODIUM CHLORIDE 3 MILLILITER(S): 9 INJECTION INTRAMUSCULAR; INTRAVENOUS; SUBCUTANEOUS at 22:08

## 2020-11-10 RX ADMIN — SODIUM CHLORIDE 3 MILLILITER(S): 9 INJECTION INTRAMUSCULAR; INTRAVENOUS; SUBCUTANEOUS at 14:21

## 2020-11-10 RX ADMIN — PANTOPRAZOLE SODIUM 40 MILLIGRAM(S): 20 TABLET, DELAYED RELEASE ORAL at 11:02

## 2020-11-10 RX ADMIN — LEVETIRACETAM 500 MILLIGRAM(S): 250 TABLET, FILM COATED ORAL at 18:22

## 2020-11-10 RX ADMIN — Medication 25 MILLIGRAM(S): at 18:22

## 2020-11-11 ENCOUNTER — TRANSCRIPTION ENCOUNTER (OUTPATIENT)
Age: 80
End: 2020-11-11

## 2020-11-11 LAB
ACANTHOCYTES BLD QL SMEAR: SLIGHT — SIGNIFICANT CHANGE UP
ANION GAP SERPL CALC-SCNC: 8 MMOL/L — SIGNIFICANT CHANGE UP (ref 5–17)
ANISOCYTOSIS BLD QL: SLIGHT — SIGNIFICANT CHANGE UP
BASOPHILS # BLD AUTO: 0.09 K/UL — SIGNIFICANT CHANGE UP (ref 0–0.2)
BASOPHILS NFR BLD AUTO: 0.9 % — SIGNIFICANT CHANGE UP (ref 0–2)
BUN SERPL-MCNC: 9 MG/DL — SIGNIFICANT CHANGE UP (ref 8–20)
BURR CELLS BLD QL SMEAR: PRESENT — SIGNIFICANT CHANGE UP
CALCIUM SERPL-MCNC: 8.3 MG/DL — LOW (ref 8.6–10.2)
CHLORIDE SERPL-SCNC: 103 MMOL/L — SIGNIFICANT CHANGE UP (ref 98–107)
CO2 SERPL-SCNC: 22 MMOL/L — SIGNIFICANT CHANGE UP (ref 22–29)
CREAT SERPL-MCNC: 0.88 MG/DL — SIGNIFICANT CHANGE UP (ref 0.5–1.3)
DACRYOCYTES BLD QL SMEAR: SLIGHT — SIGNIFICANT CHANGE UP
ELLIPTOCYTES BLD QL SMEAR: SLIGHT — SIGNIFICANT CHANGE UP
EOSINOPHIL # BLD AUTO: 0 K/UL — SIGNIFICANT CHANGE UP (ref 0–0.5)
EOSINOPHIL NFR BLD AUTO: 0 % — SIGNIFICANT CHANGE UP (ref 0–6)
GIANT PLATELETS BLD QL SMEAR: PRESENT — SIGNIFICANT CHANGE UP
GLUCOSE SERPL-MCNC: 83 MG/DL — SIGNIFICANT CHANGE UP (ref 70–99)
HCT VFR BLD CALC: 29 % — LOW (ref 39–50)
HGB BLD-MCNC: 9.5 G/DL — LOW (ref 13–17)
LYMPHOCYTES # BLD AUTO: 0.65 K/UL — LOW (ref 1–3.3)
LYMPHOCYTES # BLD AUTO: 6.9 % — LOW (ref 13–44)
MACROCYTES BLD QL: SLIGHT — SIGNIFICANT CHANGE UP
MAGNESIUM SERPL-MCNC: 1.7 MG/DL — SIGNIFICANT CHANGE UP (ref 1.6–2.6)
MANUAL SMEAR VERIFICATION: SIGNIFICANT CHANGE UP
MCHC RBC-ENTMCNC: 21.9 PG — LOW (ref 27–34)
MCHC RBC-ENTMCNC: 32.8 GM/DL — SIGNIFICANT CHANGE UP (ref 32–36)
MCV RBC AUTO: 67 FL — LOW (ref 80–100)
MICROCYTES BLD QL: SLIGHT — SIGNIFICANT CHANGE UP
MONOCYTES # BLD AUTO: 0.25 K/UL — SIGNIFICANT CHANGE UP (ref 0–0.9)
MONOCYTES NFR BLD AUTO: 2.6 % — SIGNIFICANT CHANGE UP (ref 2–14)
NEUTROPHILS # BLD AUTO: 8.47 K/UL — HIGH (ref 1.8–7.4)
NEUTROPHILS NFR BLD AUTO: 89.6 % — HIGH (ref 43–77)
OVALOCYTES BLD QL SMEAR: SLIGHT — SIGNIFICANT CHANGE UP
PHOSPHATE SERPL-MCNC: 2.4 MG/DL — SIGNIFICANT CHANGE UP (ref 2.4–4.7)
PLAT MORPH BLD: ABNORMAL
PLATELET # BLD AUTO: 115 K/UL — LOW (ref 150–400)
POIKILOCYTOSIS BLD QL AUTO: SIGNIFICANT CHANGE UP
POLYCHROMASIA BLD QL SMEAR: SLIGHT — SIGNIFICANT CHANGE UP
POTASSIUM SERPL-MCNC: 5 MMOL/L — SIGNIFICANT CHANGE UP (ref 3.5–5.3)
POTASSIUM SERPL-SCNC: 5 MMOL/L — SIGNIFICANT CHANGE UP (ref 3.5–5.3)
RBC # BLD: 4.33 M/UL — SIGNIFICANT CHANGE UP (ref 4.2–5.8)
RBC # FLD: 31.3 % — HIGH (ref 10.3–14.5)
RBC BLD AUTO: ABNORMAL
SCHISTOCYTES BLD QL AUTO: SLIGHT — SIGNIFICANT CHANGE UP
SODIUM SERPL-SCNC: 133 MMOL/L — LOW (ref 135–145)
TARGETS BLD QL SMEAR: SLIGHT — SIGNIFICANT CHANGE UP
WBC # BLD: 9.45 K/UL — SIGNIFICANT CHANGE UP (ref 3.8–10.5)
WBC # FLD AUTO: 9.45 K/UL — SIGNIFICANT CHANGE UP (ref 3.8–10.5)

## 2020-11-11 RX ORDER — MAGNESIUM SULFATE 500 MG/ML
1 VIAL (ML) INJECTION ONCE
Refills: 0 | Status: COMPLETED | OUTPATIENT
Start: 2020-11-11 | End: 2020-11-11

## 2020-11-11 RX ORDER — SODIUM CHLORIDE 9 MG/ML
1000 INJECTION, SOLUTION INTRAVENOUS
Refills: 0 | Status: DISCONTINUED | OUTPATIENT
Start: 2020-11-11 | End: 2020-11-13

## 2020-11-11 RX ADMIN — Medication 85 MILLIMOLE(S): at 16:37

## 2020-11-11 RX ADMIN — LEVETIRACETAM 500 MILLIGRAM(S): 250 TABLET, FILM COATED ORAL at 05:21

## 2020-11-11 RX ADMIN — SODIUM CHLORIDE 3 MILLILITER(S): 9 INJECTION INTRAMUSCULAR; INTRAVENOUS; SUBCUTANEOUS at 05:14

## 2020-11-11 RX ADMIN — LEVETIRACETAM 500 MILLIGRAM(S): 250 TABLET, FILM COATED ORAL at 16:37

## 2020-11-11 RX ADMIN — Medication 100 GRAM(S): at 13:15

## 2020-11-11 RX ADMIN — ENOXAPARIN SODIUM 40 MILLIGRAM(S): 100 INJECTION SUBCUTANEOUS at 11:25

## 2020-11-11 RX ADMIN — SODIUM CHLORIDE 3 MILLILITER(S): 9 INJECTION INTRAMUSCULAR; INTRAVENOUS; SUBCUTANEOUS at 11:15

## 2020-11-11 RX ADMIN — SODIUM CHLORIDE 75 MILLILITER(S): 9 INJECTION, SOLUTION INTRAVENOUS at 05:21

## 2020-11-11 RX ADMIN — SODIUM CHLORIDE 3 MILLILITER(S): 9 INJECTION INTRAMUSCULAR; INTRAVENOUS; SUBCUTANEOUS at 21:22

## 2020-11-11 RX ADMIN — SODIUM CHLORIDE 35 MILLILITER(S): 9 INJECTION, SOLUTION INTRAVENOUS at 23:01

## 2020-11-11 RX ADMIN — Medication 25 MILLIGRAM(S): at 05:21

## 2020-11-11 RX ADMIN — PANTOPRAZOLE SODIUM 40 MILLIGRAM(S): 20 TABLET, DELAYED RELEASE ORAL at 11:25

## 2020-11-11 NOTE — DIETITIAN INITIAL EVALUATION ADULT. - ENTERAL
consider nocturnal feeds via J-tube given pt with significant weight loss and inability to meet sufficient energy-protein needs via po intake (Vital 1.5 @ 75 ml/hr x 8 hrs to provide 600 ml, 900 kcal, 41g protein, 458 ml free water). consider nocturnal feeds via J-tube given pt with significant weight loss and inability to meet sufficient energy-protein needs solely via po intake (Vital 1.5 @ 75 ml/hr x 8 hrs to provide 600 ml, 900 kcal, 41g protein, 458 ml free water).

## 2020-11-11 NOTE — PHYSICAL THERAPY INITIAL EVALUATION ADULT - ADDITIONAL COMMENTS
Pt lives in a house with 0 steps to enter and 0 stairs inside.  Pt owns medical equipment: tim AMARAL   Pt lives with: spouse   Someone is always available to provide assist.

## 2020-11-11 NOTE — DIETITIAN INITIAL EVALUATION ADULT. - PERTINENT LABORATORY DATA
11-11 Na133 mmol/L<L> Glu 83 mg/dL K+ 5.0 mmol/L Cr  0.88 mg/dL BUN 9.0 mg/dL Phos 2.4 mg/dL Alb n/a   PAB n/a

## 2020-11-11 NOTE — CHART NOTE - TREATMENT: THE FOLLOWING DIET HAS BEEN RECOMMENDED
Advance diet as medically feasible/tolerable.  Continue Ensure Enlive TID to optimize po intake (350 kcal, 20g protein per serving); add Ensure Clear TID  (240 kcal, 8g protein per serving).  Consider nocturnal feeds via J-tube given pt with significant weight loss and inability to meet sufficient energy-protein needs solely via po intake (Vital 1.5 @ 75 ml/hr x 8 hrs to provide 600 ml, 900 kcal, 41g protein, 458 ml free water).  Encourage po intake, monitor diet tolerance, and provide assistance at meals as needed. Obtain daily weights to monitor trends.

## 2020-11-11 NOTE — DIETITIAN INITIAL EVALUATION ADULT. - PERTINENT MEDS FT
MEDICATIONS  (STANDING):  enoxaparin Injectable 40 milliGRAM(s) SubCutaneous daily  lactated ringers. 1000 milliLiter(s) (35 mL/Hr) IV Continuous <Continuous>  levETIRAcetam 500 milliGRAM(s) Oral two times a day  metoprolol tartrate 25 milliGRAM(s) Oral two times a day  pantoprazole  Injectable 40 milliGRAM(s) IV Push daily  sodium chloride 0.9% lock flush 3 milliLiter(s) IV Push every 8 hours  sodium phosphate IVPB 30 milliMole(s) IV Intermittent once    MEDICATIONS  (PRN):  HYDROmorphone  Injectable 0.5 milliGRAM(s) IV Push every 4 hours PRN Moderate Pain (4 - 6)  HYDROmorphone  Injectable 1 milliGRAM(s) IV Push every 4 hours PRN Severe Pain (7 - 10)  ondansetron Injectable 4 milliGRAM(s) IV Push every 6 hours PRN Nausea

## 2020-11-11 NOTE — DIETITIAN INITIAL EVALUATION ADULT. - OTHER INFO
80 year old male with gastric ca, HTN, COPD s/p robotic distal gastrectomy with regional lymphadenopathy, feeding tube jejunostomy placement. Pt reports prolonged poor appetite/po intake due to cancer and chemo tx. States weight ~9 months ago was 175 lbs with significant weight loss of 56 lbs since. Currently on a FLD, pt states he is tolerating but reports poor appetite. Encouraged to consume Ensure Enlive supplements in between meals, also encouraged small, frequent meals to maximize po intake- pt verbalized understanding. Pt reports he also likes Ensure Clear. Emphasized the importance of protein at all meals.

## 2020-11-11 NOTE — DIETITIAN INITIAL EVALUATION ADULT. - ETIOLOGY
related to inability to meet sufficient protein-energy in setting of gastric cancer s/p chemo, persistent poor appetite, now s/p robotic distal gastrectomy with regional lymphadenopathy

## 2020-11-11 NOTE — PHYSICAL THERAPY INITIAL EVALUATION ADULT - GAIT DEVIATIONS NOTED, PT EVAL
Pt walking too fast with RW, crashing into walls and  objects in renvironment, pt usually walks much slower with quadcane

## 2020-11-11 NOTE — DIETITIAN INITIAL EVALUATION ADULT. - ORAL NUTRITION SUPPLEMENTS
continue Ensure Enlive TID to optimize po intake (350 kcal, 20g protein per serving); add Ensure Clear TID  (240 kcal, 8g protein per serving).

## 2020-11-12 LAB
ACANTHOCYTES BLD QL SMEAR: SLIGHT — SIGNIFICANT CHANGE UP
ANION GAP SERPL CALC-SCNC: 12 MMOL/L — SIGNIFICANT CHANGE UP (ref 5–17)
ANISOCYTOSIS BLD QL: SLIGHT — SIGNIFICANT CHANGE UP
BASOPHILS # BLD AUTO: 0.12 K/UL — SIGNIFICANT CHANGE UP (ref 0–0.2)
BASOPHILS NFR BLD AUTO: 1.8 % — SIGNIFICANT CHANGE UP (ref 0–2)
BUN SERPL-MCNC: 6 MG/DL — LOW (ref 8–20)
BURR CELLS BLD QL SMEAR: PRESENT — SIGNIFICANT CHANGE UP
CALCIUM SERPL-MCNC: 8.2 MG/DL — LOW (ref 8.6–10.2)
CHLORIDE SERPL-SCNC: 101 MMOL/L — SIGNIFICANT CHANGE UP (ref 98–107)
CO2 SERPL-SCNC: 20 MMOL/L — LOW (ref 22–29)
CREAT ?TM UR-MCNC: 62 MG/DL — SIGNIFICANT CHANGE UP
CREAT SERPL-MCNC: 0.59 MG/DL — SIGNIFICANT CHANGE UP (ref 0.5–1.3)
ELLIPTOCYTES BLD QL SMEAR: SLIGHT — SIGNIFICANT CHANGE UP
EOSINOPHIL # BLD AUTO: 0 K/UL — SIGNIFICANT CHANGE UP (ref 0–0.5)
EOSINOPHIL NFR BLD AUTO: 0 % — SIGNIFICANT CHANGE UP (ref 0–6)
GIANT PLATELETS BLD QL SMEAR: PRESENT — SIGNIFICANT CHANGE UP
GLUCOSE SERPL-MCNC: 97 MG/DL — SIGNIFICANT CHANGE UP (ref 70–99)
HCT VFR BLD CALC: 27.5 % — LOW (ref 39–50)
HGB BLD-MCNC: 8.9 G/DL — LOW (ref 13–17)
HYPOCHROMIA BLD QL: SLIGHT — SIGNIFICANT CHANGE UP
LYMPHOCYTES # BLD AUTO: 0.4 K/UL — LOW (ref 1–3.3)
LYMPHOCYTES # BLD AUTO: 6.2 % — LOW (ref 13–44)
MACROCYTES BLD QL: SLIGHT — SIGNIFICANT CHANGE UP
MAGNESIUM SERPL-MCNC: 1.8 MG/DL — SIGNIFICANT CHANGE UP (ref 1.6–2.6)
MANUAL SMEAR VERIFICATION: SIGNIFICANT CHANGE UP
MCHC RBC-ENTMCNC: 21.8 PG — LOW (ref 27–34)
MCHC RBC-ENTMCNC: 32.4 GM/DL — SIGNIFICANT CHANGE UP (ref 32–36)
MCV RBC AUTO: 67.4 FL — LOW (ref 80–100)
MONOCYTES # BLD AUTO: 0.22 K/UL — SIGNIFICANT CHANGE UP (ref 0–0.9)
MONOCYTES NFR BLD AUTO: 3.5 % — SIGNIFICANT CHANGE UP (ref 2–14)
NEUTROPHILS # BLD AUTO: 5.67 K/UL — SIGNIFICANT CHANGE UP (ref 1.8–7.4)
NEUTROPHILS NFR BLD AUTO: 88.5 % — HIGH (ref 43–77)
OSMOLALITY UR: 432 MOSM/KG — SIGNIFICANT CHANGE UP (ref 300–1000)
PHOSPHATE SERPL-MCNC: 3.7 MG/DL — SIGNIFICANT CHANGE UP (ref 2.4–4.7)
PLAT MORPH BLD: NORMAL — SIGNIFICANT CHANGE UP
PLATELET # BLD AUTO: 127 K/UL — LOW (ref 150–400)
POIKILOCYTOSIS BLD QL AUTO: SIGNIFICANT CHANGE UP
POTASSIUM SERPL-MCNC: 3.6 MMOL/L — SIGNIFICANT CHANGE UP (ref 3.5–5.3)
POTASSIUM SERPL-SCNC: 3.6 MMOL/L — SIGNIFICANT CHANGE UP (ref 3.5–5.3)
RBC # BLD: 4.08 M/UL — LOW (ref 4.2–5.8)
RBC # FLD: 30.9 % — HIGH (ref 10.3–14.5)
RBC BLD AUTO: ABNORMAL
SODIUM SERPL-SCNC: 132 MMOL/L — LOW (ref 135–145)
SODIUM UR-SCNC: 107 MMOL/L — SIGNIFICANT CHANGE UP
TARGETS BLD QL SMEAR: SLIGHT — SIGNIFICANT CHANGE UP
WBC # BLD: 6.41 K/UL — SIGNIFICANT CHANGE UP (ref 3.8–10.5)
WBC # FLD AUTO: 6.41 K/UL — SIGNIFICANT CHANGE UP (ref 3.8–10.5)

## 2020-11-12 RX ORDER — POTASSIUM CHLORIDE 20 MEQ
40 PACKET (EA) ORAL ONCE
Refills: 0 | Status: COMPLETED | OUTPATIENT
Start: 2020-11-12 | End: 2020-11-12

## 2020-11-12 RX ORDER — MAGNESIUM SULFATE 500 MG/ML
1 VIAL (ML) INJECTION ONCE
Refills: 0 | Status: COMPLETED | OUTPATIENT
Start: 2020-11-12 | End: 2020-11-12

## 2020-11-12 RX ORDER — ACETAMINOPHEN 500 MG
650 TABLET ORAL EVERY 6 HOURS
Refills: 0 | Status: DISCONTINUED | OUTPATIENT
Start: 2020-11-12 | End: 2020-11-12

## 2020-11-12 RX ORDER — SACCHAROMYCES BOULARDII 250 MG
250 POWDER IN PACKET (EA) ORAL
Refills: 0 | Status: DISCONTINUED | OUTPATIENT
Start: 2020-11-12 | End: 2020-11-13

## 2020-11-12 RX ORDER — TRAMADOL HYDROCHLORIDE 50 MG/1
25 TABLET ORAL EVERY 4 HOURS
Refills: 0 | Status: DISCONTINUED | OUTPATIENT
Start: 2020-11-12 | End: 2020-11-13

## 2020-11-12 RX ORDER — ACETAMINOPHEN 500 MG
650 TABLET ORAL EVERY 6 HOURS
Refills: 0 | Status: DISCONTINUED | OUTPATIENT
Start: 2020-11-12 | End: 2020-11-13

## 2020-11-12 RX ADMIN — Medication 650 MILLIGRAM(S): at 06:10

## 2020-11-12 RX ADMIN — SODIUM CHLORIDE 3 MILLILITER(S): 9 INJECTION INTRAMUSCULAR; INTRAVENOUS; SUBCUTANEOUS at 13:30

## 2020-11-12 RX ADMIN — Medication 40 MILLIEQUIVALENT(S): at 11:32

## 2020-11-12 RX ADMIN — SODIUM CHLORIDE 3 MILLILITER(S): 9 INJECTION INTRAMUSCULAR; INTRAVENOUS; SUBCUTANEOUS at 05:18

## 2020-11-12 RX ADMIN — Medication 62.5 MILLIMOLE(S): at 11:32

## 2020-11-12 RX ADMIN — LEVETIRACETAM 500 MILLIGRAM(S): 250 TABLET, FILM COATED ORAL at 05:19

## 2020-11-12 RX ADMIN — LEVETIRACETAM 500 MILLIGRAM(S): 250 TABLET, FILM COATED ORAL at 17:27

## 2020-11-12 RX ADMIN — ENOXAPARIN SODIUM 40 MILLIGRAM(S): 100 INJECTION SUBCUTANEOUS at 11:32

## 2020-11-12 RX ADMIN — SODIUM CHLORIDE 3 MILLILITER(S): 9 INJECTION INTRAMUSCULAR; INTRAVENOUS; SUBCUTANEOUS at 21:16

## 2020-11-12 RX ADMIN — Medication 250 MILLIGRAM(S): at 17:27

## 2020-11-12 RX ADMIN — Medication 100 GRAM(S): at 11:32

## 2020-11-12 RX ADMIN — PANTOPRAZOLE SODIUM 40 MILLIGRAM(S): 20 TABLET, DELAYED RELEASE ORAL at 11:32

## 2020-11-12 NOTE — DISCHARGE NOTE PROVIDER - CARE PROVIDER_API CALL
Franklin Young)  Complex General Surgical Oncology; Surgery; Surgical Oncology  [address] [place] [address]  Phone: [phone]  Fax: [phone]  Follow Up Time: 2 weeks

## 2020-11-12 NOTE — DISCHARGE NOTE PROVIDER - NSDCCPCAREPLAN_GEN_ALL_CORE_FT
PRINCIPAL DISCHARGE DIAGNOSIS  Diagnosis: Gastric adenocarcinoma  Assessment and Plan of Treatment: Follow up: Please call and make an appointment with the Surgical Oncology Clinic 10-14 days after discharge. Also, please call and make an appointment with your primary care physician as per your usual schedule.   Activity: May return to normal activities as tolerated, however refrain from heavy lifting > 10-15 lbs.  Diet: May continue regular diet.  Medications: Please take all medications listed on your discharge paperwork as prescribed. Pain medication has been prescribed for you.   Wound Care: Please, keep wound site clean and dry. You may shower, but do not bathe.  Patient is advised to RETURN TO THE EMERGENCY DEPARTMENT for any of the following - worsening pain, fever/chills, nausea/vomiting, altered mental status, chest pain, shortness of breath, or any other new / worsening symptom.

## 2020-11-12 NOTE — DISCHARGE NOTE PROVIDER - NSDCCPTREATMENT_GEN_ALL_CORE_FT
PRINCIPAL PROCEDURE  Procedure: Robot-assisted laparoscopic gastrectomy  Findings and Treatment:       SECONDARY PROCEDURE  Procedure: Jejunostomy  Findings and Treatment:

## 2020-11-12 NOTE — PROGRESS NOTE ADULT - NUTRITIONAL ASSESSMENT
This patient has been assessed with a concern for Malnutrition and has been determined to have a diagnosis/diagnoses of Severe protein-calorie malnutrition.    This patient is being managed with:   Diet Full Liquid-  Supplement Feeding Modality:  Oral  Ensure Clear Cans or Servings Per Day:  3       Frequency:  Three Times a day  Ensure Enlive Cans or Servings Per Day:  3       Frequency:  Three Times a day  Entered: Nov 12 2020 12:05AM

## 2020-11-12 NOTE — DISCHARGE NOTE PROVIDER - HOSPITAL COURSE
79 yo M [facility] gastric adenocarcinoma presents for robotic gastrectomy with regional lymphadenopathy, feeding tube jejunostomy placement on 11/9. Pt tolerated procedure well with no complications. Pt was admitted to the surgical floors for monitoring post-operatively. Diet was slowly advanced. Pt is now tolerating regular diet, passing flatus, having bms, voiding, ambulating with assistance. PT recommended home with assist which the wife meets criteria for assist. Denies n/v/f/SOB/CP. Pt is now stable and ready for discharge.

## 2020-11-12 NOTE — DISCHARGE NOTE PROVIDER - NSDCMRMEDTOKEN_GEN_ALL_CORE_FT
levETIRAcetam 500 mg oral tablet: 1 tab(s) orally 2 times a day  metoprolol tartrate 25 mg oral tablet: 0.5 tab(s) orally 2 times a day   pantoprazole 40 mg oral delayed release tablet: 1 tab(s) orally 2 times a day  prochlorperazine 10 mg oral tablet: 1 tab(s) orally every 6 hours, As needed, for nausea  sucralfate 1 g/10 mL oral suspension: 10 milliliter(s) orally 4 times a day (before meals and at bedtime)   acetaminophen 325 mg oral tablet: 2 tab(s) orally every 6 hours, As needed, Mild Pain (1 - 3)  levETIRAcetam 500 mg oral tablet: 1 tab(s) orally 2 times a day  metoprolol tartrate 25 mg oral tablet: 0.5 tab(s) orally 2 times a day   pantoprazole 40 mg oral delayed release tablet: 1 tab(s) orally 2 times a day  prochlorperazine 10 mg oral tablet: 1 tab(s) orally every 6 hours, As needed, for nausea  sucralfate 1 g/10 mL oral suspension: 10 milliliter(s) orally 4 times a day (before meals and at bedtime)   acetaminophen 325 mg oral tablet: 2 tab(s) orally every 6 hours, As needed, Mild Pain (1 - 3)  levETIRAcetam 500 mg oral tablet: 1 tab(s) orally 2 times a day  metoprolol tartrate 25 mg oral tablet: 0.5 tab(s) orally 2 times a day   pantoprazole 40 mg oral delayed release tablet: 1 tab(s) orally 2 times a day  prochlorperazine 10 mg oral tablet: 1 tab(s) orally every 6 hours, As needed, for nausea  sucralfate 1 g/10 mL oral suspension: 10 milliliter(s) orally 4 times a day (before meals and at bedtime)  traMADol 50 mg oral tablet: 0.5 tab(s) orally every 4 hours, As needed, Moderate Pain (4 - 6) MDD:5

## 2020-11-13 ENCOUNTER — TRANSCRIPTION ENCOUNTER (OUTPATIENT)
Age: 80
End: 2020-11-13

## 2020-11-13 VITALS
HEART RATE: 60 BPM | SYSTOLIC BLOOD PRESSURE: 94 MMHG | RESPIRATION RATE: 19 BRPM | OXYGEN SATURATION: 93 % | DIASTOLIC BLOOD PRESSURE: 63 MMHG | TEMPERATURE: 98 F

## 2020-11-13 LAB
ANION GAP SERPL CALC-SCNC: 9 MMOL/L — SIGNIFICANT CHANGE UP (ref 5–17)
BASOPHILS # BLD AUTO: 0.01 K/UL — SIGNIFICANT CHANGE UP (ref 0–0.2)
BASOPHILS NFR BLD AUTO: 0.1 % — SIGNIFICANT CHANGE UP (ref 0–2)
BUN SERPL-MCNC: 6 MG/DL — LOW (ref 8–20)
CALCIUM SERPL-MCNC: 8.2 MG/DL — LOW (ref 8.6–10.2)
CHLORIDE SERPL-SCNC: 99 MMOL/L — SIGNIFICANT CHANGE UP (ref 98–107)
CO2 SERPL-SCNC: 23 MMOL/L — SIGNIFICANT CHANGE UP (ref 22–29)
CREAT SERPL-MCNC: 0.55 MG/DL — SIGNIFICANT CHANGE UP (ref 0.5–1.3)
EOSINOPHIL # BLD AUTO: 0.09 K/UL — SIGNIFICANT CHANGE UP (ref 0–0.5)
EOSINOPHIL NFR BLD AUTO: 1.3 % — SIGNIFICANT CHANGE UP (ref 0–6)
GLUCOSE SERPL-MCNC: 90 MG/DL — SIGNIFICANT CHANGE UP (ref 70–99)
HCT VFR BLD CALC: 29.2 % — LOW (ref 39–50)
HGB BLD-MCNC: 9.6 G/DL — LOW (ref 13–17)
IMM GRANULOCYTES NFR BLD AUTO: 0.4 % — SIGNIFICANT CHANGE UP (ref 0–1.5)
LYMPHOCYTES # BLD AUTO: 1.06 K/UL — SIGNIFICANT CHANGE UP (ref 1–3.3)
LYMPHOCYTES # BLD AUTO: 15.8 % — SIGNIFICANT CHANGE UP (ref 13–44)
MAGNESIUM SERPL-MCNC: 1.8 MG/DL — SIGNIFICANT CHANGE UP (ref 1.6–2.6)
MCHC RBC-ENTMCNC: 21.9 PG — LOW (ref 27–34)
MCHC RBC-ENTMCNC: 32.9 GM/DL — SIGNIFICANT CHANGE UP (ref 32–36)
MCV RBC AUTO: 66.5 FL — LOW (ref 80–100)
MONOCYTES # BLD AUTO: 0.45 K/UL — SIGNIFICANT CHANGE UP (ref 0–0.9)
MONOCYTES NFR BLD AUTO: 6.7 % — SIGNIFICANT CHANGE UP (ref 2–14)
NEUTROPHILS # BLD AUTO: 5.07 K/UL — SIGNIFICANT CHANGE UP (ref 1.8–7.4)
NEUTROPHILS NFR BLD AUTO: 75.7 % — SIGNIFICANT CHANGE UP (ref 43–77)
PHOSPHATE SERPL-MCNC: 3.1 MG/DL — SIGNIFICANT CHANGE UP (ref 2.4–4.7)
PLATELET # BLD AUTO: 156 K/UL — SIGNIFICANT CHANGE UP (ref 150–400)
POTASSIUM SERPL-MCNC: 3.8 MMOL/L — SIGNIFICANT CHANGE UP (ref 3.5–5.3)
POTASSIUM SERPL-SCNC: 3.8 MMOL/L — SIGNIFICANT CHANGE UP (ref 3.5–5.3)
RBC # BLD: 4.39 M/UL — SIGNIFICANT CHANGE UP (ref 4.2–5.8)
RBC # FLD: 30.7 % — HIGH (ref 10.3–14.5)
SODIUM SERPL-SCNC: 131 MMOL/L — LOW (ref 135–145)
WBC # BLD: 6.71 K/UL — SIGNIFICANT CHANGE UP (ref 3.8–10.5)
WBC # FLD AUTO: 6.71 K/UL — SIGNIFICANT CHANGE UP (ref 3.8–10.5)

## 2020-11-13 PROCEDURE — 97116 GAIT TRAINING THERAPY: CPT

## 2020-11-13 PROCEDURE — 85025 COMPLETE CBC W/AUTO DIFF WBC: CPT

## 2020-11-13 PROCEDURE — 84300 ASSAY OF URINE SODIUM: CPT

## 2020-11-13 PROCEDURE — 85014 HEMATOCRIT: CPT

## 2020-11-13 PROCEDURE — 86900 BLOOD TYPING SEROLOGIC ABO: CPT

## 2020-11-13 PROCEDURE — 84295 ASSAY OF SERUM SODIUM: CPT

## 2020-11-13 PROCEDURE — 97163 PT EVAL HIGH COMPLEX 45 MIN: CPT

## 2020-11-13 PROCEDURE — 88309 TISSUE EXAM BY PATHOLOGIST: CPT

## 2020-11-13 PROCEDURE — 88331 PATH CONSLTJ SURG 1 BLK 1SPC: CPT

## 2020-11-13 PROCEDURE — 86850 RBC ANTIBODY SCREEN: CPT

## 2020-11-13 PROCEDURE — 97110 THERAPEUTIC EXERCISES: CPT

## 2020-11-13 PROCEDURE — 83605 ASSAY OF LACTIC ACID: CPT

## 2020-11-13 PROCEDURE — 83735 ASSAY OF MAGNESIUM: CPT

## 2020-11-13 PROCEDURE — 85018 HEMOGLOBIN: CPT

## 2020-11-13 PROCEDURE — 82570 ASSAY OF URINE CREATININE: CPT

## 2020-11-13 PROCEDURE — 84132 ASSAY OF SERUM POTASSIUM: CPT

## 2020-11-13 PROCEDURE — 82330 ASSAY OF CALCIUM: CPT

## 2020-11-13 PROCEDURE — 88360 TUMOR IMMUNOHISTOCHEM/MANUAL: CPT

## 2020-11-13 PROCEDURE — C1889: CPT

## 2020-11-13 PROCEDURE — 88305 TISSUE EXAM BY PATHOLOGIST: CPT

## 2020-11-13 PROCEDURE — 86901 BLOOD TYPING SEROLOGIC RH(D): CPT

## 2020-11-13 PROCEDURE — 82947 ASSAY GLUCOSE BLOOD QUANT: CPT

## 2020-11-13 PROCEDURE — 82962 GLUCOSE BLOOD TEST: CPT

## 2020-11-13 PROCEDURE — 36415 COLL VENOUS BLD VENIPUNCTURE: CPT

## 2020-11-13 PROCEDURE — 84100 ASSAY OF PHOSPHORUS: CPT

## 2020-11-13 PROCEDURE — 82803 BLOOD GASES ANY COMBINATION: CPT

## 2020-11-13 PROCEDURE — 80048 BASIC METABOLIC PNL TOTAL CA: CPT

## 2020-11-13 PROCEDURE — S2900: CPT

## 2020-11-13 PROCEDURE — 82435 ASSAY OF BLOOD CHLORIDE: CPT

## 2020-11-13 PROCEDURE — 86923 COMPATIBILITY TEST ELECTRIC: CPT

## 2020-11-13 PROCEDURE — 83935 ASSAY OF URINE OSMOLALITY: CPT

## 2020-11-13 RX ORDER — MAGNESIUM OXIDE 400 MG ORAL TABLET 241.3 MG
400 TABLET ORAL ONCE
Refills: 0 | Status: COMPLETED | OUTPATIENT
Start: 2020-11-13 | End: 2020-11-13

## 2020-11-13 RX ORDER — POTASSIUM CHLORIDE 20 MEQ
20 PACKET (EA) ORAL ONCE
Refills: 0 | Status: DISCONTINUED | OUTPATIENT
Start: 2020-11-13 | End: 2020-11-13

## 2020-11-13 RX ORDER — SODIUM CHLORIDE 9 MG/ML
1 INJECTION INTRAMUSCULAR; INTRAVENOUS; SUBCUTANEOUS THREE TIMES A DAY
Refills: 0 | Status: DISCONTINUED | OUTPATIENT
Start: 2020-11-13 | End: 2020-11-13

## 2020-11-13 RX ORDER — ACETAMINOPHEN 500 MG
2 TABLET ORAL
Qty: 0 | Refills: 0 | DISCHARGE
Start: 2020-11-13

## 2020-11-13 RX ORDER — TRAMADOL HYDROCHLORIDE 50 MG/1
0.5 TABLET ORAL
Qty: 12 | Refills: 0
Start: 2020-11-13 | End: 2020-11-16

## 2020-11-13 RX ORDER — POTASSIUM CHLORIDE 20 MEQ
20 PACKET (EA) ORAL ONCE
Refills: 0 | Status: COMPLETED | OUTPATIENT
Start: 2020-11-13 | End: 2020-11-13

## 2020-11-13 RX ADMIN — Medication 20 MILLIEQUIVALENT(S): at 10:20

## 2020-11-13 RX ADMIN — MAGNESIUM OXIDE 400 MG ORAL TABLET 400 MILLIGRAM(S): 241.3 TABLET ORAL at 07:16

## 2020-11-13 RX ADMIN — Medication 250 MILLIGRAM(S): at 06:15

## 2020-11-13 RX ADMIN — LEVETIRACETAM 500 MILLIGRAM(S): 250 TABLET, FILM COATED ORAL at 06:14

## 2020-11-13 RX ADMIN — Medication 25 MILLIGRAM(S): at 06:15

## 2020-11-13 RX ADMIN — SODIUM CHLORIDE 3 MILLILITER(S): 9 INJECTION INTRAMUSCULAR; INTRAVENOUS; SUBCUTANEOUS at 06:11

## 2020-11-13 NOTE — PROGRESS NOTE ADULT - NUTRITIONAL ASSESSMENT
This patient has been assessed with a concern for Malnutrition and has been determined to have a diagnosis/diagnoses of Severe protein-calorie malnutrition.    This patient is being managed with:   Diet Mechanical Soft-  Supplement Feeding Modality:  Oral  Ensure Enlive Cans or Servings Per Day:  3       Frequency:  Three Times a day  Ensure Pudding Cans or Servings Per Day:  1       Frequency:  Three Times a day  Entered: Nov 12 2020  7:09AM

## 2020-11-13 NOTE — PROGRESS NOTE ADULT - SUBJECTIVE AND OBJECTIVE BOX
INTERVAL HPI/OVERNIGHT EVENTS:  NAOE, pt with no complains. Denies nausea, vomiting, abdominal pain, no BM yet but passing flatus, Voiding spontaneously     MEDICATIONS  (STANDING):  enoxaparin Injectable 40 milliGRAM(s) SubCutaneous daily  lactated ringers. 1000 milliLiter(s) (35 mL/Hr) IV Continuous <Continuous>  levETIRAcetam 500 milliGRAM(s) Oral two times a day  metoprolol tartrate 25 milliGRAM(s) Oral two times a day  pantoprazole  Injectable 40 milliGRAM(s) IV Push daily  saccharomyces boulardii 250 milliGRAM(s) Oral two times a day  sodium chloride 0.9% lock flush 3 milliLiter(s) IV Push every 8 hours    MEDICATIONS  (PRN):  acetaminophen   Tablet .. 650 milliGRAM(s) Oral every 6 hours PRN Mild Pain (1 - 3)  ondansetron Injectable 4 milliGRAM(s) IV Push every 6 hours PRN Nausea  traMADol 25 milliGRAM(s) Oral every 4 hours PRN Moderate Pain (4 - 6)      Vital Signs Last 24 Hrs  T(C): 36.3 (12 Nov 2020 23:55), Max: 36.7 (12 Nov 2020 04:34)  T(F): 97.3 (12 Nov 2020 23:55), Max: 98.1 (12 Nov 2020 04:34)  HR: 80 (12 Nov 2020 23:55) (68 - 81)  BP: 110/69 (12 Nov 2020 23:55) (95/62 - 110/69)  BP(mean): --  RR: 19 (12 Nov 2020 23:55) (18 - 19)  SpO2: 94% (12 Nov 2020 23:55) (92% - 98%)    Constitutional: NAD,   Respiratory: no increased WOB, speaking full sentences  Cardiovascular: RRR  Gastrointestinal: Soft, non-distended, non-tender, J tube in LLQ with no surrounding skin changes or drainage    I&O's Detail    11 Nov 2020 07:01  -  12 Nov 2020 07:00  --------------------------------------------------------  IN:    Lactated Ringers: 420 mL  Total IN: 420 mL    OUT:    Voided (mL): 1050 mL  Total OUT: 1050 mL    Total NET: -630 mL      12 Nov 2020 07:01  -  13 Nov 2020 01:50  --------------------------------------------------------  IN:  Total IN: 0 mL    OUT:    Voided (mL): 200 mL  Total OUT: 200 mL    Total NET: -200 mL          LABS:                        8.9    6.41  )-----------( 127      ( 12 Nov 2020 05:45 )             27.5     11-12    132<L>  |  101  |  6.0<L>  ----------------------------<  97  3.6   |  20.0<L>  |  0.59    Ca    8.2<L>      12 Nov 2020 05:45  Phos  3.7     11-12  Mg     1.8     11-12          
INTERVAL HPI/OVERNIGHT EVENTS:  Patient was seen and examined at bedside and was found to be in no acute distress. Patient reports that his bladder made be full since he was complaining of mild suprapubic pain that hasn't changed from previous days. Patient also denies any new onset n/v/f/c, cp/sob. Patient is tolerating clears from his tube feed. Patient's luu was confirmed with CXR.    MEDICATIONS  (STANDING):  enoxaparin Injectable 40 milliGRAM(s) SubCutaneous daily  lactated ringers. 1000 milliLiter(s) (75 mL/Hr) IV Continuous <Continuous>  levETIRAcetam 500 milliGRAM(s) Oral two times a day  metoprolol tartrate 25 milliGRAM(s) Oral two times a day  pantoprazole  Injectable 40 milliGRAM(s) IV Push daily  sodium chloride 0.9% lock flush 3 milliLiter(s) IV Push every 8 hours    MEDICATIONS  (PRN):  HYDROmorphone  Injectable 0.5 milliGRAM(s) IV Push every 4 hours PRN Moderate Pain (4 - 6)  HYDROmorphone  Injectable 1 milliGRAM(s) IV Push every 4 hours PRN Severe Pain (7 - 10)  ondansetron Injectable 4 milliGRAM(s) IV Push every 6 hours PRN Nausea      Vital Signs Last 24 Hrs  T(C): 36.6 (10 Nov 2020 23:20), Max: 37.1 (10 Nov 2020 07:20)  T(F): 97.8 (10 Nov 2020 23:20), Max: 98.7 (10 Nov 2020 07:20)  HR: 68 (10 Nov 2020 23:20) (68 - 87)  BP: 142/74 (10 Nov 2020 23:20) (110/66 - 144/90)  BP(mean): --  RR: 18 (10 Nov 2020 23:20) (18 - 19)  SpO2: 94% (10 Nov 2020 23:20) (94% - 98%)    Physical Exam:  Gen: NAD  Neurological: yes  HEENT: EOMI  Respiratory: no increased use of muscle  Cardiovascular: Regular rate & rhythm  Vascular: Equal and normal pulses: 2+ peripheral pulses throughout  Musculoskeletal: No joint pain, swelling or deformity; no limitation of movement      I&O's Detail    09 Nov 2020 07:01  -  10 Nov 2020 07:00  --------------------------------------------------------  IN:    Lactated Ringers: 600 mL  Total IN: 600 mL    OUT:    Gastrostomy Tube (mL): 0 mL    Indwelling Catheter - Urethral (mL): 850 mL    Nasogastric/Oral tube (mL): 40 mL    Voided (mL): 0 mL  Total OUT: 890 mL    Total NET: -290 mL      10 Nov 2020 07:01  -  11 Nov 2020 03:23  --------------------------------------------------------  IN:    Lactated Ringers: 525 mL  Total IN: 525 mL    OUT:    Gastrostomy Tube (mL): 0 mL    Indwelling Catheter - Urethral (mL): 500 mL    Nasogastric/Oral tube (mL): 20 mL    Voided (mL): 270 mL  Total OUT: 790 mL    Total NET: -265 mL          LABS:                        10.3   28.03 )-----------( 148      ( 10 Nov 2020 06:03 )             31.8     11-10    135  |  103  |  12.0  ----------------------------<  91  4.2   |  17.0<L>  |  1.02    Ca    8.6      10 Nov 2020 06:03  Phos  3.8     11-10  Mg     1.7     11-10            RADIOLOGY & ADDITIONAL STUDIES:
POST OPERATIVE NOTE    Pt seen and evaluated at bedside. Pt reports no complaints, denies pain. +flatus, -bm. Denies n/v/f/SOB/CP      MEDICATIONS  (STANDING):  lactated ringers. 1000 milliLiter(s) (100 mL/Hr) IV Continuous <Continuous>  pantoprazole  Injectable 40 milliGRAM(s) IV Push daily  sodium chloride 0.9% lock flush 3 milliLiter(s) IV Push every 8 hours    MEDICATIONS  (PRN):  HYDROmorphone  Injectable 0.5 milliGRAM(s) IV Push every 4 hours PRN Moderate Pain (4 - 6)  HYDROmorphone  Injectable 1 milliGRAM(s) IV Push every 4 hours PRN Severe Pain (7 - 10)  ondansetron Injectable 4 milliGRAM(s) IV Push every 6 hours PRN Nausea      Vital Signs Last 24 Hrs  T(C): 37.1 (09 Nov 2020 16:00), Max: 37.1 (09 Nov 2020 16:00)  T(F): 98.7 (09 Nov 2020 16:00), Max: 98.7 (09 Nov 2020 16:00)  HR: 80 (09 Nov 2020 19:00) (59 - 91)  BP: 115/64 (09 Nov 2020 19:00) (90/54 - 136/72)  BP(mean): --  RR: 18 (09 Nov 2020 19:00) (11 - 20)  SpO2: 100% (09 Nov 2020 19:00) (98% - 100%)    Constitutional: NAD, well-groomed, well-developed  HEENT: PERRLA, EOMI, no drainage or redness  Respiratory: no accessory muscle use  Cardiovascular: Regular rate & rhythm, normal S1, S2;  Gastrointestinal: Soft, non-tender, no hepatosplenomegaly, normal bowel sounds, incisions c/d/i  Extremities: No peripheral edema, No cyanosis, clubbing         I&O's Detail    09 Nov 2020 07:01  -  09 Nov 2020 19:50  --------------------------------------------------------  IN:    Lactated Ringers: 600 mL  Total IN: 600 mL    OUT:    Gastrostomy Tube (mL): 0 mL    Indwelling Catheter - Urethral (mL): 250 mL    Nasogastric/Oral tube (mL): 0 mL    Voided (mL): 0 mL  Total OUT: 250 mL    Total NET: 350 mL          LABS:                        8.1    9.94  )-----------( 140      ( 09 Nov 2020 14:22 )             24.6     11-09    133<L>  |  103  |  12.0  ----------------------------<  111<H>  4.0   |  15.0<L>  |  1.13    Ca    7.5<L>      09 Nov 2020 14:22  Phos  3.7     11-09  Mg     1.5     11-09            RADIOLOGY & ADDITIONAL STUDIES:
Progress Note:    Pt seen and examined at bedside. Complaints of low energy. Pain well controlled. Caitlin full lid diet. -flatus, -bm. Denies n/v/f/SOB/CP.     Vital Signs Last 24 Hrs  T(C): 36.8 (11 Nov 2020 23:45), Max: 36.9 (11 Nov 2020 04:32)  T(F): 98.3 (11 Nov 2020 23:45), Max: 98.4 (11 Nov 2020 04:32)  HR: 76 (11 Nov 2020 23:45) (64 - 76)  BP: 105/64 (11 Nov 2020 23:45) (103/62 - 123/75)  BP(mean): --  RR: 18 (11 Nov 2020 23:45) (17 - 18)  SpO2: 99% (11 Nov 2020 23:45) (95% - 99%)        PHYSICAL EXAM:  Gen: NAD  Neurological: yes  HEENT: EOMI  Respiratory: no increased use of muscle  Cardiovascular: Regular rate & rhythm  Abdomen: soft, nontender, nd, incisions c/d/i, j-tube in place without drainage  Vascular: Equal and normal pulses: 2+ peripheral pulses throughout  Musculoskeletal: No joint pain, swelling or deformity; no limitation of movement                    I&O's Summary    10 Nov 2020 07:01  -  11 Nov 2020 07:00  --------------------------------------------------------  IN: 900 mL / OUT: 1190 mL / NET: -290 mL    11 Nov 2020 07:01  -  12 Nov 2020 00:16  --------------------------------------------------------  IN: 175 mL / OUT: 750 mL / NET: -575 mL      I&O's Detail    10 Nov 2020 07:01  -  11 Nov 2020 07:00  --------------------------------------------------------  IN:    Lactated Ringers: 900 mL  Total IN: 900 mL    OUT:    Gastrostomy Tube (mL): 0 mL    Indwelling Catheter - Urethral (mL): 500 mL    Nasogastric/Oral tube (mL): 20 mL    Voided (mL): 670 mL  Total OUT: 1190 mL    Total NET: -290 mL      11 Nov 2020 07:01  -  12 Nov 2020 00:16  --------------------------------------------------------  IN:    Lactated Ringers: 175 mL  Total IN: 175 mL    OUT:    Voided (mL): 750 mL  Total OUT: 750 mL    Total NET: -575 mL          MEDICATIONS  (STANDING):  enoxaparin Injectable 40 milliGRAM(s) SubCutaneous daily  lactated ringers. 1000 milliLiter(s) (35 mL/Hr) IV Continuous <Continuous>  levETIRAcetam 500 milliGRAM(s) Oral two times a day  metoprolol tartrate 25 milliGRAM(s) Oral two times a day  pantoprazole  Injectable 40 milliGRAM(s) IV Push daily  sodium chloride 0.9% lock flush 3 milliLiter(s) IV Push every 8 hours    MEDICATIONS  (PRN):  HYDROmorphone  Injectable 0.5 milliGRAM(s) IV Push every 4 hours PRN Moderate Pain (4 - 6)  HYDROmorphone  Injectable 1 milliGRAM(s) IV Push every 4 hours PRN Severe Pain (7 - 10)  ondansetron Injectable 4 milliGRAM(s) IV Push every 6 hours PRN Nausea      LABS:                        9.5    9.45  )-----------( 115      ( 11 Nov 2020 06:56 )             29.0     11-11    133<L>  |  103  |  9.0  ----------------------------<  83  5.0   |  22.0  |  0.88    Ca    8.3<L>      11 Nov 2020 06:56  Phos  2.4     11-11  Mg     1.7     11-11            RADIOLOGY & ADDITIONAL STUDIES:      Pathology:
INTERVAL HPI/OVERNIGHT EVENTS:  Pt is POD 1 s/p robotic distal gastrectomy w/ regional lymphadenopathy, feeding J tube placement and tap block. Pt resting in bed comfortably, denies nausea, vomiting, no pain.     MEDICATIONS  (STANDING):  enoxaparin Injectable 40 milliGRAM(s) SubCutaneous daily  pantoprazole  Injectable 40 milliGRAM(s) IV Push daily  sodium chloride 0.9% lock flush 3 milliLiter(s) IV Push every 8 hours    MEDICATIONS  (PRN):  HYDROmorphone  Injectable 0.5 milliGRAM(s) IV Push every 4 hours PRN Moderate Pain (4 - 6)  HYDROmorphone  Injectable 1 milliGRAM(s) IV Push every 4 hours PRN Severe Pain (7 - 10)  ondansetron Injectable 4 milliGRAM(s) IV Push every 6 hours PRN Nausea      Vital Signs Last 24 Hrs  T(C): 36.6 (09 Nov 2020 23:20), Max: 37.1 (09 Nov 2020 16:00)  T(F): 97.8 (09 Nov 2020 23:20), Max: 98.7 (09 Nov 2020 16:00)  HR: 80 (09 Nov 2020 23:20) (59 - 91)  BP: 116/65 (09 Nov 2020 23:20) (90/54 - 136/72)  BP(mean): --  RR: 18 (09 Nov 2020 23:20) (11 - 20)  SpO2: 97% (09 Nov 2020 23:20) (93% - 100%)    Constitutional: NAD, cachectic  HEENT: temporal wasting  Respiratory: no increased WOB, speaking full sentences  Cardiovascular: RRR  Gastrointestinal: Soft, non-distended, non-tender, surgical incisions with Dermabond healing well, J tube with no surrounding erythema or bleeding; NGT with small amount dark fluid  : luu draining clear yellow urine    I&O's Detail    09 Nov 2020 07:01  -  10 Nov 2020 01:33  --------------------------------------------------------  IN:    Lactated Ringers: 600 mL  Total IN: 600 mL    OUT:    Gastrostomy Tube (mL): 0 mL    Indwelling Catheter - Urethral (mL): 400 mL    Nasogastric/Oral tube (mL): 0 mL    Voided (mL): 0 mL  Total OUT: 400 mL    Total NET: 200 mL    LABS:                        8.1    9.94  )-----------( 140      ( 09 Nov 2020 14:22 )             24.6     11-09    133<L>  |  103  |  12.0  ----------------------------<  111<H>  4.0   |  15.0<L>  |  1.13    Ca    7.5<L>      09 Nov 2020 14:22  Phos  3.7     11-09  Mg     1.5     11-09

## 2020-11-13 NOTE — DISCHARGE NOTE NURSING/CASE MANAGEMENT/SOCIAL WORK - PATIENT PORTAL LINK FT
You can access the FollowMyHealth Patient Portal offered by [facility] by registering at the following website: [email or web address]. By joining [facility]’s FollowMyHealth portal, you will also be able to view your health information using other applications (apps) compatible with our system.

## 2020-11-13 NOTE — PROGRESS NOTE ADULT - ASSESSMENT
80 M w/ Gastric ca, HTN, COPD s/p robotic distal gastrectomy with regional lymphadenopathy, feeding tube jejunostomy placement, POD 1, recovering well.  Plan:  - ileana luu today  - NPO, LR @100  - NGT in place  - ambulate with assistance  - keep feeding tube clamped  
80 M s/p robotic distal gastrectomy with regional lymphadenopathy, feeding tube jejunostomy placement, POD 0, recovering well.    - dc to floors when cleared from PACU  - dc jus 11/10  - ambulate with assistance  - keep feeding tube clamped
80 M w/ Gastric ca, HTN, COPD s/p robotic distal gastrectomy with regional lymphadenopathy, feeding tube jejunostomy placement, POD 3. Diet is being slowly advanced.    - await return of bowel fxn  - f/u surg path  - poss advance diet today  - c/w clamped feeding tube  - PT: home with assist  - poss dc 11/12
80 M w/ Gastric ca, HTN, COPD s/p robotic distal gastrectomy with regional lymphadenopathy, feeding tube jejunostomy placement, POD 4. Diet is being slowly advanced.    - f/u surg path  - c/w clamped feeding tube  - PT: home with assist --> d/c today
80 M w/ Gastric ca, HTN, COPD s/p robotic distal gastrectomy with regional lymphadenopathy, feeding tube jejunostomy placement, POD 2. Diet is being slowly advanced.    - await return of bowel fxn  - f/u surg path  - poss advance diet today  - c/w clamped feeding tube  - PT: home with assist  - poss dc 11/12

## 2020-11-19 LAB — SURGICAL PATHOLOGY STUDY: SIGNIFICANT CHANGE UP

## 2020-11-22 ENCOUNTER — INPATIENT (INPATIENT)
Facility: HOSPITAL | Age: 80
LOS: 2 days | Discharge: HOME | DRG: 886 | End: 2020-11-25
Attending: HOSPITALIST | Admitting: HOSPITALIST
Payer: COMMERCIAL

## 2020-11-22 VITALS
DIASTOLIC BLOOD PRESSURE: 55 MMHG | HEIGHT: 66 IN | SYSTOLIC BLOOD PRESSURE: 108 MMHG | WEIGHT: 115.08 LBS | RESPIRATION RATE: 18 BRPM | HEART RATE: 73 BPM | TEMPERATURE: 98 F | OXYGEN SATURATION: 99 %

## 2020-11-22 DIAGNOSIS — E16.2 HYPOGLYCEMIA, UNSPECIFIED: ICD-10-CM

## 2020-11-22 DIAGNOSIS — Z95.828 PRESENCE OF OTHER VASCULAR IMPLANTS AND GRAFTS: Chronic | ICD-10-CM

## 2020-11-22 DIAGNOSIS — Z95.818 PRESENCE OF OTHER CARDIAC IMPLANTS AND GRAFTS: Chronic | ICD-10-CM

## 2020-11-22 LAB
ACANTHOCYTES BLD QL SMEAR: SLIGHT — SIGNIFICANT CHANGE UP
ALBUMIN SERPL ELPH-MCNC: 3.1 G/DL — LOW (ref 3.3–5.2)
ALP SERPL-CCNC: 78 U/L — SIGNIFICANT CHANGE UP (ref 40–120)
ALT FLD-CCNC: 12 U/L — SIGNIFICANT CHANGE UP
ANION GAP SERPL CALC-SCNC: 14 MMOL/L — SIGNIFICANT CHANGE UP (ref 5–17)
ANISOCYTOSIS BLD QL: SLIGHT — SIGNIFICANT CHANGE UP
APTT BLD: 24.5 SEC — LOW (ref 27.5–35.5)
AST SERPL-CCNC: 29 U/L — SIGNIFICANT CHANGE UP
BASOPHILS # BLD AUTO: 0.07 K/UL — SIGNIFICANT CHANGE UP (ref 0–0.2)
BASOPHILS NFR BLD AUTO: 0.9 % — SIGNIFICANT CHANGE UP (ref 0–2)
BILIRUB SERPL-MCNC: 0.3 MG/DL — LOW (ref 0.4–2)
BUN SERPL-MCNC: 12 MG/DL — SIGNIFICANT CHANGE UP (ref 8–20)
BURR CELLS BLD QL SMEAR: PRESENT — SIGNIFICANT CHANGE UP
CALCIUM SERPL-MCNC: 8.8 MG/DL — SIGNIFICANT CHANGE UP (ref 8.6–10.2)
CHLORIDE SERPL-SCNC: 101 MMOL/L — SIGNIFICANT CHANGE UP (ref 98–107)
CO2 SERPL-SCNC: 21 MMOL/L — LOW (ref 22–29)
CREAT SERPL-MCNC: 0.7 MG/DL — SIGNIFICANT CHANGE UP (ref 0.5–1.3)
DACRYOCYTES BLD QL SMEAR: SLIGHT — SIGNIFICANT CHANGE UP
ELLIPTOCYTES BLD QL SMEAR: SLIGHT — SIGNIFICANT CHANGE UP
EOSINOPHIL # BLD AUTO: 0.07 K/UL — SIGNIFICANT CHANGE UP (ref 0–0.5)
EOSINOPHIL NFR BLD AUTO: 0.9 % — SIGNIFICANT CHANGE UP (ref 0–6)
GIANT PLATELETS BLD QL SMEAR: PRESENT — SIGNIFICANT CHANGE UP
GLUCOSE BLDC GLUCOMTR-MCNC: 131 MG/DL — HIGH (ref 70–99)
GLUCOSE SERPL-MCNC: 115 MG/DL — HIGH (ref 70–99)
HCT VFR BLD CALC: 27.2 % — LOW (ref 39–50)
HGB BLD-MCNC: 8.8 G/DL — LOW (ref 13–17)
HYPOCHROMIA BLD QL: SIGNIFICANT CHANGE UP
INR BLD: 1.11 RATIO — SIGNIFICANT CHANGE UP (ref 0.88–1.16)
LYMPHOCYTES # BLD AUTO: 1.78 K/UL — SIGNIFICANT CHANGE UP (ref 1–3.3)
LYMPHOCYTES # BLD AUTO: 23.7 % — SIGNIFICANT CHANGE UP (ref 13–44)
MAGNESIUM SERPL-MCNC: 1.7 MG/DL — SIGNIFICANT CHANGE UP (ref 1.6–2.6)
MANUAL SMEAR VERIFICATION: SIGNIFICANT CHANGE UP
MCHC RBC-ENTMCNC: 22.2 PG — LOW (ref 27–34)
MCHC RBC-ENTMCNC: 32.4 GM/DL — SIGNIFICANT CHANGE UP (ref 32–36)
MCV RBC AUTO: 68.7 FL — LOW (ref 80–100)
MICROCYTES BLD QL: SIGNIFICANT CHANGE UP
MONOCYTES # BLD AUTO: 0.13 K/UL — SIGNIFICANT CHANGE UP (ref 0–0.9)
MONOCYTES NFR BLD AUTO: 1.7 % — LOW (ref 2–14)
NEUTROPHILS # BLD AUTO: 5.39 K/UL — SIGNIFICANT CHANGE UP (ref 1.8–7.4)
NEUTROPHILS NFR BLD AUTO: 71.9 % — SIGNIFICANT CHANGE UP (ref 43–77)
PLAT MORPH BLD: NORMAL — SIGNIFICANT CHANGE UP
PLATELET # BLD AUTO: 238 K/UL — SIGNIFICANT CHANGE UP (ref 150–400)
POIKILOCYTOSIS BLD QL AUTO: SIGNIFICANT CHANGE UP
POLYCHROMASIA BLD QL SMEAR: SLIGHT — SIGNIFICANT CHANGE UP
POTASSIUM SERPL-MCNC: 4.2 MMOL/L — SIGNIFICANT CHANGE UP (ref 3.5–5.3)
POTASSIUM SERPL-SCNC: 4.2 MMOL/L — SIGNIFICANT CHANGE UP (ref 3.5–5.3)
PROT SERPL-MCNC: 5.6 G/DL — LOW (ref 6.6–8.7)
PROTHROM AB SERPL-ACNC: 12.8 SEC — SIGNIFICANT CHANGE UP (ref 10.6–13.6)
RBC # BLD: 3.96 M/UL — LOW (ref 4.2–5.8)
RBC # FLD: 29.5 % — HIGH (ref 10.3–14.5)
RBC BLD AUTO: ABNORMAL
SCHISTOCYTES BLD QL AUTO: SLIGHT — SIGNIFICANT CHANGE UP
SODIUM SERPL-SCNC: 135 MMOL/L — SIGNIFICANT CHANGE UP (ref 135–145)
SPHEROCYTES BLD QL SMEAR: SLIGHT — SIGNIFICANT CHANGE UP
TARGETS BLD QL SMEAR: SLIGHT — SIGNIFICANT CHANGE UP
TROPONIN T SERPL-MCNC: 0.01 NG/ML — SIGNIFICANT CHANGE UP (ref 0–0.06)
VARIANT LYMPHS # BLD: 0.9 % — SIGNIFICANT CHANGE UP (ref 0–6)
WBC # BLD: 7.5 K/UL — SIGNIFICANT CHANGE UP (ref 3.8–10.5)
WBC # FLD AUTO: 7.5 K/UL — SIGNIFICANT CHANGE UP (ref 3.8–10.5)

## 2020-11-22 PROCEDURE — 99285 EMERGENCY DEPT VISIT HI MDM: CPT

## 2020-11-22 PROCEDURE — 99222 1ST HOSP IP/OBS MODERATE 55: CPT

## 2020-11-22 PROCEDURE — 71045 X-RAY EXAM CHEST 1 VIEW: CPT | Mod: 26

## 2020-11-22 PROCEDURE — 70450 CT HEAD/BRAIN W/O DYE: CPT | Mod: 26

## 2020-11-22 PROCEDURE — 99223 1ST HOSP IP/OBS HIGH 75: CPT

## 2020-11-22 PROCEDURE — 93010 ELECTROCARDIOGRAM REPORT: CPT

## 2020-11-22 RX ORDER — DEXTROSE 50 % IN WATER 50 %
25 SYRINGE (ML) INTRAVENOUS ONCE
Refills: 0 | Status: DISCONTINUED | OUTPATIENT
Start: 2020-11-22 | End: 2020-11-25

## 2020-11-22 RX ORDER — SODIUM CHLORIDE 9 MG/ML
1000 INJECTION, SOLUTION INTRAVENOUS
Refills: 0 | Status: DISCONTINUED | OUTPATIENT
Start: 2020-11-22 | End: 2020-11-25

## 2020-11-22 RX ORDER — GLUCAGON INJECTION, SOLUTION 0.5 MG/.1ML
1 INJECTION, SOLUTION SUBCUTANEOUS ONCE
Refills: 0 | Status: DISCONTINUED | OUTPATIENT
Start: 2020-11-22 | End: 2020-11-25

## 2020-11-22 RX ORDER — MAGNESIUM SULFATE 500 MG/ML
2 VIAL (ML) INJECTION ONCE
Refills: 0 | Status: COMPLETED | OUTPATIENT
Start: 2020-11-22 | End: 2020-11-22

## 2020-11-22 RX ORDER — DEXTROSE 50 % IN WATER 50 %
15 SYRINGE (ML) INTRAVENOUS ONCE
Refills: 0 | Status: DISCONTINUED | OUTPATIENT
Start: 2020-11-22 | End: 2020-11-25

## 2020-11-22 RX ORDER — DEXTROSE 50 % IN WATER 50 %
12.5 SYRINGE (ML) INTRAVENOUS ONCE
Refills: 0 | Status: DISCONTINUED | OUTPATIENT
Start: 2020-11-22 | End: 2020-11-25

## 2020-11-22 RX ORDER — THIAMINE MONONITRATE (VIT B1) 100 MG
100 TABLET ORAL ONCE
Refills: 0 | Status: COMPLETED | OUTPATIENT
Start: 2020-11-22 | End: 2020-11-22

## 2020-11-22 RX ADMIN — Medication 100 MILLIGRAM(S): at 18:19

## 2020-11-22 RX ADMIN — Medication 50 GRAM(S): at 18:27

## 2020-11-22 NOTE — ED PROVIDER NOTE - CLINICAL SUMMARY MEDICAL DECISION MAKING FREE TEXT BOX
patient with hypoglycemia, now improved, h/o gastric CA s/p gastrectomy, possible absorption issue, not on any DM medications anymore. will r/o infection, screening EKG/trop. patient is AOx3 and wants to AMA will call family to

## 2020-11-22 NOTE — ED PROVIDER NOTE - OBJECTIVE STATEMENT
79yo M with gastric CA s/p resection on 11/10 with feeding jejiunostomy, patient states last chemo >2 weeks ago. since has been doing well. since losing 75lbs patient has stopped his DM mnths ago. has had recurrent low sugar before, has implantable device he monitors. was picked up by EMS for AMS on side of road and FS 30, FS now 180s. pt reviewed sugards last several weeks with varying numbers from 40-120s. patient denies any CP/SOB. no fever/chills. no urinary/gi sx. states eating well but has not eaten today, justice ghome to eat fried chicken now. does not want any medical attention/workup. 'aleks been stuck enough' patient wife will come pick him up

## 2020-11-22 NOTE — ED ADULT TRIAGE NOTE - CHIEF COMPLAINT QUOTE
Pt BIBA for hypoglycemia, pt found in  seat of car agitated and confused, initial BS 30, was given 1 tube oral glucose and 1 amp D50% IV, states his doctor took him off DM medication 4 months ago due to weight loss, last chemo 2 weeks ago, being treated for stomach CA, port to chest wall

## 2020-11-22 NOTE — ED ADULT TRIAGE NOTE - NS ED NURSE AMBULANCES
[address] [facility] [address] 
[phone] Patient: Selam Cook MRN: [id number] Visit Information Date & Time Provider Department Dept. Phone Encounter #  
 2/5/2018  9:00 AM Sergio Soto NP [facility] [address] [facility] O[facility]YN AT [address] [id number] Follow-up Instructions Return in about 1 month (around 3/5/2018) for Angelito Nieves. Upcoming Health Maintenance Date Due Influenza Age 5 to Adult 8/1/2017 PAP AKA CERVICAL CYTOLOGY 1/2/2021 Allergies as of 2/5/2018  Review Complete On: 2/5/2018 By: Sergio Soto NP No Known Allergies Current Immunizations  Never Reviewed No immunizations on file. Not reviewed this visit Vitals BP Resp Weight(growth percentile) LMP BMI OB Status 111/63 (BP 1 Location: Right arm, BP Patient Position: Sitting) 18 208 lb (94.3 kg) 10/18/2017 (Approximate) 31.63 kg/m2 Pregnant Smoking Status Never Smoker BMI and BSA Data Body Mass Index Body Surface Area  
 31.63 kg/m 2 2.13 m 2 Preferred Pharmacy Pharmacy Name Phone [facility] [address] [facility]  [place] [phone] Your Updated Medication List  
  
   
This list is accurate as of: 2/5/18 10:21 AM.  Always use your most recent med list.  
  
  
  
  
 PNV Combo #19-Iron-Fol Ac-DHA 22-6-1-200 mg Cap Take 1 Tab by mouth daily. Follow-up Instructions Return in about 1 month (around 3/5/2018) for Angelito Nieves. Patient Instructions Weeks 14 to 18 of Your Pregnancy: Care Instructions Your Care Instructions During this time, you may start to \"show,\" so that you look pregnant to people around you. You may also notice some changes in your skin, such as itchy spots on your palms or acne on your face. Your baby is now able to pass urine, and your baby's first stool (meconium) is starting to collect in his or her intestines. Hair is also beginning to grow on your baby's head. At your next visit, between weeks 18 and 20, your doctor may do an ultrasound test. The test allows your doctor to check for certain problems. Your doctor can also tell the sex of your baby. This is a good time to think about whether you want to know whether your baby is a boy or a girl. Talk to your doctor about getting a flu shot to help keep you healthy during your pregnancy. As your pregnancy moves along, it is common to worry or feel anxious. Your body is changing a lot. And you are thinking about giving birth, the health of your baby, and becoming a parent. You can learn to cope with any anxiety and stress you feel. Follow-up care is a key part of your treatment and safety. Be sure to make and go to all appointments, and call your doctor if you are having problems. It's also a good idea to know your test results and keep a list of the medicines you take. How can you care for yourself at home? ?Reduce stress ? · Ask for help with cooking and housekeeping. ? · Figure out who or what causes your stress. Avoid these people or situations as much as possible. ? · Relax every day. Taking 10- to 15-minute breaks can make a big difference. Take a walk, listen to music, or take a warm bath. ? · Learn relaxation techniques at prenatal or yoga class. Or buy a relaxation tape. ? · List your fears about having a baby and becoming a parent. Share the list with someone you trust. Decide which worries are really small, and try to let them go. Exercise ? · If you did not exercise much before pregnancy, start slowly. Walking is best. Natalia Obinna yourself, and do a little more every day. ? · Brisk walking, easy jogging, low-impact aerobics, water aerobics, and yoga are good choices.  Some sports, such as scuba diving, horseback riding, downhill skiing, gymnastics, and water skiing, are not a good idea. ? · Try to do at least 2½ hours a week of moderate exercise, such as a fast walk. One way to do this is to be active 30 minutes a day, at least 5 days a week. It's fine to be active in blocks of 10 minutes or more throughout your day and week. ? · Wear loose clothing. And wear shoes and a bra that provide good support. ? · Warm up and cool down to start and finish your exercise. ? · If you want to use weights, be sure to use light weights. They reduce stress on your joints. ?Stay at the best weight for you ? · Experts recommend that you gain about 1 pound a month during the first 3 months of your pregnancy. ? · Experts recommend that you gain about 1 pound a week during your last 6 months of pregnancy, for a total weight gain of 25 to 35 pounds. ? · If you are underweight, you will need to gain more weight (about 28 to 40 pounds). ? · If you are overweight, you may not need to gain as much weight (about 15 to 25 pounds). ? · If you are gaining weight too fast, use common sense. Exercise every day, and limit sweets, fast foods, and fats. Choose lean meats, fruits, and vegetables. ? · If you are having twins or more, your doctor may refer you to a dietitian. Where can you learn more? Go to http://aubrey-pranav.info/. Enter B283 in the search box to learn more about \"Weeks 14 to 18 of Your Pregnancy: Care Instructions. \" Current as of: March 16, 2017 Content Version: 11.4 © 7136-5119 [employer]. Care instructions adapted under license by [employer] (which disclaims liability or warranty for this information). If you have questions about a medical condition or this instruction, always ask your healthcare professional. [place] [address] any warranty or liability for your use of this information. Introducing [facility]! [employer] introduces ITDatabase patient portal. Now you can access parts of your medical record, email your doctor's office, and request medication refills online. 1. In your internet browser, go to [email or web address] 2. Click on the First Time User? Click Here link in the Sign In box. You will see the New Member Sign Up page. 3. Enter your ITDatabase Access Code exactly as it appears below. You will not need to use this code after youve completed the sign-up process. If you do not sign up before the expiration date, you must request a new code. · ITDatabase Access Code: [email or web address] Expires: 4/2/2018  2:43 PM 
 
4. Enter the last four digits of your Social Security Number (xxxx) and Date of Birth (mm/dd/yyyy) as indicated and click Submit. You will be taken to the next sign-up page. 5. Create a ITDatabase ID. This will be your ITDatabase login ID and cannot be changed, so think of one that is secure and easy to remember. 6. Create a ITDatabase password. You can change your password at any time. 7. Enter your Password Reset Question and Answer. This can be used at a later time if you forget your password. 8. Enter your e-mail address. You will receive e-mail notification when new information is available in [address] 9. Click Sign Up. You can now view and download portions of your medical record. 10. Click the Download Summary menu link to download a portable copy of your medical information. If you have questions, please visit the Frequently Asked Questions section of the ITDatabase website. Remember, ITDatabase is NOT to be used for urgent needs. For medical emergencies, dial 911. Now available from your iPhone and Android! Please provide this summary of care documentation to your next provider. Your primary care clinician is listed as Phys Other. If you have any questions after today's visit, please call [phone]. [facility]
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
## 2020-11-22 NOTE — H&P ADULT - NSHPPHYSICALEXAM_GEN_ALL_CORE
Vital Signs Last 24 Hrs  T(C): 36.6 (23 Nov 2020 00:37), Max: 36.7 (22 Nov 2020 19:52)  T(F): 97.8 (23 Nov 2020 00:37), Max: 98 (22 Nov 2020 19:52)  HR: 70 (23 Nov 2020 00:37) (70 - 75)  BP: 116/62 (23 Nov 2020 00:37) (108/55 - 131/65)  BP(mean): --  RR: 16 (23 Nov 2020 00:37) (16 - 20)  SpO2: 100% (23 Nov 2020 00:37) (99% - 100%)    GENERAL: NAD,   HEENT:  Atraumatic, Normocephalic, MMM, no oropharyngeal lesions  EYES: missing left eye, right eye movement intact.  NECK: Supple, No JVD, no throat tenderness.  CHEST/LUNG: Clear to auscultation bilaterally; No wheezes, rales, or rhonchi  HEART: Regular rate and rhythm; No murmurs, rubs, or gallops  ABDOMEN: Soft, Nontender, Nondistended; Bowel sounds present  EXTREMITIES:  2+ Peripheral Pulses, No clubbing, cyanosis, or edema  PSYCH: AAOx3, cooperative  NEUROLOGY: moves all extremities spontaneously. no sensory deficits  SKIN: No rashes or lesions Vital Signs Last 24 Hrs  T(C): 36.6 (23 Nov 2020 00:37), Max: 36.7 (22 Nov 2020 19:52)  T(F): 97.8 (23 Nov 2020 00:37), Max: 98 (22 Nov 2020 19:52)  HR: 70 (23 Nov 2020 00:37) (70 - 75)  BP: 116/62 (23 Nov 2020 00:37) (108/55 - 131/65)  BP(mean): --  RR: 16 (23 Nov 2020 00:37) (16 - 20)  SpO2: 100% (23 Nov 2020 00:37) (99% - 100%)    GENERAL: NAD  HEENT:  Atraumatic, Normocephalic, MMM, no oropharyngeal lesions  EYES: missing left eye, right eye movement intact.  NECK: Supple, No JVD, no throat tenderness.  CHEST/LUNG: Clear to auscultation bilaterally; No wheezes, rales, or rhonchi  HEART: Regular rate and rhythm; No murmurs, rubs, or gallops  ABDOMEN: Soft, Nontender, Nondistended; Bowel sounds present  EXTREMITIES:  2+ Peripheral Pulses, No clubbing, cyanosis, or edema  PSYCH: AAOx3, cooperative  NEUROLOGY: moves all extremities spontaneously. no sensory deficits  SKIN: No rashes or lesions

## 2020-11-22 NOTE — ED PROVIDER NOTE - CARE PLAN
Principal Discharge DX:	Hypoglycemia  Secondary Diagnosis:	SDH (subdural hematoma)  Secondary Diagnosis:	Abnormal EKG  Secondary Diagnosis:	Behavior disturbance

## 2020-11-22 NOTE — ED PROVIDER NOTE - PHYSICAL EXAMINATION
Gen: NAD, AOx3  Head: NCAT  HEENT: EOMI, oral mucosa moist, normal conjunctiva, neck supple  Lung: CTAB, no respiratory distress  CV: rrr, no murmur, Normal perfusion  Abd: soft, NTND  MSK: No edema, no visible deformities  Neuro: CN II-XII intact, 5/5 UE strength, 4/5 strength, sensation intact  Skin: No rash   Psych: normal affect

## 2020-11-22 NOTE — ED ADULT NURSE NOTE - OBJECTIVE STATEMENT
pt refusing to answer questions offers no complaints requesting to leave pt denies any s/s per EMS came in gluc 30 and AMS
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
## 2020-11-22 NOTE — H&P ADULT - NSHPLABSRESULTS_GEN_ALL_CORE
8.8    7.50  )-----------( 238      ( 22 Nov 2020 17:07 )             27.2       11-22    135  |  101  |  12.0  ----------------------------<  115<H>  4.2   |  21.0<L>  |  0.70    Ca    8.8      22 Nov 2020 17:07  Mg     1.7     11-22    TPro  5.6<L>  /  Alb  3.1<L>  /  TBili  0.3<L>  /  DBili  x   /  AST  29  /  ALT  12  /  AlkPhos  78  11-22        PT/INR - ( 22 Nov 2020 17:08 )   PT: 12.8 sec;   INR: 1.11 ratio         PTT - ( 22 Nov 2020 17:08 )  PTT:24.5 sec    Lactate Trend      CARDIAC MARKERS ( 22 Nov 2020 17:07 )  x     / 0.01 ng/mL / x     / x     / x        CAPILLARY BLOOD GLUCOSE    POCT Blood Glucose.: 131 mg/dL (22 Nov 2020 21:17)    Culture Results:   <10,000 CFU/mL Normal Urogenital Laure (11-05 @ 05:33)      RADIOLOGY, EKG & ADDITIONAL TESTS: Reviewed.     < from: CT Head No Cont (11.22.20 @ 17:27) >      IMPRESSION:  There is interval decrease in size of a 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 There is mild associated mass effect as described. There is no associated midline shift. 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 No acute intracranial hemorrhage or acute territorial infarction.    < end of copied text >    EKG- NSR with diffuse TWI and RBBB new from previous

## 2020-11-22 NOTE — H&P ADULT - NSICDXPASTMEDICALHX_GEN_ALL_CORE_FT
PAST MEDICAL HISTORY:  Benign essential HTN     DM (diabetes mellitus)     Gastric cancer on chemo    Prostate cancer     RBBB (right bundle branch block)     SDH (subdural hematoma)
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
## 2020-11-22 NOTE — ED ADULT NURSE REASSESSMENT NOTE - NS ED NURSE REASSESS COMMENT FT1
Assumed care of patient at this time from previous RN.  A&Ox4, RR even and unlabored. Skin is warm and dry.  Denies any pain or other complaints at this time.  Remains on CM and RA Spo2 @98%.  Sitting up eating at this time. Updated on plan, safety maintained.

## 2020-11-22 NOTE — CONSULT NOTE ADULT - SUBJECTIVE AND OBJECTIVE BOX
HPI: 81 y/o M with PMHX COPD, HTN, Prostate CA (2010), Gastric Cancer (2020) s/p R ACW Mediport currently on chemotherapy Pegfilgrastim.  Several recent admissions to [facility] , with syncopal episodes. CTB showed SDH with midline shift at that time, no neurosurgical interventions at that time.     Tonight Pt was brought to the ER by police, called by the wife for erratic/violent  behavior. Repeat CTB shows right sided chronic SDH, decreased in size compared to 11/4/20       PAST MEDICAL & SURGICAL HISTORY:  RBBB (right bundle branch block)    SDH (subdural hematoma)    Benign essential HTN    Gastric cancer  on chemo    DM (diabetes mellitus)    Prostate cancer    Status post placement of implantable loop recorder  left ACW    Port-A-Cath in place  8/2020 right ACW      FAMILY HISTORY:  FH: diabetes mellitus  schizophrenia         SOCIAL HISTORY:  Tobacco Use:  EtOH use: Denies      Allergies    No Known Allergies    Intolerances        REVIEW OF SYSTEMS  Negative except as noted in HPI  CONSTITUTIONAL: No fever, + weight loss secondary to gastric cancer  EYES: No eye pain, visual disturbances, or discharge  ENMT:  No difficulty hearing, tinnitus, vertigo; No sinus or throat pain  NECK: No pain or stiffness  RESPIRATORY: No cough, wheezing, chills or hemoptysis; No shortness of breath  CARDIOVASCULAR: No chest pain, palpitations, dizziness  NEUROLOGICAL: No headaches , visual changes   ENDOCRINE: No heat or cold intolerance; No hair loss  PSYCHIATRIC: + anxiety, mood swings      HOME MEDICATIONS:  Home Medications:  acetaminophen 325 mg oral tablet: 2 tab(s) orally every 6 hours, As needed, Mild Pain (1 - 3) (13 Nov 2020 09:46)  pantoprazole 40 mg oral delayed release tablet: 1 tab(s) orally 2 times a day (09 Nov 2020 06:12)  prochlorperazine 10 mg oral tablet: 1 tab(s) orally every 6 hours, As needed, for nausea (09 Nov 2020 06:12)  sucralfate 1 g/10 mL oral suspension: 10 milliliter(s) orally 4 times a day (before meals and at bedtime) (09 Nov 2020 06:12)      Vital Signs Last 24 Hrs  T(C): 36.6 (22 Nov 2020 20:17), Max: 36.7 (22 Nov 2020 19:52)  T(F): 97.8 (22 Nov 2020 20:17), Max: 98 (22 Nov 2020 19:52)  HR: 75 (22 Nov 2020 20:17) (73 - 75)  BP: 131/65 (22 Nov 2020 20:17) (108/55 - 131/65)  BP(mean): --  RR: 16 (22 Nov 2020 20:17) (16 - 20)  SpO2: 100% (22 Nov 2020 20:17) (99% - 100%)      PHYSICAL EXAM:  GENERAL: NAD, well-groomed, well-developed, thin, elderly male, in NAD. Cooperative for exam & interview  HEAD:  Atraumatic, normocephalic  YANIRA COMA SCORE: E- V- M- = 15       E: 4= opens eyes spontaneously        V: 5= oriented        M: 6= follows commands  MENTAL STATUS: AAO x3; Awake;  Appropriately conversant without aphasia; following simple commands  CRANIAL NERVES: EOMI without nystagmus. Facial sensation intact V1-3 distribution b/l. Face symmetric w/ normal eye closure and smile, tongue midline. Hearing grossly intact. Speech clear. Head turning and shoulder shrug intact.   MOTOR: strength 5/5 b/l upper and lower extremities  Uppers     Delt (C5/6)     Bicep (C5/6)          Tricep (C7)     HG (C8/T1)  R                     5/5                 5/5                    5/5              5/5                     L                      5/5                 5/5                     5/5             5/5                     Lowers      HF(L1/L2)          KE (L3)       DF (L4)      EHL (L5)      PF (S1)      R                     5/5              5/5           5/5           5/5            5/5  L                     5/5               5/5          5/5            5/5            5/5  SENSATION: grossly intact to light touch all extremities  COORDINATION: Gait intact; rapid alternating movements intact b/l upper extremities; no upper extremity dysmetria      LABS:                        8.8    7.50  )-----------( 238      ( 22 Nov 2020 17:07 )             27.2     11-22    135  |  101  |  12.0  ----------------------------<  115<H>  4.2   |  21.0<L>  |  0.70    Ca    8.8      22 Nov 2020 17:07  Mg     1.7     11-22    TPro  5.6<L>  /  Alb  3.1<L>  /  TBili  0.3<L>  /  DBili  x   /  AST  29  /  ALT  12  /  AlkPhos  78  11-22    PT/INR - ( 22 Nov 2020 17:08 )   PT: 12.8 sec;   INR: 1.11 ratio         PTT - ( 22 Nov 2020 17:08 )  PTT:24.5 sec      RADIOLOGY & ADDITIONAL STUDIES:    CT Head No Cont (11.22.20 @ 17:27)                         PROCEDURE DATE:  11/22/2020          INTERPRETATION:  CT OF THE HEAD WITHOUT CONTRAST    CLINICAL INDICATION: Mental status change    TECHNIQUE: Volumetric CT acquisition was performed through the brain andreviewed using brain and bone window technique. Dose optimization techniques were utilized including kVp/mA modulation along with iterative reconstructions.      COMPARISON: CT head from 11/4/2020    FINDINGS:  There is persistent right frontoparietal convexity hypodense subdural collection which has slightly decreased in size since the prior study, now measuring 1.1 cm in greatest thickness. There is no acute component. There is persistent mild mass effect with mild impression upon the underlying right frontoparietal lobes. There is no associated midline shift.    There is stable size of heterogeneous predominantly hyperdense left frontal lesion. There is no significant associated mass effect or surrounding vasogenic edema.      The ventricular and sulcal size and configuration is age appropriate.   There is no acute loss of gray-white differentiation. There are moderate patchy areas of hypodensity in the periventricular and subcortical white matter which are likely related to chronic microangiopathic changes.  . There is an empty sella.    There is no acute intracranial hemorrhage.     The calvarium is intact. The paranasal sinuses are clear.The mastoid air cells are predominantly clear. The orbits are unremarkable.      IMPRESSION:  There is interval decrease in size of a 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 There is mild associated mass effect as described. There is no associated midline shift. 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 No acute intracranial hemorrhage or acute territorial infarction.          CAPRINI SCORE [CLOT]:  Patient has an estimated Caprini score of greater than 5.  However, the patient's unique clinical situation will be addressed in an individual manner to determine appropriate anticoagulation treatment, if any.

## 2020-11-22 NOTE — H&P ADULT - ASSESSMENT
80M hx Gastric CA s/p gastrectomy, chemo, SDH s/p traumatic fall, DM2, HTN presents with hypoglycemia and behavioral disturbance also found to have new ekg changes. 80M hx Gastric CA s/p gastrectomy, chemo, SDH s/p traumatic fall, DM2, HTN presents with hypoglycemia and behavioral disturbance also found to have new ekg changes.         #Behavioral disturbance with agitation/aggression  -think likely related to recent SDH given location of bleed and acute change afterward  -will work up other causes, check ammonia, tsh, urinalysis, urine toxicology  -psych consulted by ED  -CTH with improving SDH with mass effect    #Hypoglycemia in setting of DM2  -likely 2/2 decreased po intake in setting of dm2  -now improved, continue to monitor q6  -hypoglycemia protocol placed  -pt tolerating po well    #Abnormal ekg  -cardio consult appreciated, possibly related to SDH vs other cardiac event  -admit to monitored bed  -check TTE  -trend troponin  -pt denying chest pain  -f/u further cardiac recs  -c/w metoprolol 12.5 BID    -hx SDH  -nsgy consult appreciated  -cth with some improvement  -stat CTH if acute mental status change  -avoid blood thinners  -c/w keppra for seizure ppx    #Gastric Ca  -outpatient follow up with oncology and surgery    #DVT ppx  -no pharmacologic ppx given recent SDH  -will place on SCD's

## 2020-11-22 NOTE — ED ADULT NURSE NOTE - NSIMPLEMENTINTERV_GEN_ALL_ED
Implemented All Fall with Harm Risk Interventions:  [place] to call system. Call bell, personal items and telephone within reach. Instruct patient to call for assistance. Room bathroom lighting operational. Non-slip footwear when patient is off stretcher. Physically safe environment: no spills, clutter or unnecessary equipment. Stretcher in lowest position, wheels locked, appropriate side rails in place. Provide visual cue, wrist band, yellow gown, etc. Monitor gait and stability. Monitor for mental status changes and reorient to person, place, and time. Review medications for side effects contributing to fall risk. Reinforce activity limits and safety measures with patient and family. Provide visual clues: red socks.

## 2020-11-22 NOTE — H&P ADULT - NSICDXPASTSURGICALHX_GEN_ALL_CORE_FT
PAST SURGICAL HISTORY:  Port-A-Cath in place 8/2020 right ACW    Status post placement of implantable loop recorder left ACW

## 2020-11-23 ENCOUNTER — TRANSCRIPTION ENCOUNTER (OUTPATIENT)
Age: 80
End: 2020-11-23

## 2020-11-23 LAB
A1C WITH ESTIMATED AVERAGE GLUCOSE RESULT: 5.5 % — SIGNIFICANT CHANGE UP (ref 4–5.6)
ALBUMIN SERPL ELPH-MCNC: 2.9 G/DL — LOW (ref 3.3–5.2)
ALP SERPL-CCNC: 80 U/L — SIGNIFICANT CHANGE UP (ref 40–120)
ALT FLD-CCNC: 9 U/L — SIGNIFICANT CHANGE UP
AMMONIA BLD-MCNC: 26 UMOL/L — SIGNIFICANT CHANGE UP (ref 11–55)
AMPHET UR-MCNC: NEGATIVE — SIGNIFICANT CHANGE UP
ANION GAP SERPL CALC-SCNC: 11 MMOL/L — SIGNIFICANT CHANGE UP (ref 5–17)
APPEARANCE UR: CLEAR — SIGNIFICANT CHANGE UP
AST SERPL-CCNC: 11 U/L — SIGNIFICANT CHANGE UP
BACTERIA # UR AUTO: ABNORMAL
BARBITURATES UR SCN-MCNC: NEGATIVE — SIGNIFICANT CHANGE UP
BENZODIAZ UR-MCNC: NEGATIVE — SIGNIFICANT CHANGE UP
BILIRUB SERPL-MCNC: 0.3 MG/DL — LOW (ref 0.4–2)
BILIRUB UR-MCNC: NEGATIVE — SIGNIFICANT CHANGE UP
BUN SERPL-MCNC: 10 MG/DL — SIGNIFICANT CHANGE UP (ref 8–20)
CALCIUM SERPL-MCNC: 8.4 MG/DL — LOW (ref 8.6–10.2)
CHLORIDE SERPL-SCNC: 101 MMOL/L — SIGNIFICANT CHANGE UP (ref 98–107)
CO2 SERPL-SCNC: 24 MMOL/L — SIGNIFICANT CHANGE UP (ref 22–29)
COCAINE METAB.OTHER UR-MCNC: NEGATIVE — SIGNIFICANT CHANGE UP
COLOR SPEC: YELLOW — SIGNIFICANT CHANGE UP
COMMENT - URINE: SIGNIFICANT CHANGE UP
CREAT SERPL-MCNC: 0.59 MG/DL — SIGNIFICANT CHANGE UP (ref 0.5–1.3)
DIFF PNL FLD: NEGATIVE — SIGNIFICANT CHANGE UP
EPI CELLS # UR: SIGNIFICANT CHANGE UP
ESTIMATED AVERAGE GLUCOSE: 111 MG/DL — SIGNIFICANT CHANGE UP (ref 68–114)
ETHANOL SERPL-MCNC: <10 MG/DL — HIGH (ref 0–9)
FERRITIN SERPL-MCNC: 74 NG/ML — SIGNIFICANT CHANGE UP (ref 30–400)
FOLATE SERPL-MCNC: 15.1 NG/ML — SIGNIFICANT CHANGE UP
GLUCOSE BLDC GLUCOMTR-MCNC: 109 MG/DL — HIGH (ref 70–99)
GLUCOSE BLDC GLUCOMTR-MCNC: 85 MG/DL — SIGNIFICANT CHANGE UP (ref 70–99)
GLUCOSE BLDC GLUCOMTR-MCNC: 90 MG/DL — SIGNIFICANT CHANGE UP (ref 70–99)
GLUCOSE BLDC GLUCOMTR-MCNC: 95 MG/DL — SIGNIFICANT CHANGE UP (ref 70–99)
GLUCOSE SERPL-MCNC: 96 MG/DL — SIGNIFICANT CHANGE UP (ref 70–99)
GLUCOSE UR QL: 100 MG/DL
HCT VFR BLD CALC: 29.7 % — LOW (ref 39–50)
HGB BLD-MCNC: 9.6 G/DL — LOW (ref 13–17)
HYALINE CASTS # UR AUTO: ABNORMAL /LPF
IRON SATN MFR SERPL: 12 % — LOW (ref 16–55)
IRON SATN MFR SERPL: 29 UG/DL — LOW (ref 59–158)
KETONES UR-MCNC: NEGATIVE — SIGNIFICANT CHANGE UP
LEUKOCYTE ESTERASE UR-ACNC: NEGATIVE — SIGNIFICANT CHANGE UP
MAGNESIUM SERPL-MCNC: 2.1 MG/DL — SIGNIFICANT CHANGE UP (ref 1.6–2.6)
MCHC RBC-ENTMCNC: 22.1 PG — LOW (ref 27–34)
MCHC RBC-ENTMCNC: 32.3 GM/DL — SIGNIFICANT CHANGE UP (ref 32–36)
MCV RBC AUTO: 68.4 FL — LOW (ref 80–100)
METHADONE UR-MCNC: NEGATIVE — SIGNIFICANT CHANGE UP
NITRITE UR-MCNC: NEGATIVE — SIGNIFICANT CHANGE UP
NT-PROBNP SERPL-SCNC: 1739 PG/ML — HIGH (ref 0–300)
OPIATES UR-MCNC: NEGATIVE — SIGNIFICANT CHANGE UP
PCP SPEC-MCNC: SIGNIFICANT CHANGE UP
PCP UR-MCNC: NEGATIVE — SIGNIFICANT CHANGE UP
PH UR: 5 — SIGNIFICANT CHANGE UP (ref 5–8)
PHOSPHATE SERPL-MCNC: 3.3 MG/DL — SIGNIFICANT CHANGE UP (ref 2.4–4.7)
PLATELET # BLD AUTO: 223 K/UL — SIGNIFICANT CHANGE UP (ref 150–400)
POTASSIUM SERPL-MCNC: 3.4 MMOL/L — LOW (ref 3.5–5.3)
POTASSIUM SERPL-SCNC: 3.4 MMOL/L — LOW (ref 3.5–5.3)
PROT SERPL-MCNC: 5.3 G/DL — LOW (ref 6.6–8.7)
PROT UR-MCNC: 15 MG/DL
RBC # BLD: 4.34 M/UL — SIGNIFICANT CHANGE UP (ref 4.2–5.8)
RBC # FLD: 29.2 % — HIGH (ref 10.3–14.5)
RBC CASTS # UR COMP ASSIST: SIGNIFICANT CHANGE UP /HPF (ref 0–4)
SARS-COV-2 RNA SPEC QL NAA+PROBE: SIGNIFICANT CHANGE UP
SODIUM SERPL-SCNC: 136 MMOL/L — SIGNIFICANT CHANGE UP (ref 135–145)
SP GR SPEC: 1.02 — SIGNIFICANT CHANGE UP (ref 1.01–1.02)
THC UR QL: POSITIVE
TIBC SERPL-MCNC: 240 UG/DL — SIGNIFICANT CHANGE UP (ref 220–430)
TRANSFERRIN SERPL-MCNC: 168 MG/DL — LOW (ref 180–329)
TROPONIN T SERPL-MCNC: <0.01 NG/ML — SIGNIFICANT CHANGE UP (ref 0–0.06)
TSH SERPL-MCNC: 4.59 UIU/ML — HIGH (ref 0.27–4.2)
UROBILINOGEN FLD QL: NEGATIVE MG/DL — SIGNIFICANT CHANGE UP
VIT B12 SERPL-MCNC: >2000 PG/ML — HIGH (ref 232–1245)
WBC # BLD: 5.44 K/UL — SIGNIFICANT CHANGE UP (ref 3.8–10.5)
WBC # FLD AUTO: 5.44 K/UL — SIGNIFICANT CHANGE UP (ref 3.8–10.5)
WBC UR QL: SIGNIFICANT CHANGE UP

## 2020-11-23 PROCEDURE — 99232 SBSQ HOSP IP/OBS MODERATE 35: CPT

## 2020-11-23 RX ORDER — SUCRALFATE 1 G
1 TABLET ORAL EVERY 6 HOURS
Refills: 0 | Status: DISCONTINUED | OUTPATIENT
Start: 2020-11-23 | End: 2020-11-25

## 2020-11-23 RX ORDER — ONDANSETRON 8 MG/1
4 TABLET, FILM COATED ORAL EVERY 6 HOURS
Refills: 0 | Status: DISCONTINUED | OUTPATIENT
Start: 2020-11-23 | End: 2020-11-23

## 2020-11-23 RX ORDER — PANTOPRAZOLE SODIUM 20 MG/1
40 TABLET, DELAYED RELEASE ORAL
Refills: 0 | Status: DISCONTINUED | OUTPATIENT
Start: 2020-11-23 | End: 2020-11-25

## 2020-11-23 RX ORDER — METOPROLOL TARTRATE 50 MG
12.5 TABLET ORAL
Refills: 0 | Status: DISCONTINUED | OUTPATIENT
Start: 2020-11-23 | End: 2020-11-25

## 2020-11-23 RX ORDER — LEVETIRACETAM 250 MG/1
500 TABLET, FILM COATED ORAL
Refills: 0 | Status: DISCONTINUED | OUTPATIENT
Start: 2020-11-23 | End: 2020-11-24

## 2020-11-23 RX ADMIN — PANTOPRAZOLE SODIUM 40 MILLIGRAM(S): 20 TABLET, DELAYED RELEASE ORAL at 07:00

## 2020-11-23 RX ADMIN — ONDANSETRON 4 MILLIGRAM(S): 8 TABLET, FILM COATED ORAL at 14:43

## 2020-11-23 RX ADMIN — SODIUM CHLORIDE 100 MILLILITER(S): 9 INJECTION, SOLUTION INTRAVENOUS at 02:44

## 2020-11-23 RX ADMIN — LEVETIRACETAM 500 MILLIGRAM(S): 250 TABLET, FILM COATED ORAL at 05:55

## 2020-11-23 RX ADMIN — Medication 1 GRAM(S): at 05:55

## 2020-11-23 RX ADMIN — Medication 12.5 MILLIGRAM(S): at 18:02

## 2020-11-23 RX ADMIN — LEVETIRACETAM 500 MILLIGRAM(S): 250 TABLET, FILM COATED ORAL at 18:02

## 2020-11-23 RX ADMIN — Medication 12.5 MILLIGRAM(S): at 05:55

## 2020-11-23 NOTE — CHART NOTE - NSCHARTNOTEFT_GEN_A_CORE
Patient refusing to change into hospital gown   Patient also refusing new IV insertion at this time  Patient states he will allow new IV placed in the AM   Patient is AxOx3 and aware of risks vs benefits of not allowing IV access with his hx of hypoglycemia   Patient still adamantly refusing

## 2020-11-23 NOTE — PROGRESS NOTE ADULT - SUBJECTIVE AND OBJECTIVE BOX
PETER GAMBOA    [id number]    80y      Male    Patient is a 80y old  Male who presents with a chief complaint of hypoglycemia, ekg changes, behavioral disturbance (2020 10:38)      INTERVAL HPI/OVERNIGHT EVENTS:      Patient has some nausea, denies chest pain, dizziness, headache.     REVIEW OF SYSTEMS:    CONSTITUTIONAL: No fever, some fatigue  RESPIRATORY: No cough, No shortness of breath  CARDIOVASCULAR: No chest pain, palpitations  GASTROINTESTINAL: No abdominal, has nausea and vomiting  NEUROLOGICAL: No headaches,  loss of strength.  MISCELLANEOUS: No joint swelling or pain       Vital Signs Last 24 Hrs  T(C): 36.6 (2020 15:37), Max: 36.7 (2020 19:52)  T(F): 97.9 (2020 15:37), Max: 98 (2020 19:52)  HR: 82 (2020 15:37) (58 - 82)  BP: 119/72 (2020 15:37) (108/55 - 131/65)  RR: 18 (2020 15:37) (16 - 20)  SpO2: 100% (2020 15:37) (99% - 100%)    PHYSICAL EXAM:    GENERAL: Elderly male looking comfortable   HEENT: PERRL, +EOMI  NECK: soft, Supple, No JVD,   CHEST/LUNG: Clear to auscultate bilaterally; No wheezing  HEART: S1S2+, Regular rate and rhythm; No murmurs  ABDOMEN: Soft, Nontender, Nondistended; Bowel sounds present  EXTREMITIES:  1+ Peripheral Pulses, No edema  SKIN: No rashes or lesions  NEURO: AAOX3, no focal deficits, no motor r sensory loss  PSYCH: normal mood      LABS:                        9.6    5.44  )-----------( 223      ( 2020 06:47 )             29.7     11-23    136  |  101  |  10.0  ----------------------------<  96  3.4<L>   |  24.0  |  0.59    Ca    8.4<L>      2020 06:47  Phos  3.3     11-23  Mg     2.1     11-23    TPro  5.3<L>  /  Alb  2.9<L>  /  TBili  0.3<L>  /  DBili  x   /  AST  11  /  ALT  9   /  AlkPhos  80  11-23    PT/INR - ( 2020 17:08 )   PT: 12.8 sec;   INR: 1.11 ratio         PTT - ( 2020 17:08 )  PTT:24.5 sec  Urinalysis Basic - ( 2020 02:32 )    Color: Yellow / Appearance: Clear / S.020 / pH: x  Gluc: x / Ketone: Negative  / Bili: Negative / Urobili: Negative mg/dL   Blood: x / Protein: 15 mg/dL / Nitrite: Negative   Leuk Esterase: Negative / RBC: 0-2 /HPF / WBC 0-2   Sq Epi: x / Non Sq Epi: Occasional / Bacteria: Occasional          I&O's Summary      MEDICATIONS  (STANDING):  dextrose 40% Gel 15 Gram(s) Oral once  dextrose 5% + sodium chloride 0.45%. 1000 milliLiter(s) (100 mL/Hr) IV Continuous <Continuous>  dextrose 50% Injectable 25 Gram(s) IV Push once  dextrose 50% Injectable 12.5 Gram(s) IV Push once  dextrose 50% Injectable 25 Gram(s) IV Push once  glucagon  Injectable 1 milliGRAM(s) IntraMuscular once  levETIRAcetam 500 milliGRAM(s) Oral two times a day  metoprolol tartrate 12.5 milliGRAM(s) Oral two times a day  pantoprazole    Tablet 40 milliGRAM(s) Oral before breakfast    MEDICATIONS  (PRN):  sucralfate 1 Gram(s) Oral every 6 hours PRN gerd

## 2020-11-23 NOTE — PROGRESS NOTE ADULT - SUBJECTIVE AND OBJECTIVE BOX
[facility]                              [address] [address]                                                 PHONE: [phone]                                                 FAX: [phone]  -----------------------------------------------------------------------------------------------  Pt seen and examined. FU for  abnormal ECG    Overnight events/Complaints: Pt without complains. Wants to go home    Vital Signs Last 24 Hrs  T(C): 36.4 (23 Nov 2020 07:58), Max: 36.7 (22 Nov 2020 19:52)  T(F): 97.5 (23 Nov 2020 07:58), Max: 98 (22 Nov 2020 19:52)  HR: 58 (23 Nov 2020 07:58) (58 - 75)  BP: 110/68 (23 Nov 2020 07:58) (108/55 - 131/65)  BP(mean): --  RR: 18 (23 Nov 2020 07:58) (16 - 20)  SpO2: 100% (23 Nov 2020 07:58) (99% - 100%)  I&O's Summary      PHYSICAL EXAM:  Constitutional: Appears well developed, well nourished; alert and co-operative  HEENT:     Head: Normocephalic and atraumatic  Neck: supple. No JVD  Cardiovascular: regular S1 S2  Respiratory: Lungs clear to auscultation; no crepitations, no wheeze  Gastrointestinal:  Soft, Non-tender, + BS	  Musculoskeletal: Normal range of motion. No edema  Skin: Warm and dry. No cyanosis . No diaphoresis.  Neurologic: Alert oriented to time place and person. Normal strength and no tremor.        LABS:                        9.6    5.44  )-----------( 223      ( 23 Nov 2020 06:47 )             29.7     11-23    136  |  101  |  10.0  ----------------------------<  96  3.4<L>   |  24.0  |  0.59    Ca    8.4<L>      23 Nov 2020 06:47  Phos  3.3     11-23  Mg     2.1     11-23    TPro  5.3<L>  /  Alb  2.9<L>  /  TBili  0.3<L>  /  DBili  x   /  AST  11  /  ALT  9   /  AlkPhos  80  11-23    CARDIAC MARKERS ( 23 Nov 2020 06:47 )  x     / <0.01 ng/mL / x     / x     / x      CARDIAC MARKERS ( 22 Nov 2020 17:07 )  x     / 0.01 ng/mL / x     / x     / x          PT/INR - ( 22 Nov 2020 17:08 )   PT: 12.8 sec;   INR: 1.11 ratio         PTT - ( 22 Nov 2020 17:08 )  PTT:24.5 sec    RADIOLOGY & ADDITIONAL STUDIES: (reviewed)  CXR was independently visualized/reviewed  and demonstrated: hyperinflated lungs    CARDIOLOGY TESTING:(reviewed)     12 lead EKG independently visualized/reviewed  and demonstrated sinus rhythm     ECG: sinus rhythm 78 bpm, normal axis, deep T wave inversion across the precordium    ECHO 9/14/20  1. Left ventricular ejection fraction, by visual estimation, is 60 to 65%.   2. Normal global left ventricular systolic function.   3. Spectral Doppler shows impaired relaxation pattern.   4. Normal RV size and function, estimated PASP of 27 mmHg.   5. Mild mitral annular calcification.   6. Sclerotic aortic valve with decreased opening.   7. There is no evidence of pericardial effusion.      MEDICATIONS:(reviewed)  MEDICATIONS  (STANDING):  dextrose 40% Gel 15 Gram(s) Oral once  dextrose 5% + sodium chloride 0.45%. 1000 milliLiter(s) (100 mL/Hr) IV Continuous <Continuous>  dextrose 50% Injectable 25 Gram(s) IV Push once  dextrose 50% Injectable 12.5 Gram(s) IV Push once  dextrose 50% Injectable 25 Gram(s) IV Push once  glucagon  Injectable 1 milliGRAM(s) IntraMuscular once  levETIRAcetam 500 milliGRAM(s) Oral two times a day  metoprolol tartrate 12.5 milliGRAM(s) Oral two times a day  pantoprazole    Tablet 40 milliGRAM(s) Oral before breakfast

## 2020-11-23 NOTE — DISCHARGE NOTE PROVIDER - CARE PROVIDER_API CALL
Marvel Mitchell (MD; MPH)  Cardiology; Internal Medicine  [address] [place] [address]  Phone: [phone]  Fax: [phone]  Follow Up Time:     Surgery and Oncologist,   as scheduled  Phone: (   )    -  Fax: (   )    -  Follow Up Time:    Marvel Mitchell (MD; MPH)  Cardiology; Internal Medicine  [address] [place] [address]  Phone: [phone]  Fax: [phone]  Follow Up Time:     Surgery and Oncologist,   as scheduled  Phone: (   )    -  Fax: (   )    -  Follow Up Time:     Chet Rowland  NEUROLOGY  [address] [place] [address]  Phone: [phone]  Fax: [phone]  Follow Up Time:     pmd,   IN 1 WEEK, PLEASE CALL THE OFFICE AND GET AN APPIONTMENT  Phone: (   )    -  Fax: (   )    -  Follow Up Time:

## 2020-11-23 NOTE — PROGRESS NOTE ADULT - ASSESSMENT
79 y/o male known to the neurosurgery service, multiple admissions within the past few months, syncopal episodes, CTB shows Right sided chronic SDH, decreased in size. On chemo for gastric Ca. Brought in for erratic/violent behavior towards wife.     Plan:  -D/w Dr. Villanueva  -Imaging reviewed, shows persistent chronic SDH that is now decreased in size  -No further imaging required unless there is an acute change in mental status  -Avoid sedative medications  -SCDs for DVT PPX  -Will sign off for now, please re-call neurosurgery PA with any further questions/concerns

## 2020-11-23 NOTE — DISCHARGE NOTE PROVIDER - NSDCFUSCHEDAPPT_GEN_ALL_CORE_FT
PETER GAMBOA ; 12/03/2020 ; [facility] [address]   PETER GAMBOA ; 12/04/2020 ; [facility]

## 2020-11-23 NOTE — PROGRESS NOTE ADULT - ASSESSMENT
80y Male with past medical history significant for HTN, HLD, active smoker, DM, ILR, gastric CA s/p resection on 11/10, recent SDH presented with hypoglycemia. He had an argument with his family at home and tried to drive away across the lawn. He was found by police in the car agitated with low FS 30 and was brought to the hospital. He says he feels well and denies any cardiovascular symptoms.     Abnormal EKG, HTN, HLD, Smoker -  - EKG today shows deep T wave inversions across the precordium which are new compared with prior EKGs. Etiology could be secondary to his recent SDH, or could represented a recent underlying cardiac event-   - No symptoms of chest pain at this time  - Pending repeat TTE to reevaluate LVEF -- if normal, likely not further inpatient cardiac workup  - continue Lopressor 12.5 mg BID  - continue telemetry. prior ILR has been unremarkable.

## 2020-11-23 NOTE — DISCHARGE NOTE PROVIDER - NSDCMRMEDTOKEN_GEN_ALL_CORE_FT
ferrous sulfate 324 mg (65 mg elemental iron) oral delayed release tablet: 1 tab(s) orally once a day  levETIRAcetam 500 mg oral tablet: 1 tab(s) orally 2 times a day  metoprolol tartrate 25 mg oral tablet: 0.5 tab(s) orally 2 times a day   pantoprazole 40 mg oral delayed release tablet: 1 tab(s) orally 2 times a day  sucralfate 1 g/10 mL oral suspension: 10 milliliter(s) orally 4 times a day (before meals and at bedtime)   divalproex sodium 250 mg oral delayed release tablet: 1 tab(s) orally 2 times a day  ferrous sulfate 324 mg (65 mg elemental iron) oral delayed release tablet: 1 tab(s) orally once a day  metoprolol tartrate 25 mg oral tablet: 0.5 tab(s) orally 2 times a day   pantoprazole 40 mg oral delayed release tablet: 1 tab(s) orally 2 times a day  sucralfate 1 g/10 mL oral suspension: 10 milliliter(s) orally 4 times a day (before meals and at bedtime)

## 2020-11-23 NOTE — DISCHARGE NOTE PROVIDER - HOSPITAL COURSE
80M hx Gastric CA s/p gastrectomy, chemo, SDH s/p traumatic fall, DM2, HTN presents with hypoglycemia and behavioral disturbance also found to have new ekg changes, patient was admitted under medicine and was hooked up with cardiac monitor, his trops cycled and came negative, seen and followed by cardiology, as per them if TTE is ok then no further workup, patient did not have any chest pain over the course, he was continued with metoprolol 12.5 BID, he was not given any aspirin because of hx of SDH, he was noted to have elevated ProBNP, but denies any SOB, orthopnea or PND, d/felicia IV fluids, TTE done showed       Patient has Hx of SDH, patient CT head done showed There is interval decrease in size of a 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 There is mild associated mass effect as described. There is no associated midline shift. 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 No acute intracranial hemorrhage or acute territorial infarction, he was continued with keppra for seizure ppx, patient was seen by Neuro surgery, as per them they reviewed CT scan, looks better then before, no intervention.   patient was noted to have Behavioral disturbance with agitation/aggression, likely related to recent SDH given location of bleed and acute change afterward ammonia is normal, TSH is 4.5, need follow up TSH in 3 weeks,  urinalysis is ok, urine toxicology is positive for THC, he has no more agitation or aggression, it might in setting of Hypoglycemia.        patient was noted to have hypoglycemia in setting of DM2, his Hb A1C is 5.5, his hypoglycemia was  likely 2/2 decreased po intake, he was counselled about diet.  patient noted to have anemia, looks like anemia of chronic disease, workup up shows low iron normal TIBC, H&H monitored and has been stable.     patient has Hx of Gastric Ca, patient need to follow outpatient oncology and surgery         80M hx Gastric CA s/p gastrectomy, chemo, SDH s/p traumatic fall, DM2, HTN presents with hypoglycemia and behavioral disturbance also found to have new ekg changes, patient was admitted under medicine and was hooked up with cardiac monitor, his trops cycled and came negative, seen and followed by cardiology, as per them if TTE is ok then no further workup, patient did not have any chest pain over the course, he was continued with metoprolol 12.5 BID, he was not given any aspirin because of hx of SDH, he was noted to have elevated ProBNP, but denies any SOB, orthopnea or PND, d/felicia IV fluids, TTE done showed Normal global left ventricular systolic function, Left ventricular ejection fraction, by visual estimation, is 60 to 65%, he has no more chest pain over the course, he will follow up with cardiology as out patient.     patient was noted to have Behavioral disturbance with agitation/aggression, likely related to recent SDH given location of bleed and acute change afterward ammonia is normal, TSH is 4.5, need follow up TSH in 3 weeks,  urinalysis is ok, urine toxicology is positive for THC, he has no more agitation or aggression, hypoglycemia possibly contributed, now resolved.   as per wife patient has been exhibiting episodes of agitation with aggressive behavior at home including physical threats. 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 There is mild associated mass effect as described. There is no associated midline shift. 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 No acute intracranial hemorrhage or acute territorial infarction, he was continued with keppra for seizure ppx, patient was seen by Neuro surgery, as per them they reviewed CT scan, looks better then before.   Neurology changed his  keppra to depakote, as this is good medication for seizures as well as help with mood, Psychiatrist spoke with family as per them they are removing all the knives and guns from the home and there would be some one with him at home to stay with him, given this all support, he cleared him for discharge.      patient was noted to have hypoglycemia in setting of DM2, his Hb A1C is 5.5, his hypoglycemia was  likely 2/2 decreased po intake, he was counselled about diet.  patient noted to have anemia, looks like anemia of chronic disease, workup up shows low iron normal TIBC, H&H monitored and has been stable.     patient has Hx of Gastric Ca, patient need to follow outpatient oncology and surgery    patient is doing ok, he is being discharge home in a stable condition.     Vital Signs Last 24 Hrs  T(C): 36.1 (25 Nov 2020 10:52), Max: 36.4 (25 Nov 2020 04:53)  T(F): 96.9 (25 Nov 2020 10:52), Max: 97.5 (25 Nov 2020 04:53)  HR: 66 (25 Nov 2020 10:52) (61 - 74)  BP: 110/69 (25 Nov 2020 10:52) (103/68 - 113/74)  RR: 18 (25 Nov 2020 10:52) (18 - 18)  SpO2: 100% (25 Nov 2020 10:52) (96% - 100%)    PHYSICAL EXAM:    GENERAL: Elderly male looking comfortable   HEENT: PERRL, +EOMI  NECK: soft, Supple, No JVD,   CHEST/LUNG: Clear to auscultate bilaterally; No wheezing  HEART: S1S2+, Regular rate and rhythm; No murmurs  ABDOMEN: Soft, Nontender, Nondistended; Bowel sounds present  EXTREMITIES:  1+ Peripheral Pulses, No edema  SKIN: No rashes or lesions  NEURO: AAOX3, no focal deficits, no motor r sensory loss  PSYCH: normal mood    Total time spent 40minutes

## 2020-11-23 NOTE — PROGRESS NOTE ADULT - ASSESSMENT
80M hx Gastric CA s/p gastrectomy, chemo, SDH s/p traumatic fall, DM2, HTN presents with hypoglycemia and behavioral disturbance also found to have new ekg changes, patient was admitted under medicine and was hooked up with cardiac monitor, his trops cycled and came negative, seen and followed by cardiology, as per them if TTE is ok then no further workup, patient did not have any chest pain over the course, he was continued with metoprolol 12.5 BID, he was not given any aspirin because of hx of SDH, he was noted to have elevated ProBNP, but denies any SOB, orthopnea or PND, d/felicia IV fluids, TTE done showed   normal EF and normal LV function.       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 There is mild associated mass effect as described. There is no associated midline shift. 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 No acute intracranial hemorrhage or acute territorial infarction, he was continued with keppra for seizure ppx, patient was seen by Neuro surgery, as per them they reviewed CT scan, looks better then before, no intervention.   patient was noted to have Behavioral disturbance with agitation/aggression, likely related to recent SDH given location of bleed and acute change afterward ammonia is normal, TSH is 4.5, need follow up TSH in 3 weeks,  urinalysis is ok, urine toxicology is positive for THC, he has no more agitation or aggression, it might in setting of Hypoglycemia.        patient was noted to have hypoglycemia in setting of DM2, his Hb A1C is 5.5, his hypoglycemia was  likely 2/2 decreased po intake, he was counselled about diet.  patient noted to have anemia, looks like anemia of chronic disease, workup up shows low iron normal TIBC, H&H monitored and has been stable.     patient has Hx of Gastric Ca, patient need to follow outpatient oncology and surgery      Patient has nausea and had an episode of vomiting, zofran prn, protonix,    Dipso: PT eval and improvement if nausea and vomiting, likely tomorrow

## 2020-11-23 NOTE — DISCHARGE NOTE PROVIDER - PROVIDER TOKENS
PROVIDER:[TOKEN:[[id number]]],FREE:[LAST:[Surgery and Oncologist],PHONE:[(   )    -],FAX:[(   )    -],ADDRESS:[as scheduled]] PROVIDER:[TOKEN:[[id number]]],FREE:[LAST:[Surgery and Oncologist],PHONE:[(   )    -],FAX:[(   )    -],ADDRESS:[as scheduled]],PROVIDER:[TOKEN:[[id number]]],FREE:[LAST:[pmd],PHONE:[(   )    -],FAX:[(   )    -],ADDRESS:[IN 1 WEEK, PLEASE CALL THE OFFICE AND GET AN APPIONTMENT]]

## 2020-11-23 NOTE — DISCHARGE NOTE PROVIDER - NSDCCPCAREPLAN_GEN_ALL_CORE_FT
PRINCIPAL DISCHARGE DIAGNOSIS  Diagnosis: Hypoglycemia  Assessment and Plan of Treatment:       SECONDARY DISCHARGE DIAGNOSES  Diagnosis: Behavior disturbance  Assessment and Plan of Treatment:     Diagnosis: Abnormal EKG  Assessment and Plan of Treatment:     Diagnosis: SDH (subdural hematoma)  Assessment and Plan of Treatment:

## 2020-11-23 NOTE — DISCHARGE NOTE PROVIDER - CARE PROVIDERS DIRECT ADDRESSES
,[email or web address],DirectAddress_Unknown [email or web address],DirectAddress_Unknown,DirectAddress_Unknown,DirectAddress_Unknown

## 2020-11-23 NOTE — PROGRESS NOTE ADULT - SUBJECTIVE AND OBJECTIVE BOX
HPI:  80M, known to our service, with PMHX COPD, HTN, Prostate CA (), Gastric Cancer () s/p R ACW Mediport currently on chemotherapy Pegfilgrastim.  Several recent admissions to [facility] , with syncopal episodes. CTB showed SDH with midline shift at that time, no neurosurgical interventions at that time.     Tonight Pt was brought to the ER by police, called by the wife for erratic/violent  behavior. Repeat CTB shows right sided chronic SDH, decreased in size compared to 20     INTERVAL HPI/OVERNIGHT EVENTS:  80y Male seen this morning on rounds with the neurosurgical team. Patient seen lying comfortably in bed. Patient does not have any acute complaints. Denies headache, weakness, numbness, n/v/d, fevers, chills, chest pain, SOB.     Vital Signs Last 24 Hrs  T(C): 36.4 (2020 07:58), Max: 36.7 (2020 19:52)  T(F): 97.5 (2020 07:58), Max: 98 (2020 19:52)  HR: 58 (2020 07:58) (58 - 75)  BP: 110/68 (2020 07:58) (108/55 - 131/65)  BP(mean): --  RR: 18 (2020 07:58) (16 - 20)  SpO2: 100% (2020 07:58) (99% - 100%)    PHYSICAL EXAM:  GENERAL: NAD, well-groomed, well-developed  HEAD:  Atraumatic, normocephalic  YANIRA COMA SCORE: E-4 V-5 M-6 = 15  MENTAL STATUS: AAO x3; Awake; Opens eyes spontaneously; Appropriately conversant without aphasia; following simple commands  CRANIAL NERVES: PERRL. EOMI without nystagmus. Face symmetric w/ normal eye closure and smile, tongue midline.   MOTOR: strength 5/5 b/l upper and lower extremities  SENSATION: grossly intact to light touch all extremities  EXTREMITIES:  2+ peripheral pulses, no clubbing, cyanosis, or edema  SKIN: Warm, dry; no rashes or lesions    LABS:                        9.6    5.44  )-----------( 223      ( 2020 06:47 )             29.7     11-    136  |  101  |  10.0  ----------------------------<  96  3.4<L>   |  24.0  |  0.59    Ca    8.4<L>      2020 06:47  Phos  3.3       Mg     2.1         TPro  5.3<L>  /  Alb  2.9<L>  /  TBili  0.3<L>  /  DBili  x   /  AST  11  /  ALT  9   /  AlkPhos  80  11-    PT/INR - ( 2020 17:08 )   PT: 12.8 sec;   INR: 1.11 ratio         PTT - ( 2020 17:08 )  PTT:24.5 sec  Urinalysis Basic - ( 2020 02:32 )    Color: Yellow / Appearance: Clear / S.020 / pH: x  Gluc: x / Ketone: Negative  / Bili: Negative / Urobili: Negative mg/dL   Blood: x / Protein: 15 mg/dL / Nitrite: Negative   Leuk Esterase: Negative / RBC: 0-2 /HPF / WBC 0-2   Sq Epi: x / Non Sq Epi: Occasional / Bacteria: Occasional          RADIOLOGY & ADDITIONAL TESTS:    < from: CT Head No Cont (20 @ 17:27) >  IMPRESSION:  There is interval decrease in size of a 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 There is mild associated mass effect as described. There is no associated midline shift. 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 No acute intracranial hemorrhage or acute territorial infarction.            PETER ESQUIVEL M.D., ATTENDING RADIOLOGIST  This document has been electronically signed. 2020  6:02PM    < end of copied text >        CAPRINI SCORE [CLOT]:  Patient has an estimated Caprini score of greater than 5.  However, the patient's unique clinical situation will be addressed in an individual manner to determine appropriate anticoagulation treatment, if any.

## 2020-11-23 NOTE — PROGRESS NOTE ADULT - ATTENDING COMMENTS
NSGY Attg:    see above    patient seen and examined    agree with exam as above    CT with improved subdural collections; no acute blood    agree with plan as above  no acute neurosurgical intervention  f/u as outpatient with Dr. Woo as previously scheduled

## 2020-11-24 DIAGNOSIS — F07.0 PERSONALITY CHANGE DUE TO KNOWN PHYSIOLOGICAL CONDITION: ICD-10-CM

## 2020-11-24 LAB
GLUCOSE BLDC GLUCOMTR-MCNC: 106 MG/DL — HIGH (ref 70–99)
GLUCOSE BLDC GLUCOMTR-MCNC: 144 MG/DL — HIGH (ref 70–99)
GLUCOSE BLDC GLUCOMTR-MCNC: 76 MG/DL — SIGNIFICANT CHANGE UP (ref 70–99)
SARS-COV-2 IGG SERPL QL IA: NEGATIVE — SIGNIFICANT CHANGE UP
SARS-COV-2 IGM SERPL IA-ACNC: <0.1 INDEX — SIGNIFICANT CHANGE UP

## 2020-11-24 PROCEDURE — 95819 EEG AWAKE AND ASLEEP: CPT | Mod: 26

## 2020-11-24 PROCEDURE — 99239 HOSP IP/OBS DSCHRG MGMT >30: CPT

## 2020-11-24 PROCEDURE — 90792 PSYCH DIAG EVAL W/MED SRVCS: CPT

## 2020-11-24 PROCEDURE — 99232 SBSQ HOSP IP/OBS MODERATE 35: CPT

## 2020-11-24 RX ORDER — POTASSIUM CHLORIDE 20 MEQ
40 PACKET (EA) ORAL ONCE
Refills: 0 | Status: COMPLETED | OUTPATIENT
Start: 2020-11-24 | End: 2020-11-24

## 2020-11-24 RX ORDER — DIVALPROEX SODIUM 500 MG/1
250 TABLET, DELAYED RELEASE ORAL
Refills: 0 | Status: DISCONTINUED | OUTPATIENT
Start: 2020-11-24 | End: 2020-11-25

## 2020-11-24 RX ADMIN — LEVETIRACETAM 500 MILLIGRAM(S): 250 TABLET, FILM COATED ORAL at 17:40

## 2020-11-24 RX ADMIN — LEVETIRACETAM 500 MILLIGRAM(S): 250 TABLET, FILM COATED ORAL at 05:32

## 2020-11-24 RX ADMIN — PANTOPRAZOLE SODIUM 40 MILLIGRAM(S): 20 TABLET, DELAYED RELEASE ORAL at 05:32

## 2020-11-24 RX ADMIN — Medication 12.5 MILLIGRAM(S): at 05:32

## 2020-11-24 RX ADMIN — DIVALPROEX SODIUM 250 MILLIGRAM(S): 500 TABLET, DELAYED RELEASE ORAL at 19:41

## 2020-11-24 RX ADMIN — Medication 40 MILLIEQUIVALENT(S): at 11:07

## 2020-11-24 RX ADMIN — Medication 12.5 MILLIGRAM(S): at 17:40

## 2020-11-24 NOTE — CONSULT NOTE ADULT - ASSESSMENT
by history altered behavior without cognitive changes, may be related to chronic sdh whch can cause frontal lobe symptoms , there may also be component of effect of keppra, also needs to r/o non-convulsive seizures, for the time being change keppra to depakote, try to get eeg, patient will not co-operate for  for mri which is also recommended w/wo contrast,  he definitely  needs psych evaluation as very likely  will need antipsychotics.

## 2020-11-24 NOTE — PROGRESS NOTE ADULT - SUBJECTIVE AND OBJECTIVE BOX
CC: hypoglycemia, ekg changes, behavioral disturbance         INTERVAL HPI/OVERNIGHT EVENTS: Patient seen and examined. Fully dressed, wants to go home. Tolerated po diet, no nausea or vomiting. Ambulated with PT and independently in room. As per wife, patient has been behaving erratically at home with threatening behavior towards her. She verbalizes concern for her safety if patient returns home.     Vital Signs Last 24 Hrs  T(C): 37.2 (2020 04:39), Max: 37.2 (2020 04:39)  T(F): 99 (2020 04:39), Max: 99 (2020 04:39)  HR: 78 (2020 04:39) (66 - 82)  BP: 111/66 (2020 04:39) (105/62 - 119/72)  BP(mean): --  RR: 18 (2020 04:39) (17 - 18)  SpO2: 98% (2020 04:39) (92% - 100%)    REVIEW OF SYSTEMS:    CONSTITUTIONAL: No feve  RESPIRATORY: No cough, No shortness of breath  CARDIOVASCULAR: No chest pain, palpitations  GASTROINTESTINAL: No abdominal, has nausea and vomiting  NEUROLOGICAL: No headaches,  loss of strength.  MISCELLANEOUS: No joint swelling or pain       PHYSICAL EXAM:    GENERAL: NAD  HEENT: PERRL, +EOMI  NECK: soft, Supple, No JVD,   CHEST/LUNG: Clear to auscultate bilaterally; No wheezing  HEART: S1S2+, Regular rate and rhythm; No murmurs  ABDOMEN: Soft, Nontender, Nondistended; Bowel sounds present  EXTREMITIES:  1+ Peripheral Pulses, No edema  SKIN: No rashes or lesions  NEURO: AAOX3, no focal deficits, no motor or sensory loss  PSYCH: Calm at present      I&O's Detail      CARDIAC MARKERS ( 2020 06:47 )  x     / <0.01 ng/mL / x     / x     / x      CARDIAC MARKERS ( 2020 17:07 )  x     / 0.01 ng/mL / x     / x     / x                                9.6    5.44  )-----------( 223      ( 2020 06:47 )             29.7     2020 06:47    136    |  101    |  10.0   ----------------------------<  96     3.4     |  24.0   |  0.59     Ca    8.4        2020 06:47  Phos  3.3       2020 06:47  Mg     2.1       2020 06:47    TPro  5.3    /  Alb  2.9    /  TBili  0.3    /  DBili  x      /  AST  11     /  ALT  9      /  AlkPhos  80     2020 06:47    PT/INR - ( 2020 17:08 )   PT: 12.8 sec;   INR: 1.11 ratio         PTT - ( 2020 17:08 )  PTT:24.5 sec  CAPILLARY BLOOD GLUCOSE      POCT Blood Glucose.: 144 mg/dL (2020 05:51)  POCT Blood Glucose.: 109 mg/dL (2020 23:35)  POCT Blood Glucose.: 85 mg/dL (2020 18:02)    LIVER FUNCTIONS - ( 2020 06:47 )  Alb: 2.9 g/dL / Pro: 5.3 g/dL / ALK PHOS: 80 U/L / ALT: 9 U/L / AST: 11 U/L / GGT: x           Urinalysis Basic - ( 2020 02:32 )    Color: Yellow / Appearance: Clear / S.020 / pH: x  Gluc: x / Ketone: Negative  / Bili: Negative / Urobili: Negative mg/dL   Blood: x / Protein: 15 mg/dL / Nitrite: Negative   Leuk Esterase: Negative / RBC: 0-2 /HPF / WBC 0-2   Sq Epi: x / Non Sq Epi: Occasional / Bacteria: Occasional        MEDICATIONS  (STANDING):  dextrose 40% Gel 15 Gram(s) Oral once  dextrose 5% + sodium chloride 0.45%. 1000 milliLiter(s) (100 mL/Hr) IV Continuous <Continuous>  dextrose 50% Injectable 25 Gram(s) IV Push once  dextrose 50% Injectable 12.5 Gram(s) IV Push once  dextrose 50% Injectable 25 Gram(s) IV Push once  glucagon  Injectable 1 milliGRAM(s) IntraMuscular once  levETIRAcetam 500 milliGRAM(s) Oral two times a day  metoprolol tartrate 12.5 milliGRAM(s) Oral two times a day  pantoprazole    Tablet 40 milliGRAM(s) Oral before breakfast    MEDICATIONS  (PRN):  sucralfate 1 Gram(s) Oral every 6 hours PRN gerd      RADIOLOGY & ADDITIONAL TESTS:

## 2020-11-24 NOTE — BEHAVIORAL HEALTH ASSESSMENT NOTE - DIFFERENTIAL
Personality change secondary to medical cause (subdural/hypoglycemia)  vs medication induced (Keppra) vs substance induced (cannabis) vs delirium

## 2020-11-24 NOTE — PROGRESS NOTE ADULT - ATTENDING COMMENTS
patient is doing ok, he  has no complains  CTA b/l   Need psych eval before he could be discharge as per family he has bizarre behaviour toward his family.

## 2020-11-24 NOTE — BEHAVIORAL HEALTH ASSESSMENT NOTE - NSBHCHARTREVIEWLAB_PSY_A_CORE FT
9.6    5.44  )-----------( 223      ( 2020 06:47 )             29.7         136  |  101  |  10.0  ----------------------------<  96  3.4<L>   |  24.0  |  0.59    Ca    8.4<L>      2020 06:47  Phos  3.3       Mg     2.1         TPro  5.3<L>  /  Alb  2.9<L>  /  TBili  0.3<L>  /  DBili  x   /  AST  11  /  ALT  9   /  AlkPhos  80      LIVER FUNCTIONS - ( 2020 06:47 )  Alb: 2.9 g/dL / Pro: 5.3 g/dL / ALK PHOS: 80 U/L / ALT: 9 U/L / AST: 11 U/L / GGT: x             Urinalysis Basic - ( 2020 02:32 )    Color: Yellow / Appearance: Clear / S.020 / pH: x  Gluc: x / Ketone: Negative  / Bili: Negative / Urobili: Negative mg/dL   Blood: x / Protein: 15 mg/dL / Nitrite: Negative   Leuk Esterase: Negative / RBC: 0-2 /HPF / WBC 0-2   Sq Epi: x / Non Sq Epi: Occasional / Bacteria: Occasional

## 2020-11-24 NOTE — PROGRESS NOTE ADULT - ASSESSMENT
80M hx Gastric CA s/p gastrectomy, chemo, SDH s/p traumatic fall, DM2, HTN presents with hypoglycemia and behavioral disturbance also found to have new ekg changes, patient was admitted under medicine and was placed on  cardiac monitor, his trops cycled and came negative, seen and followed by cardiology, as per them if TTE is ok then no further workup, patient did not have any chest pain over the course, he was continued with metoprolol 12.5 BID, he was not given any aspirin because of hx of SDH, he was noted to have elevated ProBNP, but denies any SOB, orthopnea or PND, d/felicia IV fluids, TTE done showed   normal EF and normal LV function.     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 There is mild associated mass effect as described. There is no associated midline shift. 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 No acute intracranial hemorrhage or acute territorial infarction, he was continued with keppra for seizure ppx. Patient was seen by Neuro surgery, as per them they reviewed CT scan, looks better then before, no intervention.       Agitation/Behavioral  disturbance;  -patient was noted to have Behavioral disturbance with agitation/aggression, likely related to recent SDH given location of bleed and acute change afterward ammonia is normal, TSH is 4.5, need follow up TSH in 3 weeks,  urinalysis is ok, urine toxicology is positive for THC, he has no more agitation or aggression, hypoglycemia possibly contributed, now resolved.   -As per wife patient has been exhibiting episodes of agitation with aggressive behavior at home including physical threats. Psychiatry called     Hypoglycemia:  patient was noted to have hypoglycemia in setting of DM2, his Hb A1C is 5.5, his hypoglycemia was  likely 2/2 decreased po intake, he was counselled about diet.    Anemia:  patient noted to have anemia, looks like anemia of chronic disease, workup up shows low iron normal TIBC, H&H monitored and has been stable.       H/O Gastric CA:  patient has Hx of Gastric Ca, patient need to follow outpatient oncology and surgery    Nausea/Vomiting:  Patient has nausea and had an episode of vomiting yesterday, now resolved    Dipso: Pending Psychiatry consult 80M hx Gastric CA s/p gastrectomy, chemo, SDH s/p traumatic fall, DM2, HTN presents with hypoglycemia and behavioral disturbance also found to have new ekg changes, patient was admitted under medicine and was placed on  cardiac monitor, his trops cycled and came negative, seen and followed by cardiology, as per them if TTE is ok then no further workup, patient did not have any chest pain over the course, he was continued with metoprolol 12.5 BID, he was not given any aspirin because of hx of SDH, he was noted to have elevated ProBNP, but denies any SOB, orthopnea or PND, d/felicia IV fluids, TTE done showed   normal EF and normal LV function.     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 There is mild associated mass effect as described. There is no associated midline shift. 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 No acute intracranial hemorrhage or acute territorial infarction, he was continued with keppra for seizure ppx. Patient was seen by Neuro surgery, as per them they reviewed CT scan, looks better then before, no intervention.       Agitation/Behavioral  disturbance;  -patient was noted to have Behavioral disturbance with agitation/aggression, likely related to recent SDH given location of bleed and acute change afterward ammonia is normal, TSH is 4.5, need follow up TSH in 3 weeks,  urinalysis is ok, urine toxicology is positive for THC, he has no more agitation or aggression, hypoglycemia possibly contributed, now resolved.   -As per wife patient has been exhibiting episodes of agitation with aggressive behavior at home including physical threats. Psychiatry called. Social Work consult     Hypoglycemia:  patient was noted to have hypoglycemia in setting of DM2, his Hb A1C is 5.5, his hypoglycemia was  likely 2/2 decreased po intake, he was counselled about diet.    Anemia:  patient noted to have anemia, looks like anemia of chronic disease, workup up shows low iron normal TIBC, H&H monitored and has been stable.       H/O Gastric CA:  patient has Hx of Gastric Ca, patient need to follow outpatient oncology and surgery    Nausea/Vomiting:  Patient has nausea and had an episode of vomiting yesterday, now resolved    Dipso: Pending Psychiatry consult

## 2020-11-24 NOTE — PHYSICAL THERAPY INITIAL EVALUATION ADULT - ADDITIONAL COMMENTS
Pt lives in a private home with his wife. No steps to navigate per pt. Pt was independent PTA with [employer] at home, states he also owns a rollator.
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
## 2020-11-24 NOTE — BEHAVIORAL HEALTH ASSESSMENT NOTE - RISK ASSESSMENT
Low Acute Suicide Risk Moderate given h/o recent aggression, poor insight/judgment, irritability, recent fall,  denies any current S/H I/I/P, with good support, no prior psychiatric history, no h/o prior suicide attempts.

## 2020-11-24 NOTE — BEHAVIORAL HEALTH ASSESSMENT NOTE - OTHER
fall and subdural 6 weeks ago in R frontal parietal region, poor glucose control,  possible contribution from Keppra

## 2020-11-24 NOTE — CHART NOTE - NSCHARTNOTEFT_GEN_A_CORE
PRELIMINARY EEG REVIEW    EEG reviewed to 22:55  Date: 11-24-20    - No epileptiform pattern or seizure seen.    This Preliminary report is based on fellow review. Final report pending Completion of study tomorrow morning and following attending review.    Reading Room: [phone]  On Call Service After Hours: [phone]    Munir Blair MD PGY-5  Epilepsy Fellow
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
## 2020-11-24 NOTE — BEHAVIORAL HEALTH ASSESSMENT NOTE - NSBHCHARTREVIEWINVESTIGATE_PSY_A_CORE FT
< from: 12 Lead ECG (10.26.20 @ 14:16) >    Ventricular Rate 60 BPM    Atrial Rate 60 BPM    P-R Interval 126 ms    QRS Duration 116 ms    Q-T Interval 432 ms    QTC Calculation(Bazett) 432 ms    P Axis 87 degrees    R Axis 81 degrees    T Axis 82 degrees    Diagnosis Line *** Poor data quality, interpretation may be adversely affected  Normal sinus rhythm  Low voltage QRS  Right bundle branch block  Abnormal ECG    < end of copied text >
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
## 2020-11-24 NOTE — BEHAVIORAL HEALTH ASSESSMENT NOTE - AXIS III
"Chief Complaint   Patient presents with     RECHECK     diabetes and thyroid     initial /70  Pulse 89  Temp 98.5  F (36.9  C) (Oral)  Ht 1.676 m (5' 6\")  Wt 71.2 kg (157 lb)  SpO2 97%  BMI 25.34 kg/m2 Estimated body mass index is 25.34 kg/(m^2) as calculated from the following:    Height as of this encounter: 1.676 m (5' 6\").    Weight as of this encounter: 71.2 kg (157 lb)..  bp completed using cuff size regular  COURTNEY ANGLIN LPN  " see medical note

## 2020-11-24 NOTE — PHYSICAL THERAPY INITIAL EVALUATION ADULT - CRITERIA FOR SKILLED THERAPEUTIC INTERVENTIONS
anticipated discharge recommendation/Home with [facility], supervision 2*2 documented behavioral disturbances.

## 2020-11-24 NOTE — BEHAVIORAL HEALTH ASSESSMENT NOTE - DESCRIPTION (FIRST USE, LAST USE, QUANTITY, FREQUENCY, DURATION)
1/2 pack for day for many years (as per wife ) started smoking cannabis 1-2 weeks prior to admission, no prior history of cannabis use
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
## 2020-11-24 NOTE — PROGRESS NOTE ADULT - SUBJECTIVE AND OBJECTIVE BOX
[facility]                              [address] [address]                                                 PHONE: [phone]                                                 FAX: [phone]  -----------------------------------------------------------------------------------------------  Pt seen and examined. FU for  abnormal ECG    Overnight events/Complaints: Pt without complains. Wants to go home    Vital Signs Last 24 Hrs  T(C): 36.4 (24 Nov 2020 10:30), Max: 37.2 (24 Nov 2020 04:39)  T(F): 97.5 (24 Nov 2020 10:30), Max: 99 (24 Nov 2020 04:39)  HR: 71 (24 Nov 2020 10:30) (68 - 82)  BP: 113/74 (24 Nov 2020 10:30) (105/62 - 119/72)  BP(mean): --  RR: 18 (24 Nov 2020 10:30) (17 - 18)  SpO2: 98% (24 Nov 2020 10:30) (92% - 100%)  I&O's Summary      PHYSICAL EXAM:  Constitutional: Appears well developed, well nourished; alert and co-operative  HEENT:     Head: Normocephalic and atraumatic  Neck: supple. No JVD  Cardiovascular: regular S1 S2  Respiratory: Lungs clear to auscultation; no crepitations, no wheeze  Gastrointestinal:  Soft, Non-tender, + BS	  Musculoskeletal: Normal range of motion. No edema  Skin: Warm and dry. No cyanosis . No diaphoresis.  Neurologic: Alert oriented to time place and person. Normal strength and no tremor.        LABS:                                   9.6    5.44  )-----------( 223      ( 23 Nov 2020 06:47 )             29.7     11-23    136  |  101  |  10.0  ----------------------------<  96  3.4<L>   |  24.0  |  0.59    Ca    8.4<L>      23 Nov 2020 06:47  Phos  3.3     11-23  Mg     2.1     11-23    TPro  5.3<L>  /  Alb  2.9<L>  /  TBili  0.3<L>  /  DBili  x   /  AST  11  /  ALT  9   /  AlkPhos  80  11-23    CARDIAC MARKERS ( 23 Nov 2020 06:47 )  x     / <0.01 ng/mL / x     / x     / x      CARDIAC MARKERS ( 22 Nov 2020 17:07 )  x     / 0.01 ng/mL / x     / x     / x          PT/INR - ( 22 Nov 2020 17:08 )   PT: 12.8 sec;   INR: 1.11 ratio         PTT - ( 22 Nov 2020 17:08 )  PTT:24.5 sec      RADIOLOGY & ADDITIONAL STUDIES: (reviewed)  CXR was independently visualized/reviewed  and demonstrated: hyperinflated lungs    CARDIOLOGY TESTING:(reviewed)     12 lead EKG independently visualized/reviewed  and demonstrated sinus rhythm     ECG: sinus rhythm 78 bpm, normal axis, deep T wave inversion across the precordium    ECHO independently visualized/reviewed  and demonstrated LVEF 50-55%, mild TR    MEDICATIONS:(reviewed)  MEDICATIONS  (STANDING):  dextrose 40% Gel 15 Gram(s) Oral once  dextrose 5% + sodium chloride 0.45%. 1000 milliLiter(s) (100 mL/Hr) IV Continuous <Continuous>  dextrose 50% Injectable 25 Gram(s) IV Push once  dextrose 50% Injectable 12.5 Gram(s) IV Push once  dextrose 50% Injectable 25 Gram(s) IV Push once  glucagon  Injectable 1 milliGRAM(s) IntraMuscular once  levETIRAcetam 500 milliGRAM(s) Oral two times a day  metoprolol tartrate 12.5 milliGRAM(s) Oral two times a day  pantoprazole    Tablet 40 milliGRAM(s) Oral before breakfast

## 2020-11-25 ENCOUNTER — TRANSCRIPTION ENCOUNTER (OUTPATIENT)
Age: 80
End: 2020-11-25

## 2020-11-25 VITALS
OXYGEN SATURATION: 100 % | DIASTOLIC BLOOD PRESSURE: 69 MMHG | TEMPERATURE: 97 F | HEART RATE: 66 BPM | SYSTOLIC BLOOD PRESSURE: 110 MMHG | RESPIRATION RATE: 18 BRPM

## 2020-11-25 LAB
GLUCOSE BLDC GLUCOMTR-MCNC: 100 MG/DL — HIGH (ref 70–99)
GLUCOSE BLDC GLUCOMTR-MCNC: 68 MG/DL — LOW (ref 70–99)
GLUCOSE BLDC GLUCOMTR-MCNC: 74 MG/DL — SIGNIFICANT CHANGE UP (ref 70–99)
GLUCOSE BLDC GLUCOMTR-MCNC: 87 MG/DL — SIGNIFICANT CHANGE UP (ref 70–99)

## 2020-11-25 PROCEDURE — 93005 ELECTROCARDIOGRAM TRACING: CPT

## 2020-11-25 PROCEDURE — 99239 HOSP IP/OBS DSCHRG MGMT >30: CPT

## 2020-11-25 PROCEDURE — 82746 ASSAY OF FOLIC ACID SERUM: CPT

## 2020-11-25 PROCEDURE — 83880 ASSAY OF NATRIURETIC PEPTIDE: CPT

## 2020-11-25 PROCEDURE — 85025 COMPLETE CBC W/AUTO DIFF WBC: CPT

## 2020-11-25 PROCEDURE — 83036 HEMOGLOBIN GLYCOSYLATED A1C: CPT

## 2020-11-25 PROCEDURE — 36415 COLL VENOUS BLD VENIPUNCTURE: CPT

## 2020-11-25 PROCEDURE — 83540 ASSAY OF IRON: CPT

## 2020-11-25 PROCEDURE — 99232 SBSQ HOSP IP/OBS MODERATE 35: CPT

## 2020-11-25 PROCEDURE — 96375 TX/PRO/DX INJ NEW DRUG ADDON: CPT

## 2020-11-25 PROCEDURE — 85027 COMPLETE CBC AUTOMATED: CPT

## 2020-11-25 PROCEDURE — 70450 CT HEAD/BRAIN W/O DYE: CPT

## 2020-11-25 PROCEDURE — U0003: CPT

## 2020-11-25 PROCEDURE — 84484 ASSAY OF TROPONIN QUANT: CPT

## 2020-11-25 PROCEDURE — 84443 ASSAY THYROID STIM HORMONE: CPT

## 2020-11-25 PROCEDURE — 80053 COMPREHEN METABOLIC PANEL: CPT

## 2020-11-25 PROCEDURE — 81001 URINALYSIS AUTO W/SCOPE: CPT

## 2020-11-25 PROCEDURE — 84466 ASSAY OF TRANSFERRIN: CPT

## 2020-11-25 PROCEDURE — 97163 PT EVAL HIGH COMPLEX 45 MIN: CPT

## 2020-11-25 PROCEDURE — 99285 EMERGENCY DEPT VISIT HI MDM: CPT | Mod: 25

## 2020-11-25 PROCEDURE — 80307 DRUG TEST PRSMV CHEM ANLYZR: CPT

## 2020-11-25 PROCEDURE — 82962 GLUCOSE BLOOD TEST: CPT

## 2020-11-25 PROCEDURE — 96374 THER/PROPH/DIAG INJ IV PUSH: CPT

## 2020-11-25 PROCEDURE — 84100 ASSAY OF PHOSPHORUS: CPT

## 2020-11-25 PROCEDURE — 83735 ASSAY OF MAGNESIUM: CPT

## 2020-11-25 PROCEDURE — 82607 VITAMIN B-12: CPT

## 2020-11-25 PROCEDURE — 82140 ASSAY OF AMMONIA: CPT

## 2020-11-25 PROCEDURE — 95819 EEG AWAKE AND ASLEEP: CPT

## 2020-11-25 PROCEDURE — 82728 ASSAY OF FERRITIN: CPT

## 2020-11-25 PROCEDURE — 71045 X-RAY EXAM CHEST 1 VIEW: CPT

## 2020-11-25 PROCEDURE — 83550 IRON BINDING TEST: CPT

## 2020-11-25 PROCEDURE — 85730 THROMBOPLASTIN TIME PARTIAL: CPT

## 2020-11-25 PROCEDURE — 85610 PROTHROMBIN TIME: CPT

## 2020-11-25 PROCEDURE — 86769 SARS-COV-2 COVID-19 ANTIBODY: CPT

## 2020-11-25 PROCEDURE — 93306 TTE W/DOPPLER COMPLETE: CPT

## 2020-11-25 RX ORDER — DIVALPROEX SODIUM 500 MG/1
1 TABLET, DELAYED RELEASE ORAL
Qty: 60 | Refills: 0
Start: 2020-11-25 | End: 2020-12-24

## 2020-11-25 RX ADMIN — Medication 12.5 MILLIGRAM(S): at 05:25

## 2020-11-25 RX ADMIN — PANTOPRAZOLE SODIUM 40 MILLIGRAM(S): 20 TABLET, DELAYED RELEASE ORAL at 05:24

## 2020-11-25 RX ADMIN — DIVALPROEX SODIUM 250 MILLIGRAM(S): 500 TABLET, DELAYED RELEASE ORAL at 05:25

## 2020-11-25 NOTE — DISCHARGE NOTE NURSING/CASE MANAGEMENT/SOCIAL WORK - PATIENT PORTAL LINK FT
You can access the FollowMyHealth Patient Portal offered by [facility] by registering at the following website: [email or web address]. By joining [employer]’s FollowMyHealth portal, you will also be able to view your health information using other applications (apps) compatible with our system.
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
## 2020-11-25 NOTE — PROGRESS NOTE BEHAVIORAL HEALTH - NSBHCHARTREVIEWVS_PSY_A_CORE FT
Vital Signs Last 24 Hrs  T(C): 36.3 (24 Nov 2020 16:57), Max: 37.2 (24 Nov 2020 04:39)  T(F): 97.4 (24 Nov 2020 16:57), Max: 99 (24 Nov 2020 04:39)  HR: 74 (24 Nov 2020 16:57) (68 - 78)  BP: 103/68 (24 Nov 2020 16:57) (103/68 - 113/74)  BP(mean): --  RR: 18 (24 Nov 2020 16:57) (17 - 18)  SpO2: 96% (24 Nov 2020 16:57) (92% - 98%)

## 2020-11-25 NOTE — PROGRESS NOTE BEHAVIORAL HEALTH - RISK ASSESSMENT
Low to moderate: Given patient change in personality since fall, increasing irritability and recent aggression, cannabis use.  Patient  has not been physically aggressive on unit, compliant with medications, guns were removed from home (as per wife), medication that may be contributing to irritability were changed and started on mood stabilizer,  patient remains future oriented and denying any S/H I/I/P, has good support at home.
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
## 2020-11-25 NOTE — PROGRESS NOTE ADULT - SUBJECTIVE AND OBJECTIVE BOX
Interval History:  no new issues  MEDICATIONS  (STANDING):  dextrose 40% Gel 15 Gram(s) Oral once  dextrose 5% + sodium chloride 0.45%. 1000 milliLiter(s) (100 mL/Hr) IV Continuous <Continuous>  dextrose 50% Injectable 25 Gram(s) IV Push once  dextrose 50% Injectable 12.5 Gram(s) IV Push once  dextrose 50% Injectable 25 Gram(s) IV Push once  diVALproex  milliGRAM(s) Oral two times a day  glucagon  Injectable 1 milliGRAM(s) IntraMuscular once  metoprolol tartrate 12.5 milliGRAM(s) Oral two times a day  pantoprazole    Tablet 40 milliGRAM(s) Oral before breakfast    MEDICATIONS  (PRN):  sucralfate 1 Gram(s) Oral every 6 hours PRN gerd      Allergies    No Known Allergies    Intolerances        PHYSICAL EXAM:  Vital Signs Last 24 Hrs  T(F): 97.5 (11-25-20 @ 04:53)  HR: 71 (11-25-20 @ 04:53)  BP: 109/67 (11-25-20 @ 04:53)  RR: 18 (11-25-20 @ 04:53)    l  NERVOUS SYSTEM:  Alert & Oriented X3, speech and language normal, cranial nerves II-XII normal,   Good concentration; Motor Strength 5/5 B/L upper and lower extremities;            RADIOLOGY & ADDITIONAL STUDIES:
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
## 2020-11-25 NOTE — PROGRESS NOTE ADULT - REASON FOR ADMISSION
hypoglycemia, ekg changes, behavioral disturbance

## 2020-11-25 NOTE — EEG REPORT - NS EEG TEXT BOX
[facility]   COMPREHENSIVE EPILEPSY CENTER   REPORT OF ROUTINE VIDEO EEG     [facility]: [address], [place] [address], Ph#: [phone]  [facility]: [id number], [place] [address], Ph#: [phone]  [facility]: [address] [place] [address], Ph#: [phone]    Patient Name: PETER GAMBOA  Age and : 80y (38)  MRN #: [id number]  Location: [address] [address]  Referring Physician: Sebastián Roche    Study Date: 20    _____________________________________________________________  TECHNICAL INFORMATION    Placement and Labeling of Electrodes:  The EEG was performed utilizing 20 channels referential EEG connections (coronal over temporal over parasagittal montage) using all standard 10-20 electrode placements with EKG.  Recording was at a sampling rate of 256 samples per second per channel.  Time synchronized digital video recording was done simultaneously with EEG recording.  A low light infrared camera was used for low light recording.  Mil and seizure detection algorithms were utilized.    _____________________________________________________________  HISTORY    Patient is a 80y old  Male who presents with a chief complaint of hypoglycemia, ekg changes, behavioral disturbance (2020 10:03)      PERTINENT MEDICATION:  diVALproex  milliGRAM(s) Oral two times a day    _____________________________________________________________  STUDY INTERPRETATION    Findings: The background was continuous, spontaneously variable and reactive. During wakefulness, the posterior dominant rhythm consisted of symmetric, well-modulated 8 Hz activity, with amplitude to 30 uV, that attenuated to eye opening.      Background Slowing:  No generalized background slowing was present.    Focal Slowing:   None were present.    Sleep Background:  Drowsiness was characterized by fragmentation, attenuation, and slowing of the background activity.    Stage II sleep transients were not recorded.    Other Non-Epileptiform Findings:  None were present.    Interictal Epileptiform Activity:   None were present.    Events:  Clinical events: None recorded.  Seizures: None recorded.    Activation Procedures:   Hyperventilation was not performed.    Photic stimulation was performed and did not elicit any abnormality.     Artifacts:  Intermittent myogenic and movement artifacts were noted.    ECG:  The heart rate on single channel ECG was limited by artifact    _____________________________________________________________  EEG SUMMARY/CLASSIFICATION    Normal EEG in the awake and drowsy states.  _____________________________________________________________  EEG IMPRESSION/CLINICAL CORRELATE    Normal EEG study.  No epileptiform pattern or seizure seen.    Munir Blair MD PGY-5  Epilepsy Fellow    This Preliminary report is based on fellow review. Final report pending attending review.    Reading Room: [phone]  On Call Service After Hours: [phone] [facility]   COMPREHENSIVE EPILEPSY CENTER   REPORT OF ROUTINE VIDEO EEG     [facility]: [address], [place] [address], Ph#: [phone]  [facility]: [id number], [place] [address], Ph#: [phone]  [facility]: [address] [place] [address], Ph#: [phone]    Patient Name: PETER GAMBOA  Age and : 80y (39)  MRN #: [id number]  Location: [address] [address]  Referring Physician: Sebastián Roche    Study Date: 20    _____________________________________________________________  TECHNICAL INFORMATION    Placement and Labeling of Electrodes:  The EEG was performed utilizing 20 channels referential EEG connections (coronal over temporal over parasagittal montage) using all standard 10-20 electrode placements with EKG.  Recording was at a sampling rate of 256 samples per second per channel.  Time synchronized digital video recording was done simultaneously with EEG recording.  A low light infrared camera was used for low light recording.  Mil and seizure detection algorithms were utilized.    _____________________________________________________________  HISTORY    Patient is a 80y old  Male who presents with a chief complaint of hypoglycemia, ekg changes, behavioral disturbance (2020 10:03)      PERTINENT MEDICATION:  diVALproex  milliGRAM(s) Oral two times a day    _____________________________________________________________  STUDY INTERPRETATION    Findings: The background was continuous, spontaneously variable and reactive. During wakefulness, the posterior dominant rhythm consisted of symmetric, well-modulated 8 Hz activity, with amplitude to 30 uV, that attenuated to eye opening.      Background Slowing:  No generalized background slowing was present.    Focal Slowing:   None were present.    Sleep Background:  Drowsiness was characterized by fragmentation, attenuation, and slowing of the background activity.    Stage II sleep transients were not recorded.    Other Non-Epileptiform Findings:  None were present.    Interictal Epileptiform Activity:   None were present.    Events:  Clinical events: None recorded.  Seizures: None recorded.    Activation Procedures:   Hyperventilation was not performed.    Photic stimulation was performed and did not elicit any abnormality.     Artifacts:  Intermittent myogenic and movement artifacts were noted.    ECG:  The heart rate on single channel ECG was limited by artifact    _____________________________________________________________  EEG SUMMARY/CLASSIFICATION    Normal EEG for age in the awake and drowsy states.  _____________________________________________________________  EEG IMPRESSION/CLINICAL CORRELATE    Normal EEG study.  No epileptiform pattern or seizure seen.    Munir Blair MD PGY-5  Epilepsy Fellow      Reading Room: [phone]  On Call Service After Hours: [phone]

## 2020-11-30 ENCOUNTER — OUTPATIENT (OUTPATIENT)
Dept: OUTPATIENT SERVICES | Facility: HOSPITAL | Age: 80
LOS: 1 days | Discharge: HOME | End: 2020-11-30

## 2020-11-30 DIAGNOSIS — C16.9 MALIGNANT NEOPLASM OF STOMACH, UNSPECIFIED: ICD-10-CM

## 2020-11-30 DIAGNOSIS — Z95.828 PRESENCE OF OTHER VASCULAR IMPLANTS AND GRAFTS: Chronic | ICD-10-CM

## 2020-11-30 DIAGNOSIS — Z95.818 PRESENCE OF OTHER CARDIAC IMPLANTS AND GRAFTS: Chronic | ICD-10-CM

## 2020-12-03 ENCOUNTER — APPOINTMENT (OUTPATIENT)
Dept: SURGICAL ONCOLOGY | Facility: CLINIC | Age: 80
End: 2020-12-03
Payer: MEDICARE

## 2020-12-03 VITALS
SYSTOLIC BLOOD PRESSURE: 116 MMHG | BODY MASS INDEX: 19.29 KG/M2 | DIASTOLIC BLOOD PRESSURE: 73 MMHG | OXYGEN SATURATION: 94 % | WEIGHT: 120.05 LBS | HEIGHT: 66 IN | HEART RATE: 74 BPM

## 2020-12-03 PROCEDURE — 99024 POSTOP FOLLOW-UP VISIT: CPT

## 2020-12-03 NOTE — ASSESSMENT
[FreeTextEntry1] : IMP:\par Newly diagnosed gastric cancer, staged at least T3N1 by EUS criteria.  Given his excellent performance status and despite his age, I would aggressively treat with perioperative chemotherapy FLOT regimen.  He does not have any clear evidence of metastatic disease on imaging.  Completed neoadjuvant chemotherapy 9/2020. \par \par ****SURGERY 11/9/2020-  S/p robotic assisted laparoscopic distal gastrectomy with Billroth 2 gastrojejunostomy reconstruction, placement of feeding jejunostomy tube for gastric cancer. \par ****FINAL PATHOLOGY-  Residual gastric adenocarcinoma, moderately differentiated, status post treatment.  Vascular invasion identified.  4/29 lymph nodes involved by adenocarcinoma, negative margins.  Tumor stage: pT3 N2.  \par \par PLAN:\par 1) F/u with Dr. Glass (Heme-onc) \par 2) Increase activity level and PO intake \par 3)

## 2020-12-03 NOTE — HISTORY OF PRESENT ILLNESS
[de-identified] : 80 year-old male presenting for an initial post op visit. \par He was initially seen in consultation on 8/13/20, referred by Dr. Eugenia Glass.  In June 2020 his PCP referred him for a CT scan due to anemia.  Study revealed a 3 cm mass in the upper central abdomen abutting the posterior antrum of the stomach and neck of the pancreas.   There is an additional heterogeneous enhancing 2.7 cm lobulated mass in the ventral upper abdominal peritoneal cavity anterosuperiorly to the antrum of the stomach with internal and surrounding enhancing vessels. \par \par EUS was performed on 7/10/20 and revealed a large firm malignant appearing circumferential in the gastric antrum, causing partial gastric outlet obstruction.  There are several necrotic and malignant appearing lymph nodes in the peritumoral region.  There is transmural involvement concerning for at least a T3N1 lesion. Pathology was consistent with moderately differentiated adenocarcinoma.\par \par Staging PET showed hypermetabolic thickening of the distal stomach, consistent with primary gastric cancer. Hypermetabolic activity in the distal stomach, inseparable from the perigastric lymphadenopathy, consistent with metastatic kong disease.\par \par 7/27/20 CEA: 10.8 ng/ml\par \par He consulted with Dr. Eugenia Glass from medical oncology who has recommended neoadjuvant chemotherapy (FLOT regimen).  Patient agreed to proceed.\par \par His past medical history includes type 2 diabetes, COPD and prostate cancer s/p brachytherapy 2010.\par \par 50 lb weight loss.\par \par 9/24/20-  Patient here for follow up after cycle 3 of chemotherapy.....\par \par ****SURGERY 11/9/2020-  S/p robotic assisted laparoscopic distal gastrectomy with Billroth 2 gastrojejunostomy reconstruction, placement of feeding jejunostomy tube for gastric cancer. \par ****FINAL PATHOLOGY-  Residual gastric adenocarcinoma, moderately differentiated, status post treatment.  Vascular invasion identified.  4/29 lymph nodes involved by adenocarcinoma, negative margins.  Tumor stage: pT3 N2.  \par \par 12/3/2020-  Denies abdominal pain, nausea or vomiting.  Appetite improving?  Denies fever or chills.

## 2020-12-03 NOTE — REASON FOR VISIT
[Gastric Cancer] : gastric cancer [Post-Op] : a post-op for [FreeTextEntry2] : s/p distal gastrectomy with Billroth 2 gastrojejunostomy reconstruction 11/9/2020

## 2020-12-04 ENCOUNTER — APPOINTMENT (OUTPATIENT)
Dept: HEMATOLOGY ONCOLOGY | Facility: CLINIC | Age: 80
End: 2020-12-04
Payer: MEDICARE

## 2020-12-04 VITALS
TEMPERATURE: 97 F | SYSTOLIC BLOOD PRESSURE: 103 MMHG | OXYGEN SATURATION: 97 % | BODY MASS INDEX: 19.77 KG/M2 | DIASTOLIC BLOOD PRESSURE: 71 MMHG | HEART RATE: 96 BPM | WEIGHT: 123.01 LBS | HEIGHT: 66 IN

## 2020-12-04 PROCEDURE — 99215 OFFICE O/P EST HI 40 MIN: CPT

## 2020-12-04 PROCEDURE — 99072 ADDL SUPL MATRL&STAF TM PHE: CPT

## 2020-12-04 NOTE — RESULTS/DATA
[FreeTextEntry1] : 11/20/20 path:  Procedure: Partial gastrectomy, distal with lymph node dissection\par Tumor Site: Antrum, Lesser curvature\par Tumor Size:  2.5 x 0.7 x 0.3 cm\par Histologic Type:  Adenocarcinoma, Intestinal type\par Alternate optional WHO classification: Tubular\par (intestinal) adenocarcinoma\par Histologic Grade:  G2: Moderately differentiated\par Microscopic Extent of Tumor:  Tumor invades subserosal connective\par tissue without involvement of visceral peritoneum\par Margins: All margins are negative for invasive carcinoma and\par dysplasia\par Margins examined: proximal and distal (see comment below on\par proximal margin)\par Distance of invasive carcinoma from closest margin: 0.7 cm\par Specify margin: Distal margin\par Treatment effect:  Present: Residual cancer with evident tumor\par regression, but more than single cells or rare small groups of\par cancer cells (partial response, score 2)\par Lymph-Vascular Invasion:  Present\par Perineural invasion: Not identified\par Regional Lymph Nodes:\par Number of Lymph Nodes Involved: 4 (include all parts)\par Number of Lymph Nodes Examined: 29 (include all parts)\par Pathologic Staging (pTNM):\par Primary Tumor (pT):  pT3: Tumor invades subserosal connective\par tissue, without involvement of visceral peritoneum or adjacent\par structures\par Regional Lymph Nodes (pN):  pN2: Metastasis in 3 to 6\par perigastric lymph nodes\par Distant Metastasis (pM): Not applicable\par Additional Pathologic Findings:  Intestinal metaplasia and\par chronic gastritis\par Ancillary studies: [id number] immunohistochemical stain to be\par reported as addendum.\par \par \par 7/20/20 PET:\par Small hypermetabolic foci in the mediastinum and kings, corresponding to small mediastinal and bilateral hilar lymph nodes on CT.  The imaging appearance of these lymph nodes is nonspecific and favors reactive lymph nodes.  Hypermetabolic activity in the right upper quadrant abdomen, corresponding to extensive thickening of the distal stomach.  4.7 cm of stomach is involved.  SUV 8.2-11.2.  Proximal to this area of hypermetabolic thickening of the distal stomach, the proximal stomach is distended and contained air fluid contrast level.  The hypermetabolic activity in the thickening of the stomach is inseparable from perigastric lymph nodes.  Perigastric lymph node measures 2.5x1.6 cm SUV 12.5.  No evidence of additional enlarged intra-abdominal, pelvic or inguinal lymphadenopathy.  Radiation seeds noted in prostate.  No other abnormal hypermetabolic activity to suggest additional sites of malignancy.\par \par 7/10/20 Pathology:\par Gastric, Antrum, Ulcerated mass: moderately differentiated adenocarcinoma\par \par 6/19/20 CT A/P:\par In the upper central abdomen abutting the posterior antrum of the stomach and neck of the pancreas, there is a nonspecific heterogeneously enhancing circumscribed 3 x 2 x 3 cm mass.  There is an additional heterogeneous enhancing 2.7 x 2.3 x 2.5 lobulated mass in the ventral upper abdominal peritoneal cavity anterosuperiorly to the antrum of the stomach with internal and surrounding enhancing vessels.  Extensive enhancing severe bowel wall thickening of the entire coon and rectum compatible with colitis/proctitis which may be infectious/inflammatory. Moderate rugal fold thickening of the fundus of the stomach which may represent gastritis.

## 2020-12-04 NOTE — ASSESSMENT
[Curative] : Goals of care discussed with patient: Curative [FreeTextEntry1] : The phase II/III FLOT4-AIO trial compared four preoperative and four postoperative courses of the docetaxel-based triplet FLOT regimen (docetaxel 50 mg/m2 plus oxaliplatin 85 mg/m2 and leucovorin 200 mg/m2 with short-term infusional FU 2600 mg/m2 as a 24-hour infusion, all on day 1, administered every two weeks) to epirubicin-based triplet therapy in patients with resectable adenocarcinoma of the stomach or EGJ. The epirubicin-based group used the MAGIC approach. The phase III component randomized 716 patients with resectable gastric (44 percent) or EGJ (56 percent) adenocarcinoma. Overall, 91 and 37 percent of patients receiving ECF/ECX and 90 and 50 percent of patients with FLOT completed the planned preoperative and postoperative cycles [65]. At a median follow-up of 43 months, median OS (50 versus 35 months, HR 0.77, 95% CI 0.63-0.94) and three-year OS (57 versus 48 percent) were greater with FLOT. Perioperative complications were similar.

## 2020-12-04 NOTE — HISTORY OF PRESENT ILLNESS
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  He was sent for CT by his PCP, Dr. Charles Smith, due to anemia.  CT showed in the upper central abdomen abutting the posterior antrum of the stomach and neck of the pancreas there is a  3 x 2 x 3 cm mass.  There is an additional heterogeneous enhancing 2.7 x 2.3 x 2.5 lobulated mass in the ventral upper abdominal peritoneal cavity anterosuperiorly to the antrum of the stomach with internal and surrounding enhancing vessels.  Endoscopy with biopsy of the gastric mass was c/w moderately differentiated adenocarcinoma.  Staging PET showed hypermetabolic thickening of the distal stomach, consistent with primary gastric cancer.  Hypermetabolic activity in the distal stomach, inseparable from the perigastric lymphadenopathy, consistent with metastatic kong disease. [de-identified] : Patient presents s/p 4 cycles FLOT for gastric adenocarcinoma.  Fatigue. + N/V. + Alopecia. + Decreased appetite with subsequent weight loss. + Dysgeusia. 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 + Cold intolerance, mouth only. Pt had a syncopal event s/p fall and was readmitted on Oct 11 to [facility]. Pt is having gastric mass removed on 11/9/2020.  Today, pt is frail and weak. He is awake, responsive and oriented. He is very weak. He denies headaches, dizziness, weakness, vision or speech disturbance.

## 2020-12-24 ENCOUNTER — OUTPATIENT (OUTPATIENT)
Dept: OUTPATIENT SERVICES | Facility: HOSPITAL | Age: 80
LOS: 1 days | Discharge: HOME | End: 2020-12-24

## 2020-12-24 DIAGNOSIS — C16.9 MALIGNANT NEOPLASM OF STOMACH, UNSPECIFIED: ICD-10-CM

## 2020-12-24 DIAGNOSIS — Z95.828 PRESENCE OF OTHER VASCULAR IMPLANTS AND GRAFTS: Chronic | ICD-10-CM

## 2020-12-24 DIAGNOSIS — Z95.818 PRESENCE OF OTHER CARDIAC IMPLANTS AND GRAFTS: Chronic | ICD-10-CM

## 2021-01-04 ENCOUNTER — APPOINTMENT (OUTPATIENT)
Dept: HEMATOLOGY ONCOLOGY | Facility: CLINIC | Age: 81
End: 2021-01-04

## 2021-02-04 ENCOUNTER — RESULT REVIEW (OUTPATIENT)
Age: 81
End: 2021-02-04

## 2021-02-04 ENCOUNTER — LABORATORY RESULT (OUTPATIENT)
Age: 81
End: 2021-02-04

## 2021-02-04 ENCOUNTER — APPOINTMENT (OUTPATIENT)
Dept: HEMATOLOGY ONCOLOGY | Facility: CLINIC | Age: 81
End: 2021-02-04
Payer: MEDICARE

## 2021-02-04 ENCOUNTER — OUTPATIENT (OUTPATIENT)
Dept: OUTPATIENT SERVICES | Facility: HOSPITAL | Age: 81
LOS: 1 days | Discharge: HOME | End: 2021-02-04

## 2021-02-04 VITALS
HEART RATE: 91 BPM | WEIGHT: 135.1 LBS | BODY MASS INDEX: 21.81 KG/M2 | SYSTOLIC BLOOD PRESSURE: 129 MMHG | DIASTOLIC BLOOD PRESSURE: 68 MMHG

## 2021-02-04 DIAGNOSIS — Z95.818 PRESENCE OF OTHER CARDIAC IMPLANTS AND GRAFTS: Chronic | ICD-10-CM

## 2021-02-04 DIAGNOSIS — Z95.828 PRESENCE OF OTHER VASCULAR IMPLANTS AND GRAFTS: Chronic | ICD-10-CM

## 2021-02-04 DIAGNOSIS — C16.9 MALIGNANT NEOPLASM OF STOMACH, UNSPECIFIED: ICD-10-CM

## 2021-02-04 LAB
BASOPHILS # BLD AUTO: 0.1 K/UL — SIGNIFICANT CHANGE UP (ref 0–0.2)
BASOPHILS NFR BLD AUTO: 1.2 % — SIGNIFICANT CHANGE UP (ref 0–2)
EOSINOPHIL # BLD AUTO: 0.1 K/UL — SIGNIFICANT CHANGE UP (ref 0–0.5)
EOSINOPHIL NFR BLD AUTO: 1.3 % — SIGNIFICANT CHANGE UP (ref 0–6)
HCT VFR BLD CALC: 27.1 % — LOW (ref 39–50)
HGB BLD-MCNC: 8.1 G/DL — LOW (ref 13–17)
LYMPHOCYTES # BLD AUTO: 1.6 K/UL — SIGNIFICANT CHANGE UP (ref 1–3.3)
LYMPHOCYTES # BLD AUTO: 25.4 % — SIGNIFICANT CHANGE UP (ref 13–44)
MCHC RBC-ENTMCNC: 21.6 PG — LOW (ref 27–34)
MCHC RBC-ENTMCNC: 30 G/DL — LOW (ref 32–36)
MCV RBC AUTO: 72.2 FL — LOW (ref 80–100)
MONOCYTES # BLD AUTO: 0.6 K/UL — SIGNIFICANT CHANGE UP (ref 0–0.9)
MONOCYTES NFR BLD AUTO: 9.1 % — SIGNIFICANT CHANGE UP (ref 2–14)
NEUTROPHILS # BLD AUTO: 4 K/UL — SIGNIFICANT CHANGE UP (ref 1.8–7.4)
NEUTROPHILS NFR BLD AUTO: 63 % — SIGNIFICANT CHANGE UP (ref 43–77)
PLATELET # BLD AUTO: 252 K/UL — SIGNIFICANT CHANGE UP (ref 150–400)
RBC # BLD: 3.75 M/UL — LOW (ref 4.2–5.8)
RBC # FLD: 17.5 % — HIGH (ref 10.3–14.5)
WBC # BLD: 6.4 K/UL — SIGNIFICANT CHANGE UP (ref 3.8–10.5)
WBC # FLD AUTO: 6.4 K/UL — SIGNIFICANT CHANGE UP (ref 3.8–10.5)

## 2021-02-04 PROCEDURE — 99215 OFFICE O/P EST HI 40 MIN: CPT

## 2021-02-04 PROCEDURE — 99072 ADDL SUPL MATRL&STAF TM PHE: CPT

## 2021-02-04 NOTE — HISTORY OF PRESENT ILLNESS
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  He was sent for CT by his PCP, Dr. Charles Smith, due to anemia.  CT showed in the upper central abdomen abutting the posterior antrum of the stomach and neck of the pancreas there is a  3 x 2 x 3 cm mass.  There is an additional heterogeneous enhancing 2.7 x 2.3 x 2.5 lobulated mass in the ventral upper abdominal peritoneal cavity anterosuperiorly to the antrum of the stomach with internal and surrounding enhancing vessels.  Endoscopy with biopsy of the gastric mass was c/w moderately differentiated adenocarcinoma.  Staging PET showed hypermetabolic thickening of the distal stomach, consistent with primary gastric cancer.  Hypermetabolic activity in the distal stomach, inseparable from the perigastric lymphadenopathy, consistent with metastatic kong disease. [de-identified] : Patient presents s/p 4 cycles FLOT for gastric adenocarcinoma.  Fatigue. + N/V. + Alopecia. + Decreased appetite with subsequent weight loss. + Dysgeusia. 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 + Cold intolerance, mouth only. Pt had a syncopal event s/p fall and was readmitted on Oct 11 to [facility]. Pt is having gastric mass removed on 11/9/2020.  Today, pt is frail and weak. He is awake, responsive and oriented. He is very weak. He denies headaches, dizziness, weakness, vision or speech disturbance.

## 2021-02-04 NOTE — ASSESSMENT
[FreeTextEntry1] : The phase II/III FLOT4-AIO trial compared four preoperative and four postoperative courses of the docetaxel-based triplet FLOT regimen (docetaxel 50 mg/m2 plus oxaliplatin 85 mg/m2 and leucovorin 200 mg/m2 with short-term infusional FU 2600 mg/m2 as a 24-hour infusion, all on day 1, administered every two weeks) to epirubicin-based triplet therapy in patients with resectable adenocarcinoma of the stomach or EGJ. The epirubicin-based group used the MAGIC approach. The phase III component randomized 716 patients with resectable gastric (44 percent) or EGJ (56 percent) adenocarcinoma. Overall, 91 and 37 percent of patients receiving ECF/ECX and 90 and 50 percent of patients with FLOT completed the planned preoperative and postoperative cycles [65]. At a median follow-up of 43 months, median OS (50 versus 35 months, HR 0.77, 95% CI 0.63-0.94) and three-year OS (57 versus 48 percent) were greater with FLOT. Perioperative complications were similar.\par \par There are emerging data on the benefit of combinations of trastuzumab with cytotoxic chemotherapy in the perioperative setting, but this remains an investigational approach. The PETRARCA trial randomized 81 patients to FLOT alone or to the addition of both trastuzumab and pertuzumab.  The addition of HER2-targeted therapy significantly increased the complete pathologic rate (35 versus 12 percent), and increased the rate of node-negative resections (68 versus 39 percent), but there was a higher rate of toxicity (especially grade 3 or 4 diarrhea [41 versus 5 percent] and leukopenia [23 versus 13 percent]) that led to more cycles with dose modification. Rates of complete (R0) resection, surgical morbidity and surgical mortality were comparable. Following this initial assessment, it was decided not to move forward with the phase III portion of this study.

## 2021-02-04 NOTE — RESULTS/DATA
[FreeTextEntry1] : 11/20/20 path:  Procedure: Partial gastrectomy, distal with lymph node dissection\par Tumor Site: Antrum, Lesser curvature\par Tumor Size:  2.5 x 0.7 x 0.3 cm\par Histologic Type:  Adenocarcinoma, Intestinal type\par Alternate optional WHO classification: Tubular\par (intestinal) adenocarcinoma\par Histologic Grade:  G2: Moderately differentiated\par Microscopic Extent of Tumor:  Tumor invades subserosal connective\par tissue without involvement of visceral peritoneum\par Margins: All margins are negative for invasive carcinoma and\par dysplasia\par Margins examined: proximal and distal (see comment below on\par proximal margin)\par Distance of invasive carcinoma from closest margin: 0.7 cm\par Specify margin: Distal margin\par Treatment effect:  Present: Residual cancer with evident tumor\par regression, but more than single cells or rare small groups of\par cancer cells (partial response, score 2)\par Lymph-Vascular Invasion:  Present\par Perineural invasion: Not identified\par Regional Lymph Nodes:\par Number of Lymph Nodes Involved: 4 (include all parts)\par Number of Lymph Nodes Examined: 29 (include all parts)\par Pathologic Staging (pTNM):\par Primary Tumor (pT):  pT3: Tumor invades subserosal connective\par tissue, without involvement of visceral peritoneum or adjacent\par structures\par Regional Lymph Nodes (pN):  pN2: Metastasis in 3 to 6\par perigastric lymph nodes\par Distant Metastasis (pM): Not applicable\par Additional Pathologic Findings:  Intestinal metaplasia and\par chronic gastritis\par Ancillary studies: Cum2Reg immunohistochemical stain to be\par reported as addendum.\par \par 7/20/20 PET:\par Small hypermetabolic foci in the mediastinum and kings, corresponding to small mediastinal and bilateral hilar lymph nodes on CT.  The imaging appearance of these lymph nodes is nonspecific and favors reactive lymph nodes.  Hypermetabolic activity in the right upper quadrant abdomen, corresponding to extensive thickening of the distal stomach.  4.7 cm of stomach is involved.  SUV 8.2-11.2.  Proximal to this area of hypermetabolic thickening of the distal stomach, the proximal stomach is distended and contained air fluid contrast level.  The hypermetabolic activity in the thickening of the stomach is inseparable from perigastric lymph nodes.  Perigastric lymph node measures 2.5x1.6 cm SUV 12.5.  No evidence of additional enlarged intra-abdominal, pelvic or inguinal lymphadenopathy.  Radiation seeds noted in prostate.  No other abnormal hypermetabolic activity to suggest additional sites of malignancy.\par \par 7/10/20 Pathology:\par Gastric, Antrum, Ulcerated mass: moderately differentiated adenocarcinoma\par \par 6/19/20 CT A/P:\par In the upper central abdomen abutting the posterior antrum of the stomach and neck of the pancreas, there is a nonspecific heterogeneously enhancing circumscribed 3 x 2 x 3 cm mass.  There is an additional heterogeneous enhancing 2.7 x 2.3 x 2.5 lobulated mass in the ventral upper abdominal peritoneal cavity anterosuperiorly to the antrum of the stomach with internal and surrounding enhancing vessels.  Extensive enhancing severe bowel wall thickening of the entire coon and rectum compatible with colitis/proctitis which may be infectious/inflammatory. Moderate rugal fold thickening of the fundus of the stomach which may represent gastritis.

## 2021-02-16 ENCOUNTER — APPOINTMENT (OUTPATIENT)
Dept: HEMATOLOGY ONCOLOGY | Facility: CLINIC | Age: 81
End: 2021-02-16

## 2021-02-16 ENCOUNTER — LABORATORY RESULT (OUTPATIENT)
Age: 81
End: 2021-02-16

## 2021-02-17 LAB
ALBUMIN SERPL ELPH-MCNC: 4.1 G/DL
ALP BLD-CCNC: 111 U/L
ALT SERPL-CCNC: 7 U/L
ANION GAP SERPL CALC-SCNC: 12 MMOL/L
AST SERPL-CCNC: 13 U/L
BILIRUB SERPL-MCNC: <0.2 MG/DL
BUN SERPL-MCNC: 15 MG/DL
CALCIUM SERPL-MCNC: 9.2 MG/DL
CHLORIDE SERPL-SCNC: 102 MMOL/L
CO2 SERPL-SCNC: 26 MMOL/L
CREAT SERPL-MCNC: 1.02 MG/DL
GLUCOSE SERPL-MCNC: 92 MG/DL
MAGNESIUM SERPL-MCNC: 2 MG/DL
POTASSIUM SERPL-SCNC: 4.3 MMOL/L
PROT SERPL-MCNC: 6.8 G/DL
SODIUM SERPL-SCNC: 140 MMOL/L

## 2021-02-22 ENCOUNTER — LABORATORY RESULT (OUTPATIENT)
Age: 81
End: 2021-02-22

## 2021-02-22 ENCOUNTER — RESULT REVIEW (OUTPATIENT)
Age: 81
End: 2021-02-22

## 2021-02-22 ENCOUNTER — APPOINTMENT (OUTPATIENT)
Age: 81
End: 2021-02-22

## 2021-02-22 LAB
BASOPHILS # BLD AUTO: 0.1 K/UL — SIGNIFICANT CHANGE UP (ref 0–0.2)
BASOPHILS NFR BLD AUTO: 1.3 % — SIGNIFICANT CHANGE UP (ref 0–2)
EOSINOPHIL # BLD AUTO: 0.2 K/UL — SIGNIFICANT CHANGE UP (ref 0–0.5)
EOSINOPHIL NFR BLD AUTO: 2.4 % — SIGNIFICANT CHANGE UP (ref 0–6)
HCT VFR BLD CALC: 34.1 % — LOW (ref 39–50)
HGB BLD-MCNC: 10.2 G/DL — LOW (ref 13–17)
LYMPHOCYTES # BLD AUTO: 1.5 K/UL — SIGNIFICANT CHANGE UP (ref 1–3.3)
LYMPHOCYTES # BLD AUTO: 21.6 % — SIGNIFICANT CHANGE UP (ref 13–44)
MCHC RBC-ENTMCNC: 21.5 PG — LOW (ref 27–34)
MCHC RBC-ENTMCNC: 29.8 G/DL — LOW (ref 32–36)
MCV RBC AUTO: 72 FL — LOW (ref 80–100)
MONOCYTES # BLD AUTO: 0.7 K/UL — SIGNIFICANT CHANGE UP (ref 0–0.9)
MONOCYTES NFR BLD AUTO: 9.1 % — SIGNIFICANT CHANGE UP (ref 2–14)
NEUTROPHILS # BLD AUTO: 4.7 K/UL — SIGNIFICANT CHANGE UP (ref 1.8–7.4)
NEUTROPHILS NFR BLD AUTO: 65.6 % — SIGNIFICANT CHANGE UP (ref 43–77)
PLATELET # BLD AUTO: 239 K/UL — SIGNIFICANT CHANGE UP (ref 150–400)
RBC # BLD: 4.74 M/UL — SIGNIFICANT CHANGE UP (ref 4.2–5.8)
RBC # FLD: 19.3 % — HIGH (ref 10.3–14.5)
WBC # BLD: 7.2 K/UL — SIGNIFICANT CHANGE UP (ref 3.8–10.5)
WBC # FLD AUTO: 7.2 K/UL — SIGNIFICANT CHANGE UP (ref 3.8–10.5)

## 2021-02-23 DIAGNOSIS — Z51.11 ENCOUNTER FOR ANTINEOPLASTIC CHEMOTHERAPY: ICD-10-CM

## 2021-02-23 DIAGNOSIS — R11.2 NAUSEA WITH VOMITING, UNSPECIFIED: ICD-10-CM

## 2021-02-24 ENCOUNTER — APPOINTMENT (OUTPATIENT)
Age: 81
End: 2021-02-24

## 2021-02-25 DIAGNOSIS — Z51.89 ENCOUNTER FOR OTHER SPECIFIED AFTERCARE: ICD-10-CM

## 2021-03-08 ENCOUNTER — OUTPATIENT (OUTPATIENT)
Dept: OUTPATIENT SERVICES | Facility: HOSPITAL | Age: 81
LOS: 1 days | Discharge: HOME | End: 2021-03-08

## 2021-03-08 DIAGNOSIS — Z95.818 PRESENCE OF OTHER CARDIAC IMPLANTS AND GRAFTS: Chronic | ICD-10-CM

## 2021-03-08 DIAGNOSIS — Z95.828 PRESENCE OF OTHER VASCULAR IMPLANTS AND GRAFTS: Chronic | ICD-10-CM

## 2021-03-08 DIAGNOSIS — C16.9 MALIGNANT NEOPLASM OF STOMACH, UNSPECIFIED: ICD-10-CM

## 2021-03-09 ENCOUNTER — APPOINTMENT (OUTPATIENT)
Age: 81
End: 2021-03-09

## 2021-03-09 ENCOUNTER — LABORATORY RESULT (OUTPATIENT)
Age: 81
End: 2021-03-09

## 2021-03-09 ENCOUNTER — APPOINTMENT (OUTPATIENT)
Dept: HEMATOLOGY ONCOLOGY | Facility: CLINIC | Age: 81
End: 2021-03-09
Payer: MEDICARE

## 2021-03-09 ENCOUNTER — RESULT REVIEW (OUTPATIENT)
Age: 81
End: 2021-03-09

## 2021-03-09 VITALS
WEIGHT: 135.01 LBS | OXYGEN SATURATION: 97 % | DIASTOLIC BLOOD PRESSURE: 71 MMHG | HEIGHT: 66 IN | TEMPERATURE: 97.3 F | HEART RATE: 87 BPM | SYSTOLIC BLOOD PRESSURE: 113 MMHG | BODY MASS INDEX: 21.7 KG/M2

## 2021-03-09 LAB
BASOPHILS # BLD AUTO: 0.2 K/UL — SIGNIFICANT CHANGE UP (ref 0–0.2)
BASOPHILS NFR BLD AUTO: 1.2 % — SIGNIFICANT CHANGE UP (ref 0–2)
EOSINOPHIL # BLD AUTO: 0.1 K/UL — SIGNIFICANT CHANGE UP (ref 0–0.5)
EOSINOPHIL NFR BLD AUTO: 0.5 % — SIGNIFICANT CHANGE UP (ref 0–6)
HCT VFR BLD CALC: 34.3 % — LOW (ref 39–50)
HGB BLD-MCNC: 10.3 G/DL — LOW (ref 13–17)
LYMPHOCYTES # BLD AUTO: 12.6 % — LOW (ref 13–44)
LYMPHOCYTES # BLD AUTO: 2 K/UL — SIGNIFICANT CHANGE UP (ref 1–3.3)
MCHC RBC-ENTMCNC: 20.8 PG — LOW (ref 27–34)
MCHC RBC-ENTMCNC: 30.1 G/DL — LOW (ref 32–36)
MCV RBC AUTO: 69.2 FL — LOW (ref 80–100)
MONOCYTES # BLD AUTO: 1.3 K/UL — HIGH (ref 0–0.9)
MONOCYTES NFR BLD AUTO: 8 % — SIGNIFICANT CHANGE UP (ref 2–14)
NEUTROPHILS # BLD AUTO: 12.3 K/UL — HIGH (ref 1.8–7.4)
NEUTROPHILS NFR BLD AUTO: 77.7 % — HIGH (ref 43–77)
PLATELET # BLD AUTO: 158 K/UL — SIGNIFICANT CHANGE UP (ref 150–400)
RBC # BLD: 4.96 M/UL — SIGNIFICANT CHANGE UP (ref 4.2–5.8)
RBC # FLD: 19.5 % — HIGH (ref 10.3–14.5)
WBC # BLD: 15.8 K/UL — HIGH (ref 3.8–10.5)
WBC # FLD AUTO: 15.8 K/UL — HIGH (ref 3.8–10.5)

## 2021-03-09 PROCEDURE — 99072 ADDL SUPL MATRL&STAF TM PHE: CPT

## 2021-03-09 PROCEDURE — 99213 OFFICE O/P EST LOW 20 MIN: CPT

## 2021-03-10 DIAGNOSIS — Z51.11 ENCOUNTER FOR ANTINEOPLASTIC CHEMOTHERAPY: ICD-10-CM

## 2021-03-10 DIAGNOSIS — R11.2 NAUSEA WITH VOMITING, UNSPECIFIED: ICD-10-CM

## 2021-03-11 ENCOUNTER — LABORATORY RESULT (OUTPATIENT)
Age: 81
End: 2021-03-11

## 2021-03-11 ENCOUNTER — APPOINTMENT (OUTPATIENT)
Dept: HEMATOLOGY ONCOLOGY | Facility: CLINIC | Age: 81
End: 2021-03-11

## 2021-03-11 ENCOUNTER — APPOINTMENT (OUTPATIENT)
Age: 81
End: 2021-03-11

## 2021-03-12 DIAGNOSIS — Z51.89 ENCOUNTER FOR OTHER SPECIFIED AFTERCARE: ICD-10-CM

## 2021-03-23 ENCOUNTER — LABORATORY RESULT (OUTPATIENT)
Age: 81
End: 2021-03-23

## 2021-03-23 ENCOUNTER — APPOINTMENT (OUTPATIENT)
Age: 81
End: 2021-03-23

## 2021-03-23 ENCOUNTER — RESULT REVIEW (OUTPATIENT)
Age: 81
End: 2021-03-23

## 2021-03-23 LAB
BASOPHILS # BLD AUTO: 0.2 K/UL — SIGNIFICANT CHANGE UP (ref 0–0.2)
BASOPHILS NFR BLD AUTO: 1 % — SIGNIFICANT CHANGE UP (ref 0–2)
EOSINOPHIL # BLD AUTO: 0.1 K/UL — SIGNIFICANT CHANGE UP (ref 0–0.5)
EOSINOPHIL NFR BLD AUTO: 0.3 % — SIGNIFICANT CHANGE UP (ref 0–6)
HCT VFR BLD CALC: 36.6 % — LOW (ref 39–50)
HGB BLD-MCNC: 11.1 G/DL — LOW (ref 13–17)
LYMPHOCYTES # BLD AUTO: 11.2 % — LOW (ref 13–44)
LYMPHOCYTES # BLD AUTO: 2 K/UL — SIGNIFICANT CHANGE UP (ref 1–3.3)
MCHC RBC-ENTMCNC: 21.4 PG — LOW (ref 27–34)
MCHC RBC-ENTMCNC: 30.3 G/DL — LOW (ref 32–36)
MCV RBC AUTO: 70.6 FL — LOW (ref 80–100)
MONOCYTES # BLD AUTO: 1.2 K/UL — HIGH (ref 0–0.9)
MONOCYTES NFR BLD AUTO: 7 % — SIGNIFICANT CHANGE UP (ref 2–14)
NEUTROPHILS # BLD AUTO: 14.3 K/UL — HIGH (ref 1.8–7.4)
NEUTROPHILS NFR BLD AUTO: 80.5 % — HIGH (ref 43–77)
PLATELET # BLD AUTO: 194 K/UL — SIGNIFICANT CHANGE UP (ref 150–400)
RBC # BLD: 5.19 M/UL — SIGNIFICANT CHANGE UP (ref 4.2–5.8)
RBC # FLD: 21.2 % — HIGH (ref 10.3–14.5)
WBC # BLD: 17.8 K/UL — HIGH (ref 3.8–10.5)
WBC # FLD AUTO: 17.8 K/UL — HIGH (ref 3.8–10.5)

## 2021-03-25 ENCOUNTER — APPOINTMENT (OUTPATIENT)
Age: 81
End: 2021-03-25

## 2021-03-26 ENCOUNTER — APPOINTMENT (OUTPATIENT)
Age: 81
End: 2021-03-26

## 2021-04-06 ENCOUNTER — APPOINTMENT (OUTPATIENT)
Dept: HEMATOLOGY ONCOLOGY | Facility: CLINIC | Age: 81
End: 2021-04-06
Payer: MEDICARE

## 2021-04-06 ENCOUNTER — RESULT REVIEW (OUTPATIENT)
Age: 81
End: 2021-04-06

## 2021-04-06 ENCOUNTER — APPOINTMENT (OUTPATIENT)
Age: 81
End: 2021-04-06

## 2021-04-06 ENCOUNTER — LABORATORY RESULT (OUTPATIENT)
Age: 81
End: 2021-04-06

## 2021-04-06 VITALS
TEMPERATURE: 97.2 F | HEIGHT: 66 IN | WEIGHT: 133.02 LBS | HEART RATE: 66 BPM | SYSTOLIC BLOOD PRESSURE: 142 MMHG | OXYGEN SATURATION: 100 % | BODY MASS INDEX: 21.38 KG/M2 | DIASTOLIC BLOOD PRESSURE: 86 MMHG

## 2021-04-06 DIAGNOSIS — I62.03 NONTRAUMATIC CHRONIC SUBDURAL HEMORRHAGE: ICD-10-CM

## 2021-04-06 LAB
ACANTHOCYTES BLD QL SMEAR: SLIGHT — SIGNIFICANT CHANGE UP
ANISOCYTOSIS BLD QL: SIGNIFICANT CHANGE UP
BASOPHILS # BLD AUTO: 0.2 K/UL — SIGNIFICANT CHANGE UP (ref 0–0.2)
BASOPHILS NFR BLD AUTO: 0 % — SIGNIFICANT CHANGE UP (ref 0–2)
BITE CELLS BLD QL SMEAR: PRESENT — SIGNIFICANT CHANGE UP
DACRYOCYTES BLD QL SMEAR: SLIGHT — SIGNIFICANT CHANGE UP
ELLIPTOCYTES BLD QL SMEAR: SLIGHT — SIGNIFICANT CHANGE UP
EOSINOPHIL # BLD AUTO: 0.1 K/UL — SIGNIFICANT CHANGE UP (ref 0–0.5)
EOSINOPHIL NFR BLD AUTO: 0 % — SIGNIFICANT CHANGE UP (ref 0–6)
HCT VFR BLD CALC: 36.6 % — LOW (ref 39–50)
HGB BLD-MCNC: 11 G/DL — LOW (ref 13–17)
HYPOCHROMIA BLD QL: SIGNIFICANT CHANGE UP
LYMPHOCYTES # BLD AUTO: 1.9 K/UL — SIGNIFICANT CHANGE UP (ref 1–3.3)
LYMPHOCYTES # BLD AUTO: 6 % — LOW (ref 13–44)
MACROCYTES BLD QL: SLIGHT — SIGNIFICANT CHANGE UP
MCHC RBC-ENTMCNC: 21.7 PG — LOW (ref 27–34)
MCHC RBC-ENTMCNC: 30 G/DL — LOW (ref 32–36)
MCV RBC AUTO: 72.5 FL — LOW (ref 80–100)
MICROCYTES BLD QL: SIGNIFICANT CHANGE UP
MONOCYTES # BLD AUTO: 1.7 K/UL — HIGH (ref 0–0.9)
MONOCYTES NFR BLD AUTO: 4 % — SIGNIFICANT CHANGE UP (ref 2–14)
NEUTROPHILS # BLD AUTO: 34.4 K/UL — HIGH (ref 1.8–7.4)
NEUTROPHILS NFR BLD AUTO: 90 % — HIGH (ref 43–77)
OVALOCYTES BLD QL SMEAR: SLIGHT — SIGNIFICANT CHANGE UP
PLAT MORPH BLD: NORMAL — SIGNIFICANT CHANGE UP
PLATELET # BLD AUTO: 198 K/UL — SIGNIFICANT CHANGE UP (ref 150–400)
POIKILOCYTOSIS BLD QL AUTO: SIGNIFICANT CHANGE UP
POLYCHROMASIA BLD QL SMEAR: SLIGHT — SIGNIFICANT CHANGE UP
RBC # BLD: 5.06 M/UL — SIGNIFICANT CHANGE UP (ref 4.2–5.8)
RBC # FLD: 20.9 % — HIGH (ref 10.3–14.5)
RBC BLD AUTO: ABNORMAL
SCHISTOCYTES BLD QL AUTO: SLIGHT — SIGNIFICANT CHANGE UP
SMUDGE CELLS # BLD: PRESENT — SIGNIFICANT CHANGE UP
TARGETS BLD QL SMEAR: SLIGHT — SIGNIFICANT CHANGE UP
TOXIC GRANULES BLD QL SMEAR: PRESENT — SIGNIFICANT CHANGE UP
WBC # BLD: 38.3 K/UL — HIGH (ref 3.8–10.5)
WBC # FLD AUTO: 38.3 K/UL — HIGH (ref 3.8–10.5)

## 2021-04-06 PROCEDURE — 99072 ADDL SUPL MATRL&STAF TM PHE: CPT

## 2021-04-06 PROCEDURE — 99214 OFFICE O/P EST MOD 30 MIN: CPT

## 2021-04-06 NOTE — ASSESSMENT
[Curative] : Goals of care discussed with patient: Curative [FreeTextEntry1] : The phase II/III FLOT4-AIO trial compared four preoperative and four postoperative courses of the docetaxel-based triplet FLOT regimen (docetaxel 50 mg/m2 plus oxaliplatin 85 mg/m2 and leucovorin 200 mg/m2 with short-term infusional FU 2600 mg/m2 as a 24-hour infusion, all on day 1, administered every two weeks) to epirubicin-based triplet therapy in patients with resectable adenocarcinoma of the stomach or EGJ. The epirubicin-based group used the MAGIC approach. The phase III component randomized 716 patients with resectable gastric (44 percent) or EGJ (56 percent) adenocarcinoma. Overall, 91 and 37 percent of patients receiving ECF/ECX and 90 and 50 percent of patients with FLOT completed the planned preoperative and postoperative cycles [65]. At a median follow-up of 43 months, median OS (50 versus 35 months, HR 0.77, 95% CI 0.63-0.94) and three-year OS (57 versus 48 percent) were greater with FLOT. Perioperative complications were similar.\par \par There are emerging data on the benefit of combinations of trastuzumab with cytotoxic chemotherapy in the perioperative setting, but this remains an investigational approach. The PETRARCA trial randomized 81 patients to FLOT alone or to the addition of both trastuzumab and pertuzumab.  The addition of HER2-targeted therapy significantly increased the complete pathologic rate (35 versus 12 percent), and increased the rate of node-negative resections (68 versus 39 percent), but there was a higher rate of toxicity (especially grade 3 or 4 diarrhea [41 versus 5 percent] and leukopenia [23 versus 13 percent]) that led to more cycles with dose modification. Rates of complete (R0) resection, surgical morbidity and surgical mortality were comparable. Following this initial assessment, it was decided not to move forward with the phase III portion of this study.

## 2021-04-06 NOTE — HISTORY OF PRESENT ILLNESS
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  He was sent for CT by his PCP, Dr. Charles Smith, due to anemia.  CT showed in the upper central abdomen abutting the posterior antrum of the stomach and neck of the pancreas there is a  3 x 2 x 3 cm mass.  There is an additional heterogeneous enhancing 2.7 x 2.3 x 2.5 lobulated mass in the ventral upper abdominal peritoneal cavity anterosuperiorly to the antrum of the stomach with internal and surrounding enhancing vessels.  Endoscopy with biopsy of the gastric mass was c/w moderately differentiated adenocarcinoma.  Staging PET showed hypermetabolic thickening of the distal stomach, consistent with primary gastric cancer.  Hypermetabolic activity in the distal stomach, inseparable from the perigastric lymphadenopathy, consistent with metastatic kong disease. [de-identified] : Patient presents on C4D1 adjuvant FLOT for gastric adenocarcinoma s/p robotic assisted laparoscopic distal gastrectomy with Billroth 2 gastrojejunostomy reconstruction and placement of feeding jejunostomy tube on 11/9/20 with Dr Young.  He is s/p 4 cycles neoadjuvant FLOT.  \par + Fatigue, intermittent and mild. + Peripheral neuropathy, improved with gabapentin. + Occasional emesis after "eating too much," denies nausea. + Constipation, improved. + Alopecia. + Nail changes.  + Cold intolerance improving. + Decreased appetite with mild weight loss. PEG removed by Dr. Young. + Grade 1 H/F, xeroderma only, no pain.  No myalgias.  No weakness.  No dysgeusia. No fevers, no cough, no SOB.

## 2021-04-06 NOTE — RESULTS/DATA
[FreeTextEntry1] : 11/20/20 path:  Procedure: Partial gastrectomy, distal with lymph node dissection\par Tumor Site: Antrum, Lesser curvature\par Tumor Size:  2.5 x 0.7 x 0.3 cm\par Histologic Type:  Adenocarcinoma, Intestinal type\par Alternate optional WHO classification: Tubular\par (intestinal) adenocarcinoma\par Histologic Grade:  G2: Moderately differentiated\par Microscopic Extent of Tumor:  Tumor invades subserosal connective\par tissue without involvement of visceral peritoneum\par Margins: All margins are negative for invasive carcinoma and\par dysplasia\par Margins examined: proximal and distal (see comment below on\par proximal margin)\par Distance of invasive carcinoma from closest margin: 0.7 cm\par Specify margin: Distal margin\par Treatment effect:  Present: Residual cancer with evident tumor\par regression, but more than single cells or rare small groups of\par cancer cells (partial response, score 2)\par Lymph-Vascular Invasion:  Present\par Perineural invasion: Not identified\par Regional Lymph Nodes:\par Number of Lymph Nodes Involved: 4 (include all parts)\par Number of Lymph Nodes Examined: 29 (include all parts)\par Pathologic Staging (pTNM):\par Primary Tumor (pT):  pT3: Tumor invades subserosal connective\par tissue, without involvement of visceral peritoneum or adjacent\par structures\par Regional Lymph Nodes (pN):  pN2: Metastasis in 3 to 6\par perigastric lymph nodes\par Distant Metastasis (pM): Not applicable\par Additional Pathologic Findings:  Intestinal metaplasia and\par chronic gastritis\par Ancillary studies: Orh2Hpv immunohistochemical stain to be\par reported as addendum.\par \par 7/20/20 PET:\par Small hypermetabolic foci in the mediastinum and kings, corresponding to small mediastinal and bilateral hilar lymph nodes on CT.  The imaging appearance of these lymph nodes is nonspecific and favors reactive lymph nodes.  Hypermetabolic activity in the right upper quadrant abdomen, corresponding to extensive thickening of the distal stomach.  4.7 cm of stomach is involved.  SUV 8.2-11.2.  Proximal to this area of hypermetabolic thickening of the distal stomach, the proximal stomach is distended and contained air fluid contrast level.  The hypermetabolic activity in the thickening of the stomach is inseparable from perigastric lymph nodes.  Perigastric lymph node measures 2.5x1.6 cm SUV 12.5.  No evidence of additional enlarged intra-abdominal, pelvic or inguinal lymphadenopathy.  Radiation seeds noted in prostate.  No other abnormal hypermetabolic activity to suggest additional sites of malignancy.\par \par 7/10/20 Pathology:\par Gastric, Antrum, Ulcerated mass: moderately differentiated adenocarcinoma\par \par 6/19/20 CT A/P:\par In the upper central abdomen abutting the posterior antrum of the stomach and neck of the pancreas, there is a nonspecific heterogeneously enhancing circumscribed 3 x 2 x 3 cm mass.  There is an additional heterogeneous enhancing 2.7 x 2.3 x 2.5 lobulated mass in the ventral upper abdominal peritoneal cavity anterosuperiorly to the antrum of the stomach with internal and surrounding enhancing vessels.  Extensive enhancing severe bowel wall thickening of the entire coon and rectum compatible with colitis/proctitis which may be infectious/inflammatory. Moderate rugal fold thickening of the fundus of the stomach which may represent gastritis.

## 2021-04-07 ENCOUNTER — OUTPATIENT (OUTPATIENT)
Dept: OUTPATIENT SERVICES | Facility: HOSPITAL | Age: 81
LOS: 1 days | Discharge: HOME | End: 2021-04-07

## 2021-04-07 DIAGNOSIS — Z95.818 PRESENCE OF OTHER CARDIAC IMPLANTS AND GRAFTS: Chronic | ICD-10-CM

## 2021-04-07 DIAGNOSIS — C16.9 MALIGNANT NEOPLASM OF STOMACH, UNSPECIFIED: ICD-10-CM

## 2021-04-07 DIAGNOSIS — Z95.828 PRESENCE OF OTHER VASCULAR IMPLANTS AND GRAFTS: Chronic | ICD-10-CM

## 2021-04-08 ENCOUNTER — APPOINTMENT (OUTPATIENT)
Age: 81
End: 2021-04-08

## 2021-04-08 ENCOUNTER — APPOINTMENT (OUTPATIENT)
Dept: HEMATOLOGY ONCOLOGY | Facility: CLINIC | Age: 81
End: 2021-04-08
Payer: MEDICARE

## 2021-04-09 DIAGNOSIS — Z51.89 ENCOUNTER FOR OTHER SPECIFIED AFTERCARE: ICD-10-CM

## 2021-04-10 ENCOUNTER — EMERGENCY (EMERGENCY)
Facility: HOSPITAL | Age: 81
LOS: 1 days | Discharge: HOME | End: 2021-04-10
Attending: EMERGENCY MEDICINE
Payer: MEDICARE

## 2021-04-10 VITALS
HEART RATE: 86 BPM | WEIGHT: 126.1 LBS | SYSTOLIC BLOOD PRESSURE: 107 MMHG | DIASTOLIC BLOOD PRESSURE: 68 MMHG | RESPIRATION RATE: 18 BRPM | TEMPERATURE: 98 F | HEIGHT: 66 IN | OXYGEN SATURATION: 99 %

## 2021-04-10 DIAGNOSIS — Z95.818 PRESENCE OF OTHER CARDIAC IMPLANTS AND GRAFTS: Chronic | ICD-10-CM

## 2021-04-10 DIAGNOSIS — Z95.828 PRESENCE OF OTHER VASCULAR IMPLANTS AND GRAFTS: Chronic | ICD-10-CM

## 2021-04-10 LAB
ALBUMIN SERPL ELPH-MCNC: 3.8 G/DL — SIGNIFICANT CHANGE UP (ref 3.3–5.2)
ALP SERPL-CCNC: 185 U/L — HIGH (ref 40–120)
ALT FLD-CCNC: 10 U/L — SIGNIFICANT CHANGE UP
ANION GAP SERPL CALC-SCNC: 13 MMOL/L — SIGNIFICANT CHANGE UP (ref 5–17)
ANISOCYTOSIS BLD QL: SLIGHT — SIGNIFICANT CHANGE UP
APPEARANCE UR: CLEAR — SIGNIFICANT CHANGE UP
AST SERPL-CCNC: 13 U/L — SIGNIFICANT CHANGE UP
BACTERIA # UR AUTO: ABNORMAL
BASOPHILS # BLD AUTO: 0 K/UL — SIGNIFICANT CHANGE UP (ref 0–0.2)
BASOPHILS NFR BLD AUTO: 0 % — SIGNIFICANT CHANGE UP (ref 0–2)
BILIRUB SERPL-MCNC: 0.4 MG/DL — SIGNIFICANT CHANGE UP (ref 0.4–2)
BILIRUB UR-MCNC: NEGATIVE — SIGNIFICANT CHANGE UP
BUN SERPL-MCNC: 12 MG/DL — SIGNIFICANT CHANGE UP (ref 8–20)
CALCIUM SERPL-MCNC: 6.7 MG/DL — LOW (ref 8.6–10.2)
CHLORIDE SERPL-SCNC: 103 MMOL/L — SIGNIFICANT CHANGE UP (ref 98–107)
CO2 SERPL-SCNC: 19 MMOL/L — LOW (ref 22–29)
COLOR SPEC: YELLOW — SIGNIFICANT CHANGE UP
CREAT SERPL-MCNC: 0.61 MG/DL — SIGNIFICANT CHANGE UP (ref 0.5–1.3)
DIFF PNL FLD: NEGATIVE — SIGNIFICANT CHANGE UP
EOSINOPHIL # BLD AUTO: 0 K/UL — SIGNIFICANT CHANGE UP (ref 0–0.5)
EOSINOPHIL NFR BLD AUTO: 0 % — SIGNIFICANT CHANGE UP (ref 0–6)
EPI CELLS # UR: SIGNIFICANT CHANGE UP
GLUCOSE SERPL-MCNC: 113 MG/DL — HIGH (ref 70–99)
GLUCOSE UR QL: NEGATIVE MG/DL — SIGNIFICANT CHANGE UP
HCT VFR BLD CALC: 28.2 % — LOW (ref 39–50)
HCT VFR BLD CALC: 31.8 % — LOW (ref 39–50)
HGB BLD-MCNC: 10.3 G/DL — LOW (ref 13–17)
HGB BLD-MCNC: 9.3 G/DL — LOW (ref 13–17)
KETONES UR-MCNC: ABNORMAL
LEUKOCYTE ESTERASE UR-ACNC: NEGATIVE — SIGNIFICANT CHANGE UP
LIDOCAIN IGE QN: 13 U/L — LOW (ref 22–51)
LYMPHOCYTES # BLD AUTO: 1.01 K/UL — SIGNIFICANT CHANGE UP (ref 1–3.3)
LYMPHOCYTES # BLD AUTO: 2 % — LOW (ref 13–44)
MACROCYTES BLD QL: SLIGHT — SIGNIFICANT CHANGE UP
MAGNESIUM SERPL-MCNC: 2.6 MG/DL — SIGNIFICANT CHANGE UP (ref 1.6–2.6)
MCHC RBC-ENTMCNC: 22 PG — LOW (ref 27–34)
MCHC RBC-ENTMCNC: 22.4 PG — LOW (ref 27–34)
MCHC RBC-ENTMCNC: 32.4 GM/DL — SIGNIFICANT CHANGE UP (ref 32–36)
MCHC RBC-ENTMCNC: 33 GM/DL — SIGNIFICANT CHANGE UP (ref 32–36)
MCV RBC AUTO: 67.8 FL — LOW (ref 80–100)
MCV RBC AUTO: 67.8 FL — LOW (ref 80–100)
METAMYELOCYTES # FLD: 1 % — HIGH (ref 0–0)
MICROCYTES BLD QL: SLIGHT — SIGNIFICANT CHANGE UP
MONOCYTES # BLD AUTO: 1.51 K/UL — HIGH (ref 0–0.9)
MONOCYTES NFR BLD AUTO: 3 % — SIGNIFICANT CHANGE UP (ref 2–14)
NEUTROPHILS # BLD AUTO: 47.43 K/UL — HIGH (ref 1.8–7.4)
NEUTROPHILS NFR BLD AUTO: 94 % — HIGH (ref 43–77)
NITRITE UR-MCNC: NEGATIVE — SIGNIFICANT CHANGE UP
NRBC # BLD: 0 /100 — SIGNIFICANT CHANGE UP (ref 0–0)
NT-PROBNP SERPL-SCNC: 109 PG/ML — SIGNIFICANT CHANGE UP (ref 0–300)
PH UR: 6 — SIGNIFICANT CHANGE UP (ref 5–8)
PLAT MORPH BLD: NORMAL — SIGNIFICANT CHANGE UP
PLATELET # BLD AUTO: 129 K/UL — LOW (ref 150–400)
PLATELET # BLD AUTO: 86 K/UL — LOW (ref 150–400)
POIKILOCYTOSIS BLD QL AUTO: SLIGHT — SIGNIFICANT CHANGE UP
POLYCHROMASIA BLD QL SMEAR: SLIGHT — SIGNIFICANT CHANGE UP
POTASSIUM SERPL-MCNC: 4.3 MMOL/L — SIGNIFICANT CHANGE UP (ref 3.5–5.3)
POTASSIUM SERPL-SCNC: 4.3 MMOL/L — SIGNIFICANT CHANGE UP (ref 3.5–5.3)
PROT SERPL-MCNC: 6.3 G/DL — LOW (ref 6.6–8.7)
PROT UR-MCNC: 15 MG/DL
RBC # BLD: 4.16 M/UL — LOW (ref 4.2–5.8)
RBC # BLD: 4.69 M/UL — SIGNIFICANT CHANGE UP (ref 4.2–5.8)
RBC # FLD: 25.2 % — HIGH (ref 10.3–14.5)
RBC # FLD: 25.6 % — HIGH (ref 10.3–14.5)
RBC BLD AUTO: ABNORMAL
RBC CASTS # UR COMP ASSIST: SIGNIFICANT CHANGE UP /HPF (ref 0–4)
SODIUM SERPL-SCNC: 135 MMOL/L — SIGNIFICANT CHANGE UP (ref 135–145)
SP GR SPEC: 1.01 — SIGNIFICANT CHANGE UP (ref 1.01–1.02)
TROPONIN T SERPL-MCNC: <0.01 NG/ML — SIGNIFICANT CHANGE UP (ref 0–0.06)
UROBILINOGEN FLD QL: NEGATIVE MG/DL — SIGNIFICANT CHANGE UP
WBC # BLD: 50.46 K/UL — CRITICAL HIGH (ref 3.8–10.5)
WBC # BLD: 55.36 K/UL — CRITICAL HIGH (ref 3.8–10.5)
WBC # FLD AUTO: 50.46 K/UL — CRITICAL HIGH (ref 3.8–10.5)
WBC # FLD AUTO: 55.36 K/UL — CRITICAL HIGH (ref 3.8–10.5)
WBC UR QL: SIGNIFICANT CHANGE UP

## 2021-04-10 PROCEDURE — G1004: CPT

## 2021-04-10 PROCEDURE — 99220: CPT

## 2021-04-10 PROCEDURE — 71045 X-RAY EXAM CHEST 1 VIEW: CPT | Mod: 26

## 2021-04-10 PROCEDURE — 93010 ELECTROCARDIOGRAM REPORT: CPT

## 2021-04-10 PROCEDURE — 70450 CT HEAD/BRAIN W/O DYE: CPT | Mod: 26,MG

## 2021-04-10 RX ORDER — GABAPENTIN 400 MG/1
300 CAPSULE ORAL THREE TIMES A DAY
Refills: 0 | Status: DISCONTINUED | OUTPATIENT
Start: 2021-04-10 | End: 2021-04-15

## 2021-04-10 RX ORDER — FERROUS SULFATE 325(65) MG
325 TABLET ORAL DAILY
Refills: 0 | Status: DISCONTINUED | OUTPATIENT
Start: 2021-04-10 | End: 2021-04-15

## 2021-04-10 RX ORDER — SODIUM CHLORIDE 9 MG/ML
500 INJECTION INTRAMUSCULAR; INTRAVENOUS; SUBCUTANEOUS ONCE
Refills: 0 | Status: COMPLETED | OUTPATIENT
Start: 2021-04-10 | End: 2021-04-10

## 2021-04-10 RX ORDER — VANCOMYCIN HCL 1 G
1000 VIAL (EA) INTRAVENOUS ONCE
Refills: 0 | Status: COMPLETED | OUTPATIENT
Start: 2021-04-10 | End: 2021-04-10

## 2021-04-10 RX ORDER — PANTOPRAZOLE SODIUM 20 MG/1
40 TABLET, DELAYED RELEASE ORAL
Refills: 0 | Status: DISCONTINUED | OUTPATIENT
Start: 2021-04-10 | End: 2021-04-15

## 2021-04-10 RX ORDER — CEFEPIME 1 G/1
2000 INJECTION, POWDER, FOR SOLUTION INTRAMUSCULAR; INTRAVENOUS ONCE
Refills: 0 | Status: COMPLETED | OUTPATIENT
Start: 2021-04-10 | End: 2021-04-10

## 2021-04-10 RX ORDER — DIVALPROEX SODIUM 500 MG/1
250 TABLET, DELAYED RELEASE ORAL
Refills: 0 | Status: DISCONTINUED | OUTPATIENT
Start: 2021-04-10 | End: 2021-04-15

## 2021-04-10 RX ORDER — SODIUM CHLORIDE 9 MG/ML
1000 INJECTION INTRAMUSCULAR; INTRAVENOUS; SUBCUTANEOUS ONCE
Refills: 0 | Status: COMPLETED | OUTPATIENT
Start: 2021-04-10 | End: 2021-04-10

## 2021-04-10 RX ORDER — ASPIRIN/CALCIUM CARB/MAGNESIUM 324 MG
81 TABLET ORAL DAILY
Refills: 0 | Status: DISCONTINUED | OUTPATIENT
Start: 2021-04-10 | End: 2021-04-15

## 2021-04-10 RX ADMIN — PANTOPRAZOLE SODIUM 40 MILLIGRAM(S): 20 TABLET, DELAYED RELEASE ORAL at 18:21

## 2021-04-10 RX ADMIN — SODIUM CHLORIDE 500 MILLILITER(S): 9 INJECTION INTRAMUSCULAR; INTRAVENOUS; SUBCUTANEOUS at 13:35

## 2021-04-10 RX ADMIN — CEFEPIME 100 MILLIGRAM(S): 1 INJECTION, POWDER, FOR SOLUTION INTRAMUSCULAR; INTRAVENOUS at 16:00

## 2021-04-10 RX ADMIN — GABAPENTIN 300 MILLIGRAM(S): 400 CAPSULE ORAL at 21:52

## 2021-04-10 RX ADMIN — Medication 250 MILLIGRAM(S): at 18:21

## 2021-04-10 RX ADMIN — DIVALPROEX SODIUM 250 MILLIGRAM(S): 500 TABLET, DELAYED RELEASE ORAL at 18:21

## 2021-04-10 RX ADMIN — Medication 81 MILLIGRAM(S): at 18:21

## 2021-04-10 RX ADMIN — SODIUM CHLORIDE 1000 MILLILITER(S): 9 INJECTION INTRAMUSCULAR; INTRAVENOUS; SUBCUTANEOUS at 16:00

## 2021-04-10 NOTE — ED PROVIDER NOTE - OBJECTIVE STATEMENT
The patient is a 80 year old male presents with syncope.  The patient has history of stomach cancer undergoing chemotherapy  No CP, No SOB, No abd pain  The patient states that he has no appetite after chemo and did not eat or drink for past 2 days The patient is a 80 year old male presents with syncope.  The patient has history of stomach cancer undergoing chemotherapy  No HA, No CP, No SOB, No abd pain  The patient states that he has no appetite after chemo and did not eat or drink for past 2 days

## 2021-04-10 NOTE — ED CDU PROVIDER INITIAL DAY NOTE - MEDICAL DECISION MAKING DETAILS
81 y/o male with syncope h/o gastric CA  evaluated by cardio recommending observation overnight with hydration and likely dc in morning, no TTE per cardio   Will also obtain Head CT

## 2021-04-10 NOTE — ED ADULT NURSE NOTE - OBJECTIVE STATEMENT
Patient BIBA from the auto store. Pt states that he was walking around the store, felt "hot" and then passed out. Pt denies hitting his head. Pt currently on chemo for stomach cancer (last chemo tuesday), pt states that he has been unable to eat for the last 2-3 days and has been feeling weak. FSBS 104 per EMS.- as per Triage note, upon arrival pt states " I want soup - I don't care if you dint have it go steal from another patient"

## 2021-04-10 NOTE — ED CDU PROVIDER INITIAL DAY NOTE - OBJECTIVE STATEMENT
79 y/o male h/o gastric cancer last chemo several days ago placed in observation for syncope. PT reports he has not eaten over the past three days. He was in the store today when he felt "hot" and had a syncopal episode. He does not believe he struck his head. Denies any HA, dizziness, cp, sob, palpitations, or abdominal pain.

## 2021-04-10 NOTE — ED ADULT NURSE NOTE - INTERVENTIONS DEFINITIONS
Glide to call system/Call bell, personal items and telephone within reach/Instruct patient to call for assistance/Room bathroom lighting operational/Non-slip footwear when patient is off stretcher/Physically safe environment: no spills, clutter or unnecessary equipment/Stretcher in lowest position, wheels locked, appropriate side rails in place/Provide visual cue, wrist band, yellow gown, etc.

## 2021-04-10 NOTE — ED ADULT NURSE NOTE - CHIEF COMPLAINT QUOTE
Patient BIBA from the [employer] store. Pt states that he was walking around the store, felt "hot" and then passed out. Pt denies hitting his head. Pt currently on chemo for stomach cancer (last chemo tuesday), pt states that he has been unable to eat for the last 2-3 days and has been feeling weak. FSBS 104 per EMS.

## 2021-04-10 NOTE — ED PROVIDER NOTE - CHPI ED SYMPTOMS NEG
no back pain/no chest pain/no cough/no dizziness/no fever/no nausea/no shortness of breath/no vomiting/no chills/no diaphoresis

## 2021-04-10 NOTE — ED ADULT TRIAGE NOTE - CHIEF COMPLAINT QUOTE
Patient BIBA from the [employer] store. Pt states that he was walking around the store, felt "hot" and then passed out. Pt denies hitting his head. Pt currently on chemo for stomach cancer (last chemo tuesday), pt states that he has been unable to eat for the last 2-3 days and has been feeling weak. FSBS 104 per EMS.

## 2021-04-10 NOTE — ED PROVIDER NOTE - CLINICAL SUMMARY MEDICAL DECISION MAKING FREE TEXT BOX
The patient presents with syncope not eating for past 3 days after chemo for his gastric cancer and will place in ED observation for further evaluation

## 2021-04-10 NOTE — ED ADULT NURSE REASSESSMENT NOTE - NSIMPLEMENTINTERV_GEN_ALL_ED
Implemented All Fall with Harm Risk Interventions:  [place] to call system. Call bell, personal items and telephone within reach. Instruct patient to call for assistance. Room bathroom lighting operational. Non-slip footwear when patient is off stretcher. Physically safe environment: no spills, clutter or unnecessary equipment. Stretcher in lowest position, wheels locked, appropriate side rails in place. Provide visual cue, wrist band, yellow gown, etc. Monitor gait and stability. Monitor for mental status changes and reorient to person, place, and time. Review medications for side effects contributing to fall risk. Reinforce activity limits and safety measures with patient and family. Provide visual clues: red socks.

## 2021-04-10 NOTE — ED CDU PROVIDER INITIAL DAY NOTE - PROGRESS NOTE DETAILS
Pt seen on evening rounds with Dr. Shabazz. Feeling well with no complaints. Tolerating PO. CT head pending. Will reassess.

## 2021-04-10 NOTE — ED CDU PROVIDER INITIAL DAY NOTE - ATTENDING CONTRIBUTION TO CARE
Pt. awake and alert. Pt. in no acute distress. Pt. moving all extremities. Pt. will be admitted for syncope and observation.  I, Dr. Shabazz, performed a face to face bedside interview with this patient regarding history of present illness, review of symptoms and relevant past medical, social and family history.  I completed an independent physical examination.  I have also reviewed the ACP's note(s) and discussed the plan with the ACP.

## 2021-04-11 VITALS
TEMPERATURE: 98 F | SYSTOLIC BLOOD PRESSURE: 123 MMHG | HEART RATE: 75 BPM | RESPIRATION RATE: 18 BRPM | DIASTOLIC BLOOD PRESSURE: 74 MMHG | OXYGEN SATURATION: 98 %

## 2021-04-11 DIAGNOSIS — R55 SYNCOPE AND COLLAPSE: ICD-10-CM

## 2021-04-11 DIAGNOSIS — C16.9 MALIGNANT NEOPLASM OF STOMACH, UNSPECIFIED: ICD-10-CM

## 2021-04-11 PROCEDURE — 84484 ASSAY OF TROPONIN QUANT: CPT

## 2021-04-11 PROCEDURE — 81001 URINALYSIS AUTO W/SCOPE: CPT

## 2021-04-11 PROCEDURE — 36415 COLL VENOUS BLD VENIPUNCTURE: CPT

## 2021-04-11 PROCEDURE — 83735 ASSAY OF MAGNESIUM: CPT

## 2021-04-11 PROCEDURE — 96374 THER/PROPH/DIAG INJ IV PUSH: CPT

## 2021-04-11 PROCEDURE — 99223 1ST HOSP IP/OBS HIGH 75: CPT

## 2021-04-11 PROCEDURE — 80053 COMPREHEN METABOLIC PANEL: CPT

## 2021-04-11 PROCEDURE — 70450 CT HEAD/BRAIN W/O DYE: CPT

## 2021-04-11 PROCEDURE — 85027 COMPLETE CBC AUTOMATED: CPT

## 2021-04-11 PROCEDURE — 71045 X-RAY EXAM CHEST 1 VIEW: CPT

## 2021-04-11 PROCEDURE — G0378: CPT

## 2021-04-11 PROCEDURE — 85025 COMPLETE CBC W/AUTO DIFF WBC: CPT

## 2021-04-11 PROCEDURE — 83880 ASSAY OF NATRIURETIC PEPTIDE: CPT

## 2021-04-11 PROCEDURE — 99217: CPT

## 2021-04-11 PROCEDURE — 93005 ELECTROCARDIOGRAM TRACING: CPT

## 2021-04-11 PROCEDURE — 82962 GLUCOSE BLOOD TEST: CPT

## 2021-04-11 PROCEDURE — 99284 EMERGENCY DEPT VISIT MOD MDM: CPT

## 2021-04-11 PROCEDURE — 96375 TX/PRO/DX INJ NEW DRUG ADDON: CPT

## 2021-04-11 PROCEDURE — 83690 ASSAY OF LIPASE: CPT

## 2021-04-11 RX ADMIN — GABAPENTIN 300 MILLIGRAM(S): 400 CAPSULE ORAL at 06:00

## 2021-04-11 RX ADMIN — PANTOPRAZOLE SODIUM 40 MILLIGRAM(S): 20 TABLET, DELAYED RELEASE ORAL at 06:00

## 2021-04-11 RX ADMIN — DIVALPROEX SODIUM 250 MILLIGRAM(S): 500 TABLET, DELAYED RELEASE ORAL at 06:00

## 2021-04-11 NOTE — ED CDU PROVIDER DISPOSITION NOTE - ATTENDING CONTRIBUTION TO CARE
Cleared by cardiology for d/c home with outpatient f/u. Pt. with no complaints. I, Dr. Shabazz, performed a face to face bedside interview with this patient regarding history of present illness, review of symptoms and relevant past medical, social and family history.  I completed an independent physical examination.  I have also reviewed the ACP's note(s) and discussed the plan with the ACP.

## 2021-04-11 NOTE — CONSULT NOTE ADULT - PROBLEM SELECTOR RECOMMENDATION 2
-cards consulted.  Episode likely due to volume depletion and/or vasovagal etiology secondary to poor oral intake x 3 days.  -for observation

## 2021-04-11 NOTE — ED ADULT NURSE REASSESSMENT NOTE - COMFORT CARE
meal provided/plan of care explained/po fluids offered/side rails up/wait time explained
assisted to bathroom/plan of care explained/po fluids offered/side rails up/wait time explained

## 2021-04-11 NOTE — ED CDU PROVIDER DISPOSITION NOTE - CLINICAL COURSE
79 y/o male h/o gastric cancer last chemo several days ago placed in observation for syncope. PT reports he has not eaten over the past three days. He was in the store today when he felt "hot" and had a syncopal episode. He does not believe he struck his head. Denies any HA, dizziness, cp, sob, palpitations, or abdominal pain.  Ct head no acute findings, cxr no acute cardiopulmonary findings, labs with wbc 55K, seen by oncology due to Nulasta.  Seen by cardiology, LOOP recorder without events, CM no events overnight, believe it is from decreased po intake.  Patient tolerating po ate 30% food at breakfast 100% of juice and coffee.  Cleared by cardiology for d/c home with outpatient f/u.

## 2021-04-11 NOTE — ED CDU PROVIDER DISPOSITION NOTE - NSFOLLOWUPINSTRUCTIONS_ED_ALL_ED_FT
- follow up with your cardiologist this week  - make sure you eat and drink at every meal  - follow up with your oncologist

## 2021-04-11 NOTE — CONSULT NOTE ADULT - SUBJECTIVE AND OBJECTIVE BOX
[facility]                                   [address] [place] [address]                                                      PHONE: [phone]                                                         FAX: [phone]  [email or web address]  ---------------------------------------------------------------------------------------------------------------------------------    Reason for Consult: syncope    HPI:  PETER GAMBOA is an 80y Male h/o gastric CA currently undergoing chemo, chronic RBBB, s/p ILR in Sept 2020, HTN, HLD, DM, and smoker who presents after syncopal episode while at an auto store. He had a prodrome of feeling hot then LOC. Last chemo was Tuesday. He reports not eating for 3 days and feeling very weak.    PAST MEDICAL & SURGICAL HISTORY:  Prostate cancer    DM (diabetes mellitus)    Gastric cancer  on chemo    Benign essential HTN    SDH (subdural hematoma)    RBBB (right bundle branch block)    Port-A-Cath in place  8/2020 right ACW    Status post placement of implantable loop recorder  left ACW        No Known Allergies      MEDICATIONS  (STANDING):  vancomycin  IVPB 1000 milliGRAM(s) IV Intermittent once    MEDICATIONS  (PRN):      Social History:  Cigarettes:      yes              Alchohol:     no            Illicit Drug Abuse:  no    ROS: Negative other than as mentioned in HPI.    Vital Signs Last 24 Hrs  T(C): 36.6 (10 Apr 2021 16:05), Max: 36.8 (10 Apr 2021 12:35)  T(F): 97.8 (10 Apr 2021 16:05), Max: 98.2 (10 Apr 2021 12:35)  HR: 78 (10 Apr 2021 16:05) (78 - 86)  BP: 125/65 (10 Apr 2021 16:05) (107/68 - 125/65)  BP(mean): --  RR: 18 (10 Apr 2021 16:05) (18 - 18)  SpO2: 99% (10 Apr 2021 16:05) (99% - 99%)  ICU Vital Signs Last 24 Hrs  PETER GAMBOA  I&O's Detail    I&O's Summary    Drug Dosing Weight  PETER GAMBOA      PHYSICAL EXAM:  General: NAD.  HEENT: Head; normocephalic.  Eyes: Pupils reactive.  Neck: Supple.  CARDIOVASCULAR: RRR.   LUNGS: No rales, rhonchi or wheeze. Normal breath sounds bilaterally.  ABDOMEN: Soft.  EXTREMITIES: No clubbing, cyanosis or edema.   SKIN: warm.  NEURO: Alert/oriented x 3.    PSYCH: normal affect.        LABS:                        10.3   50.46 )-----------( 129      ( 10 Apr 2021 13:25 )             31.8     04-10    135  |  103  |  12.0  ----------------------------<  113<H>  4.3   |  19.0<L>  |  0.61    Ca    6.7<L>      10 Apr 2021 13:25  Mg     2.6     04-10    TPro  6.3<L>  /  Alb  3.8  /  TBili  0.4  /  DBili  x   /  AST  13  /  ALT  10  /  AlkPhos  185<H>  04-10    PETER GAMBOA  CARDIAC MARKERS ( 10 Apr 2021 13:25 )  x     / <0.01 ng/mL / x     / x     / x                RADIOLOGY & ADDITIONAL STUDIES:    INTERPRETATION OF TELEMETRY (personally reviewed): NSR    ECG: NSR 85 with incomplete RBBB    ECHO: 11/2019 normal EF    STRESS TEST: Nuclear stress 11/2019 no ischemia, EF 62%.      Assessment and Plan:  80y Male h/o gastric CA currently undergoing chemo, chronic RBBB, s/p ILR in Sept 2020, HTN, HLD, DM, and smoker who presents after syncopal episode while at an auto store. He had a prodrome of feeling hot then LOC. Last chemo was Tuesday. He reports not eating for 3 days and feeling very weak.    1. ILR interrogated. No correlating arrhythmia. Implant date 9/16/20.   2. Episode likely due to volume depletion and/or vasovagal etiology secondary to poor oral intake x 3 days.  3. Discussed with ER service.  4. Admitted for observation.  5. Please call with any questions or changes    
HPI:  HPI:  79 y/o male h/o gastric cancer last chemo several days ago placed in observation for syncope. PT reports he has not eaten over the past three days. He was in the store today when he felt "hot" and had a syncopal episode. He does not believe he struck his head. Denies any HA, dizziness, cp, sob, palpitations, or abdominal pain.      The patient was diagnosed with gastric adenocarcinoma in 2020 at the age of 79. He was sent for CT by his PCP, Dr. Charles Smith, due to anemia. CT showed in the upper central abdomen abutting the posterior antrum of the stomach and neck of the pancreas there is a 3 x 2 x 3 cm mass. There is an additional heterogeneous enhancing 2.7 x 2.3 x 2.5 lobulated mass in the ventral upper abdominal peritoneal cavity anterosuperiorly to the antrum of the stomach with internal and surrounding enhancing vessels. Endoscopy with biopsy of the gastric mass was c/w moderately differentiated adenocarcinoma. Staging PET showed hypermetabolic thickening of the distal stomach, consistent with primary gastric cancer. Hypermetabolic activity in the distal stomach, inseparable from the perigastric lymphadenopathy, consistent with metastatic kong disease.     TNM stage: T3, N2, M0     PAST MEDICAL & SURGICAL HISTORY:  Prostate cancer    DM (diabetes mellitus)    Gastric cancer  on chemo    Benign essential HTN    SDH (subdural hematoma)    RBBB (right bundle branch block)    Port-A-Cath in place  2020 right ACW    Status post placement of implantable loop recorder  left ACW        MEDICATIONS  (STANDING):  aspirin  chewable 81 milliGRAM(s) Oral daily  diVALproex  milliGRAM(s) Oral two times a day  ferrous    sulfate 325 milliGRAM(s) Oral daily  gabapentin 300 milliGRAM(s) Oral three times a day  pantoprazole    Tablet 40 milliGRAM(s) Oral two times a day    MEDICATIONS  (PRN):      Allergies    No Known Allergies    Intolerances        FAMILY HISTORY:  FH: diabetes mellitus (Father)        Review of Systems    Constitutional, Eyes, ENT, Cardiovascular, Respiratory, Gastrointestinal, Genitourinary, Musculoskeletal, Integumentary, Neurological, Psychiatric, Endocrine, Heme/Lymph and Allergic/Immunologic review of systems are otherwise negative except as noted in HPI.     Vital Signs Last 24 Hrs  T(C): 36.7 (2021 00:09), Max: 36.9 (10 Apr 2021 19:15)  T(F): 98 (2021 00:09), Max: 98.5 (10 Apr 2021 19:15)  HR: 71 (2021 00:09) (71 - 86)  BP: 119/54 (2021 00:09) (107/68 - 125/65)  BP(mean): 89 (10 Apr 2021 19:15) (89 - 89)  RR: 18 (2021 00:09) (18 - 18)  SpO2: 98% (2021 00:09) (98% - 99%)    Physical Exam  Constitutional: well developed, well nourished, in no acute distress and thin.   Eyes: PERRL, EOMI, no conjunctival infection, anicteric.   ENT: pharynx is unremarkable, moist mucus membrane, no oral lesions.   Neck: supple without JVD, no thyromegaly or masses appreciated.   Pulmonary: clear to auscultation bilaterally, no dullness, no wheezing.   Cardiac: RRR, normal S1S2, no murmurs, rubs, gallops.   Vascular: no JVD, no calf tenderness, venous stasis changes, varices.   Abdomen: normoactive bowel sounds, soft and nontender, no hepatosplenomegaly or masses appreciated.   Lymphatic: no peripheral adenopathy appreciated.   Musculoskeletal: full range of motion and no deformities appreciated.   Skin: normal appearance, no rash, nodules, vesicles, ulcers, erythema.   Neurology: grossly intact.   Psychiatric: affect appropriate.       LABS:  CBC Full  -  ( 10 Apr 2021 20:13 )  WBC Count : 55.36 K/uL  RBC Count : 4.16 M/uL  Hemoglobin : 9.3 g/dL  Hematocrit : 28.2 %  Platelet Count - Automated : 86 K/uL  Mean Cell Volume : 67.8 fl  Mean Cell Hemoglobin : 22.4 pg  Mean Cell Hemoglobin Concentration : 33.0 gm/dL  Auto Neutrophil # : x  Auto Lymphocyte # : x  Auto Monocyte # : x  Auto Eosinophil # : x  Auto Basophil # : x  Auto Neutrophil % : x  Auto Lymphocyte % : x  Auto Monocyte % : x  Auto Eosinophil % : x  Auto Basophil % : x    04-10    135  |  103  |  12.0  ----------------------------<  113<H>  4.3   |  19.0<L>  |  0.61    Ca    6.7<L>      10 Apr 2021 13:25  Mg     2.6     04-10    TPro  6.3<L>  /  Alb  3.8  /  TBili  0.4  /  DBili  x   /  AST  13  /  ALT  10  /  AlkPhos  185<H>  04-10      Urinalysis Basic - ( 10 Apr 2021 18:37 )    Color: Yellow / Appearance: Clear / S.010 / pH: x  Gluc: x / Ketone: Trace  / Bili: Negative / Urobili: Negative mg/dL   Blood: x / Protein: 15 mg/dL / Nitrite: Negative   Leuk Esterase: Negative / RBC: 0-2 /HPF / WBC 0-2   Sq Epi: x / Non Sq Epi: Occasional / Bacteria: Occasional        RADIOLOGY & ADDITIONAL STUDIES:  RADIOLOGY & ADDITIONAL STUDIES:  < from: CT Head No Cont (04.10.21 @ 20:22) >   EXAM:  CT BRAIN                          PROCEDURE DATE:  04/10/2021          INTERPRETATION:  Clinical Indication: Syncope.    Comparison: 2020    Technique: Noncontrast axial CT images of the head were acquired. Coronal and sagittal reformats were obtained.    Findings:  Right frontoparietal extra-axial collection is slightly decreased in size from prior exam, now measuring up to 9 mm in diameter, previously up to 1.1 cm. No acute intracranial hemorrhage is identified.  Stable mild associated mass effect is again noted. There is grossly stable size of heterogeneous predominantly hyperdense left frontal lobe lesion (2, 26). No midline shift is seen.  There is no evidence of acute territorial infarct.  There are periventricular and subcortical white matter hypodensities, which are nonspecific, but likely reflect mild microvascular ischemic disease.  The visualized paranasal sinuses are clear. The mastoid air cells are well aerated.  No acute osseous abnormality is seen. Small osteomas are noted in the sphenoid sinus. 2 radiopaque foreign bodies are again noted within the left orbit.      Impression: Slight interval decrease in size of chronic right frontoparietal subdural collection. Grossly stable size of hyperdense left frontal lobe lesion. No acute intracranial abnormality.    < end of copied text >

## 2021-04-11 NOTE — ED CDU PROVIDER DISPOSITION NOTE - SECONDARY DIAGNOSIS.
"History of Present Illness   aMrk Handy is a 24 y.o. man with no significant past medical history who presents today for STD screening. The patient reports that he had unprotected intercourse over the weekend with a new partner. He is requesting screening today. He is asymptomatic. He has no additional complaints and is otherwise healthy on today's visit.    History reviewed. No pertinent past medical history.    History reviewed. No pertinent surgical history.    Social History     Socioeconomic History    Marital status: Single     Spouse name: None    Number of children: None    Years of education: None    Highest education level: None   Social Needs    Financial resource strain: None    Food insecurity - worry: None    Food insecurity - inability: None    Transportation needs - medical: None    Transportation needs - non-medical: None   Occupational History    None   Tobacco Use    Smoking status: Never Smoker   Substance and Sexual Activity    Alcohol use: Yes     Alcohol/week: 0.0 oz    Drug use: None    Sexual activity: None   Other Topics Concern    None   Social History Narrative    None       Family History   Problem Relation Age of Onset    Hypertension Father        Review of Systems  Review of Systems   Constitutional: Negative for fever.   Genitourinary: Negative for dysuria.        Negative for penile discharge.  Negative for penile lesions.   Skin: Negative for itching and rash.     A complete review of systems was otherwise negative.    Physical Exam  /72 (BP Location: Right arm, Patient Position: Sitting, BP Method: Medium (Manual))   Pulse 86   Temp 97.9 °F (36.6 °C) (Oral)   Ht 5' 9" (1.753 m)   Wt 90.7 kg (200 lb)   SpO2 97%   BMI 29.53 kg/m²   General appearance: alert, appears stated age, cooperative and no distress  Male genitalia: normal  Lymph nodes: Cervical, supraclavicular, and axillary nodes normal.  Neurologic: Grossly " normal    Assessment/Plan  Possible exposure to STD  Screening as listed below.  Discussed safe sex practices with patient.  We will treat as necessary pending results.  -     C. trachomatis/N. gonorrhoeae by AMP DNA  -     Trichomonas vaginalis, RNA, Qual, Urine  -     HIV 1/2 Ag/Ab (4th Gen); Future; Expected date: 12/19/2018  -     RPR; Future; Expected date: 12/19/2018    Patient has verbalized understanding and is in agreement with plan of care.    Follow-up if symptoms worsen or fail to improve.   Gastric cancer

## 2021-04-11 NOTE — PROGRESS NOTE ADULT - SUBJECTIVE AND OBJECTIVE BOX
Formerly [facility]                                   [address] [place] [address]                                                      PHONE: [phone]                                                         FAX: [phone]  [email or web address]  ---------------------------------------------------------------------------------------------------------------------------------    Overnight events/patient complaints: Still reports poor appetite; received IV fluids. No recurrence of syncope or dizziness. No tele events.      No Known Allergies    MEDICATIONS  (STANDING):  aspirin  chewable 81 milliGRAM(s) Oral daily  diVALproex  milliGRAM(s) Oral two times a day  ferrous    sulfate 325 milliGRAM(s) Oral daily  gabapentin 300 milliGRAM(s) Oral three times a day  pantoprazole    Tablet 40 milliGRAM(s) Oral two times a day    MEDICATIONS  (PRN):      Vital Signs Last 24 Hrs  T(C): 36.9 (2021 07:41), Max: 36.9 (10 Apr 2021 19:15)  T(F): 98.4 (2021 07:41), Max: 98.5 (10 Apr 2021 19:15)  HR: 73 (2021 07:41) (71 - 86)  BP: 115/69 (2021 07:41) (107/68 - 125/65)  BP(mean): 89 (10 Apr 2021 19:15) (89 - 89)  RR: 16 (2021 07:41) (16 - 18)  SpO2: 97% (2021 07:41) (97% - 99%)  ICU Vital Signs Last 24 Hrs  PETER GAMBOA  I&O's Detail    I&O's Summary    Drug Dosing Weight  PETER GAMBOA      PHYSICAL EXAM:  General: NAD.  HEENT: Head; normocephalic.  Eyes: Pupils reactive.  Neck: Supple.  CARDIOVASCULAR: RRR.   LUNGS: No rales, rhonchi or wheeze. Normal breath sounds bilaterally.  ABDOMEN: Soft.  EXTREMITIES: No clubbing, cyanosis or edema.   SKIN: warm.  NEURO: Alert/oriented x 3.    PSYCH: normal affect.        LABS:                        9.3    55.36 )-----------( 86       ( 10 Apr 2021 20:13 )             28.2     04-10    135  |  103  |  12.0  ----------------------------<  113<H>  4.3   |  19.0<L>  |  0.61    Ca    6.7<L>      10 Apr 2021 13:25  Mg     2.6     04-10    TPro  6.3<L>  /  Alb  3.8  /  TBili  0.4  /  DBili  x   /  AST  13  /  ALT  10  /  AlkPhos  185<H>  04-10    PETER GAMBOA  CARDIAC MARKERS ( 10 Apr 2021 20:13 )  x     / <0.01 ng/mL / x     / x     / x      CARDIAC MARKERS ( 10 Apr 2021 18:13 )  x     / <0.01 ng/mL / x     / x     / x      CARDIAC MARKERS ( 10 Apr 2021 13:25 )  x     / <0.01 ng/mL / x     / x     / x            Urinalysis Basic - ( 10 Apr 2021 18:37 )    Color: Yellow / Appearance: Clear / S.010 / pH: x  Gluc: x / Ketone: Trace  / Bili: Negative / Urobili: Negative mg/dL   Blood: x / Protein: 15 mg/dL / Nitrite: Negative   Leuk Esterase: Negative / RBC: 0-2 /HPF / WBC 0-2   Sq Epi: x / Non Sq Epi: Occasional / Bacteria: Occasional        RADIOLOGY & ADDITIONAL STUDIES:    INTERPRETATION OF TELEMETRY (personally reviewed): NSR, no events.    ECG: NSR 85 with incomplete RBBB    ECHO: 2019 normal EF    STRESS TEST: Nuclear stress 2019 no ischemia, EF 62%.      Assessment and Plan:  80y Male h/o gastric CA currently undergoing chemo, chronic RBBB, s/p ILR in 2020, HTN, HLD, DM, and smoker who presents after syncopal episode while at an auto store. He had a prodrome of feeling hot then LOC. Last chemo was Tuesday. He reports not eating for 3 days and feeling very weak. He tried to eat here but still lacks appetite. Patient received IV fluids.    1. ILR interrogated yesterday. No correlating arrhythmia. Implant date 20.   2. Episode was likely due to volume depletion and/or vasovagal etiology secondary to poor oral intake x 3 days.  3. Telemetry unremarkable overnight.  4. Stable from cardiac standpoint.  5. Outpatient cardiology f/u and continue remote monitoring of ILR.  6. Please call with any questions or changes.

## 2021-04-11 NOTE — CONSULT NOTE ADULT - ASSESSMENT
HPI:  79 y/o male h/o gastric cancer last chemo several days ago placed in observation for syncope. PT reports he has not eaten over the past three days. He was in the store today when he felt "hot" and had a syncopal episode. He does not believe he struck his head. Denies any HA, dizziness, cp, sob, palpitations, or abdominal pain.    The patient was diagnosed with gastric adenocarcinoma in 2020 at the age of 79. He was sent for CT by his PCP, Dr. Charles Smith, due to anemia. CT showed in the upper central abdomen abutting the posterior antrum of the stomach and neck of the pancreas there is a 3 x 2 x 3 cm mass. There is an additional heterogeneous enhancing 2.7 x 2.3 x 2.5 lobulated mass in the ventral upper abdominal peritoneal cavity anterosuperiorly to the antrum of the stomach with internal and surrounding enhancing vessels. Endoscopy with biopsy of the gastric mass was c/w moderately differentiated adenocarcinoma. Staging PET showed hypermetabolic thickening of the distal stomach, consistent with primary gastric cancer. Hypermetabolic activity in the distal stomach, inseparable from the perigastric lymphadenopathy, consistent with metastatic kong disease.     TNM stage: T3, N2, M0       PAST MEDICAL & SURGICAL HISTORY:  Prostate cancer    DM (diabetes mellitus)    Gastric cancer  on chemo    Benign essential HTN    SDH (subdural hematoma)    RBBB (right bundle branch block)    Port-A-Cath in place  2020 right ACW    Status post placement of implantable loop recorder  left ACW        MEDICATIONS  (STANDING):  aspirin  chewable 81 milliGRAM(s) Oral daily  diVALproex  milliGRAM(s) Oral two times a day  ferrous    sulfate 325 milliGRAM(s) Oral daily  gabapentin 300 milliGRAM(s) Oral three times a day  pantoprazole    Tablet 40 milliGRAM(s) Oral two times a day    MEDICATIONS  (PRN):      Allergies    No Known Allergies    Intolerances        FAMILY HISTORY:  FH: diabetes mellitus (Father)        Review of Systems    Constitutional, Eyes, ENT, Cardiovascular, Respiratory, Gastrointestinal, Genitourinary, Musculoskeletal, Integumentary, Neurological, Psychiatric, Endocrine, Heme/Lymph and Allergic/Immunologic review of systems are otherwise negative except as noted in HPI.     Vital Signs Last 24 Hrs  T(C): 36.7 (2021 00:09), Max: 36.9 (10 Apr 2021 19:15)  T(F): 98 (2021 00:09), Max: 98.5 (10 Apr 2021 19:15)  HR: 71 (2021 00:09) (71 - 86)  BP: 119/54 (2021 00:09) (107/68 - 125/65)  BP(mean): 89 (10 Apr 2021 19:15) (89 - 89)  RR: 18 (2021 00:09) (18 - 18)  SpO2: 98% (2021 00:09) (98% - 99%)    Physical Exam  Constitutional: well developed, well nourished, in no acute distress and thin.   Eyes: PERRL, EOMI, no conjunctival infection, anicteric.   ENT: pharynx is unremarkable, moist mucus membrane, no oral lesions.   Neck: supple without JVD, no thyromegaly or masses appreciated.   Pulmonary: clear to auscultation bilaterally, no dullness, no wheezing.   Cardiac: RRR, normal S1S2, no murmurs, rubs, gallops.   Vascular: no JVD, no calf tenderness, venous stasis changes, varices.   Abdomen: normoactive bowel sounds, soft and nontender, no hepatosplenomegaly or masses appreciated.   Lymphatic: no peripheral adenopathy appreciated.   Musculoskeletal: full range of motion and no deformities appreciated.   Skin: normal appearance, no rash, nodules, vesicles, ulcers, erythema.   Neurology: grossly intact.   Psychiatric: affect appropriate.       LABS:  CBC Full  -  ( 10 Apr 2021 20:13 )  WBC Count : 55.36 K/uL  RBC Count : 4.16 M/uL  Hemoglobin : 9.3 g/dL  Hematocrit : 28.2 %  Platelet Count - Automated : 86 K/uL  Mean Cell Volume : 67.8 fl  Mean Cell Hemoglobin : 22.4 pg  Mean Cell Hemoglobin Concentration : 33.0 gm/dL  Auto Neutrophil # : x  Auto Lymphocyte # : x  Auto Monocyte # : x  Auto Eosinophil # : x  Auto Basophil # : x  Auto Neutrophil % : x  Auto Lymphocyte % : x  Auto Monocyte % : x  Auto Eosinophil % : x  Auto Basophil % : x    04-10    135  |  103  |  12.0  ----------------------------<  113<H>  4.3   |  19.0<L>  |  0.61    Ca    6.7<L>      10 Apr 2021 13:25  Mg     2.6     04-10    TPro  6.3<L>  /  Alb  3.8  /  TBili  0.4  /  DBili  x   /  AST  13  /  ALT  10  /  AlkPhos  185<H>  04-10      Urinalysis Basic - ( 10 Apr 2021 18:37 )    Color: Yellow / Appearance: Clear / S.010 / pH: x  Gluc: x / Ketone: Trace  / Bili: Negative / Urobili: Negative mg/dL   Blood: x / Protein: 15 mg/dL / Nitrite: Negative   Leuk Esterase: Negative / RBC: 0-2 /HPF / WBC 0-2   Sq Epi: x / Non Sq Epi: Occasional / Bacteria: Occasional        RADIOLOGY & ADDITIONAL STUDIES:  < from: CT Head No Cont (04.10.21 @ 20:22) >   EXAM:  CT BRAIN                          PROCEDURE DATE:  04/10/2021          INTERPRETATION:  Clinical Indication: Syncope.    Comparison: 2020    Technique: Noncontrast axial CT images of the head were acquired. Coronal and sagittal reformats were obtained.    Findings:  Right frontoparietal extra-axial collection is slightly decreased in size from prior exam, now measuring up to 9 mm in diameter, previously up to 1.1 cm. No acute intracranial hemorrhage is identified.  Stable mild associated mass effect is again noted. There is grossly stable size of heterogeneous predominantly hyperdense left frontal lobe lesion (2, 26). No midline shift is seen.  There is no evidence of acute territorial infarct.  There are periventricular and subcortical white matter hypodensities, which are nonspecific, but likely reflect mild microvascular ischemic disease.  The visualized paranasal sinuses are clear. The mastoid air cells are well aerated.  No acute osseous abnormality is seen. Small osteomas are noted in the sphenoid sinus. 2 radiopaque foreign bodies are again noted within the left orbit.      Impression: Slight interval decrease in size of chronic right frontoparietal subdural collection. Grossly stable size of hyperdense left frontal lobe lesion. No acute intracranial abnormality.    < end of copied text >      C4D1 adjuvant FLOT for gastric adenocarcinoma s/p robotic assisted laparoscopic distal gastrectomy with Billroth 2 gastrojejunostomy reconstruction and placement of feeding jejunostomy tube on 20 with Dr Young. He is s/p 4 cycles neoadjuvant FLOT.      The patient was diagnosed with gastric adenocarcinoma in June 2020 at the age of 79. He was sent for CT by his PCP, Dr. Charles Smith, due to anemia. CT showed in the upper central abdomen abutting the posterior antrum of the stomach and neck of the pancreas there is a 3 x 2 x 3 cm mass. There is an additional heterogeneous enhancing 2.7 x 2.3 x 2.5 lobulated mass in the ventral upper abdominal peritoneal cavity anterosuperiorly to the antrum of the stomach with internal and surrounding enhancing vessels. Endoscopy with biopsy of the gastric mass was c/w moderately differentiated adenocarcinoma. Staging PET showed hypermetabolic thickening of the distal stomach, consistent with primary gastric cancer. Hypermetabolic activity in the distal stomach, inseparable from the perigastric lymphadenopathy, consistent with metastatic kong disease.     TNM stage: T3, N2, M0   C4D1 adjuvant FLOT for gastric adenocarcinoma s/p robotic assisted laparoscopic distal gastrectomy with Billroth 2 gastrojejunostomy reconstruction and placement of feeding jejunostomy tube on 11/9/20 with Dr Young. He is s/p 4 cycles neoadjuvant FLOT.    80y Male h/o gastric CA currently undergoing chemo, chronic RBBB, s/p ILR in Sept 2020, HTN, HLD, DM, and smoker who presents after syncopal episode while at an auto store. He had a prodrome of feeling hot then LOC. Last chemo was Tuesday. He reports not eating for 3 days and feeling very weak.      Oncologic Hx:\  The patient was diagnosed with gastric adenocarcinoma in June 2020 at the age of 79. He was sent for CT by his PCP, Dr. Charles Smith, due to anemia. CT showed in the upper central abdomen abutting the posterior antrum of the stomach and neck of the pancreas there is a 3 x 2 x 3 cm mass. There is an additional heterogeneous enhancing 2.7 x 2.3 x 2.5 lobulated mass in the ventral upper abdominal peritoneal cavity anterosuperiorly to the antrum of the stomach with internal and surrounding enhancing vessels. Endoscopy with biopsy of the gastric mass was c/w moderately differentiated adenocarcinoma. Staging PET showed hypermetabolic thickening of the distal stomach, consistent with primary gastric cancer. Hypermetabolic activity in the distal stomach, inseparable from the perigastric lymphadenopathy, consistent with metastatic kong disease.     TNM stage: T3, N2, M0   C4D1 adjuvant FLOT for gastric adenocarcinoma s/p robotic assisted laparoscopic distal gastrectomy with Billroth 2 gastrojejunostomy reconstruction and placement of feeding jejunostomy tube on 11/9/20 with Dr Young. He is s/p 4 cycles neoadjuvant FLOT.

## 2021-04-11 NOTE — ED ADULT NURSE REASSESSMENT NOTE - NS ED NURSE REASSESS COMMENT FT1
Pt resting comfortably in bed meds given as per orders. Pt VSS, no complaints for RN will continue to monitor.
Pt sleeping comfortably in bed no distress noted no complaints for RN at this time will continue to monitor.
Verbal report given to ARI Biswas, pt breathing easy and unlabored, VSS care endorsed to OBS nurse now .Pt moved to CDU Bed 10
Assumed care of the patient @4230 Pt A&Ox4. PO intake encouraged. Denies c/o pain at this time. Patient in understanding of plan of care. Patient with no further questions for the RN. Resting in comfort. Call bell within reach and encouraged to use when assistance needed. Will continue to monitor.
Assumed care of the Pt at 1930. Verbal report received from HELLEN Peters. Pt is AOx4 in no acute distress. Pt Denies any chest pain, shortness of breath, nausea or dizziness at this time. no complaints for RN, VSS, PIV patent. pending CT and repeat labs. pt NSR on CM as per monitor tech. Pt urinal. Pt given call bell, Pt instructed on how to use call, call bell within reach. Pt pending CT and lab results. pt in understanding of plan of care wait time explained, plan of care explained, will continue to monitor.

## 2021-04-11 NOTE — CONSULT NOTE ADULT - PROBLEM SELECTOR RECOMMENDATION 9
T3, N2, M0 gastric adenoca  -C4D6 adjuvant FLOT for gastric adenocarcinoma   -s/p robotic assisted laparoscopic distal gastrectomy with Billroth 2 gastrojejunostomy reconstruction and placement of feeding jejunostomy tube on 11/9/20 with Dr Young.   -He is s/p 4 cycles neoadjuvant FLOT. T3, N2, M0 gastric adenoca  -C4D6 adjuvant FLOT for gastric adenocarcinoma   -s/p robotic assisted laparoscopic distal gastrectomy with Billroth 2 gastrojejunostomy reconstruction and placement of feeding jejunostomy tube on 11/9/20 with Dr Young.   -He is s/p 4 cycles neoadjuvant FLOT.  -leukocytosis 55K s/t neulasta onpro given on 4/8/21

## 2021-04-11 NOTE — ED CDU PROVIDER DISPOSITION NOTE - PATIENT PORTAL LINK FT
You can access the FollowMyHealth Patient Portal offered by [facility] by registering at the following website: [email or web address]. By joining [employer]’s FollowMyHealth portal, you will also be able to view your health information using other applications (apps) compatible with our system.

## 2021-04-15 ENCOUNTER — APPOINTMENT (OUTPATIENT)
Dept: NUCLEAR MEDICINE | Facility: CLINIC | Age: 81
End: 2021-04-15
Payer: MEDICARE

## 2021-04-15 ENCOUNTER — OUTPATIENT (OUTPATIENT)
Dept: OUTPATIENT SERVICES | Facility: HOSPITAL | Age: 81
LOS: 1 days | End: 2021-04-15

## 2021-04-15 DIAGNOSIS — Z95.828 PRESENCE OF OTHER VASCULAR IMPLANTS AND GRAFTS: Chronic | ICD-10-CM

## 2021-04-15 DIAGNOSIS — Z95.818 PRESENCE OF OTHER CARDIAC IMPLANTS AND GRAFTS: Chronic | ICD-10-CM

## 2021-04-15 DIAGNOSIS — C16.9 MALIGNANT NEOPLASM OF STOMACH, UNSPECIFIED: ICD-10-CM

## 2021-04-15 PROCEDURE — 78815 PET IMAGE W/CT SKULL-THIGH: CPT | Mod: 26,PS

## 2021-04-15 NOTE — ED CDU PROVIDER SUBSEQUENT DAY NOTE - ATTENDING CONTRIBUTION TO CARE
Pt resting comfortably. Pending reassessment by cards, PO challenge. I, Dr. Shabazz, performed a face to face bedside interview with this patient regarding history of present illness, review of symptoms and relevant past medical, social and family history.  I completed an independent physical examination.  I have also reviewed the ACP's note(s) and discussed the plan with the ACP.

## 2021-04-20 ENCOUNTER — RESULT REVIEW (OUTPATIENT)
Age: 81
End: 2021-04-20

## 2021-04-20 ENCOUNTER — APPOINTMENT (OUTPATIENT)
Dept: HEMATOLOGY ONCOLOGY | Facility: CLINIC | Age: 81
End: 2021-04-20
Payer: MEDICARE

## 2021-04-20 VITALS
HEIGHT: 66 IN | BODY MASS INDEX: 20.27 KG/M2 | DIASTOLIC BLOOD PRESSURE: 89 MMHG | OXYGEN SATURATION: 97 % | SYSTOLIC BLOOD PRESSURE: 127 MMHG | WEIGHT: 126.13 LBS | HEART RATE: 86 BPM

## 2021-04-20 LAB
BASOPHILS # BLD AUTO: 0.1 K/UL — SIGNIFICANT CHANGE UP (ref 0–0.2)
BASOPHILS NFR BLD AUTO: 0.5 % — SIGNIFICANT CHANGE UP (ref 0–2)
EOSINOPHIL # BLD AUTO: 0 K/UL — SIGNIFICANT CHANGE UP (ref 0–0.5)
EOSINOPHIL NFR BLD AUTO: 0.2 % — SIGNIFICANT CHANGE UP (ref 0–6)
HCT VFR BLD CALC: 37.4 % — LOW (ref 39–50)
HGB BLD-MCNC: 11.4 G/DL — LOW (ref 13–17)
LYMPHOCYTES # BLD AUTO: 2.1 K/UL — SIGNIFICANT CHANGE UP (ref 1–3.3)
LYMPHOCYTES # BLD AUTO: 9.1 % — LOW (ref 13–44)
MCHC RBC-ENTMCNC: 21.5 PG — LOW (ref 27–34)
MCHC RBC-ENTMCNC: 30.4 G/DL — LOW (ref 32–36)
MCV RBC AUTO: 70.9 FL — LOW (ref 80–100)
MONOCYTES # BLD AUTO: 1.5 K/UL — HIGH (ref 0–0.9)
MONOCYTES NFR BLD AUTO: 6.7 % — SIGNIFICANT CHANGE UP (ref 2–14)
NEUTROPHILS # BLD AUTO: 18.7 K/UL — HIGH (ref 1.8–7.4)
NEUTROPHILS NFR BLD AUTO: 83.5 % — HIGH (ref 43–77)
PLATELET # BLD AUTO: 184 K/UL — SIGNIFICANT CHANGE UP (ref 150–400)
RBC # BLD: 5.27 M/UL — SIGNIFICANT CHANGE UP (ref 4.2–5.8)
RBC # FLD: 22.4 % — HIGH (ref 10.3–14.5)
WBC # BLD: 22.4 K/UL — HIGH (ref 3.8–10.5)
WBC # FLD AUTO: 22.4 K/UL — HIGH (ref 3.8–10.5)

## 2021-04-20 PROCEDURE — 99072 ADDL SUPL MATRL&STAF TM PHE: CPT

## 2021-04-20 PROCEDURE — 99213 OFFICE O/P EST LOW 20 MIN: CPT

## 2021-04-20 NOTE — HISTORY OF PRESENT ILLNESS
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 He was sent for CT by his PCP, Dr. Charles Smith, due to anemia. CT showed in the upper central abdomen abutting the posterior antrum of the stomach and neck of the pancreas there is a 3 x 2 x 3 cm mass. There is an additional heterogeneous enhancing 2.7 x 2.3 x 2.5 lobulated mass in the ventral upper abdominal peritoneal cavity anterosuperiorly to the antrum of the stomach with internal and surrounding enhancing vessels. Endoscopy with biopsy of the gastric mass was c/w moderately differentiated adenocarcinoma. Staging PET showed hypermetabolic thickening of the distal stomach, consistent with primary gastric cancer. Hypermetabolic activity in the distal stomach, inseparable from the perigastric lymphadenopathy, consistent with metastatic kong disease. \par \par TNM stage: T3, N2, M0 \par \par \par Patient completed 4 cycles of postoperative adjuvant FLOT for gastric adenocarcinoma s/p robotic assisted laparoscopic distal gastrectomy with Billroth 2 gastrojejunostomy reconstruction and placement of feeding jejunostomy tube on 11/9/20 with Dr Young. He is s/p 4 cycles neoadjuvant FLOT. \par + Fatigue, intermittent and mild. + Peripheral neuropathy, improved with gabapentin. + Occasional emesis after "eating too much," denies nausea. + Constipation, improved. + Alopecia. + Nail changes. + Cold intolerance improving. + Decreased appetite with mild weight loss. PEG removed by Dr. Young. + Grade 1 H/F, xeroderma only, no pain. No myalgias. No weakness. No dysgeusia. No fevers, no cough, no SOB. \par  [de-identified] : Follow-up PET/CT April 15, 2021:\par \par IMPRESSION:\par \par 1.  Postsurgical changes of gastrectomy and gastrojejunostomy. Mild homogeneous FDG avidity in the residual stomach is nonspecific, possibly physiologic. No focal FDG avidity at the anastomosis.\par \par 2.  No evidence of FDG avid or enlarged perigastric lymph nodes however evaluation is limited due to lack of intravenous contrast and paucity of intra-abdominal fat which makes evaluation difficult.\par \par 3.  Widespread diffuse FDG avidity of the bone marrow, probably due to administration of colony stimulating factors. Clinical correlation suggested.\par \par 4.  Subcentimeter nodular opacities left upper lobe, too small to characterize.\par

## 2021-04-20 NOTE — ASSESSMENT
[FreeTextEntry1] :  ypT3 N2. gastric cancer, her 2 + [was at least T3N1 by EUS criteria].\par     -s/p ERCP 7/10/20. Pathology: Gastric, Antrum, Ulcerated mass - moderately\par     differentiated adenocarcinoma\par     -s/p 4 cycles neoadjuvant FLOT (8/17/20 - 10/5/20): docetaxel (50 mg/m2), oxaliplatin\par     (85 mg/m2), LV (200 mg/m2) with short-term infusional FU (2600 mg/m2 as a\par     24-hour infusion), on day 1 Q2 weeks. \par     -s/p surgery with Dr. Young 11/9/20. Distal gastrectomy and PEG placed. Path c/w\par     residual gastric adenocarcinoma, moderately differentiated, status post treatment.\par     Vascular invasion identified. 4/29 lymph nodes involved by adenocarcinoma,\par     negative margins. Tumor stage: ypT3 N2. \par     -C4D1 of 4 cycles of postop FLOT. Pt had requested to remain off\par     adjuvant FLOT until 2/21, AMA. Patient also requested dose reduction. \par     -post treatment PET - GRANT\par

## 2021-04-20 NOTE — RESULTS/DATA
[FreeTextEntry1] : 11/20/20 path:  Procedure: Partial gastrectomy, distal with lymph node dissection\par Tumor Site: Antrum, Lesser curvature\par Tumor Size:  2.5 x 0.7 x 0.3 cm\par Histologic Type:  Adenocarcinoma, Intestinal type\par Alternate optional WHO classification: Tubular\par (intestinal) adenocarcinoma\par Histologic Grade:  G2: Moderately differentiated\par Microscopic Extent of Tumor:  Tumor invades subserosal connective\par tissue without involvement of visceral peritoneum\par Margins: All margins are negative for invasive carcinoma and\par dysplasia\par Margins examined: proximal and distal (see comment below on\par proximal margin)\par Distance of invasive carcinoma from closest margin: 0.7 cm\par Specify margin: Distal margin\par Treatment effect:  Present: Residual cancer with evident tumor\par regression, but more than single cells or rare small groups of\par cancer cells (partial response, score 2)\par Lymph-Vascular Invasion:  Present\par Perineural invasion: Not identified\par Regional Lymph Nodes:\par Number of Lymph Nodes Involved: 4 (include all parts)\par Number of Lymph Nodes Examined: 29 (include all parts)\par Pathologic Staging (pTNM):\par Primary Tumor (pT):  pT3: Tumor invades subserosal connective\par tissue, without involvement of visceral peritoneum or adjacent\par structures\par Regional Lymph Nodes (pN):  pN2: Metastasis in 3 to 6\par perigastric lymph nodes\par Distant Metastasis (pM): Not applicable\par Additional Pathologic Findings:  Intestinal metaplasia and\par chronic gastritis\par Ancillary studies: [id number] immunohistochemical stain to be\par reported as addendum.\par \par 7/20/20 PET:\par Small hypermetabolic foci in the mediastinum and kings, corresponding to small mediastinal and bilateral hilar lymph nodes on CT.  The imaging appearance of these lymph nodes is nonspecific and favors reactive lymph nodes.  Hypermetabolic activity in the right upper quadrant abdomen, corresponding to extensive thickening of the distal stomach.  4.7 cm of stomach is involved.  SUV 8.2-11.2.  Proximal to this area of hypermetabolic thickening of the distal stomach, the proximal stomach is distended and contained air fluid contrast level.  The hypermetabolic activity in the thickening of the stomach is inseparable from perigastric lymph nodes.  Perigastric lymph node measures 2.5x1.6 cm SUV 12.5.  No evidence of additional enlarged intra-abdominal, pelvic or inguinal lymphadenopathy.  Radiation seeds noted in prostate.  No other abnormal hypermetabolic activity to suggest additional sites of malignancy.\par \par 7/10/20 Pathology:\par Gastric, Antrum, Ulcerated mass: moderately differentiated adenocarcinoma\par \par 6/19/20 CT A/P:\par In the upper central abdomen abutting the posterior antrum of the stomach and neck of the pancreas, there is a nonspecific heterogeneously enhancing circumscribed 3 x 2 x 3 cm mass.  There is an additional heterogeneous enhancing 2.7 x 2.3 x 2.5 lobulated mass in the ventral upper abdominal peritoneal cavity anterosuperiorly to the antrum of the stomach with internal and surrounding enhancing vessels.  Extensive enhancing severe bowel wall thickening of the entire coon and rectum compatible with colitis/proctitis which may be infectious/inflammatory. Moderate rugal fold thickening of the fundus of the stomach which may represent gastritis.

## 2021-04-20 NOTE — HISTORY OF PRESENT ILLNESS
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  He was sent for CT by his PCP, Dr. Charles Smith, due to anemia.  CT showed in the upper central abdomen abutting the posterior antrum of the stomach and neck of the pancreas there is a  3 x 2 x 3 cm mass.  There is an additional heterogeneous enhancing 2.7 x 2.3 x 2.5 lobulated mass in the ventral upper abdominal peritoneal cavity anterosuperiorly to the antrum of the stomach with internal and surrounding enhancing vessels.  Endoscopy with biopsy of the gastric mass was c/w moderately differentiated adenocarcinoma.  Staging PET showed hypermetabolic thickening of the distal stomach, consistent with primary gastric cancer.  Hypermetabolic activity in the distal stomach, inseparable from the perigastric lymphadenopathy, consistent with metastatic kong disease. [de-identified] : Patient presents on C2D1 adjuvant FLOT for gastric adenocarcinoma s/p robotic assisted laparoscopic distal gastrectomy with Billroth 2 gastrojejunostomy reconstruction and placement of feeding jejunostomy tube on 11/9/20 with Dr Young.  He is s/p 4 cycles neoadjuvant FLOT.  \par + Fatigue. + Peripheral neuropathy. + N/V, intermittent. + Constipation. + Alopecia. + Nail changes  + Cold intolerance. No myalgia.  No weakness.  Appetite improved and gained weight recently. PEG removed by Dr. Young. No dysgeusia.  No H/F syndrome. No fevers, no cough, no SOB.

## 2021-04-21 NOTE — PROVIDER CONTACT NOTE (CRITICAL VALUE NOTIFICATION) - NS PROVIDER READ BACK TO LAB
Total Volume (Ccs): 1 yes Kenalog Preparation: kenalog Consent: The risks of atrophy were reviewed with the patient. Concentration (Mg/Ml) Of Additional Medication: 2.5 Concentration (Mg/Ml): 40.0 Detail Level: None Add Option For Additional Mediation: No

## 2021-04-22 LAB
ALBUMIN SERPL ELPH-MCNC: 4.2 G/DL
ALP BLD-CCNC: 232 U/L
ALT SERPL-CCNC: 7 U/L
ANION GAP SERPL CALC-SCNC: 13 MMOL/L
AST SERPL-CCNC: 12 U/L
BILIRUB SERPL-MCNC: 0.2 MG/DL
BUN SERPL-MCNC: 8 MG/DL
CALCIUM SERPL-MCNC: 8 MG/DL
CHLORIDE SERPL-SCNC: 104 MMOL/L
CO2 SERPL-SCNC: 22 MMOL/L
CREAT SERPL-MCNC: 0.81 MG/DL
GLUCOSE SERPL-MCNC: 55 MG/DL
MAGNESIUM SERPL-MCNC: 2.4 MG/DL
POTASSIUM SERPL-SCNC: 4.2 MMOL/L
PROT SERPL-MCNC: 6.4 G/DL
SODIUM SERPL-SCNC: 139 MMOL/L

## 2021-05-18 ENCOUNTER — OUTPATIENT (OUTPATIENT)
Dept: OUTPATIENT SERVICES | Facility: HOSPITAL | Age: 81
LOS: 1 days | Discharge: HOME | End: 2021-05-18

## 2021-05-18 DIAGNOSIS — C16.9 MALIGNANT NEOPLASM OF STOMACH, UNSPECIFIED: ICD-10-CM

## 2021-05-18 DIAGNOSIS — Z95.818 PRESENCE OF OTHER CARDIAC IMPLANTS AND GRAFTS: Chronic | ICD-10-CM

## 2021-05-18 DIAGNOSIS — Z95.828 PRESENCE OF OTHER VASCULAR IMPLANTS AND GRAFTS: Chronic | ICD-10-CM

## 2021-05-20 ENCOUNTER — APPOINTMENT (OUTPATIENT)
Age: 81
End: 2021-05-20

## 2021-06-02 ENCOUNTER — APPOINTMENT (OUTPATIENT)
Age: 81
End: 2021-06-02

## 2021-06-07 NOTE — ED CDU PROVIDER INITIAL DAY NOTE - CROS ED EYES ALL NEG
negative... Full Thickness Lip Wedge Repair (Flap) Text: Given the location of the defect and the proximity to free margins a full thickness wedge repair was deemed most appropriate.  Using a sterile surgical marker, the appropriate repair was drawn incorporating the defect and placing the expected incisions perpendicular to the vermilion border.  The vermilion border was also meticulously outlined to ensure appropriate reapproximation during the repair.  The area thus outlined was incised through and through with a #15 scalpel blade.  The muscularis and dermis were reaproximated with deep sutures following hemostasis. Care was taken to realign the vermilion border before proceeding with the superficial closure.  Once the vermilion was realigned the superfical and mucosal closure was finished.

## 2021-06-28 ENCOUNTER — OUTPATIENT (OUTPATIENT)
Dept: OUTPATIENT SERVICES | Facility: HOSPITAL | Age: 81
LOS: 1 days | Discharge: HOME | End: 2021-06-28

## 2021-06-28 DIAGNOSIS — C16.9 MALIGNANT NEOPLASM OF STOMACH, UNSPECIFIED: ICD-10-CM

## 2021-06-28 DIAGNOSIS — Z95.818 PRESENCE OF OTHER CARDIAC IMPLANTS AND GRAFTS: Chronic | ICD-10-CM

## 2021-06-28 DIAGNOSIS — Z95.828 PRESENCE OF OTHER VASCULAR IMPLANTS AND GRAFTS: Chronic | ICD-10-CM

## 2021-07-01 ENCOUNTER — APPOINTMENT (OUTPATIENT)
Age: 81
End: 2021-07-01

## 2021-07-22 ENCOUNTER — APPOINTMENT (OUTPATIENT)
Dept: HEMATOLOGY ONCOLOGY | Facility: CLINIC | Age: 81
End: 2021-07-22
Payer: MEDICARE

## 2021-07-22 ENCOUNTER — RESULT REVIEW (OUTPATIENT)
Age: 81
End: 2021-07-22

## 2021-07-22 ENCOUNTER — APPOINTMENT (OUTPATIENT)
Age: 81
End: 2021-07-22

## 2021-07-22 VITALS
HEART RATE: 84 BPM | BODY MASS INDEX: 22.02 KG/M2 | WEIGHT: 137.04 LBS | OXYGEN SATURATION: 95 % | SYSTOLIC BLOOD PRESSURE: 136 MMHG | DIASTOLIC BLOOD PRESSURE: 76 MMHG | HEIGHT: 66 IN

## 2021-07-22 LAB
ALBUMIN SERPL ELPH-MCNC: 4.6 G/DL
ALP BLD-CCNC: 101 U/L
ALT SERPL-CCNC: 9 U/L
ANION GAP SERPL CALC-SCNC: 11 MMOL/L
AST SERPL-CCNC: 13 U/L
BASOPHILS # BLD AUTO: 0.1 K/UL — SIGNIFICANT CHANGE UP (ref 0–0.2)
BASOPHILS NFR BLD AUTO: 1.3 % — SIGNIFICANT CHANGE UP (ref 0–2)
BILIRUB SERPL-MCNC: 0.2 MG/DL
BUN SERPL-MCNC: 10 MG/DL
CALCIUM SERPL-MCNC: 9.8 MG/DL
CHLORIDE SERPL-SCNC: 105 MMOL/L
CO2 SERPL-SCNC: 23 MMOL/L
CREAT SERPL-MCNC: 0.83 MG/DL
EOSINOPHIL # BLD AUTO: 0.1 K/UL — SIGNIFICANT CHANGE UP (ref 0–0.5)
EOSINOPHIL NFR BLD AUTO: 2.3 % — SIGNIFICANT CHANGE UP (ref 0–6)
GLUCOSE SERPL-MCNC: 75 MG/DL
HCT VFR BLD CALC: 45.5 % — SIGNIFICANT CHANGE UP (ref 39–50)
HGB BLD-MCNC: 13.8 G/DL — SIGNIFICANT CHANGE UP (ref 13–17)
LYMPHOCYTES # BLD AUTO: 1.5 K/UL — SIGNIFICANT CHANGE UP (ref 1–3.3)
LYMPHOCYTES # BLD AUTO: 33.2 % — SIGNIFICANT CHANGE UP (ref 13–44)
MCHC RBC-ENTMCNC: 24.2 PG — LOW (ref 27–34)
MCHC RBC-ENTMCNC: 30.3 G/DL — LOW (ref 32–36)
MCV RBC AUTO: 79.7 FL — LOW (ref 80–100)
MONOCYTES # BLD AUTO: 0.5 K/UL — SIGNIFICANT CHANGE UP (ref 0–0.9)
MONOCYTES NFR BLD AUTO: 11.8 % — SIGNIFICANT CHANGE UP (ref 2–14)
NEUTROPHILS # BLD AUTO: 2.4 K/UL — SIGNIFICANT CHANGE UP (ref 1.8–7.4)
NEUTROPHILS NFR BLD AUTO: 51.4 % — SIGNIFICANT CHANGE UP (ref 43–77)
PLATELET # BLD AUTO: 174 K/UL — SIGNIFICANT CHANGE UP (ref 150–400)
POTASSIUM SERPL-SCNC: 4.8 MMOL/L
PROT SERPL-MCNC: 7.2 G/DL
RBC # BLD: 5.7 M/UL — SIGNIFICANT CHANGE UP (ref 4.2–5.8)
RBC # FLD: 15.8 % — HIGH (ref 10.3–14.5)
SODIUM SERPL-SCNC: 139 MMOL/L
WBC # BLD: 4.6 K/UL — SIGNIFICANT CHANGE UP (ref 3.8–10.5)
WBC # FLD AUTO: 4.6 K/UL — SIGNIFICANT CHANGE UP (ref 3.8–10.5)

## 2021-07-22 PROCEDURE — 99214 OFFICE O/P EST MOD 30 MIN: CPT

## 2021-07-22 PROCEDURE — 99072 ADDL SUPL MATRL&STAF TM PHE: CPT

## 2021-07-22 NOTE — RESULTS/DATA
[FreeTextEntry1] : Follow-up PET/CT April 15, 2021:\par \par IMPRESSION:\par \par 1.  Postsurgical changes of gastrectomy and gastrojejunostomy. Mild homogeneous FDG avidity in the residual stomach is nonspecific, possibly physiologic. No focal FDG avidity at the anastomosis.\par \par 2.  No evidence of FDG avid or enlarged perigastric lymph nodes however evaluation is limited due to lack of intravenous contrast and paucity of intra-abdominal fat which makes evaluation difficult.\par \par 3.  Widespread diffuse FDG avidity of the bone marrow, probably due to administration of colony stimulating factors. Clinical correlation suggested.\par \par 4.  Subcentimeter nodular opacities left upper lobe, too small to characterize.\par

## 2021-07-22 NOTE — ASSESSMENT
[FreeTextEntry1] :  ypT3 N2. gastric cancer, her 2 + [was at least T3N1 by EUS criteria].\par     -s/p ERCP 7/10/20. Pathology: Gastric, Antrum, Ulcerated mass - moderately\par     differentiated adenocarcinoma\par     -s/p 4 cycles neoadjuvant FLOT (8/17/20 - 10/5/20): docetaxel (50 mg/m2), oxaliplatin\par     (85 mg/m2), LV (200 mg/m2) with short-term infusional FU (2600 mg/m2 as a\par     24-hour infusion), on day 1 Q2 weeks. \par     -s/p surgery with Dr. Young 11/9/20. Distal gastrectomy and PEG placed. Path c/w\par     residual gastric adenocarcinoma, moderately differentiated, status post treatment.\par     Vascular invasion identified. 4/29 lymph nodes involved by adenocarcinoma,\par     negative margins. Tumor stage: ypT3 N2. \par     -C4D1 of 4 cycles of postop FLOT. Pt had requested to remain off\par     adjuvant FLOT until 2/21, AMA. Patient also requested dose reduction. \par     -post treatment PET 4/21- GRANT\par - PET/CT ordered\par - F/U with Dr. Corral after scans and to continue surveillance.

## 2021-07-22 NOTE — HISTORY OF PRESENT ILLNESS
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 He was sent for CT by his PCP, Dr. Charles Smith, due to anemia. CT showed in the upper central abdomen abutting the posterior antrum of the stomach and neck of the pancreas there is a 3 x 2 x 3 cm mass. There is an additional heterogeneous enhancing 2.7 x 2.3 x 2.5 lobulated mass in the ventral upper abdominal peritoneal cavity anterosuperiorly to the antrum of the stomach with internal and surrounding enhancing vessels. Endoscopy with biopsy of the gastric mass was c/w moderately differentiated adenocarcinoma. Staging PET showed hypermetabolic thickening of the distal stomach, consistent with primary gastric cancer. Hypermetabolic activity in the distal stomach, inseparable from the perigastric lymphadenopathy, consistent with metastatic kong disease. \par \par TNM stage: T3, N2, M0 \par \par \par Patient completed 4 cycles of postoperative adjuvant FLOT for gastric adenocarcinoma s/p robotic assisted laparoscopic distal gastrectomy with Billroth 2 gastrojejunostomy reconstruction and placement of feeding jejunostomy tube on 11/9/20 with Dr Young. He is s/p 4 cycles neoadjuvant FLOT. \par + Fatigue, intermittent and mild. + Peripheral neuropathy, improved with gabapentin. + Occasional emesis after "eating too much," denies nausea. + Constipation, improved. + Alopecia. + Nail changes. + Cold intolerance improving. + Decreased appetite with mild weight loss. PEG removed by Dr. Young. + Grade 1 H/F, xeroderma only, no pain. No myalgias. No weakness. No dysgeusia. No fevers, no cough, no SOB. \par  [de-identified] : Patient present to office for follow up. \par Patient s/p 4 cycles FLOT (8/20-10/20, s/p distal gastrectomy with PEG placement. Patient underwent 4 more cycles FLOT post op. Patient has been off therapy and under surveillance since 2/21\par Patient doing well. States appetite and energy level are good. \par Denies fever/chills, fatigue,rash, mouth sores, nausea, vomiting, diarrhea,constipation,  abdominal pain, bleeding, easy bruising or visual changes, chest pain, cough, SOB or MORROW,  neuropathy, LE edema.\par

## 2021-08-03 ENCOUNTER — APPOINTMENT (OUTPATIENT)
Dept: NUCLEAR MEDICINE | Facility: CLINIC | Age: 81
End: 2021-08-03
Payer: MEDICARE

## 2021-08-03 ENCOUNTER — OUTPATIENT (OUTPATIENT)
Dept: OUTPATIENT SERVICES | Facility: HOSPITAL | Age: 81
LOS: 1 days | End: 2021-08-03

## 2021-08-03 DIAGNOSIS — Z95.828 PRESENCE OF OTHER VASCULAR IMPLANTS AND GRAFTS: Chronic | ICD-10-CM

## 2021-08-03 DIAGNOSIS — C16.9 MALIGNANT NEOPLASM OF STOMACH, UNSPECIFIED: ICD-10-CM

## 2021-08-03 DIAGNOSIS — Z95.818 PRESENCE OF OTHER CARDIAC IMPLANTS AND GRAFTS: Chronic | ICD-10-CM

## 2021-08-03 PROCEDURE — 78815 PET IMAGE W/CT SKULL-THIGH: CPT | Mod: 26,PS

## 2021-08-05 ENCOUNTER — OUTPATIENT (OUTPATIENT)
Dept: OUTPATIENT SERVICES | Facility: HOSPITAL | Age: 81
LOS: 1 days | Discharge: HOME | End: 2021-08-05

## 2021-08-05 DIAGNOSIS — C16.9 MALIGNANT NEOPLASM OF STOMACH, UNSPECIFIED: ICD-10-CM

## 2021-08-05 DIAGNOSIS — Z95.818 PRESENCE OF OTHER CARDIAC IMPLANTS AND GRAFTS: Chronic | ICD-10-CM

## 2021-08-05 DIAGNOSIS — Z95.828 PRESENCE OF OTHER VASCULAR IMPLANTS AND GRAFTS: Chronic | ICD-10-CM

## 2021-08-06 ENCOUNTER — APPOINTMENT (OUTPATIENT)
Dept: HEMATOLOGY ONCOLOGY | Facility: CLINIC | Age: 81
End: 2021-08-06
Payer: MEDICARE

## 2021-08-06 VITALS
OXYGEN SATURATION: 98 % | HEART RATE: 79 BPM | TEMPERATURE: 97 F | DIASTOLIC BLOOD PRESSURE: 80 MMHG | WEIGHT: 138 LBS | BODY MASS INDEX: 22.18 KG/M2 | SYSTOLIC BLOOD PRESSURE: 160 MMHG | HEIGHT: 66 IN

## 2021-08-06 PROCEDURE — 99213 OFFICE O/P EST LOW 20 MIN: CPT

## 2021-08-06 RX ORDER — PROCHLORPERAZINE MALEATE 10 MG/1
10 TABLET ORAL EVERY 6 HOURS
Qty: 120 | Refills: 0 | Status: DISCONTINUED | COMMUNITY
Start: 2020-08-17 | End: 2021-08-06

## 2021-08-06 RX ORDER — DRONABINOL 5 MG/1
5 CAPSULE ORAL
Qty: 60 | Refills: 0 | Status: DISCONTINUED | COMMUNITY
Start: 2020-12-04 | End: 2021-08-06

## 2021-08-06 RX ORDER — ONDANSETRON 8 MG/1
8 TABLET, ORALLY DISINTEGRATING ORAL EVERY 8 HOURS
Qty: 90 | Refills: 0 | Status: DISCONTINUED | COMMUNITY
Start: 2020-08-17 | End: 2021-08-06

## 2021-08-06 RX ORDER — POTASSIUM CHLORIDE 1500 MG/1
20 TABLET, FILM COATED, EXTENDED RELEASE ORAL
Qty: 10 | Refills: 1 | Status: DISCONTINUED | COMMUNITY
Start: 2020-10-26 | End: 2021-08-06

## 2021-08-06 NOTE — HISTORY OF PRESENT ILLNESS
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 He was sent for CT by his PCP, Dr. Charles Smith, due to anemia. CT showed in the upper central abdomen abutting the posterior antrum of the stomach and neck of the pancreas there is a 3 x 2 x 3 cm mass. There is an additional heterogeneous enhancing 2.7 x 2.3 x 2.5 lobulated mass in the ventral upper abdominal peritoneal cavity anterosuperiorly to the antrum of the stomach with internal and surrounding enhancing vessels. Endoscopy with biopsy of the gastric mass was c/w moderately differentiated adenocarcinoma. Staging PET showed hypermetabolic thickening of the distal stomach, consistent with primary gastric cancer. Hypermetabolic activity in the distal stomach, inseparable from the perigastric lymphadenopathy, consistent with metastatic kong disease. \par \par TNM stage: T3, N2, M0 \par \par \par Patient completed 4 cycles of postoperative adjuvant FLOT for gastric adenocarcinoma s/p robotic assisted laparoscopic distal gastrectomy with Billroth 2 gastrojejunostomy reconstruction and placement of feeding jejunostomy tube on 11/9/20 with Dr Young. He is s/p 4 cycles neoadjuvant FLOT. \par + Fatigue, intermittent and mild. + Peripheral neuropathy, improved with gabapentin. + Occasional emesis after "eating too much," denies nausea. + Constipation, improved. + Alopecia. + Nail changes. + Cold intolerance improving. + Decreased appetite with mild weight loss. PEG removed by Dr. Young. + Grade 1 H/F, xeroderma only, no pain. No myalgias. No weakness. No dysgeusia. No fevers, no cough, no SOB. \par \par \par  [de-identified] : 8/6/21: Interval PET on 8/3 showed 1. Unchanged nonspecific mild diffuse FDG avidity in the residual stomach, status post gastrectomy and gastrojejunostomy, possibly physiologic.  No evidence of FDG avid perigastric lymph nodes.  Resolution of diffuse marrow FDG avidity. Unchanged subcentimeter left upper lobe pulmonary nodules, too small to characterize.  Nonspecific new minimally FDG avid small bilateral hilar lymph nodes.\par \par Pt has mild tingling of fingers and toes, much improved from before. Continues to take gabapentin. His appetite is good. His weight is stable. Denies HA. CP, SOB, abd pain, constipation, diarrhea, melena, hematuria, dysuria.

## 2021-08-06 NOTE — REVIEW OF SYSTEMS
[Negative] : Allergic/Immunologic [FreeTextEntry2] : as per interval history [de-identified] : mild numbness and tingling of fingers and toes

## 2021-08-06 NOTE — ASSESSMENT
[FreeTextEntry1] :  ypT3 N2. gastric cancer, her 2 + [was at least T3N1 by EUS criteria].\par     -s/p ERCP 7/10/20. Pathology: Gastric, Antrum, Ulcerated mass - moderately\par     differentiated adenocarcinoma\par     -s/p 4 cycles neoadjuvant FLOT (8/17/20 - 10/5/20): docetaxel (50 mg/m2), oxaliplatin\par     (85 mg/m2), LV (200 mg/m2) with short-term infusional FU (2600 mg/m2 as a\par     24-hour infusion), on day 1 Q2 weeks. \par     -s/p surgery with Dr. Young 11/9/20. Distal gastrectomy and PEG placed. Path c/w\par     residual gastric adenocarcinoma, moderately differentiated, status post treatment.\par     Vascular invasion identified. 4/29 lymph nodes involved by adenocarcinoma,\par     negative margins. Tumor stage: ypT3 N2. \par     -C4D1 of 4 cycles of postop FLOT. Pt had requested to remain off\par     adjuvant FLOT until 2/21, AMA. Patient also requested dose reduction. \par     -post treatment PET 4/21- GRANT\par    - PET/CT ordered\par    -H&P every 3-6 months for 1-2 y, every 6-12 months for 3-5 years and annual thereafter\par    -CT c/ap with oral and IV contrast every 6-12 months for first 2 years, then annual up to 5 years, Will consider    PET as clinically indicated\par    -Monitor B12, iron\par

## 2021-09-13 ENCOUNTER — OUTPATIENT (OUTPATIENT)
Dept: OUTPATIENT SERVICES | Facility: HOSPITAL | Age: 81
LOS: 1 days | Discharge: HOME | End: 2021-09-13

## 2021-09-13 DIAGNOSIS — C16.9 MALIGNANT NEOPLASM OF STOMACH, UNSPECIFIED: ICD-10-CM

## 2021-09-13 DIAGNOSIS — Z95.828 PRESENCE OF OTHER VASCULAR IMPLANTS AND GRAFTS: Chronic | ICD-10-CM

## 2021-09-13 DIAGNOSIS — Z95.818 PRESENCE OF OTHER CARDIAC IMPLANTS AND GRAFTS: Chronic | ICD-10-CM

## 2021-09-16 ENCOUNTER — APPOINTMENT (OUTPATIENT)
Age: 81
End: 2021-09-16

## 2021-10-13 ENCOUNTER — OUTPATIENT (OUTPATIENT)
Dept: OUTPATIENT SERVICES | Facility: HOSPITAL | Age: 81
LOS: 1 days | Discharge: HOME | End: 2021-10-13

## 2021-10-13 DIAGNOSIS — Z95.818 PRESENCE OF OTHER CARDIAC IMPLANTS AND GRAFTS: Chronic | ICD-10-CM

## 2021-10-13 DIAGNOSIS — C16.9 MALIGNANT NEOPLASM OF STOMACH, UNSPECIFIED: ICD-10-CM

## 2021-10-13 DIAGNOSIS — Z95.828 PRESENCE OF OTHER VASCULAR IMPLANTS AND GRAFTS: Chronic | ICD-10-CM

## 2021-10-18 ENCOUNTER — APPOINTMENT (OUTPATIENT)
Age: 81
End: 2021-10-18

## 2021-11-04 ENCOUNTER — APPOINTMENT (OUTPATIENT)
Dept: HEMATOLOGY ONCOLOGY | Facility: CLINIC | Age: 81
End: 2021-11-04
Payer: MEDICARE

## 2021-11-04 ENCOUNTER — RESULT REVIEW (OUTPATIENT)
Age: 81
End: 2021-11-04

## 2021-11-04 VITALS
WEIGHT: 140 LBS | OXYGEN SATURATION: 98 % | DIASTOLIC BLOOD PRESSURE: 79 MMHG | BODY MASS INDEX: 22.5 KG/M2 | HEIGHT: 66 IN | HEART RATE: 79 BPM | SYSTOLIC BLOOD PRESSURE: 137 MMHG

## 2021-11-04 LAB
BASOPHILS # BLD AUTO: 0.1 K/UL — SIGNIFICANT CHANGE UP (ref 0–0.2)
BASOPHILS NFR BLD AUTO: 1.4 % — SIGNIFICANT CHANGE UP (ref 0–2)
EOSINOPHIL # BLD AUTO: 0.1 K/UL — SIGNIFICANT CHANGE UP (ref 0–0.5)
EOSINOPHIL NFR BLD AUTO: 2.2 % — SIGNIFICANT CHANGE UP (ref 0–6)
HCT VFR BLD CALC: 45.9 % — SIGNIFICANT CHANGE UP (ref 39–50)
HGB BLD-MCNC: 14.1 G/DL — SIGNIFICANT CHANGE UP (ref 13–17)
LYMPHOCYTES # BLD AUTO: 1.5 K/UL — SIGNIFICANT CHANGE UP (ref 1–3.3)
LYMPHOCYTES # BLD AUTO: 27.2 % — SIGNIFICANT CHANGE UP (ref 13–44)
MCHC RBC-ENTMCNC: 25.4 PG — LOW (ref 27–34)
MCHC RBC-ENTMCNC: 30.8 G/DL — LOW (ref 32–36)
MCV RBC AUTO: 82.4 FL — SIGNIFICANT CHANGE UP (ref 80–100)
MONOCYTES # BLD AUTO: 0.6 K/UL — SIGNIFICANT CHANGE UP (ref 0–0.9)
MONOCYTES NFR BLD AUTO: 10 % — SIGNIFICANT CHANGE UP (ref 2–14)
NEUTROPHILS # BLD AUTO: 3.3 K/UL — SIGNIFICANT CHANGE UP (ref 1.8–7.4)
NEUTROPHILS NFR BLD AUTO: 59.3 % — SIGNIFICANT CHANGE UP (ref 43–77)
PLATELET # BLD AUTO: 172 K/UL — SIGNIFICANT CHANGE UP (ref 150–400)
RBC # BLD: 5.57 M/UL — SIGNIFICANT CHANGE UP (ref 4.2–5.8)
RBC # FLD: 13.5 % — SIGNIFICANT CHANGE UP (ref 10.3–14.5)
WBC # BLD: 5.6 K/UL — SIGNIFICANT CHANGE UP (ref 3.8–10.5)
WBC # FLD AUTO: 5.6 K/UL — SIGNIFICANT CHANGE UP (ref 3.8–10.5)

## 2021-11-04 PROCEDURE — 99213 OFFICE O/P EST LOW 20 MIN: CPT

## 2021-11-04 NOTE — REVIEW OF SYSTEMS
[Negative] : Allergic/Immunologic [FreeTextEntry2] : as per interval history [de-identified] : mild numbness and tingling of fingers and toes

## 2021-11-04 NOTE — ASSESSMENT
[FreeTextEntry1] :  ypT3 N2. gastric cancer, her 2 + [was at least T3N1 by EUS criteria].\par     -s/p ERCP 7/10/20. Pathology: Gastric, Antrum, Ulcerated mass - moderately\par     differentiated adenocarcinoma\par     -s/p 4 cycles neoadjuvant FLOT (8/17/20 - 10/5/20): docetaxel (50 mg/m2), oxaliplatin\par     (85 mg/m2), LV (200 mg/m2) with short-term infusional FU (2600 mg/m2 as a\par     24-hour infusion), on day 1 Q2 weeks. \par     -s/p surgery with Dr. Young 11/9/20. Distal gastrectomy and PEG placed. Path c/w\par     residual gastric adenocarcinoma, moderately differentiated, status post treatment.\par     Vascular invasion identified. 4/29 lymph nodes involved by adenocarcinoma,\par     negative margins. Tumor stage: ypT3 N2. \par     -C4D1 of 4 cycles of postop FLOT. Pt had requested to remain off\par     adjuvant FLOT until 2/21, AMA. Patient also requested dose reduction. \par     -post treatment PET 4/21- GRANT\par    - PET/CT 8/3: GRANT but Nonspecific new minimally FDG avid small bilateral hilar lymph nodes\par    -H&P every 3-6 months for 1-2 y, every 6-12 months for 3-5 years and annual thereafter\par    -CT c/ap with oral and IV contrast every 6-12 months for first 2 years, then annual up to 5 years, Will consider    PET as clinically indicated\par    -Monitor B12, iron\par

## 2021-11-04 NOTE — HISTORY OF PRESENT ILLNESS
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 He was sent for CT by his PCP, Dr. Charles Smith, due to anemia. CT showed in the upper central abdomen abutting the posterior antrum of the stomach and neck of the pancreas there is a 3 x 2 x 3 cm mass. There is an additional heterogeneous enhancing 2.7 x 2.3 x 2.5 lobulated mass in the ventral upper abdominal peritoneal cavity anterosuperiorly to the antrum of the stomach with internal and surrounding enhancing vessels. Endoscopy with biopsy of the gastric mass was c/w moderately differentiated adenocarcinoma. Staging PET showed hypermetabolic thickening of the distal stomach, consistent with primary gastric cancer. Hypermetabolic activity in the distal stomach, inseparable from the perigastric lymphadenopathy, consistent with metastatic kong disease. \par \par TNM stage: T3, N2, M0 \par \par \par Patient completed 4 cycles of postoperative adjuvant FLOT for gastric adenocarcinoma s/p robotic assisted laparoscopic distal gastrectomy with Billroth 2 gastrojejunostomy reconstruction and placement of feeding jejunostomy tube on 11/9/20 with Dr Young. He is s/p 4 cycles neoadjuvant FLOT. \par + Fatigue, intermittent and mild. + Peripheral neuropathy, improved with gabapentin. + Occasional emesis after "eating too much," denies nausea. + Constipation, improved. + Alopecia. + Nail changes. + Cold intolerance improving. + Decreased appetite with mild weight loss. PEG removed by Dr. Young. + Grade 1 H/F, xeroderma only, no pain. No myalgias. No weakness. No dysgeusia. No fevers, no cough, no SOB. \par \par \par  [de-identified] : 8/6/21: Interval PET on 8/3 showed 1. Unchanged nonspecific mild diffuse FDG avidity in the residual stomach, status post gastrectomy and gastrojejunostomy, possibly physiologic.  No evidence of FDG avid perigastric lymph nodes.  Resolution of diffuse marrow FDG avidity. Unchanged subcentimeter left upper lobe pulmonary nodules, too small to characterize.  Nonspecific new minimally FDG avid small bilateral hilar lymph nodes.\par \par Pt has mild tingling of fingers and toes, much improved from before. Continues to take gabapentin. His appetite is good. His weight is stable. Denies HA. CP, SOB, abd pain, constipation, diarrhea, melena, hematuria, dysuria.\par \par 11/4/21: Pt feels well. Right 4th finger lock up, no pain, does not bother him. Mild numbness/tingling for fingers and toes, improving.  Taking gabapentin. Appetite is good. Denies HA. CP, SOB, abd pain, constipation, diarrhea, melena, hematuria, dysuria.

## 2021-11-05 LAB
ALBUMIN SERPL ELPH-MCNC: 4.5 G/DL
ALP BLD-CCNC: 81 U/L
ALT SERPL-CCNC: 13 U/L
ANION GAP SERPL CALC-SCNC: 14 MMOL/L
AST SERPL-CCNC: 12 U/L
BILIRUB SERPL-MCNC: 0.3 MG/DL
BUN SERPL-MCNC: 16 MG/DL
CALCIUM SERPL-MCNC: 9.8 MG/DL
CEA SERPL-MCNC: 8.1 NG/ML
CHLORIDE SERPL-SCNC: 103 MMOL/L
CO2 SERPL-SCNC: 22 MMOL/L
CREAT SERPL-MCNC: 0.8 MG/DL
FERRITIN SERPL-MCNC: 63 NG/ML
FOLATE SERPL-MCNC: >20 NG/ML
GLUCOSE SERPL-MCNC: 111 MG/DL
IRON SATN MFR SERPL: 28 %
IRON SERPL-MCNC: 93 UG/DL
MAGNESIUM SERPL-MCNC: 2 MG/DL
POTASSIUM SERPL-SCNC: 4.5 MMOL/L
PROT SERPL-MCNC: 7.1 G/DL
SODIUM SERPL-SCNC: 139 MMOL/L
TIBC SERPL-MCNC: 335 UG/DL
UIBC SERPL-MCNC: 242 UG/DL
VIT B12 SERPL-MCNC: 383 PG/ML

## 2021-12-09 ENCOUNTER — OUTPATIENT (OUTPATIENT)
Dept: OUTPATIENT SERVICES | Facility: HOSPITAL | Age: 81
LOS: 1 days | Discharge: HOME | End: 2021-12-09

## 2021-12-09 DIAGNOSIS — Z95.828 PRESENCE OF OTHER VASCULAR IMPLANTS AND GRAFTS: Chronic | ICD-10-CM

## 2021-12-09 DIAGNOSIS — Z95.818 PRESENCE OF OTHER CARDIAC IMPLANTS AND GRAFTS: Chronic | ICD-10-CM

## 2021-12-09 DIAGNOSIS — C16.9 MALIGNANT NEOPLASM OF STOMACH, UNSPECIFIED: ICD-10-CM

## 2021-12-10 ENCOUNTER — APPOINTMENT (OUTPATIENT)
Age: 81
End: 2021-12-10

## 2022-01-03 NOTE — HISTORY OF PRESENT ILLNESS
How Severe Are They?: mild
Is This A New Presentation, Or A Follow-Up?: Follow Up [facility]
[de-identified] : 80 year-old male returns for follow up.  He was initially seen in consultation on 8/13/20, referred by Dr. Eugenia Glass.  In June 2020 his PCP referred him for a CT scan due to anemia.  Study revealed a 3 cm mass in the upper central abdomen abutting the posterior antrum of the stomach and neck of the pancreas.   There is an additional heterogeneous enhancing 2.7 cm lobulated mass in the ventral upper abdominal peritoneal cavity anterosuperiorly to the antrum of the stomach with internal and surrounding enhancing vessels. \par \par EUS was performed on 7/10/20 and revealed a large firm malignant appearing circumferential in the gastric antrum, causing partial gastric outlet obstruction.  There are several necrotic and malignant appearing lymph nodes in the peritumoral region.  There is transmural involvement concerning for at least a T3N1 lesion. Pathology was consistent with moderately differentiated adenocarcinoma.\par \par Staging PET showed hypermetabolic thickening of the distal stomach, consistent with primary gastric cancer. Hypermetabolic activity in the distal stomach, inseparable from the perigastric lymphadenopathy, consistent with metastatic kong disease.\par \par 7/27/20 CEA: 10.8 ng/ml\par \par He consulted with Dr. Eugenia Glass from medical oncology who has recommended neoadjuvant chemotherapy (FLOT regimen).  Patient agreed to proceed.\par \par His past medical history includes type 2 diabetes, COPD and prostate cancer s/p brachytherapy 2010.\par \par 50 lb weight loss.\par \par 9/24/20-  Patient here for follow up after cycle 3 of chemotherapy.....

## 2022-02-02 ENCOUNTER — OUTPATIENT (OUTPATIENT)
Dept: OUTPATIENT SERVICES | Facility: HOSPITAL | Age: 82
LOS: 1 days | Discharge: HOME | End: 2022-02-02

## 2022-02-02 DIAGNOSIS — Z95.818 PRESENCE OF OTHER CARDIAC IMPLANTS AND GRAFTS: Chronic | ICD-10-CM

## 2022-02-02 DIAGNOSIS — C16.9 MALIGNANT NEOPLASM OF STOMACH, UNSPECIFIED: ICD-10-CM

## 2022-02-02 DIAGNOSIS — Z95.828 PRESENCE OF OTHER VASCULAR IMPLANTS AND GRAFTS: Chronic | ICD-10-CM

## 2022-02-04 ENCOUNTER — RESULT REVIEW (OUTPATIENT)
Age: 82
End: 2022-02-04

## 2022-02-04 NOTE — PHYSICAL THERAPY INITIAL EVALUATION ADULT - NEUROVASCULAR ASSESSMENT LLE
Bexarotene Pregnancy And Lactation Text: This medication is Pregnancy Category X and should not be given to women who are pregnant or may become pregnant. This medication should not be used if you are breast feeding. no numbness/warm/no discoloration/no tingling

## 2022-02-07 ENCOUNTER — APPOINTMENT (OUTPATIENT)
Dept: HEMATOLOGY ONCOLOGY | Facility: CLINIC | Age: 82
End: 2022-02-07

## 2022-02-07 ENCOUNTER — APPOINTMENT (OUTPATIENT)
Age: 82
End: 2022-02-07

## 2022-02-07 ENCOUNTER — RESULT REVIEW (OUTPATIENT)
Age: 82
End: 2022-02-07

## 2022-02-07 LAB
ALBUMIN SERPL ELPH-MCNC: 4.4 G/DL — SIGNIFICANT CHANGE UP (ref 3.3–5)
ALP SERPL-CCNC: 83 U/L — SIGNIFICANT CHANGE UP (ref 40–120)
ALT FLD-CCNC: 9 U/L — LOW (ref 10–45)
ANION GAP SERPL CALC-SCNC: 14 MMOL/L — SIGNIFICANT CHANGE UP (ref 5–17)
AST SERPL-CCNC: 15 U/L — SIGNIFICANT CHANGE UP (ref 10–40)
BASOPHILS # BLD AUTO: 0.1 K/UL — SIGNIFICANT CHANGE UP (ref 0–0.2)
BASOPHILS NFR BLD AUTO: 1.9 % — SIGNIFICANT CHANGE UP (ref 0–2)
BILIRUB SERPL-MCNC: 0.3 MG/DL — SIGNIFICANT CHANGE UP (ref 0.2–1.2)
BUN SERPL-MCNC: 15 MG/DL — SIGNIFICANT CHANGE UP (ref 7–23)
CALCIUM SERPL-MCNC: 9.5 MG/DL — SIGNIFICANT CHANGE UP (ref 8.4–10.5)
CEA SERPL-MCNC: 8.4 NG/ML — HIGH (ref 0–3.8)
CHLORIDE SERPL-SCNC: 105 MMOL/L — SIGNIFICANT CHANGE UP (ref 96–108)
CO2 SERPL-SCNC: 21 MMOL/L — LOW (ref 22–31)
CREAT SERPL-MCNC: 0.82 MG/DL — SIGNIFICANT CHANGE UP (ref 0.5–1.3)
EOSINOPHIL # BLD AUTO: 0.2 K/UL — SIGNIFICANT CHANGE UP (ref 0–0.5)
EOSINOPHIL NFR BLD AUTO: 2.8 % — SIGNIFICANT CHANGE UP (ref 0–6)
GLUCOSE SERPL-MCNC: 134 MG/DL — HIGH (ref 70–99)
HCT VFR BLD CALC: 46.4 % — SIGNIFICANT CHANGE UP (ref 39–50)
HGB BLD-MCNC: 14.5 G/DL — SIGNIFICANT CHANGE UP (ref 13–17)
LYMPHOCYTES # BLD AUTO: 2 K/UL — SIGNIFICANT CHANGE UP (ref 1–3.3)
LYMPHOCYTES # BLD AUTO: 36.8 % — SIGNIFICANT CHANGE UP (ref 13–44)
MAGNESIUM SERPL-MCNC: 2.2 MG/DL — SIGNIFICANT CHANGE UP (ref 1.6–2.6)
MCHC RBC-ENTMCNC: 25.7 PG — LOW (ref 27–34)
MCHC RBC-ENTMCNC: 31.2 G/DL — LOW (ref 32–36)
MCV RBC AUTO: 82.3 FL — SIGNIFICANT CHANGE UP (ref 80–100)
MONOCYTES # BLD AUTO: 0.4 K/UL — SIGNIFICANT CHANGE UP (ref 0–0.9)
MONOCYTES NFR BLD AUTO: 7.8 % — SIGNIFICANT CHANGE UP (ref 2–14)
NEUTROPHILS # BLD AUTO: 2.8 K/UL — SIGNIFICANT CHANGE UP (ref 1.8–7.4)
NEUTROPHILS NFR BLD AUTO: 50.8 % — SIGNIFICANT CHANGE UP (ref 43–77)
PLATELET # BLD AUTO: 169 K/UL — SIGNIFICANT CHANGE UP (ref 150–400)
POTASSIUM SERPL-MCNC: 4.6 MMOL/L — SIGNIFICANT CHANGE UP (ref 3.5–5.3)
POTASSIUM SERPL-SCNC: 4.6 MMOL/L — SIGNIFICANT CHANGE UP (ref 3.5–5.3)
PROT SERPL-MCNC: 7 G/DL — SIGNIFICANT CHANGE UP (ref 6–8.3)
RBC # BLD: 5.64 M/UL — SIGNIFICANT CHANGE UP (ref 4.2–5.8)
RBC # FLD: 12.3 % — SIGNIFICANT CHANGE UP (ref 10.3–14.5)
SODIUM SERPL-SCNC: 140 MMOL/L — SIGNIFICANT CHANGE UP (ref 135–145)
WBC # BLD: 5.5 K/UL — SIGNIFICANT CHANGE UP (ref 3.8–10.5)
WBC # FLD AUTO: 5.5 K/UL — SIGNIFICANT CHANGE UP (ref 3.8–10.5)

## 2022-02-10 ENCOUNTER — APPOINTMENT (OUTPATIENT)
Dept: CT IMAGING | Facility: CLINIC | Age: 82
End: 2022-02-10
Payer: MEDICARE

## 2022-02-10 ENCOUNTER — OUTPATIENT (OUTPATIENT)
Dept: OUTPATIENT SERVICES | Facility: HOSPITAL | Age: 82
LOS: 1 days | End: 2022-02-10

## 2022-02-10 DIAGNOSIS — Z95.828 PRESENCE OF OTHER VASCULAR IMPLANTS AND GRAFTS: Chronic | ICD-10-CM

## 2022-02-10 DIAGNOSIS — C16.9 MALIGNANT NEOPLASM OF STOMACH, UNSPECIFIED: ICD-10-CM

## 2022-02-10 DIAGNOSIS — Z95.818 PRESENCE OF OTHER CARDIAC IMPLANTS AND GRAFTS: Chronic | ICD-10-CM

## 2022-02-10 PROCEDURE — 74177 CT ABD & PELVIS W/CONTRAST: CPT | Mod: 26

## 2022-02-14 ENCOUNTER — RESULT REVIEW (OUTPATIENT)
Age: 82
End: 2022-02-14

## 2022-02-14 ENCOUNTER — APPOINTMENT (OUTPATIENT)
Dept: HEMATOLOGY ONCOLOGY | Facility: CLINIC | Age: 82
End: 2022-02-14
Payer: MEDICARE

## 2022-02-14 VITALS
DIASTOLIC BLOOD PRESSURE: 77 MMHG | BODY MASS INDEX: 23.63 KG/M2 | SYSTOLIC BLOOD PRESSURE: 149 MMHG | OXYGEN SATURATION: 98 % | HEIGHT: 66 IN | HEART RATE: 76 BPM | WEIGHT: 147.02 LBS

## 2022-02-14 LAB
BASOPHILS # BLD AUTO: 0.1 K/UL — SIGNIFICANT CHANGE UP (ref 0–0.2)
BASOPHILS NFR BLD AUTO: 1.3 % — SIGNIFICANT CHANGE UP (ref 0–2)
EOSINOPHIL # BLD AUTO: 0.1 K/UL — SIGNIFICANT CHANGE UP (ref 0–0.5)
EOSINOPHIL NFR BLD AUTO: 1.6 % — SIGNIFICANT CHANGE UP (ref 0–6)
HCT VFR BLD CALC: 48.3 % — SIGNIFICANT CHANGE UP (ref 39–50)
HGB BLD-MCNC: 15 G/DL — SIGNIFICANT CHANGE UP (ref 13–17)
LYMPHOCYTES # BLD AUTO: 1.3 K/UL — SIGNIFICANT CHANGE UP (ref 1–3.3)
LYMPHOCYTES # BLD AUTO: 25.3 % — SIGNIFICANT CHANGE UP (ref 13–44)
MCHC RBC-ENTMCNC: 25.5 PG — LOW (ref 27–34)
MCHC RBC-ENTMCNC: 31.2 G/DL — LOW (ref 32–36)
MCV RBC AUTO: 82 FL — SIGNIFICANT CHANGE UP (ref 80–100)
MONOCYTES # BLD AUTO: 0.4 K/UL — SIGNIFICANT CHANGE UP (ref 0–0.9)
MONOCYTES NFR BLD AUTO: 8.5 % — SIGNIFICANT CHANGE UP (ref 2–14)
NEUTROPHILS # BLD AUTO: 3.3 K/UL — SIGNIFICANT CHANGE UP (ref 1.8–7.4)
NEUTROPHILS NFR BLD AUTO: 63.3 % — SIGNIFICANT CHANGE UP (ref 43–77)
PLATELET # BLD AUTO: 168 K/UL — SIGNIFICANT CHANGE UP (ref 150–400)
RBC # BLD: 5.89 M/UL — HIGH (ref 4.2–5.8)
RBC # FLD: 12.6 % — SIGNIFICANT CHANGE UP (ref 10.3–14.5)
WBC # BLD: 5.2 K/UL — SIGNIFICANT CHANGE UP (ref 3.8–10.5)
WBC # FLD AUTO: 5.2 K/UL — SIGNIFICANT CHANGE UP (ref 3.8–10.5)

## 2022-02-14 PROCEDURE — 99213 OFFICE O/P EST LOW 20 MIN: CPT

## 2022-02-14 NOTE — REVIEW OF SYSTEMS
[Negative] : Allergic/Immunologic [de-identified] : mild numbness and tingling of fingers and toes

## 2022-02-14 NOTE — ASSESSMENT
[FreeTextEntry1] :  ypT3 N2. gastric cancer, her 2 + [was at least T3N1 by EUS criteria].\par     -s/p ERCP 7/10/20. Pathology: Gastric, Antrum, Ulcerated mass - moderately\par     differentiated adenocarcinoma\par     -s/p 4 cycles neoadjuvant FLOT (8/17/20 - 10/5/20): docetaxel (50 mg/m2), oxaliplatin\par     (85 mg/m2), LV (200 mg/m2) with short-term infusional FU (2600 mg/m2 as a\par     24-hour infusion), on day 1 Q2 weeks. \par     -s/p surgery with Dr. Young 11/9/20. Distal gastrectomy and PEG placed. Path c/w\par     residual gastric adenocarcinoma, moderately differentiated, status post treatment.\par     Vascular invasion identified. 4/29 lymph nodes involved by adenocarcinoma,\par     negative margins. Tumor stage: ypT3 N2. \par     -C4D1 of 4 cycles of postop FLOT. Pt had requested to remain off\par     adjuvant FLOT until 2/21, AMA. Patient also requested dose reduction. \par     -post treatment PET 4/21- GRANT\par    - CT 2/10/22 showed GRANT\par    -H&P every 3-6 months for 1-2 y, every 6-12 months for 3-5 years and annual thereafter\par    -CT c/ap with oral and IV contrast every 6-12 months for first 2 years, then annual up to 5 years, Will consider    PET as clinically indicated\par    -Monitor B12, iron\par

## 2022-02-14 NOTE — HISTORY OF PRESENT ILLNESS
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 He was sent for CT by his PCP, Dr. Charles Smith, due to anemia. CT showed in the upper central abdomen abutting the posterior antrum of the stomach and neck of the pancreas there is a 3 x 2 x 3 cm mass. There is an additional heterogeneous enhancing 2.7 x 2.3 x 2.5 lobulated mass in the ventral upper abdominal peritoneal cavity anterosuperiorly to the antrum of the stomach with internal and surrounding enhancing vessels. Endoscopy with biopsy of the gastric mass was c/w moderately differentiated adenocarcinoma. Staging PET showed hypermetabolic thickening of the distal stomach, consistent with primary gastric cancer. Hypermetabolic activity in the distal stomach, inseparable from the perigastric lymphadenopathy, consistent with metastatic kong disease. \par \par TNM stage: T3, N2, M0 \par \par \par Patient completed 4 cycles of postoperative adjuvant FLOT for gastric adenocarcinoma s/p robotic assisted laparoscopic distal gastrectomy with Billroth 2 gastrojejunostomy reconstruction and placement of feeding jejunostomy tube on 11/9/20 with Dr Young. He is s/p 4 cycles neoadjuvant FLOT. \par + Fatigue, intermittent and mild. + Peripheral neuropathy, improved with gabapentin. + Occasional emesis after "eating too much," denies nausea. + Constipation, improved. + Alopecia. + Nail changes. + Cold intolerance improving. + Decreased appetite with mild weight loss. PEG removed by Dr. Young. + Grade 1 H/F, xeroderma only, no pain. No myalgias. No weakness. No dysgeusia. No fevers, no cough, no SOB. \par \par \par  [de-identified] : 8/6/21: Interval PET on 8/3 showed 1. Unchanged nonspecific mild diffuse FDG avidity in the residual stomach, status post gastrectomy and gastrojejunostomy, possibly physiologic.  No evidence of FDG avid perigastric lymph nodes.  Resolution of diffuse marrow FDG avidity. Unchanged subcentimeter left upper lobe pulmonary nodules, too small to characterize.  Nonspecific new minimally FDG avid small bilateral hilar lymph nodes.\par \par Pt has mild tingling of fingers and toes, much improved from before. Continues to take gabapentin. His appetite is good. His weight is stable. Denies HA. CP, SOB, abd pain, constipation, diarrhea, melena, hematuria, dysuria.\par \par 11/4/21: Pt feels well. Right 4th finger lock up, no pain, does not bother him. Mild numbness/tingling for fingers and toes, improving.  Taking gabapentin. Appetite is good. Denies HA. CP, SOB, abd pain, constipation, diarrhea, melena, hematuria, dysuria. \par \par 2/13/22: CT scan on 2/4/22 showed no evidence of recurrent dz. Pt feels well. His appetite is good and gained some weight. Endorses neuropathy of hands. His fingers sometimes lock up. Denies HA. CP, SOB, abd pain, constipation, diarrhea, melena, hematuria, dysuria.

## 2022-02-15 LAB
ALBUMIN SERPL ELPH-MCNC: 4.8 G/DL
ALP BLD-CCNC: 88 U/L
ALT SERPL-CCNC: 11 U/L
ANION GAP SERPL CALC-SCNC: 12 MMOL/L
AST SERPL-CCNC: 13 U/L
BILIRUB SERPL-MCNC: 0.3 MG/DL
BUN SERPL-MCNC: 14 MG/DL
CALCIUM SERPL-MCNC: 9.9 MG/DL
CEA SERPL-MCNC: 8.3 NG/ML
CHLORIDE SERPL-SCNC: 102 MMOL/L
CO2 SERPL-SCNC: 26 MMOL/L
CREAT SERPL-MCNC: 0.93 MG/DL
FERRITIN SERPL-MCNC: 97 NG/ML
FOLATE SERPL-MCNC: 19.5 NG/ML
GLUCOSE SERPL-MCNC: 127 MG/DL
IRON SATN MFR SERPL: 30 %
IRON SERPL-MCNC: 97 UG/DL
MAGNESIUM SERPL-MCNC: 2 MG/DL
POTASSIUM SERPL-SCNC: 4.6 MMOL/L
PROT SERPL-MCNC: 7.5 G/DL
RBC # BLD: 5.61 M/UL
RETICS # AUTO: 1.1 %
RETICS AGGREG/RBC NFR: 62.3 K/UL
SODIUM SERPL-SCNC: 140 MMOL/L
TIBC SERPL-MCNC: 328 UG/DL
UIBC SERPL-MCNC: 231 UG/DL
VIT B12 SERPL-MCNC: 429 PG/ML

## 2022-03-04 ENCOUNTER — APPOINTMENT (OUTPATIENT)
Age: 82
End: 2022-03-04

## 2022-04-08 ENCOUNTER — OUTPATIENT (OUTPATIENT)
Dept: OUTPATIENT SERVICES | Facility: HOSPITAL | Age: 82
LOS: 1 days | Discharge: HOME | End: 2022-04-08

## 2022-04-08 DIAGNOSIS — C16.9 MALIGNANT NEOPLASM OF STOMACH, UNSPECIFIED: ICD-10-CM

## 2022-04-08 DIAGNOSIS — Z95.818 PRESENCE OF OTHER CARDIAC IMPLANTS AND GRAFTS: Chronic | ICD-10-CM

## 2022-04-08 DIAGNOSIS — Z95.828 PRESENCE OF OTHER VASCULAR IMPLANTS AND GRAFTS: Chronic | ICD-10-CM

## 2022-04-15 ENCOUNTER — RESULT REVIEW (OUTPATIENT)
Age: 82
End: 2022-04-15

## 2022-04-15 ENCOUNTER — APPOINTMENT (OUTPATIENT)
Age: 82
End: 2022-04-15

## 2022-04-15 LAB
ALBUMIN SERPL ELPH-MCNC: 4.1 G/DL — SIGNIFICANT CHANGE UP (ref 3.3–5)
ALP SERPL-CCNC: 82 U/L — SIGNIFICANT CHANGE UP (ref 40–120)
ALT FLD-CCNC: 11 U/L — SIGNIFICANT CHANGE UP (ref 10–45)
ANION GAP SERPL CALC-SCNC: 14 MMOL/L — SIGNIFICANT CHANGE UP (ref 5–17)
AST SERPL-CCNC: 16 U/L — SIGNIFICANT CHANGE UP (ref 10–40)
BASOPHILS # BLD AUTO: 0.1 K/UL — SIGNIFICANT CHANGE UP (ref 0–0.2)
BASOPHILS NFR BLD AUTO: 1.8 % — SIGNIFICANT CHANGE UP (ref 0–2)
BILIRUB SERPL-MCNC: 0.3 MG/DL — SIGNIFICANT CHANGE UP (ref 0.2–1.2)
BUN SERPL-MCNC: 12 MG/DL — SIGNIFICANT CHANGE UP (ref 7–23)
CALCIUM SERPL-MCNC: 9.7 MG/DL — SIGNIFICANT CHANGE UP (ref 8.4–10.5)
CEA SERPL-MCNC: 8 NG/ML — HIGH (ref 0–3.8)
CHLORIDE SERPL-SCNC: 102 MMOL/L — SIGNIFICANT CHANGE UP (ref 96–108)
CO2 SERPL-SCNC: 22 MMOL/L — SIGNIFICANT CHANGE UP (ref 22–31)
CREAT SERPL-MCNC: 0.88 MG/DL — SIGNIFICANT CHANGE UP (ref 0.5–1.3)
EGFR: 86 ML/MIN/1.73M2 — SIGNIFICANT CHANGE UP
EOSINOPHIL # BLD AUTO: 0.1 K/UL — SIGNIFICANT CHANGE UP (ref 0–0.5)
EOSINOPHIL NFR BLD AUTO: 2.7 % — SIGNIFICANT CHANGE UP (ref 0–6)
GLUCOSE SERPL-MCNC: 149 MG/DL — HIGH (ref 70–99)
HCT VFR BLD CALC: 44.6 % — SIGNIFICANT CHANGE UP (ref 39–50)
HGB BLD-MCNC: 13.6 G/DL — SIGNIFICANT CHANGE UP (ref 13–17)
LYMPHOCYTES # BLD AUTO: 1.6 K/UL — SIGNIFICANT CHANGE UP (ref 1–3.3)
LYMPHOCYTES # BLD AUTO: 34.9 % — SIGNIFICANT CHANGE UP (ref 13–44)
MAGNESIUM SERPL-MCNC: 1.8 MG/DL — SIGNIFICANT CHANGE UP (ref 1.6–2.6)
MCHC RBC-ENTMCNC: 25.8 PG — LOW (ref 27–34)
MCHC RBC-ENTMCNC: 30.5 G/DL — LOW (ref 32–36)
MCV RBC AUTO: 84.4 FL — SIGNIFICANT CHANGE UP (ref 80–100)
MONOCYTES # BLD AUTO: 0.4 K/UL — SIGNIFICANT CHANGE UP (ref 0–0.9)
MONOCYTES NFR BLD AUTO: 9.3 % — SIGNIFICANT CHANGE UP (ref 2–14)
NEUTROPHILS # BLD AUTO: 2.3 K/UL — SIGNIFICANT CHANGE UP (ref 1.8–7.4)
NEUTROPHILS NFR BLD AUTO: 51.4 % — SIGNIFICANT CHANGE UP (ref 43–77)
PLATELET # BLD AUTO: 174 K/UL — SIGNIFICANT CHANGE UP (ref 150–400)
POTASSIUM SERPL-MCNC: 4.2 MMOL/L — SIGNIFICANT CHANGE UP (ref 3.5–5.3)
POTASSIUM SERPL-SCNC: 4.2 MMOL/L — SIGNIFICANT CHANGE UP (ref 3.5–5.3)
PROT SERPL-MCNC: 7 G/DL — SIGNIFICANT CHANGE UP (ref 6–8.3)
RBC # BLD: 5.28 M/UL — SIGNIFICANT CHANGE UP (ref 4.2–5.8)
RBC # FLD: 13.4 % — SIGNIFICANT CHANGE UP (ref 10.3–14.5)
SODIUM SERPL-SCNC: 137 MMOL/L — SIGNIFICANT CHANGE UP (ref 135–145)
WBC # BLD: 4.6 K/UL — SIGNIFICANT CHANGE UP (ref 3.8–10.5)
WBC # FLD AUTO: 4.6 K/UL — SIGNIFICANT CHANGE UP (ref 3.8–10.5)

## 2022-05-16 ENCOUNTER — OUTPATIENT (OUTPATIENT)
Dept: OUTPATIENT SERVICES | Facility: HOSPITAL | Age: 82
LOS: 1 days | Discharge: HOME | End: 2022-05-16

## 2022-05-16 DIAGNOSIS — Z95.828 PRESENCE OF OTHER VASCULAR IMPLANTS AND GRAFTS: Chronic | ICD-10-CM

## 2022-05-16 DIAGNOSIS — C16.9 MALIGNANT NEOPLASM OF STOMACH, UNSPECIFIED: ICD-10-CM

## 2022-05-16 DIAGNOSIS — Z95.818 PRESENCE OF OTHER CARDIAC IMPLANTS AND GRAFTS: Chronic | ICD-10-CM

## 2022-05-27 ENCOUNTER — RESULT REVIEW (OUTPATIENT)
Age: 82
End: 2022-05-27

## 2022-05-27 ENCOUNTER — APPOINTMENT (OUTPATIENT)
Age: 82
End: 2022-05-27

## 2022-05-27 LAB
ALBUMIN SERPL ELPH-MCNC: 4.2 G/DL — SIGNIFICANT CHANGE UP (ref 3.3–5)
ALP SERPL-CCNC: 89 U/L — SIGNIFICANT CHANGE UP (ref 40–120)
ALT FLD-CCNC: 12 U/L — SIGNIFICANT CHANGE UP (ref 10–45)
ANION GAP SERPL CALC-SCNC: 15 MMOL/L — SIGNIFICANT CHANGE UP (ref 5–17)
AST SERPL-CCNC: 17 U/L — SIGNIFICANT CHANGE UP (ref 10–40)
BASOPHILS # BLD AUTO: 0.1 K/UL — SIGNIFICANT CHANGE UP (ref 0–0.2)
BASOPHILS NFR BLD AUTO: 1.3 % — SIGNIFICANT CHANGE UP (ref 0–2)
BILIRUB SERPL-MCNC: <0.2 MG/DL — SIGNIFICANT CHANGE UP (ref 0.2–1.2)
BUN SERPL-MCNC: 13 MG/DL — SIGNIFICANT CHANGE UP (ref 7–23)
CALCIUM SERPL-MCNC: 9.3 MG/DL — SIGNIFICANT CHANGE UP (ref 8.4–10.5)
CEA SERPL-MCNC: 8.2 NG/ML — HIGH (ref 0–3.8)
CHLORIDE SERPL-SCNC: 99 MMOL/L — SIGNIFICANT CHANGE UP (ref 96–108)
CO2 SERPL-SCNC: 22 MMOL/L — SIGNIFICANT CHANGE UP (ref 22–31)
CREAT SERPL-MCNC: 0.82 MG/DL — SIGNIFICANT CHANGE UP (ref 0.5–1.3)
EGFR: 88 ML/MIN/1.73M2 — SIGNIFICANT CHANGE UP
EOSINOPHIL # BLD AUTO: 0.1 K/UL — SIGNIFICANT CHANGE UP (ref 0–0.5)
EOSINOPHIL NFR BLD AUTO: 2.3 % — SIGNIFICANT CHANGE UP (ref 0–6)
GLUCOSE SERPL-MCNC: 142 MG/DL — HIGH (ref 70–99)
HCT VFR BLD CALC: 43.9 % — SIGNIFICANT CHANGE UP (ref 39–50)
HGB BLD-MCNC: 13.9 G/DL — SIGNIFICANT CHANGE UP (ref 13–17)
LYMPHOCYTES # BLD AUTO: 1.9 K/UL — SIGNIFICANT CHANGE UP (ref 1–3.3)
LYMPHOCYTES # BLD AUTO: 36.4 % — SIGNIFICANT CHANGE UP (ref 13–44)
MCHC RBC-ENTMCNC: 25.8 PG — LOW (ref 27–34)
MCHC RBC-ENTMCNC: 31.6 G/DL — LOW (ref 32–36)
MCV RBC AUTO: 81.8 FL — SIGNIFICANT CHANGE UP (ref 80–100)
MONOCYTES # BLD AUTO: 0.5 K/UL — SIGNIFICANT CHANGE UP (ref 0–0.9)
MONOCYTES NFR BLD AUTO: 8.9 % — SIGNIFICANT CHANGE UP (ref 2–14)
NEUTROPHILS # BLD AUTO: 2.7 K/UL — SIGNIFICANT CHANGE UP (ref 1.8–7.4)
NEUTROPHILS NFR BLD AUTO: 51.2 % — SIGNIFICANT CHANGE UP (ref 43–77)
PLATELET # BLD AUTO: 160 K/UL — SIGNIFICANT CHANGE UP (ref 150–400)
POTASSIUM SERPL-MCNC: 4.3 MMOL/L — SIGNIFICANT CHANGE UP (ref 3.5–5.3)
POTASSIUM SERPL-SCNC: 4.3 MMOL/L — SIGNIFICANT CHANGE UP (ref 3.5–5.3)
PROT SERPL-MCNC: 7.1 G/DL — SIGNIFICANT CHANGE UP (ref 6–8.3)
RBC # BLD: 5.37 M/UL — SIGNIFICANT CHANGE UP (ref 4.2–5.8)
RBC # FLD: 12.9 % — SIGNIFICANT CHANGE UP (ref 10.3–14.5)
SODIUM SERPL-SCNC: 136 MMOL/L — SIGNIFICANT CHANGE UP (ref 135–145)
WBC # BLD: 5.3 K/UL — SIGNIFICANT CHANGE UP (ref 3.8–10.5)
WBC # FLD AUTO: 5.3 K/UL — SIGNIFICANT CHANGE UP (ref 3.8–10.5)

## 2022-05-31 ENCOUNTER — APPOINTMENT (OUTPATIENT)
Dept: HEMATOLOGY ONCOLOGY | Facility: CLINIC | Age: 82
End: 2022-05-31
Payer: MEDICARE

## 2022-05-31 VITALS
WEIGHT: 149.01 LBS | HEART RATE: 75 BPM | SYSTOLIC BLOOD PRESSURE: 131 MMHG | OXYGEN SATURATION: 96 % | BODY MASS INDEX: 23.95 KG/M2 | DIASTOLIC BLOOD PRESSURE: 80 MMHG | HEIGHT: 66 IN

## 2022-05-31 PROCEDURE — 99214 OFFICE O/P EST MOD 30 MIN: CPT

## 2022-05-31 NOTE — REVIEW OF SYSTEMS
[Negative] : Allergic/Immunologic [de-identified] : mild numbness and tingling of fingers and toes

## 2022-05-31 NOTE — ASSESSMENT
[FreeTextEntry1] :  ypT3 N2. gastric cancer, her 2 + [was at least T3N1 by EUS criteria].\par -s/p ERCP 7/10/20. Pathology: Gastric, Antrum, Ulcerated mass - moderately differentiated adenocarcinoma\par -s/p 4 cycles neoadjuvant FLOT (8/17/20 - 10/5/20): docetaxel (50 mg/m2), oxaliplatin (85 mg/m2), LV (200 mg/m2) with short-term infusional FU (2600 mg/m2 as a 24-hour infusion), on day 1 Q2 weeks. \par -s/p surgery with Dr. Young 11/9/20. Distal gastrectomy and PEG placed. Path c/w residual gastric adenocarcinoma, moderately differentiated, status post treatment. Vascular invasion identified. 4/29 lymph nodes involved by adenocarcinoma, negative margins. Tumor stage: ypT3 N2. \par -C4D1 of 4 cycles of postop FLOT. Pt had requested to remain off adjuvant FLOT until 2/21, AMA. Patient also requested dose reduction. \par -post treatment PET 4/21- GRANT\par -CT 2/10/22 showed GRANT\par -H&P every 3-6 months for 1-2 y, every 6-12 months for 3-5 years and annual thereafter\par -CT c/ap with oral and IV contrast every 6-12 months for first 2 years, then annual up to 5 years, Will consider    PET as clinically indicated\par -CTs ordered for 8/8/22 with Dr. Ellis alexander scheduled for 8/15/22\par -port flush Q6 weeks.  Can discuss removing port at next visit pending CT results\par -Monitor B12, iron\par \par #Supportive\par -Dr. Rosas evaluation for swollen and cramping fingers\par -Miralax for constipation \par

## 2022-05-31 NOTE — HISTORY OF PRESENT ILLNESS
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 He was sent for CT by his PCP, Dr. Charles Smith, due to anemia. CT showed in the upper central abdomen abutting the posterior antrum of the stomach and neck of the pancreas there is a 3 x 2 x 3 cm mass. There is an additional heterogeneous enhancing 2.7 x 2.3 x 2.5 lobulated mass in the ventral upper abdominal peritoneal cavity anterosuperiorly to the antrum of the stomach with internal and surrounding enhancing vessels. Endoscopy with biopsy of the gastric mass was c/w moderately differentiated adenocarcinoma. Staging PET showed hypermetabolic thickening of the distal stomach, consistent with primary gastric cancer. Hypermetabolic activity in the distal stomach, inseparable from the perigastric lymphadenopathy, consistent with metastatic kong disease. \par \par TNM stage: T3, N2, M0 \par \par \par Patient completed 4 cycles of postoperative adjuvant FLOT for gastric adenocarcinoma s/p robotic assisted laparoscopic distal gastrectomy with Billroth 2 gastrojejunostomy reconstruction and placement of feeding jejunostomy tube on 11/9/20 with Dr Young. He is s/p 4 cycles neoadjuvant FLOT. \par + Fatigue, intermittent and mild. + Peripheral neuropathy, improved with gabapentin. + Occasional emesis after "eating too much," denies nausea. + Constipation, improved. + Alopecia. + Nail changes. + Cold intolerance improving. + Decreased appetite with mild weight loss. PEG removed by Dr. Young. + Grade 1 H/F, xeroderma only, no pain. No myalgias. No weakness. No dysgeusia. No fevers, no cough, no SOB. \par \par \par  [de-identified] : 8/6/21: Interval PET on 8/3 showed 1. Unchanged nonspecific mild diffuse FDG avidity in the residual stomach, status post gastrectomy and gastrojejunostomy, possibly physiologic.  No evidence of FDG avid perigastric lymph nodes.  Resolution of diffuse marrow FDG avidity. Unchanged subcentimeter left upper lobe pulmonary nodules, too small to characterize.  Nonspecific new minimally FDG avid small bilateral hilar lymph nodes.\par \par Pt has mild tingling of fingers and toes, much improved from before. Continues to take gabapentin. His appetite is good. His weight is stable. Denies HA. CP, SOB, abd pain, constipation, diarrhea, melena, hematuria, dysuria.\par \par 11/4/21: Pt feels well. Right 4th finger lock up, no pain, does not bother him. Mild numbness/tingling for fingers and toes, improving.  Taking gabapentin. Appetite is good. Denies HA. CP, SOB, abd pain, constipation, diarrhea, melena, hematuria, dysuria. \par \par 2/13/22: CT scan on 2/4/22 showed no evidence of recurrent dz. Pt feels well. His appetite is good and gained some weight. Endorses neuropathy of hands. His fingers sometimes lock up. Denies HA. CP, SOB, abd pain, constipation, diarrhea, melena, hematuria, dysuria. \par \par 5/31/22: Patient presents for follow up for gastric adenocarcinoma s/p robotic assisted laparoscopic distal gastrectomy with Billroth 2 gastrojejunostomy reconstruction and placement of feeding jejunostomy tube on 11/9/20 with Dr Young s/p 4 cycles of neoadjuvant and adjuvant FLOT.\par + Swelling and cramping, "locking up" of multiple digits of both hands, R>L. No pain but intermittent discomfort. Neuropathy mostly resolved. + Occasional constipation. + Occasional abdominal pain, almost always after eating "too much." No other complaints. No diarrhea, no melena.  No dyspepsia. No fevers, cough or SOB.

## 2022-06-14 ENCOUNTER — APPOINTMENT (OUTPATIENT)
Dept: PHYSICAL MEDICINE AND REHAB | Facility: CLINIC | Age: 82
End: 2022-06-14
Payer: MEDICARE

## 2022-06-14 VITALS
HEART RATE: 67 BPM | TEMPERATURE: 97.6 F | HEIGHT: 66 IN | SYSTOLIC BLOOD PRESSURE: 159 MMHG | WEIGHT: 149 LBS | BODY MASS INDEX: 23.95 KG/M2 | DIASTOLIC BLOOD PRESSURE: 84 MMHG

## 2022-06-14 DIAGNOSIS — M79.2 NEURALGIA AND NEURITIS, UNSPECIFIED: ICD-10-CM

## 2022-06-14 PROCEDURE — 99204 OFFICE O/P NEW MOD 45 MIN: CPT

## 2022-06-14 RX ORDER — FLASH GLUCOSE SENSOR
KIT MISCELLANEOUS
Qty: 6 | Refills: 0 | Status: DISCONTINUED | COMMUNITY
Start: 2021-03-18

## 2022-06-14 NOTE — HISTORY OF PRESENT ILLNESS
[FreeTextEntry1] : Mr. Petty is an 81 year old male ypT3 N2 gastric cancer, her 2 + [was at least T3N1 by EUS criteria].  S/p ERCP 7/10/20. Pathology: Gastric, Antrum, Ulcerated mass - moderately differentiated adenocarcinoms.  S/p 4 cycles neoadjuvant FLOT (8/17/20 - 10/5/20): docetaxel (50 mg/m2), oxaliplatin (85 mg/m2), LV (200 mg/m2) with short-term infusional FU (2600 mg/m2 as a 24-hour infusion), on day 1 Q2 weeks.  S/p surgery with Dr. Young 11/9/20. Distal gastrectomy and PEG placed. Path c/w residual gastric adenocarcinoma, moderately differentiated, status post treatment. Vascular invasion identified. 4/29 lymph nodes involved by adenocarcinoma, negative margins. Tumor stage: ypT3 N2.  C4D1 of 4 cycles of postop FLOT. Pt had requested to remain off adjuvant FLOT until 2/21, AMA.\par \par He reports that since his treatment he has had persistent numbness and tingling in his bilateral hands.  Also reports some triggering sensation.  Denies any recent trauma.  Reports he sometimes has difficulty holding things due to the numbness in his hands.  Denies any pain radiating from his neck.  Denies any bowel bladder dysfunction.  Previously tried gabapentin which was not effective.\par \par \par

## 2022-06-14 NOTE — PHYSICAL EXAM
[FreeTextEntry1] : Gen: Patient is A&O x 3, NAD\par HEENT: EOMI, hearing grossly normal\par Resp: regular, non - labored\par CV: pulses regular\par Skin: no rashes, erythema\par Lymph: no clubbing, cyanosis, edema, or palpable lymphadenopathy\par Inspection: no instability or misalignment\par ROM: full throughout\par Palpation:no tenderness to palpation at A1 pulley, mild TTP along PIP joint lines \par Sensation: Decreased to light touch in bilateral distal hands \par Reflexes: 1+ and symmetric throughout\par Strength: 5/5 throughout\par Special tests: -Spurling sign, -Phalen's sign \par Gait: normal, non-antalgic\par \par

## 2022-06-14 NOTE — ASSESSMENT
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 modalities and strengthening \par -Discussed obtaining imaging to evaluate for arthropathy, he would like to defer \par \par Follow up in 1 month.

## 2022-07-01 ENCOUNTER — APPOINTMENT (OUTPATIENT)
Age: 82
End: 2022-07-01

## 2022-07-11 NOTE — BEHAVIORAL HEALTH ASSESSMENT NOTE - THOUGHT PROCESS
Render Risk Assessment In Note?: no Additional Notes: Patient consent was obtained to proceed with the visit and recommended plan of care after discussion of all risks and benefits including the risk of Covid-19 exposure Detail Level: Simple Linear

## 2022-07-22 ENCOUNTER — OUTPATIENT (OUTPATIENT)
Dept: OUTPATIENT SERVICES | Facility: HOSPITAL | Age: 82
LOS: 1 days | End: 2022-07-22

## 2022-07-22 ENCOUNTER — APPOINTMENT (OUTPATIENT)
Dept: PHYSICAL MEDICINE AND REHAB | Facility: CLINIC | Age: 82
End: 2022-07-22

## 2022-07-22 DIAGNOSIS — M25.549 PAIN IN JOINTS OF UNSPECIFIED HAND: ICD-10-CM

## 2022-07-22 DIAGNOSIS — Z95.818 PRESENCE OF OTHER CARDIAC IMPLANTS AND GRAFTS: Chronic | ICD-10-CM

## 2022-07-22 DIAGNOSIS — M79.643 PAIN IN UNSPECIFIED HAND: ICD-10-CM

## 2022-07-22 DIAGNOSIS — G62.9 POLYNEUROPATHY, UNSPECIFIED: ICD-10-CM

## 2022-07-22 DIAGNOSIS — Z95.828 PRESENCE OF OTHER VASCULAR IMPLANTS AND GRAFTS: Chronic | ICD-10-CM

## 2022-07-22 PROCEDURE — 99213 OFFICE O/P EST LOW 20 MIN: CPT

## 2022-07-22 PROCEDURE — 73130 X-RAY EXAM OF HAND: CPT | Mod: 26,50

## 2022-07-22 NOTE — HISTORY OF PRESENT ILLNESS
[FreeTextEntry1] : Mr. Petty is an 81 year old male ypT3 N2 gastric cancer, her 2 + [was at least T3N1 by EUS criteria].  S/p ERCP 7/10/20. Pathology: Gastric, Antrum, Ulcerated mass - moderately differentiated adenocarcinoms.  S/p 4 cycles neoadjuvant FLOT (8/17/20 - 10/5/20): docetaxel (50 mg/m2), oxaliplatin (85 mg/m2), LV (200 mg/m2) with short-term infusional FU (2600 mg/m2 as a 24-hour infusion), on day 1 Q2 weeks.  S/p surgery with Dr. Young 11/9/20. Distal gastrectomy and PEG placed. Path c/w residual gastric adenocarcinoma, moderately differentiated, status post treatment. Vascular invasion identified. 4/29 lymph nodes involved by adenocarcinoma, negative margins. Tumor stage: ypT3 N2.  C4D1 of 4 cycles of postop FLOT. Pt had requested to remain off adjuvant FLOT until 2/21, AMA.\par \par Since last visit, patient stopped taking gabapentin and started duloxetine, though did not notice any difference in his symptoms. He took about 30 days worth of the duloxetine before self-discontinuing about a week ago. Patient reports that he is having persistent swelling and triggering in his R 3rd digit, as well as several digits locking in flexion. He has been using makeshift splint to prevent his finger from flexing and soaking his hands in warm water which helps. He has been unable to garden this season as he has difficulty holding things and with locking of fingers. He states that he occasionally has numbness in the L hand which had already started to improve prior to starting duloxetine. He did attend occupational therapy for 2 sessions which he does not believe helped with the swelling and stiffness. \par \par \par

## 2022-07-22 NOTE — PHYSICAL EXAM
[FreeTextEntry1] : Gen: Patient is A&O x 3, NAD\par HEENT: EOMI, hearing grossly normal\par Resp: regular, non - labored\par CV: pulses regular\par Skin: no rashes, erythema\par Lymph: no clubbing, cyanosis, edema, or palpable lymphadenopathy\par Inspection: no instability or misalignment. Swelling of multiple digits especially bilateral 3rd, L 4th\par ROM: unable to fully flex bilateral fingers to form a fist; distal digits reach to distal phalanges. Bilateral 5th and R third digits lock in flexion \par Palpation:no tenderness to palpation at A1 pulley, mild TTP along PIP joint lines romana R 3rd and L 4th; R 3rd MCP tenderness \par Sensation: Decreased to light touch in bilateral distal hands \par Reflexes: 1+ and symmetric throughout\par Strength: 5/5 throughout excepting incomplete \par Special tests: -Spurling sign, -Phalen's sign \par Gait: normal, non-antalgic\par \par

## 2022-07-22 NOTE — ASSESSMENT
[FreeTextEntry1] : 81 year old male presenting for evaluation\par \par #Neuropathy: \par -Distal symmetric sensory polyneuropathy now improved\par -Monitor off duloxetine as symptoms improved\par -Previous intolerance to gabapentin\par -Continue OT\par \par #Hand Arthralgia: \par -Obtain bilateral hand XR to further evaluate \par -Rheumatology referral to evaluate for underlying psoriatic arthritis given clinical appearance\par \par Follow up in 1 months or earlier as needed.

## 2022-07-22 NOTE — REVIEW OF SYSTEMS
[Joint Stiffness] : joint stiffness [Negative] : Heme/Lymph [FreeTextEntry9] : +Joint stiffness and locking [de-identified] : Denies skin rash [de-identified] : +hand numbness

## 2022-07-25 ENCOUNTER — APPOINTMENT (OUTPATIENT)
Dept: RHEUMATOLOGY | Facility: CLINIC | Age: 82
End: 2022-07-25

## 2022-07-25 VITALS
BODY MASS INDEX: 22.5 KG/M2 | RESPIRATION RATE: 17 BRPM | WEIGHT: 140 LBS | TEMPERATURE: 97.7 F | HEIGHT: 66 IN | HEART RATE: 72 BPM | SYSTOLIC BLOOD PRESSURE: 124 MMHG | OXYGEN SATURATION: 98 % | DIASTOLIC BLOOD PRESSURE: 76 MMHG

## 2022-07-25 DIAGNOSIS — Z86.39 PERSONAL HISTORY OF OTHER ENDOCRINE, NUTRITIONAL AND METABOLIC DISEASE: ICD-10-CM

## 2022-07-25 PROCEDURE — 99205 OFFICE O/P NEW HI 60 MIN: CPT

## 2022-07-25 RX ORDER — GABAPENTIN 300 MG/1
300 CAPSULE ORAL 3 TIMES DAILY
Qty: 90 | Refills: 2 | Status: DISCONTINUED | COMMUNITY
Start: 2021-03-09 | End: 2022-07-25

## 2022-07-25 RX ORDER — METFORMIN HYDROCHLORIDE 625 MG/1
TABLET ORAL
Refills: 0 | Status: ACTIVE | COMMUNITY

## 2022-07-25 RX ORDER — DULOXETINE HYDROCHLORIDE 20 MG/1
20 CAPSULE, DELAYED RELEASE PELLETS ORAL
Qty: 30 | Refills: 1 | Status: DISCONTINUED | COMMUNITY
Start: 2022-06-14 | End: 2022-07-25

## 2022-07-26 ENCOUNTER — LABORATORY RESULT (OUTPATIENT)
Age: 82
End: 2022-07-26

## 2022-08-05 ENCOUNTER — OUTPATIENT (OUTPATIENT)
Dept: OUTPATIENT SERVICES | Facility: HOSPITAL | Age: 82
LOS: 1 days | Discharge: HOME | End: 2022-08-05

## 2022-08-05 DIAGNOSIS — Z95.818 PRESENCE OF OTHER CARDIAC IMPLANTS AND GRAFTS: Chronic | ICD-10-CM

## 2022-08-05 DIAGNOSIS — C16.9 MALIGNANT NEOPLASM OF STOMACH, UNSPECIFIED: ICD-10-CM

## 2022-08-05 DIAGNOSIS — Z95.828 PRESENCE OF OTHER VASCULAR IMPLANTS AND GRAFTS: Chronic | ICD-10-CM

## 2022-08-07 LAB
14-3-3 ETA AG SER IA-MCNC: <0.2 NG/ML
ACE BLD-CCNC: 55 U/L
ALBUMIN MFR SERPL ELPH: 58.9 %
ALBUMIN SERPL ELPH-MCNC: 4.7 G/DL
ALBUMIN SERPL-MCNC: 4.5 G/DL
ALBUMIN/GLOB SERPL: 1.4 RATIO
ALP BLD-CCNC: 81 U/L
ALPHA1 GLOB MFR SERPL ELPH: 4.1 %
ALPHA1 GLOB SERPL ELPH-MCNC: 0.3 G/DL
ALPHA2 GLOB MFR SERPL ELPH: 12.8 %
ALPHA2 GLOB SERPL ELPH-MCNC: 1 G/DL
ALT SERPL-CCNC: 10 U/L
ANA SER IF-ACNC: NEGATIVE
ANION GAP SERPL CALC-SCNC: 13 MMOL/L
APPEARANCE: CLEAR
AST SERPL-CCNC: 13 U/L
B-GLOBULIN MFR SERPL ELPH: 11.8 %
B-GLOBULIN SERPL ELPH-MCNC: 0.9 G/DL
BACTERIA: NEGATIVE
BASOPHILS # BLD AUTO: 0.04 K/UL
BASOPHILS NFR BLD AUTO: 0.8 %
BILIRUB SERPL-MCNC: 0.3 MG/DL
BILIRUBIN URINE: NEGATIVE
BLOOD URINE: NEGATIVE
BUN SERPL-MCNC: 14 MG/DL
CALCIUM SERPL-MCNC: 9.9 MG/DL
CCP AB SER IA-ACNC: <8 UNITS
CHLORIDE SERPL-SCNC: 101 MMOL/L
CK SERPL-CCNC: 140 U/L
CO2 SERPL-SCNC: 25 MMOL/L
COLOR: YELLOW
CREAT SERPL-MCNC: 0.88 MG/DL
CRP SERPL-MCNC: <3 MG/L
DEPRECATED KAPPA LC FREE/LAMBDA SER: 1.15 RATIO
DSDNA AB SER-ACNC: 12 IU/ML
EGFR: 86 ML/MIN/1.73M2
ENA RNP AB SER IA-ACNC: <0.2 AL
ENA SCL70 IGG SER IA-ACNC: <0.2 AL
ENA SM AB SER IA-ACNC: <0.2 AL
ENA SS-A AB SER IA-ACNC: <0.2 AL
ENA SS-B AB SER IA-ACNC: <0.2 AL
EOSINOPHIL # BLD AUTO: 0.09 K/UL
EOSINOPHIL NFR BLD AUTO: 1.8 %
ERYTHROCYTE [SEDIMENTATION RATE] IN BLOOD BY WESTERGREN METHOD: 35 MM/HR
FOLATE SERPL-MCNC: 18 NG/ML
GAMMA GLOB FLD ELPH-MCNC: 1 G/DL
GAMMA GLOB MFR SERPL ELPH: 12.4 %
GLUCOSE QUALITATIVE U: NEGATIVE
GLUCOSE SERPL-MCNC: 109 MG/DL
HAV IGM SER QL: NONREACTIVE
HBV CORE IGG+IGM SER QL: NONREACTIVE
HBV CORE IGM SER QL: NONREACTIVE
HBV SURFACE AG SER QL: NONREACTIVE
HCT VFR BLD CALC: 45.4 %
HCV AB SER QL: NONREACTIVE
HCV S/CO RATIO: 0.1 S/CO
HGB BLD-MCNC: 15.5 G/DL
HYALINE CASTS: 0 /LPF
IGA SER QL IEP: 348 MG/DL
IGG SER QL IEP: 928 MG/DL
IGM SER QL IEP: 43 MG/DL
IMM GRANULOCYTES NFR BLD AUTO: 0.2 %
INTERPRETATION SERPL IEP-IMP: NORMAL
KAPPA LC CSF-MCNC: 1.62 MG/DL
KAPPA LC SERPL-MCNC: 1.87 MG/DL
KETONES URINE: NEGATIVE
LDH SERPL-CCNC: 174 U/L
LEUKOCYTE ESTERASE URINE: NEGATIVE
LYMPHOCYTES # BLD AUTO: 1.7 K/UL
LYMPHOCYTES NFR BLD AUTO: 34.6 %
M PROTEIN SPEC IFE-MCNC: NORMAL
MAGNESIUM SERPL-MCNC: 2.2 MG/DL
MAN DIFF?: NORMAL
MCHC RBC-ENTMCNC: 26.6 PG
MCHC RBC-ENTMCNC: 34.1 GM/DL
MCV RBC AUTO: 78 FL
MICROSCOPIC-UA: NORMAL
MONOCYTES # BLD AUTO: 0.41 K/UL
MONOCYTES NFR BLD AUTO: 8.4 %
NEUTROPHILS # BLD AUTO: 2.66 K/UL
NEUTROPHILS NFR BLD AUTO: 54.2 %
NITRITE URINE: NEGATIVE
PH URINE: 6
PHOSPHATE SERPL-MCNC: 3.2 MG/DL
PLATELET # BLD AUTO: 193 K/UL
POTASSIUM SERPL-SCNC: 4.8 MMOL/L
PROT SERPL-MCNC: 7.2 G/DL
PROT SERPL-MCNC: 7.7 G/DL
PROT SERPL-MCNC: 7.7 G/DL
PROTEIN URINE: NORMAL
RBC # BLD: 5.82 M/UL
RBC # FLD: 14.9 %
RED BLOOD CELLS URINE: 3 /HPF
RF+CCP IGG SER-IMP: NEGATIVE
RHEUMATOID FACT SER QL: <10 IU/ML
RNA POLYMERASE III IGG: 7 UNITS
SODIUM SERPL-SCNC: 139 MMOL/L
SPECIFIC GRAVITY URINE: 1.02
SQUAMOUS EPITHELIAL CELLS: 0 /HPF
THYROGLOB AB SERPL-ACNC: <20 IU/ML
THYROPEROXIDASE AB SERPL IA-ACNC: <10 IU/ML
TRICHINELLA AB SER QL: NEGATIVE
TSH SERPL-ACNC: 2.71 UIU/ML
URATE SERPL-MCNC: 4.2 MG/DL
UROBILINOGEN URINE: NORMAL
VIT B12 SERPL-MCNC: 461 PG/ML
WBC # FLD AUTO: 4.91 K/UL
WHITE BLOOD CELLS URINE: 2 /HPF

## 2022-08-08 ENCOUNTER — OUTPATIENT (OUTPATIENT)
Dept: OUTPATIENT SERVICES | Facility: HOSPITAL | Age: 82
LOS: 1 days | End: 2022-08-08
Payer: COMMERCIAL

## 2022-08-08 ENCOUNTER — APPOINTMENT (OUTPATIENT)
Dept: CT IMAGING | Facility: CLINIC | Age: 82
End: 2022-08-08

## 2022-08-08 DIAGNOSIS — C16.9 MALIGNANT NEOPLASM OF STOMACH, UNSPECIFIED: ICD-10-CM

## 2022-08-08 DIAGNOSIS — Z95.818 PRESENCE OF OTHER CARDIAC IMPLANTS AND GRAFTS: Chronic | ICD-10-CM

## 2022-08-08 DIAGNOSIS — Z95.828 PRESENCE OF OTHER VASCULAR IMPLANTS AND GRAFTS: Chronic | ICD-10-CM

## 2022-08-08 PROCEDURE — 71260 CT THORAX DX C+: CPT

## 2022-08-08 PROCEDURE — 74177 CT ABD & PELVIS W/CONTRAST: CPT | Mod: 26

## 2022-08-08 PROCEDURE — 71260 CT THORAX DX C+: CPT | Mod: 26

## 2022-08-08 PROCEDURE — 74177 CT ABD & PELVIS W/CONTRAST: CPT

## 2022-08-08 NOTE — ADDENDUM
[FreeTextEntry1] : I, Coleen Massey, acted solely as a scribe for Dr. Myron I. Kleiner, MD. on 07/25/2022.

## 2022-08-08 NOTE — REVIEW OF SYSTEMS
[Negative] : Heme/Lymph [As Noted in HPI] : as noted in HPI [Joint Swelling] : joint swelling [Joint Stiffness] : joint stiffness [Dry Eyes] : no dryness of the eyes [Joint Pain] : no joint pain

## 2022-08-08 NOTE — ASSESSMENT
[FreeTextEntry1] : Impression: PETER GAMBOA is a 81 year old  man who was referred here for further evaluation of joint symptoms and rheumatic diseases.\par \par 6 month history of swelling and stiffness bilateral PIPs. I will evaluate for various types of rheumatic diseases. On exam, patient has mild diffuse swelling bilateral fingers - consider scleroderma, MCTD and other related diseases. He also has mild synovial proliferation bilateral PIPs - will evaluate for various types of inflammatory arthritis. Occasional locking bilateral 2nd-5th fingers secondary to flexor tenosynovitis as seen on exam. Decreased flexion right thumb also secondary to tendonitis on exam. Rehab treated with medication (? which) without relief. Occupational therapy without relief. Today, patient has dry eyes, although not bothersome and denies dry mouth - consider Sjogren's Syndrome and evaluate for sarcoidosis, hepatitis C, IgG 4 related disease and other related diseases. Rehab referred patient here for further evaluation.\par \par Plan: \par Laboratory tests ordered - see list below - with coordination of care\par X-rays ordered - see list below - with coordination of care\par Obtain previous bone densitometry report\par Diagnosis and prognosis discussed\par Continue current medications (other than those changed below)\par Meloxicam 7.5 mg q.d. at end of breakfast (Possible side effects explained including cardiovascular risk/MI/CVA) \par Omeprazole 20 mg q.d. a.c. breakfast (possible side effects explained)\par Prophylactic aspirin 81 mg q.d. at end of supper (Possible side effects explained) \par Return visit 2-3 weeks

## 2022-08-08 NOTE — HISTORY OF PRESENT ILLNESS
[FreeTextEntry1] : PETER GAMBOA is a 81 year old  man who was referred here for further evaluation of joint symptoms and rheumatic diseases.\par \par 6 months ago, patient developed swelling and stiffness bilateral PIPs. Denies pain, erythema and heat. Pain not worse in the morning. Denies morning stiffness. Denies other joint pain, swelling or stiffness. Occasional locking bilateral 2nd-5th fingers. Decreased flexion right thumb. Rehab Dr. Brandon Rosas treated with medication (? which) without relief. Occupational therapy without relief. Today, denies dry eyes and dry mouth. Rehab referred patient here for further evaluation.

## 2022-08-08 NOTE — CONSULT LETTER
[Dear  ___] : Dear  [unfilled], [Consult Letter:] : I had the pleasure of evaluating your patient, [unfilled]. [Please see my note below.] : Please see my note below. [Consult Closing:] : Thank you very much for allowing me to participate in the care of this patient.  If you have any questions, please do not hesitate to contact me. [Sincerely,] : Sincerely, [FreeTextEntry3] : Yung\par Myron I. Kleiner, M.D., FACR\par Chief, Division of Rheumatology\par Department of Medicine\par [facility] [DrRobert  ___] : Dr. LARRY

## 2022-08-09 LAB — HLA-B27 RELATED AG QL: NEGATIVE

## 2022-08-12 ENCOUNTER — RESULT REVIEW (OUTPATIENT)
Age: 82
End: 2022-08-12

## 2022-08-12 ENCOUNTER — APPOINTMENT (OUTPATIENT)
Age: 82
End: 2022-08-12

## 2022-08-12 LAB
ALBUMIN SERPL ELPH-MCNC: 4.3 G/DL — SIGNIFICANT CHANGE UP (ref 3.3–5)
ALP SERPL-CCNC: 88 U/L — SIGNIFICANT CHANGE UP (ref 40–120)
ALT FLD-CCNC: 10 U/L — SIGNIFICANT CHANGE UP (ref 10–45)
ANION GAP SERPL CALC-SCNC: 14 MMOL/L — SIGNIFICANT CHANGE UP (ref 5–17)
AST SERPL-CCNC: 16 U/L — SIGNIFICANT CHANGE UP (ref 10–40)
BASOPHILS # BLD AUTO: 0.1 K/UL — SIGNIFICANT CHANGE UP (ref 0–0.2)
BASOPHILS NFR BLD AUTO: 1.2 % — SIGNIFICANT CHANGE UP (ref 0–2)
BILIRUB SERPL-MCNC: 0.3 MG/DL — SIGNIFICANT CHANGE UP (ref 0.2–1.2)
BUN SERPL-MCNC: 12 MG/DL — SIGNIFICANT CHANGE UP (ref 7–23)
CALCIUM SERPL-MCNC: 9.5 MG/DL — SIGNIFICANT CHANGE UP (ref 8.4–10.5)
CEA SERPL-MCNC: 7.7 NG/ML — HIGH (ref 0–3.8)
CHLORIDE SERPL-SCNC: 101 MMOL/L — SIGNIFICANT CHANGE UP (ref 96–108)
CO2 SERPL-SCNC: 21 MMOL/L — LOW (ref 22–31)
CREAT SERPL-MCNC: 0.75 MG/DL — SIGNIFICANT CHANGE UP (ref 0.5–1.3)
EGFR: 90 ML/MIN/1.73M2 — SIGNIFICANT CHANGE UP
EOSINOPHIL # BLD AUTO: 0.1 K/UL — SIGNIFICANT CHANGE UP (ref 0–0.5)
EOSINOPHIL NFR BLD AUTO: 1.5 % — SIGNIFICANT CHANGE UP (ref 0–6)
GLUCOSE SERPL-MCNC: 139 MG/DL — HIGH (ref 70–99)
HCT VFR BLD CALC: 43.8 % — SIGNIFICANT CHANGE UP (ref 39–50)
HGB BLD-MCNC: 14.2 G/DL — SIGNIFICANT CHANGE UP (ref 13–17)
LYMPHOCYTES # BLD AUTO: 1.5 K/UL — SIGNIFICANT CHANGE UP (ref 1–3.3)
LYMPHOCYTES # BLD AUTO: 27.8 % — SIGNIFICANT CHANGE UP (ref 13–44)
MAGNESIUM SERPL-MCNC: 2 MG/DL — SIGNIFICANT CHANGE UP (ref 1.6–2.6)
MCHC RBC-ENTMCNC: 25.6 PG — LOW (ref 27–34)
MCHC RBC-ENTMCNC: 32.5 G/DL — SIGNIFICANT CHANGE UP (ref 32–36)
MCV RBC AUTO: 78.7 FL — LOW (ref 80–100)
MONOCYTES # BLD AUTO: 0.6 K/UL — SIGNIFICANT CHANGE UP (ref 0–0.9)
MONOCYTES NFR BLD AUTO: 11.3 % — SIGNIFICANT CHANGE UP (ref 2–14)
NEUTROPHILS # BLD AUTO: 3.2 K/UL — SIGNIFICANT CHANGE UP (ref 1.8–7.4)
NEUTROPHILS NFR BLD AUTO: 58.3 % — SIGNIFICANT CHANGE UP (ref 43–77)
PLATELET # BLD AUTO: 162 K/UL — SIGNIFICANT CHANGE UP (ref 150–400)
POTASSIUM SERPL-MCNC: 4.4 MMOL/L — SIGNIFICANT CHANGE UP (ref 3.5–5.3)
POTASSIUM SERPL-SCNC: 4.4 MMOL/L — SIGNIFICANT CHANGE UP (ref 3.5–5.3)
PROT SERPL-MCNC: 7.1 G/DL — SIGNIFICANT CHANGE UP (ref 6–8.3)
RBC # BLD: 5.56 M/UL — SIGNIFICANT CHANGE UP (ref 4.2–5.8)
RBC # FLD: 13.5 % — SIGNIFICANT CHANGE UP (ref 10.3–14.5)
SODIUM SERPL-SCNC: 136 MMOL/L — SIGNIFICANT CHANGE UP (ref 135–145)
WBC # BLD: 5.4 K/UL — SIGNIFICANT CHANGE UP (ref 3.8–10.5)
WBC # FLD AUTO: 5.4 K/UL — SIGNIFICANT CHANGE UP (ref 3.8–10.5)

## 2022-08-15 ENCOUNTER — APPOINTMENT (OUTPATIENT)
Dept: HEMATOLOGY ONCOLOGY | Facility: CLINIC | Age: 82
End: 2022-08-15

## 2022-08-15 VITALS
DIASTOLIC BLOOD PRESSURE: 84 MMHG | OXYGEN SATURATION: 96 % | WEIGHT: 141.02 LBS | BODY MASS INDEX: 22.67 KG/M2 | SYSTOLIC BLOOD PRESSURE: 134 MMHG | HEART RATE: 73 BPM | HEIGHT: 66 IN

## 2022-08-15 PROCEDURE — 99213 OFFICE O/P EST LOW 20 MIN: CPT

## 2022-08-15 NOTE — HISTORY OF PRESENT ILLNESS
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 He was sent for CT by his PCP, Dr. Charles Smith, due to anemia. CT showed in the upper central abdomen abutting the posterior antrum of the stomach and neck of the pancreas there is a 3 x 2 x 3 cm mass. There is an additional heterogeneous enhancing 2.7 x 2.3 x 2.5 lobulated mass in the ventral upper abdominal peritoneal cavity anterosuperiorly to the antrum of the stomach with internal and surrounding enhancing vessels. Endoscopy with biopsy of the gastric mass was c/w moderately differentiated adenocarcinoma. Staging PET showed hypermetabolic thickening of the distal stomach, consistent with primary gastric cancer. Hypermetabolic activity in the distal stomach, inseparable from the perigastric lymphadenopathy, consistent with metastatic kong disease. \par \par TNM stage: T3, N2, M0 \par \par \par Patient completed 4 cycles of postoperative adjuvant FLOT for gastric adenocarcinoma s/p robotic assisted laparoscopic distal gastrectomy with Billroth 2 gastrojejunostomy reconstruction and placement of feeding jejunostomy tube on 11/9/20 with Dr Young. He is s/p 4 cycles neoadjuvant FLOT. \par + Fatigue, intermittent and mild. + Peripheral neuropathy, improved with gabapentin. + Occasional emesis after "eating too much," denies nausea. + Constipation, improved. + Alopecia. + Nail changes. + Cold intolerance improving. + Decreased appetite with mild weight loss. PEG removed by Dr. Young. + Grade 1 H/F, xeroderma only, no pain. No myalgias. No weakness. No dysgeusia. No fevers, no cough, no SOB. \par \par \par  [de-identified] : 8/6/21: Interval PET on 8/3 showed 1. Unchanged nonspecific mild diffuse FDG avidity in the residual stomach, status post gastrectomy and gastrojejunostomy, possibly physiologic.  No evidence of FDG avid perigastric lymph nodes.  Resolution of diffuse marrow FDG avidity. Unchanged subcentimeter left upper lobe pulmonary nodules, too small to characterize.  Nonspecific new minimally FDG avid small bilateral hilar lymph nodes.\par \par Pt has mild tingling of fingers and toes, much improved from before. Continues to take gabapentin. His appetite is good. His weight is stable. Denies HA. CP, SOB, abd pain, constipation, diarrhea, melena, hematuria, dysuria.\par \par 11/4/21: Pt feels well. Right 4th finger lock up, no pain, does not bother him. Mild numbness/tingling for fingers and toes, improving.  Taking gabapentin. Appetite is good. Denies HA. CP, SOB, abd pain, constipation, diarrhea, melena, hematuria, dysuria. \par \par 2/13/22: CT scan on 2/4/22 showed no evidence of recurrent dz. Pt feels well. His appetite is good and gained some weight. Endorses neuropathy of hands. His fingers sometimes lock up. Denies HA. CP, SOB, abd pain, constipation, diarrhea, melena, hematuria, dysuria. \par \par 8/15/22: pt is here for follow up for gastric cancer s/p FLOT and resection. CT scan 8/8/22 showed Increase in size of 9 mm left upper lobe pulmonary nodule raising suspicion for small primary lung neoplasm.  No evidence of recurrent or metastatic disease within the abdomen/pelvis. Discussed findings with pt.\par \par Denies HA. CP, SOB, abd pain, constipation, diarrhea, melena, hematuria, dysuria. \par \par \par \par

## 2022-08-15 NOTE — ASSESSMENT
[FreeTextEntry1] :  ypT3 N2. gastric cancer, her 2 + [was at least T3N1 by EUS criteria].\par     -s/p ERCP 7/10/20. Pathology: Gastric, Antrum, Ulcerated mass - moderately\par     differentiated adenocarcinoma\par     -s/p 4 cycles neoadjuvant FLOT (8/17/20 - 10/5/20): docetaxel (50 mg/m2), oxaliplatin\par     (85 mg/m2), LV (200 mg/m2) with short-term infusional FU (2600 mg/m2 as a\par     24-hour infusion), on day 1 Q2 weeks. \par     -s/p surgery with Dr. Young 11/9/20. Distal gastrectomy and PEG placed. Path c/w\par     residual gastric adenocarcinoma, moderately differentiated, status post treatment.\par     Vascular invasion identified. 4/29 lymph nodes involved by adenocarcinoma,\par     negative margins. Tumor stage: ypT3 N2. \par     -C4D1 of 4 cycles of postop FLOT. Pt had requested to remain off\par     adjuvant FLOT until 2/21, AMA. Patient also requested dose reduction. \par     -post treatment PET 4/21- GRANT\par   - 8/15/22: pt is here for follow up for gastric cancer s/p FLOT and resection. CT scan 8/8/22 showed Increase in size of 9 mm left upper lobe pulmonary nodule raising suspicion for small primary lung neoplasm.  No evidence of recurrent or metastatic disease within the abdomen/pelvis\par - will repeat scan in 3 months given lung nodule\par \par    -H&P every 3-6 months for 1-2 y, every 6-12 months for 3-5 years and annual thereafter\par    -CT c/ap with oral and IV contrast every 6-12 months for first 2 years, then annual up to 5 years, Will consider    PET as clinically indicated\par    -Monitor B12, iron\par

## 2022-08-15 NOTE — REVIEW OF SYSTEMS
[Negative] : Allergic/Immunologic [de-identified] : mild numbness and tingling of fingers and toes

## 2022-08-16 NOTE — BEHAVIORAL HEALTH ASSESSMENT NOTE - DOMICILE TYPE
Private Residence Rituxan Counseling:  I discussed with the patient the risks of Rituxan infusions. Side effects can include infusion reactions, severe drug rashes including mucocutaneous reactions, reactivation of latent hepatitis and other infections and rarely progressive multifocal leukoencephalopathy.  All of the patient's questions and concerns were addressed.

## 2022-09-23 ENCOUNTER — APPOINTMENT (OUTPATIENT)
Age: 82
End: 2022-09-23

## 2022-10-14 ENCOUNTER — APPOINTMENT (OUTPATIENT)
Dept: RHEUMATOLOGY | Facility: CLINIC | Age: 82
End: 2022-10-14

## 2022-10-14 VITALS
OXYGEN SATURATION: 98 % | HEIGHT: 66 IN | WEIGHT: 140 LBS | RESPIRATION RATE: 17 BRPM | HEART RATE: 96 BPM | BODY MASS INDEX: 22.5 KG/M2 | SYSTOLIC BLOOD PRESSURE: 120 MMHG | DIASTOLIC BLOOD PRESSURE: 70 MMHG | TEMPERATURE: 98 F

## 2022-10-14 PROCEDURE — 99215 OFFICE O/P EST HI 40 MIN: CPT | Mod: 25

## 2022-10-14 PROCEDURE — G2212 PROLONG OUTPT/OFFICE VIS: CPT

## 2022-10-14 PROCEDURE — 36415 COLL VENOUS BLD VENIPUNCTURE: CPT

## 2022-10-14 RX ORDER — MELOXICAM 7.5 MG/1
7.5 TABLET ORAL
Qty: 30 | Refills: 1 | Status: DISCONTINUED | COMMUNITY
Start: 2022-07-25 | End: 2022-10-14

## 2022-10-14 RX ORDER — OMEPRAZOLE 20 MG/1
20 CAPSULE, DELAYED RELEASE ORAL DAILY
Qty: 30 | Refills: 1 | Status: DISCONTINUED | COMMUNITY
Start: 2022-07-25 | End: 2022-10-14

## 2022-10-17 LAB
ALBUMIN SERPL ELPH-MCNC: 4.8 G/DL
ALP BLD-CCNC: 84 U/L
ALT SERPL-CCNC: 10 U/L
ANION GAP SERPL CALC-SCNC: 12 MMOL/L
AST SERPL-CCNC: 13 U/L
BASOPHILS # BLD AUTO: 0.04 K/UL
BASOPHILS NFR BLD AUTO: 0.7 %
BILIRUB SERPL-MCNC: 0.4 MG/DL
BUN SERPL-MCNC: 15 MG/DL
CALCIUM SERPL-MCNC: 10 MG/DL
CHLORIDE SERPL-SCNC: 103 MMOL/L
CK SERPL-CCNC: 237 U/L
CO2 SERPL-SCNC: 24 MMOL/L
CREAT SERPL-MCNC: 0.82 MG/DL
CRP SERPL-MCNC: <3 MG/L
EGFR: 87 ML/MIN/1.73M2
ENA RNP AB SER IA-ACNC: <0.2 AL
ENA SCL70 IGG SER IA-ACNC: <0.2 AL
ENA SM AB SER IA-ACNC: <0.2 AL
ENA SS-A AB SER IA-ACNC: <0.2 AL
ENA SS-B AB SER IA-ACNC: <0.2 AL
EOSINOPHIL # BLD AUTO: 0.05 K/UL
EOSINOPHIL NFR BLD AUTO: 0.9 %
ERYTHROCYTE [SEDIMENTATION RATE] IN BLOOD BY WESTERGREN METHOD: 28 MM/HR
GLUCOSE SERPL-MCNC: 82 MG/DL
HCT VFR BLD CALC: 44.1 %
HGB BLD-MCNC: 14.5 G/DL
IMM GRANULOCYTES NFR BLD AUTO: 0.2 %
LDH SERPL-CCNC: 182 U/L
LYMPHOCYTES # BLD AUTO: 1.48 K/UL
LYMPHOCYTES NFR BLD AUTO: 27.5 %
MAN DIFF?: NORMAL
MCHC RBC-ENTMCNC: 25.3 PG
MCHC RBC-ENTMCNC: 32.9 GM/DL
MCV RBC AUTO: 77 FL
MONOCYTES # BLD AUTO: 0.43 K/UL
MONOCYTES NFR BLD AUTO: 8 %
NEUTROPHILS # BLD AUTO: 3.37 K/UL
NEUTROPHILS NFR BLD AUTO: 62.7 %
PHOSPHATE SERPL-MCNC: 2.9 MG/DL
PLATELET # BLD AUTO: 214 K/UL
POTASSIUM SERPL-SCNC: 4.5 MMOL/L
PROT SERPL-MCNC: 7.5 G/DL
RBC # BLD: 5.73 M/UL
RBC # FLD: 15.6 %
SODIUM SERPL-SCNC: 139 MMOL/L
WBC # FLD AUTO: 5.38 K/UL

## 2022-10-20 LAB
FERRITIN SERPL-MCNC: 91 NG/ML
IGG SUBSET TOTAL IGG: 957 MG/DL
IGG1 SER-MCNC: 601 MG/DL
IGG2 SER-MCNC: 152 MG/DL
IGG3 SER-MCNC: 93 MG/DL
IGG4 SER-MCNC: 23 MG/DL
IRON SATN MFR SERPL: 27 %
IRON SERPL-MCNC: 98 UG/DL
TIBC SERPL-MCNC: 364 UG/DL
UIBC SERPL-MCNC: 266 UG/DL

## 2022-10-31 ENCOUNTER — OUTPATIENT (OUTPATIENT)
Dept: OUTPATIENT SERVICES | Facility: HOSPITAL | Age: 82
LOS: 1 days | Discharge: HOME | End: 2022-10-31

## 2022-10-31 DIAGNOSIS — Z95.828 PRESENCE OF OTHER VASCULAR IMPLANTS AND GRAFTS: Chronic | ICD-10-CM

## 2022-10-31 DIAGNOSIS — C16.9 MALIGNANT NEOPLASM OF STOMACH, UNSPECIFIED: ICD-10-CM

## 2022-10-31 DIAGNOSIS — Z95.818 PRESENCE OF OTHER CARDIAC IMPLANTS AND GRAFTS: Chronic | ICD-10-CM

## 2022-11-03 NOTE — ASSESSMENT
[FreeTextEntry1] : Impression: PETER GAMBOA is a 82 year old man who presents for an initial follow up for further evaluation of joint symptoms and rheumatic diseases including osteoarthritis, limited scleroderma, Raynaud's.\par \par Patient feels fairly well. Denies recent joint pain. Occasional locking left 4th/5th fingers and right 1st/5th fingers secondary to flexor tenosynovitis as seen on exam today. Patient has dry eyes - will continue to monitor for Sjogren's Syndrome. On exam, patient has persistent mild diffuse swelling bilateral fingers secondary to limited scleroderma. No recent Raynaud's episodes, although he was having a mild episode on exam today. Discontinued Omeprazole and Meloxicam 7.5 mg after 4-6 weeks secondary to lack of relief.  X-rays of his hands did reveal osteoarthritis bilateral first CMC joints.  Recent lab tests revealed mild inflammation - otherwise no significant results or changes. The patient continues vitamin D supplements (? units).  His previous bone densitometry was normal.  Patient denies rash or side effects with current medications. Patient is content with current medication regimen.  His chest CAT scan revealed left upper lobe pulmonary nodule being followed by oncology (patient has history of prostate cancer and gastric cancer).\par \par Plan: I reviewed recent lab results with patient with extensive discussion\par I reviewed recent Chest CAT Scan results with patient with extensive discussion\par I reviewed the x-ray results with the patient with extensive discussion\par I reviewed the previous bone densitometry report with my analysis of the raw data with extensive discussion\par Laboratory tests ordered - see list below - with coordination of care \par Diagnosis and prognosis discussed\par Continue current medications (other than those changed below)\par Stay off Meloxicam\par Etodolac  mg q.d. end of breakfast (Possible side effects explained including cardiovascular risk/MI/CVA) Restart Omeprazole 20 mg q.d. a.c. breakfast (possible side effects explained)\par Continue Prophylactic aspirin 81 mg q.d. at end of supper (Possible side effects explained) \par Artificial tears one drop each eye q.i.d. and p.r.n.(Possible side effects explained)\par Biotene mouthwash/spray q.i.d. and p.r.n.(Possible side effects explained) \par Oral Hydration\par Patient declines oral medication for dryness \par Keep hands, feet and torso warm - patient warned of dangers of gangrene/digital amputation with Raynaud's \par Consult Pulmonary regarding PFTs with [facility] - [facility]--with coordination of care\par Follow up Cardiology regarding Echocardiogram - to evaluate for pulmonary hypertension--with coordination of care\par Return visit 3 months\par Total time for this office visit, including face-to-face time and non-face-to-face time, 85 minutes--- including review of the chart and previous records, review of previous lab results with extensive discussion with the patient, ordering lab tests with coordination of care, review of recent imaging reports/x-ray results with extensive discussion with the patient, review of his chest CAT scan with extensive discussion with the patient, review of his previous bone densitometry with my analysis of the raw data with extensive discussion with the patient, ordering of cardiology consultation for echocardiogram to evaluate for pulmonary hypertension with coordination of care, ordering of pulmonary consultation for PFTs with [facility] with coordination of care, ordering of new x-rays with coordination of care, detailed medication history, review of medications going forward with their possible side effects, reviewed the impact of the patient's rheumatic disease on their other medical problems, reviewed the impact of the patient's other medical problems on their rheumatic disease

## 2022-11-03 NOTE — CONSULT LETTER
[Dear  ___] : Dear  [unfilled], [Consult Letter:] : I had the pleasure of evaluating your patient, [unfilled]. [Please see my note below.] : Please see my note below. [Consult Closing:] : Thank you very much for allowing me to participate in the care of this patient.  If you have any questions, please do not hesitate to contact me. [Sincerely,] : Sincerely, [DrRobert  ___] : Dr. LARRY [FreeTextEntry3] : Yung\par Myron I. Kleiner, M.D., FACR\par Chief, Division of Rheumatology\par Department of Medicine\par [facility]

## 2022-11-03 NOTE — ADDENDUM
[FreeTextEntry1] : I, Coleen Massey, acted solely as a scribe for Dr. Myron I. Kleiner, MD. on 10/14/2022.

## 2022-11-03 NOTE — REVIEW OF SYSTEMS
[As Noted in HPI] : as noted in HPI [Joint Swelling] : joint swelling [Joint Stiffness] : joint stiffness [Negative] : Heme/Lymph [Dry Eyes] : no dryness of the eyes [Joint Pain] : no joint pain

## 2022-11-03 NOTE — HISTORY OF PRESENT ILLNESS
[FreeTextEntry1] : PETER GAMBOA is a 82 year old man who presents for an initial follow up for further evaluation of joint symptoms and rheumatic diseases. \par \par Patient feels fairly well. Denies recent joint pain. Morning stiffness 5 minutes. Occasional locking left 4th/5th fingers and right 1st/5th fingers. No recent Raynaud's episodes. Discontinued Omeprazole and Meloxicam 7.5 mg after 4-6 weeks secondary to lack of relief. The patient continues vitamin D supplements (? units). Patient denies rash or side effects with current medications. Patient is content with current medication regimen.

## 2022-11-04 ENCOUNTER — RESULT REVIEW (OUTPATIENT)
Age: 82
End: 2022-11-04

## 2022-11-04 ENCOUNTER — APPOINTMENT (OUTPATIENT)
Age: 82
End: 2022-11-04

## 2022-11-04 LAB
ALBUMIN SERPL ELPH-MCNC: 4 G/DL — SIGNIFICANT CHANGE UP (ref 3.3–5)
ALP SERPL-CCNC: 73 U/L — SIGNIFICANT CHANGE UP (ref 40–120)
ALT FLD-CCNC: 10 U/L — SIGNIFICANT CHANGE UP (ref 10–45)
ANION GAP SERPL CALC-SCNC: 13 MMOL/L — SIGNIFICANT CHANGE UP (ref 5–17)
AST SERPL-CCNC: 22 U/L — SIGNIFICANT CHANGE UP (ref 10–40)
BASOPHILS # BLD AUTO: 0.1 K/UL — SIGNIFICANT CHANGE UP (ref 0–0.2)
BASOPHILS NFR BLD AUTO: 1.8 % — SIGNIFICANT CHANGE UP (ref 0–2)
BILIRUB SERPL-MCNC: 0.3 MG/DL — SIGNIFICANT CHANGE UP (ref 0.2–1.2)
BUN SERPL-MCNC: 15 MG/DL — SIGNIFICANT CHANGE UP (ref 7–23)
CALCIUM SERPL-MCNC: 9.7 MG/DL — SIGNIFICANT CHANGE UP (ref 8.4–10.5)
CEA SERPL-MCNC: 7.6 NG/ML — HIGH (ref 0–3.8)
CHLORIDE SERPL-SCNC: 101 MMOL/L — SIGNIFICANT CHANGE UP (ref 96–108)
CO2 SERPL-SCNC: 23 MMOL/L — SIGNIFICANT CHANGE UP (ref 22–31)
CREAT SERPL-MCNC: 0.7 MG/DL — SIGNIFICANT CHANGE UP (ref 0.5–1.3)
EGFR: 91 ML/MIN/1.73M2 — SIGNIFICANT CHANGE UP
EOSINOPHIL # BLD AUTO: 0.1 K/UL — SIGNIFICANT CHANGE UP (ref 0–0.5)
EOSINOPHIL NFR BLD AUTO: 2 % — SIGNIFICANT CHANGE UP (ref 0–6)
GLUCOSE SERPL-MCNC: 103 MG/DL — HIGH (ref 70–99)
HCT VFR BLD CALC: 44.2 % — SIGNIFICANT CHANGE UP (ref 39–50)
HGB BLD-MCNC: 13.9 G/DL — SIGNIFICANT CHANGE UP (ref 13–17)
LYMPHOCYTES # BLD AUTO: 1.7 K/UL — SIGNIFICANT CHANGE UP (ref 1–3.3)
LYMPHOCYTES # BLD AUTO: 34.3 % — SIGNIFICANT CHANGE UP (ref 13–44)
MAGNESIUM SERPL-MCNC: 1.9 MG/DL — SIGNIFICANT CHANGE UP (ref 1.6–2.6)
MCHC RBC-ENTMCNC: 26 PG — LOW (ref 27–34)
MCHC RBC-ENTMCNC: 31.6 G/DL — LOW (ref 32–36)
MCV RBC AUTO: 82.4 FL — SIGNIFICANT CHANGE UP (ref 80–100)
MONOCYTES # BLD AUTO: 0.4 K/UL — SIGNIFICANT CHANGE UP (ref 0–0.9)
MONOCYTES NFR BLD AUTO: 8.7 % — SIGNIFICANT CHANGE UP (ref 2–14)
NEUTROPHILS # BLD AUTO: 2.6 K/UL — SIGNIFICANT CHANGE UP (ref 1.8–7.4)
NEUTROPHILS NFR BLD AUTO: 53.2 % — SIGNIFICANT CHANGE UP (ref 43–77)
PLATELET # BLD AUTO: 180 K/UL — SIGNIFICANT CHANGE UP (ref 150–400)
POTASSIUM SERPL-MCNC: 4.4 MMOL/L — SIGNIFICANT CHANGE UP (ref 3.5–5.3)
POTASSIUM SERPL-SCNC: 4.4 MMOL/L — SIGNIFICANT CHANGE UP (ref 3.5–5.3)
PROT SERPL-MCNC: 7.2 G/DL — SIGNIFICANT CHANGE UP (ref 6–8.3)
RBC # BLD: 5.36 M/UL — SIGNIFICANT CHANGE UP (ref 4.2–5.8)
RBC # FLD: 13 % — SIGNIFICANT CHANGE UP (ref 10.3–14.5)
SODIUM SERPL-SCNC: 137 MMOL/L — SIGNIFICANT CHANGE UP (ref 135–145)
WBC # BLD: 4.8 K/UL — SIGNIFICANT CHANGE UP (ref 3.8–10.5)
WBC # FLD AUTO: 4.8 K/UL — SIGNIFICANT CHANGE UP (ref 3.8–10.5)

## 2022-12-01 ENCOUNTER — APPOINTMENT (OUTPATIENT)
Dept: PULMONOLOGY | Facility: CLINIC | Age: 82
End: 2022-12-01

## 2022-12-01 VITALS
BODY MASS INDEX: 22.44 KG/M2 | SYSTOLIC BLOOD PRESSURE: 120 MMHG | RESPIRATION RATE: 16 BRPM | WEIGHT: 139 LBS | DIASTOLIC BLOOD PRESSURE: 60 MMHG | HEART RATE: 60 BPM | OXYGEN SATURATION: 96 %

## 2022-12-01 PROCEDURE — 99204 OFFICE O/P NEW MOD 45 MIN: CPT

## 2022-12-01 NOTE — DISCUSSION/SUMMARY
[FreeTextEntry1] : 82-year-old male seen today for a new left upper lobe nodule.  Appearance of this nodule may represent regional inflammatory process versus underlying carcinoma/metastasis.  A repeat chest CT has been ordered by oncology for tomorrow and the patient will be followed up here for review.

## 2022-12-01 NOTE — CONSULT LETTER
[Dear  ___] : Dear  [unfilled], [Consult Letter:] : I had the pleasure of evaluating your patient, [unfilled]. [Please see my note below.] : Please see my note below. [Consult Closing:] : Thank you very much for allowing me to participate in the care of this patient.  If you have any questions, please do not hesitate to contact me. [Sincerely,] : Sincerely, [FreeTextEntry3] : Jay Jay Kennedy MD FCCP\par Pulmonary/Critical Care/Sleep Medicine\par Department of Internal Medicine\par \par [facility] [employer]\par

## 2022-12-01 NOTE — HISTORY OF PRESENT ILLNESS
[Current] : current [TextBox_4] : 82-year-old male smoker seen today for findings on CAT scan.  Patient is a social smoker smoking only several cigarettes per week and continues to do so.  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  He was staged at stage T3 N0 M0 and has completed 4 cycles of chemotherapy following Billroth II gastrojejunostomy with a feeding jejunostomy tube.  Patient has recently undergone a CAT scan of the chest on 8/8/2022 which showed a new 9 mm left upper lobe pulmonary nodule suspicious for metastasis.  He denies any complaints of cough, wheeze, sputum, fevers, chills.  His weight has remained stable over the last 2 years. [TextBox_29] : 2-3 cigarettes per week

## 2022-12-01 NOTE — PHYSICAL EXAM
[No Acute Distress] : no acute distress [Normal Oropharynx] : normal oropharynx [Normal Appearance] : normal appearance [No Neck Mass] : no neck mass [Normal Rate/Rhythm] : normal rate/rhythm [Normal S1, S2] : normal s1, s2 [No Murmurs] : no murmurs [No Resp Distress] : no resp distress [Clear to Auscultation Bilaterally] : clear to auscultation bilaterally [No Abnormalities] : no abnormalities [Benign] : benign [Normal Gait] : normal gait [No Clubbing] : no clubbing [No Cyanosis] : no cyanosis [No Edema] : no edema [FROM] : FROM [Normal Color/ Pigmentation] : normal color/ pigmentation [No Focal Deficits] : no focal deficits [Oriented x3] : oriented x3 [Normal Affect] : normal affect [TextBox_89] : Surgical changes

## 2022-12-02 ENCOUNTER — OUTPATIENT (OUTPATIENT)
Dept: OUTPATIENT SERVICES | Facility: HOSPITAL | Age: 82
LOS: 1 days | End: 2022-12-02

## 2022-12-02 ENCOUNTER — APPOINTMENT (OUTPATIENT)
Dept: CT IMAGING | Facility: CLINIC | Age: 82
End: 2022-12-02

## 2022-12-02 DIAGNOSIS — Z95.828 PRESENCE OF OTHER VASCULAR IMPLANTS AND GRAFTS: Chronic | ICD-10-CM

## 2022-12-02 DIAGNOSIS — C16.9 MALIGNANT NEOPLASM OF STOMACH, UNSPECIFIED: ICD-10-CM

## 2022-12-02 DIAGNOSIS — Z95.818 PRESENCE OF OTHER CARDIAC IMPLANTS AND GRAFTS: Chronic | ICD-10-CM

## 2022-12-02 PROCEDURE — 71260 CT THORAX DX C+: CPT | Mod: 26

## 2022-12-09 ENCOUNTER — RESULT REVIEW (OUTPATIENT)
Age: 82
End: 2022-12-09

## 2022-12-09 ENCOUNTER — APPOINTMENT (OUTPATIENT)
Dept: HEMATOLOGY ONCOLOGY | Facility: CLINIC | Age: 82
End: 2022-12-09

## 2022-12-09 VITALS
WEIGHT: 143 LBS | OXYGEN SATURATION: 98 % | DIASTOLIC BLOOD PRESSURE: 88 MMHG | SYSTOLIC BLOOD PRESSURE: 164 MMHG | HEIGHT: 66 IN | HEART RATE: 87 BPM | BODY MASS INDEX: 22.98 KG/M2

## 2022-12-09 LAB
ALBUMIN SERPL ELPH-MCNC: 4.7 G/DL
ALP BLD-CCNC: 83 U/L
ALT SERPL-CCNC: 11 U/L
ANION GAP SERPL CALC-SCNC: 12 MMOL/L
AST SERPL-CCNC: 11 U/L
BASOPHILS # BLD AUTO: 0.1 K/UL — SIGNIFICANT CHANGE UP (ref 0–0.2)
BASOPHILS NFR BLD AUTO: 1.3 % — SIGNIFICANT CHANGE UP (ref 0–2)
BILIRUB SERPL-MCNC: 0.3 MG/DL
BUN SERPL-MCNC: 17 MG/DL
CALCIUM SERPL-MCNC: 10.4 MG/DL
CEA SERPL-MCNC: 8.3 NG/ML
CHLORIDE SERPL-SCNC: 101 MMOL/L
CO2 SERPL-SCNC: 25 MMOL/L
CREAT SERPL-MCNC: 0.88 MG/DL
EGFR: 85 ML/MIN/1.73M2
EOSINOPHIL # BLD AUTO: 0.1 K/UL — SIGNIFICANT CHANGE UP (ref 0–0.5)
EOSINOPHIL NFR BLD AUTO: 2.3 % — SIGNIFICANT CHANGE UP (ref 0–6)
GLUCOSE SERPL-MCNC: 162 MG/DL
HCT VFR BLD CALC: 48.3 % — SIGNIFICANT CHANGE UP (ref 39–50)
HGB BLD-MCNC: 15.7 G/DL — SIGNIFICANT CHANGE UP (ref 13–17)
LYMPHOCYTES # BLD AUTO: 2 K/UL — SIGNIFICANT CHANGE UP (ref 1–3.3)
LYMPHOCYTES # BLD AUTO: 34.7 % — SIGNIFICANT CHANGE UP (ref 13–44)
MAGNESIUM SERPL-MCNC: 2.1 MG/DL
MCHC RBC-ENTMCNC: 26 PG — LOW (ref 27–34)
MCHC RBC-ENTMCNC: 32.5 G/DL — SIGNIFICANT CHANGE UP (ref 32–36)
MCV RBC AUTO: 80 FL — SIGNIFICANT CHANGE UP (ref 80–100)
MONOCYTES # BLD AUTO: 0.4 K/UL — SIGNIFICANT CHANGE UP (ref 0–0.9)
MONOCYTES NFR BLD AUTO: 7.1 % — SIGNIFICANT CHANGE UP (ref 2–14)
NEUTROPHILS # BLD AUTO: 3.1 K/UL — SIGNIFICANT CHANGE UP (ref 1.8–7.4)
NEUTROPHILS NFR BLD AUTO: 54.6 % — SIGNIFICANT CHANGE UP (ref 43–77)
PLATELET # BLD AUTO: 192 K/UL — SIGNIFICANT CHANGE UP (ref 150–400)
POTASSIUM SERPL-SCNC: 4.7 MMOL/L
PROT SERPL-MCNC: 7.6 G/DL
RBC # BLD: 6.04 M/UL — HIGH (ref 4.2–5.8)
RBC # FLD: 12.9 % — SIGNIFICANT CHANGE UP (ref 10.3–14.5)
SODIUM SERPL-SCNC: 138 MMOL/L
WBC # BLD: 5.7 K/UL — SIGNIFICANT CHANGE UP (ref 3.8–10.5)
WBC # FLD AUTO: 5.7 K/UL — SIGNIFICANT CHANGE UP (ref 3.8–10.5)

## 2022-12-09 PROCEDURE — 99213 OFFICE O/P EST LOW 20 MIN: CPT

## 2022-12-09 NOTE — ASSESSMENT
[FreeTextEntry1] :  ypT3 N2. gastric cancer, her 2 + [was at least T3N1 by EUS criteria].\par     -s/p ERCP 7/10/20. Pathology: Gastric, Antrum, Ulcerated mass - moderately\par     differentiated adenocarcinoma\par     -s/p 4 cycles neoadjuvant FLOT (8/17/20 - 10/5/20): docetaxel (50 mg/m2), oxaliplatin\par     (85 mg/m2), LV (200 mg/m2) with short-term infusional FU (2600 mg/m2 as a\par     24-hour infusion), on day 1 Q2 weeks. \par     -s/p surgery with Dr. Young 11/9/20. Distal gastrectomy and PEG placed. Path c/w\par     residual gastric adenocarcinoma, moderately differentiated, status post treatment.\par     Vascular invasion identified. 4/29 lymph nodes involved by adenocarcinoma,\par     negative margins. Tumor stage: ypT3 N2. \par     -C4D1 of 4 cycles of postop FLOT. Pt had requested to remain off\par     adjuvant FLOT until 2/21, AMA. Patient also requested dose reduction. \par     -post treatment PET 4/21- [facility]\par   - CT chest on 12/2/22 showed Irregular left upper lobe 1 cm nodular opacity unchanged compared to 8/8/2022 and appears to be new compared to 4/15/2021 PET/CT; this nodule is indeterminate, but one diagnostic consideration is for primary lung neoplasm. Couple of additional left lung nodules measuring up to 0.5 cm unchanged compared to 4/15/2021 PET/CT.\par     - d/w pt that we can obtain bx now or reassess in 3 months as the nodule remained unchanged. Pt would like to wait.\par     - will obtain PET scan in 3 months\par \par Surveillance schedule\par    -H&P every 3-6 months for 1-2 y, every 6-12 months for 3-5 years and annual thereafter\par    -CT c/ap with oral and IV contrast every 6-12 months for first 2 years, then annual up to 5 years, Will consider    PET as clinically indicated\par    -Monitor B12, iron\par \par \par RTC in 3 months

## 2022-12-09 NOTE — REVIEW OF SYSTEMS
[Negative] : Allergic/Immunologic [de-identified] : mild numbness and tingling of fingers and toes

## 2022-12-09 NOTE — HISTORY OF PRESENT ILLNESS
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 He was sent for CT by his PCP, Dr. Charles Smith, due to anemia. CT showed in the upper central abdomen abutting the posterior antrum of the stomach and neck of the pancreas there is a 3 x 2 x 3 cm mass. There is an additional heterogeneous enhancing 2.7 x 2.3 x 2.5 lobulated mass in the ventral upper abdominal peritoneal cavity anterosuperiorly to the antrum of the stomach with internal and surrounding enhancing vessels. Endoscopy with biopsy of the gastric mass was c/w moderately differentiated adenocarcinoma. Staging PET showed hypermetabolic thickening of the distal stomach, consistent with primary gastric cancer. Hypermetabolic activity in the distal stomach, inseparable from the perigastric lymphadenopathy, consistent with metastatic kong disease. \par \par TNM stage: T3, N2, M0 \par \par \par Patient completed 4 cycles of postoperative adjuvant FLOT for gastric adenocarcinoma s/p robotic assisted laparoscopic distal gastrectomy with Billroth 2 gastrojejunostomy reconstruction and placement of feeding jejunostomy tube on 11/9/20 with Dr Young. He is s/p 4 cycles neoadjuvant FLOT. \par + Fatigue, intermittent and mild. + Peripheral neuropathy, improved with gabapentin. + Occasional emesis after "eating too much," denies nausea. + Constipation, improved. + Alopecia. + Nail changes. + Cold intolerance improving. + Decreased appetite with mild weight loss. PEG removed by Dr. Young. + Grade 1 H/F, xeroderma only, no pain. No myalgias. No weakness. No dysgeusia. No fevers, no cough, no SOB. \par \par \par  [de-identified] : 8/6/21: Interval PET on 8/3 showed 1. Unchanged nonspecific mild diffuse FDG avidity in the residual stomach, status post gastrectomy and gastrojejunostomy, possibly physiologic.  No evidence of FDG avid perigastric lymph nodes.  Resolution of diffuse marrow FDG avidity. Unchanged subcentimeter left upper lobe pulmonary nodules, too small to characterize.  Nonspecific new minimally FDG avid small bilateral hilar lymph nodes.\par \par Pt has mild tingling of fingers and toes, much improved from before. Continues to take gabapentin. His appetite is good. His weight is stable. Denies HA. CP, SOB, abd pain, constipation, diarrhea, melena, hematuria, dysuria.\par \par 11/4/21: Pt feels well. Right 4th finger lock up, no pain, does not bother him. Mild numbness/tingling for fingers and toes, improving.  Taking gabapentin. Appetite is good. Denies HA. CP, SOB, abd pain, constipation, diarrhea, melena, hematuria, dysuria. \par \par 2/13/22: CT scan on 2/4/22 showed no evidence of recurrent dz. Pt feels well. His appetite is good and gained some weight. Endorses neuropathy of hands. His fingers sometimes lock up. Denies HA. CP, SOB, abd pain, constipation, diarrhea, melena, hematuria, dysuria. \par \par 8/15/22: pt is here for follow up for gastric cancer s/p FLOT and resection. CT scan 8/8/22 showed Increase in size of 9 mm left upper lobe pulmonary nodule raising suspicion for small primary lung neoplasm.  No evidence of recurrent or metastatic disease within the abdomen/pelvis. Discussed findings with pt.\par \par \par 12/9/22: pt is here for follow up for gastric cancer s/p FLOT and resection. Pt feels well. Denies HA. CP, SOB, abd pain, constipation, diarrhea, melena, hematuria, dysuria. \par \par CT chest on 12/2/22 showed Irregular left upper lobe 1 cm nodular opacity unchanged compared to 8/8/2022 and appears to be new compared to 4/15/2021 PET/CT; this nodule is indeterminate, but one diagnostic consideration is for primary lung neoplasm. Couple of additional left lung nodules measuring up to 0.5 cm unchanged compared to 4/15/2021 PET/CT.\par \par \par

## 2022-12-16 ENCOUNTER — APPOINTMENT (OUTPATIENT)
Age: 82
End: 2022-12-16

## 2022-12-21 ENCOUNTER — APPOINTMENT (OUTPATIENT)
Dept: NUCLEAR MEDICINE | Facility: CLINIC | Age: 82
End: 2022-12-21

## 2022-12-21 ENCOUNTER — OUTPATIENT (OUTPATIENT)
Dept: OUTPATIENT SERVICES | Facility: HOSPITAL | Age: 82
LOS: 1 days | End: 2022-12-21

## 2022-12-21 DIAGNOSIS — Z95.828 PRESENCE OF OTHER VASCULAR IMPLANTS AND GRAFTS: Chronic | ICD-10-CM

## 2022-12-21 DIAGNOSIS — Z95.818 PRESENCE OF OTHER CARDIAC IMPLANTS AND GRAFTS: Chronic | ICD-10-CM

## 2022-12-21 DIAGNOSIS — R91.1 SOLITARY PULMONARY NODULE: ICD-10-CM

## 2022-12-21 PROCEDURE — 78815 PET IMAGE W/CT SKULL-THIGH: CPT | Mod: 26,PS

## 2022-12-22 ENCOUNTER — APPOINTMENT (OUTPATIENT)
Dept: PULMONOLOGY | Facility: CLINIC | Age: 82
End: 2022-12-22

## 2022-12-22 VITALS
RESPIRATION RATE: 16 BRPM | DIASTOLIC BLOOD PRESSURE: 69 MMHG | HEIGHT: 66 IN | OXYGEN SATURATION: 98 % | SYSTOLIC BLOOD PRESSURE: 110 MMHG | WEIGHT: 143 LBS | BODY MASS INDEX: 22.98 KG/M2 | HEART RATE: 75 BPM

## 2022-12-22 PROCEDURE — 99215 OFFICE O/P EST HI 40 MIN: CPT

## 2022-12-22 NOTE — HISTORY OF PRESENT ILLNESS
[Former] : former [>= 20 pack years] : >= 20 pack years [TextBox_4] : 12/1/2022:\par 82-year-old male smoker seen today for findings on CAT scan.  Patient is a social smoker smoking only several cigarettes per week and continues to do so.  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  He was staged at stage T3 N0 M0 and has completed 4 cycles of chemotherapy following Billroth II gastrojejunostomy with a feeding jejunostomy tube.  Patient has recently undergone a CAT scan of the chest on 8/8/2022 which showed a new 9 mm left upper lobe pulmonary nodule suspicious for metastasis.  He denies any complaints of cough, wheeze, sputum, fevers, chills.  His weight has remained stable over the last 2 years.\par \par CAT scan performed at that time was suspicious.  The regional inflammation versus metastasis.\par \par 12/22/2022:\par No new complaints of cough wheeze or shortness of breath.  CAT scan was obtained and reviewed below.  In addition PET scan was also repeated yesterday but official report is pending\par

## 2022-12-22 NOTE — DISCUSSION/SUMMARY
[FreeTextEntry1] : 82-year-old male seen today for a new left upper lobe nodule.  Appearance of this nodule may represent regional inflammatory process versus underlying carcinoma/metastasis.  Lesion at this time is too small for biopsy without excision.  Follow-up PET/CT in 3 months and will reconsider excisional biopsy if no resolution or improvement is noted.\par \par Await official report

## 2022-12-22 NOTE — CONSULT LETTER
[Dear  ___] : Dear  [unfilled], [Consult Letter:] : I had the pleasure of evaluating your patient, [unfilled]. [Please see my note below.] : Please see my note below. [Consult Closing:] : Thank you very much for allowing me to participate in the care of this patient.  If you have any questions, please do not hesitate to contact me. [Sincerely,] : Sincerely, [FreeTextEntry3] : Jay Jay Kennedy MD FCCP\par Pulmonary/Critical Care/Sleep Medicine\par Department of Internal Medicine\par \par [facility] [employer]\par

## 2022-12-22 NOTE — END OF VISIT
[Time Spent: ___ minutes] : I have spent [unfilled] minutes of time on the encounter. [FreeTextEntry3] : PET/CT reviewed on PACS with patient

## 2023-01-03 ENCOUNTER — APPOINTMENT (OUTPATIENT)
Dept: RHEUMATOLOGY | Facility: CLINIC | Age: 83
End: 2023-01-03
Payer: MEDICARE

## 2023-01-03 VITALS
HEART RATE: 85 BPM | SYSTOLIC BLOOD PRESSURE: 124 MMHG | HEIGHT: 66 IN | TEMPERATURE: 97.7 F | OXYGEN SATURATION: 97 % | DIASTOLIC BLOOD PRESSURE: 74 MMHG

## 2023-01-03 DIAGNOSIS — M25.60 STIFFNESS OF UNSPECIFIED JOINT, NOT ELSEWHERE CLASSIFIED: ICD-10-CM

## 2023-01-03 PROCEDURE — 99215 OFFICE O/P EST HI 40 MIN: CPT | Mod: 25

## 2023-01-03 PROCEDURE — 36415 COLL VENOUS BLD VENIPUNCTURE: CPT

## 2023-01-03 PROCEDURE — 81003 URINALYSIS AUTO W/O SCOPE: CPT | Mod: QW

## 2023-01-06 LAB
ALBUMIN SERPL ELPH-MCNC: 4.7 G/DL
ALP BLD-CCNC: 77 U/L
ALT SERPL-CCNC: 14 U/L
ANION GAP SERPL CALC-SCNC: 11 MMOL/L
AST SERPL-CCNC: 40 U/L
BASOPHILS # BLD AUTO: 0.06 K/UL
BASOPHILS NFR BLD AUTO: 0.9 %
BILIRUB SERPL-MCNC: 0.2 MG/DL
BILIRUB UR QL STRIP: NORMAL
BUN SERPL-MCNC: 17 MG/DL
CALCIUM SERPL-MCNC: 10.2 MG/DL
CHLORIDE SERPL-SCNC: 103 MMOL/L
CK SERPL-CCNC: 98 U/L
CLARITY UR: CLEAR
CO2 SERPL-SCNC: 26 MMOL/L
COLLECTION METHOD: NORMAL
CREAT SERPL-MCNC: 0.84 MG/DL
CRP SERPL-MCNC: <3 MG/L
EGFR: 87 ML/MIN/1.73M2
ENA RNP AB SER IA-ACNC: <0.2 AL
ENA SCL70 IGG SER IA-ACNC: <0.2 AL
ENA SM AB SER IA-ACNC: <0.2 AL
ENA SS-A AB SER IA-ACNC: <0.2 AL
ENA SS-B AB SER IA-ACNC: <0.2 AL
EOSINOPHIL # BLD AUTO: 0.07 K/UL
EOSINOPHIL NFR BLD AUTO: 1.1 %
ERYTHROCYTE [SEDIMENTATION RATE] IN BLOOD BY WESTERGREN METHOD: 41 MM/HR
GLUCOSE SERPL-MCNC: 71 MG/DL
GLUCOSE UR-MCNC: NORMAL
HCG UR QL: 0.2 EU/DL
HCT VFR BLD CALC: 43.3 %
HGB BLD-MCNC: 14.4 G/DL
HGB UR QL STRIP.AUTO: NORMAL
IMM GRANULOCYTES NFR BLD AUTO: 0.2 %
KETONES UR-MCNC: NORMAL
LDH SERPL-CCNC: 169 U/L
LEUKOCYTE ESTERASE UR QL STRIP: NORMAL
LYMPHOCYTES # BLD AUTO: 1.76 K/UL
LYMPHOCYTES NFR BLD AUTO: 26.7 %
MAN DIFF?: NORMAL
MCHC RBC-ENTMCNC: 25.5 PG
MCHC RBC-ENTMCNC: 33.3 GM/DL
MCV RBC AUTO: 76.6 FL
MONOCYTES # BLD AUTO: 0.51 K/UL
MONOCYTES NFR BLD AUTO: 7.8 %
NEUTROPHILS # BLD AUTO: 4.17 K/UL
NEUTROPHILS NFR BLD AUTO: 63.3 %
NITRITE UR QL STRIP: NORMAL
PH UR STRIP: 5
PHOSPHATE SERPL-MCNC: 3.4 MG/DL
PLATELET # BLD AUTO: 214 K/UL
POTASSIUM SERPL-SCNC: 4.9 MMOL/L
PROT SERPL-MCNC: 7.5 G/DL
PROT UR STRIP-MCNC: NORMAL
RBC # BLD: 5.65 M/UL
RBC # FLD: 16.2 %
RNA POLYMERASE III IGG: 5 UNITS
SODIUM SERPL-SCNC: 140 MMOL/L
SP GR UR STRIP: 1.01
WBC # FLD AUTO: 6.58 K/UL

## 2023-01-09 RX ORDER — MAGNESIUM 100 MG
100 TABLET ORAL
Refills: 0 | Status: DISCONTINUED | COMMUNITY

## 2023-01-09 RX ORDER — MULTIVIT-MIN/FOLIC/VIT K/LYCOP 400-300MCG
25 MCG TABLET ORAL
Refills: 0 | Status: DISCONTINUED | COMMUNITY

## 2023-01-09 RX ORDER — DEXTROMETHORPHAN POLISTIREX 30 MG/5 ML
500-1000-40 SUSPENSION, EXTENDED RELEASE 12 HR ORAL
Refills: 0 | Status: ACTIVE | COMMUNITY

## 2023-01-09 RX ORDER — CHROMIUM 200 MCG
TABLET ORAL
Refills: 0 | Status: DISCONTINUED | COMMUNITY
End: 2023-01-09

## 2023-01-09 RX ORDER — TOCOPHERSOLAN (VITAMIN E TPGS) 400/15ML
LIQUID (ML) ORAL
Refills: 0 | Status: DISCONTINUED | COMMUNITY

## 2023-01-09 NOTE — CONSULT LETTER
[Dear  ___] : Dear  [unfilled], [Consult Letter:] : I had the pleasure of evaluating your patient, [unfilled]. [Please see my note below.] : Please see my note below. [Consult Closing:] : Thank you very much for allowing me to participate in the care of this patient.  If you have any questions, please do not hesitate to contact me. [Sincerely,] : Sincerely, [DrRobert  ___] : Dr. LARRY [FreeTextEntry3] : Yung\par Myron I. Kleiner, M.D., FACR\par Chief, Division of Rheumatology\par Department of Medicine\par [facility]

## 2023-01-09 NOTE — ADDENDUM
[FreeTextEntry1] : I, Ang Ayala, acted solely as a scribe for Dr. Myron I. Kleiner, MD. on 01/03/2023 .

## 2023-01-09 NOTE — ASSESSMENT
[FreeTextEntry1] : Impression: PETER GAMBOA is a 82 year old man who presents for an initial follow up for further evaluation of joint symptoms and rheumatic diseases including osteoarthritis, limited scleroderma, Raynaud's.\par \par Patient feels fairly well.  Occasional stiffness left fingers and occasional snapping right 5th finger secondary to osteoarthritis, limited scleroderma, with flexor tenosynovitis on exam. Denies recent dry eyes and dry mouth-- continue to monitor for Sjogren's syndrome. No recent Raynaud's episodes. The patient continues calcium 500 mg b.i.d. and a multivitamin-- not taking a separate vitamin D supplement. After further questioning, patient informed me that he never received etodolac nor omeprazole which I prescribed last visit. Recent laboratory tests results reveal no significant changes or results, with extensive discussion. Patient denies rash or side effects with current medications. Patient is content with current medication regimen.\par \par Plan: I reviewed recent lab results with patient with extensive discussion\par Laboratory tests ordered - see list below - with coordination of care \par Diagnosis and prognosis discussed\par Continue current medications (other than those changed below)\par Reorder etodolac  mg daily at end of breakfast (Possible side effects explained including cardiovascular risk/MI/CVA)\par Reorder omeprazole 20 mg daily AC breakfast (Possible side effects explained)\par Artificial tears one drop each eye q.i.d. and p.r.n.(Possible side effects explained)\par Biotene mouthwash/spray q.i.d. and p.r.n.(Possible side effects explained) \par Oral Hydration\par Patient declines oral medication for dryness \par Keep hands, feet and torso warm - patient warned of dangers of gangrene/digital amputation with Raynaud's \par Occupational therapy-- patient declined, he feels not helpful in the past\par Obtain cardiology consultation and echocardiogram report-- [facility]\par Obtain PFTs with [facility] via Pulmonary\par Return visit 3 months

## 2023-01-09 NOTE — HISTORY OF PRESENT ILLNESS
[FreeTextEntry1] : PETER GAMBOA is a 82 year old man who presents for follow up office visit for further evaluation of joint symptoms and rheumatic diseases including osteoarthritis, limited scleroderma, Raynaud's.\par \par Patient feels fairly well.  Occasional stiffness left fingers and occasional snapping right 5th finger. Denies dry eyes and dry mouth. No recent Raynaud's episodes. The patient continues calcium 500 mg b.i.d. and a multivitamin-- not taking a separate vitamin D supplement. After further questioning, patient informed me that he never received Etodolac nor omeprazole. Patient denies rash or side effects with current medications. Patient is content with current medication regimen.\par \par PMH:\par Pulmonary-- follow up pulmonary nodule q.3 months and mild COPD\par Oncology-- follow up for gastric adenocarcinoma- stable

## 2023-01-10 ENCOUNTER — NON-APPOINTMENT (OUTPATIENT)
Age: 83
End: 2023-01-10

## 2023-03-02 ENCOUNTER — OUTPATIENT (OUTPATIENT)
Dept: OUTPATIENT SERVICES | Facility: HOSPITAL | Age: 83
LOS: 1 days | Discharge: HOME | End: 2023-03-02

## 2023-03-02 ENCOUNTER — APPOINTMENT (OUTPATIENT)
Dept: NUCLEAR MEDICINE | Facility: CLINIC | Age: 83
End: 2023-03-02
Payer: MEDICARE

## 2023-03-02 ENCOUNTER — OUTPATIENT (OUTPATIENT)
Dept: OUTPATIENT SERVICES | Facility: HOSPITAL | Age: 83
LOS: 1 days | End: 2023-03-02

## 2023-03-02 DIAGNOSIS — C16.9 MALIGNANT NEOPLASM OF STOMACH, UNSPECIFIED: ICD-10-CM

## 2023-03-02 DIAGNOSIS — Z95.818 PRESENCE OF OTHER CARDIAC IMPLANTS AND GRAFTS: Chronic | ICD-10-CM

## 2023-03-02 DIAGNOSIS — Z95.828 PRESENCE OF OTHER VASCULAR IMPLANTS AND GRAFTS: Chronic | ICD-10-CM

## 2023-03-02 PROCEDURE — 78815 PET IMAGE W/CT SKULL-THIGH: CPT | Mod: 26,PI

## 2023-03-08 ENCOUNTER — RESULT REVIEW (OUTPATIENT)
Age: 83
End: 2023-03-08

## 2023-03-08 ENCOUNTER — APPOINTMENT (OUTPATIENT)
Dept: HEMATOLOGY ONCOLOGY | Facility: CLINIC | Age: 83
End: 2023-03-08
Payer: MEDICARE

## 2023-03-08 VITALS
HEART RATE: 77 BPM | WEIGHT: 143.05 LBS | BODY MASS INDEX: 22.99 KG/M2 | HEIGHT: 66 IN | DIASTOLIC BLOOD PRESSURE: 77 MMHG | OXYGEN SATURATION: 98 % | SYSTOLIC BLOOD PRESSURE: 130 MMHG

## 2023-03-08 LAB
BASOPHILS # BLD AUTO: 0.1 K/UL — SIGNIFICANT CHANGE UP (ref 0–0.2)
BASOPHILS NFR BLD AUTO: 0.9 % — SIGNIFICANT CHANGE UP (ref 0–2)
CEA SERPL-MCNC: 7 NG/ML
EOSINOPHIL # BLD AUTO: 0.1 K/UL — SIGNIFICANT CHANGE UP (ref 0–0.5)
EOSINOPHIL NFR BLD AUTO: 1.9 % — SIGNIFICANT CHANGE UP (ref 0–6)
HCT VFR BLD CALC: 44 % — SIGNIFICANT CHANGE UP (ref 39–50)
HGB BLD-MCNC: 14.4 G/DL — SIGNIFICANT CHANGE UP (ref 13–17)
LYMPHOCYTES # BLD AUTO: 1.9 K/UL — SIGNIFICANT CHANGE UP (ref 1–3.3)
LYMPHOCYTES # BLD AUTO: 31.4 % — SIGNIFICANT CHANGE UP (ref 13–44)
MCHC RBC-ENTMCNC: 25.6 PG — LOW (ref 27–34)
MCHC RBC-ENTMCNC: 32.7 G/DL — SIGNIFICANT CHANGE UP (ref 32–36)
MCV RBC AUTO: 78.3 FL — LOW (ref 80–100)
MONOCYTES # BLD AUTO: 0.5 K/UL — SIGNIFICANT CHANGE UP (ref 0–0.9)
MONOCYTES NFR BLD AUTO: 7.7 % — SIGNIFICANT CHANGE UP (ref 2–14)
NEUTROPHILS # BLD AUTO: 3.6 K/UL — SIGNIFICANT CHANGE UP (ref 1.8–7.4)
NEUTROPHILS NFR BLD AUTO: 58.2 % — SIGNIFICANT CHANGE UP (ref 43–77)
PLATELET # BLD AUTO: 204 K/UL — SIGNIFICANT CHANGE UP (ref 150–400)
RBC # BLD: 5.63 M/UL — SIGNIFICANT CHANGE UP (ref 4.2–5.8)
RBC # FLD: 13.8 % — SIGNIFICANT CHANGE UP (ref 10.3–14.5)
WBC # BLD: 6.2 K/UL — SIGNIFICANT CHANGE UP (ref 3.8–10.5)
WBC # FLD AUTO: 6.2 K/UL — SIGNIFICANT CHANGE UP (ref 3.8–10.5)

## 2023-03-08 PROCEDURE — 99214 OFFICE O/P EST MOD 30 MIN: CPT

## 2023-03-09 LAB
ALBUMIN SERPL ELPH-MCNC: 4.6 G/DL
ALP BLD-CCNC: 79 U/L
ALT SERPL-CCNC: 11 U/L
ANION GAP SERPL CALC-SCNC: 15 MMOL/L
AST SERPL-CCNC: 13 U/L
BILIRUB SERPL-MCNC: 0.3 MG/DL
BUN SERPL-MCNC: 13 MG/DL
CALCIUM SERPL-MCNC: 10.2 MG/DL
CHLORIDE SERPL-SCNC: 102 MMOL/L
CO2 SERPL-SCNC: 24 MMOL/L
CREAT SERPL-MCNC: 0.83 MG/DL
EGFR: 87 ML/MIN/1.73M2
GLUCOSE SERPL-MCNC: 114 MG/DL
MAGNESIUM SERPL-MCNC: 2 MG/DL
POTASSIUM SERPL-SCNC: 4.4 MMOL/L
PROT SERPL-MCNC: 7.3 G/DL
SODIUM SERPL-SCNC: 140 MMOL/L

## 2023-03-15 ENCOUNTER — APPOINTMENT (OUTPATIENT)
Dept: PULMONOLOGY | Facility: CLINIC | Age: 83
End: 2023-03-15
Payer: MEDICARE

## 2023-03-15 VITALS
DIASTOLIC BLOOD PRESSURE: 60 MMHG | RESPIRATION RATE: 16 BRPM | HEART RATE: 76 BPM | SYSTOLIC BLOOD PRESSURE: 115 MMHG | OXYGEN SATURATION: 98 %

## 2023-03-15 VITALS — WEIGHT: 140 LBS | BODY MASS INDEX: 23.61 KG/M2 | HEIGHT: 64.5 IN

## 2023-03-15 PROCEDURE — 99215 OFFICE O/P EST HI 40 MIN: CPT | Mod: 25

## 2023-03-15 PROCEDURE — 94010 BREATHING CAPACITY TEST: CPT

## 2023-03-15 RX ORDER — ETODOLAC 500 MG/1
500 TABLET, FILM COATED, EXTENDED RELEASE ORAL
Qty: 90 | Refills: 0 | Status: COMPLETED | COMMUNITY
Start: 2022-10-14 | End: 2023-03-15

## 2023-03-15 NOTE — DISCUSSION/SUMMARY
[FreeTextEntry1] : 82-year-old male seen today for new left upper lobe lobe nodule.  Lesion is expanding and consistent either with metastasis or new primary.  Transthoracic needle biopsy has been arranged by oncology.  We will follow-up with further recommendations to follow.

## 2023-03-15 NOTE — HISTORY OF PRESENT ILLNESS
[Former] : former [>= 20 pack years] : >= 20 pack years [TextBox_4] : 12/1/2022:\par 82-year-old male smoker seen today for findings on CAT scan.  Patient is a social smoker smoking only several cigarettes per week and continues to do so.  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  He was staged at stage T3 N0 M0 and has completed 4 cycles of chemotherapy following Billroth II gastrojejunostomy with a feeding jejunostomy tube.  Patient has recently undergone a CAT scan of the chest on 8/8/2022 which showed a new 9 mm left upper lobe pulmonary nodule suspicious for metastasis.  He denies any complaints of cough, wheeze, sputum, fevers, chills.  His weight has remained stable over the last 2 years.\par \par CAT scan performed at that time was suspicious.  The regional inflammation versus metastasis.\par \par 12/22/2022:\par No new complaints of cough wheeze or shortness of breath.  CAT scan was obtained and reviewed below.  In addition PET scan was also repeated yesterday but official report is pending.\par \par Patient's pulmonary nodule was felt to represent regional inflammatory process versus underlying carcinoma/metastasis.  It was too small for further evaluation with biopsy.  A PET scan was Ordered.\par \par 3/15/2023:\par Patient underwent repeat PET/CT which demonstrated an enlarging left upper lobe nodule.  A transthoracic needle biopsy was arranged by Dr. Billings from oncology.  Presently the patient denies any complaints of cough wheeze or shortness of breath\par \par

## 2023-03-15 NOTE — CONSULT LETTER
[Dear  ___] : Dear  [unfilled], [Consult Letter:] : I had the pleasure of evaluating your patient, [unfilled]. [Please see my note below.] : Please see my note below. [Consult Closing:] : Thank you very much for allowing me to participate in the care of this patient.  If you have any questions, please do not hesitate to contact me. [Sincerely,] : Sincerely, [FreeTextEntry3] : Jay Jay Kennedy MD FCCP\par Pulmonary/Critical Care/Sleep Medicine\par Department of Internal Medicine\par \par [facility] [employer]\par

## 2023-03-17 ENCOUNTER — OUTPATIENT (OUTPATIENT)
Dept: OUTPATIENT SERVICES | Facility: HOSPITAL | Age: 83
LOS: 1 days | End: 2023-03-17
Payer: MEDICARE

## 2023-03-17 VITALS
WEIGHT: 142.42 LBS | TEMPERATURE: 97 F | OXYGEN SATURATION: 98 % | SYSTOLIC BLOOD PRESSURE: 120 MMHG | RESPIRATION RATE: 16 BRPM | HEART RATE: 70 BPM | DIASTOLIC BLOOD PRESSURE: 70 MMHG | HEIGHT: 65 IN

## 2023-03-17 DIAGNOSIS — E11.9 TYPE 2 DIABETES MELLITUS WITHOUT COMPLICATIONS: ICD-10-CM

## 2023-03-17 DIAGNOSIS — Z29.9 ENCOUNTER FOR PROPHYLACTIC MEASURES, UNSPECIFIED: ICD-10-CM

## 2023-03-17 DIAGNOSIS — Z95.818 PRESENCE OF OTHER CARDIAC IMPLANTS AND GRAFTS: Chronic | ICD-10-CM

## 2023-03-17 DIAGNOSIS — Z01.818 ENCOUNTER FOR OTHER PREPROCEDURAL EXAMINATION: ICD-10-CM

## 2023-03-17 DIAGNOSIS — Z95.828 PRESENCE OF OTHER VASCULAR IMPLANTS AND GRAFTS: Chronic | ICD-10-CM

## 2023-03-17 DIAGNOSIS — R91.8 OTHER NONSPECIFIC ABNORMAL FINDING OF LUNG FIELD: ICD-10-CM

## 2023-03-17 DIAGNOSIS — I10 ESSENTIAL (PRIMARY) HYPERTENSION: ICD-10-CM

## 2023-03-17 DIAGNOSIS — Z90.3 ACQUIRED ABSENCE OF STOMACH [PART OF]: Chronic | ICD-10-CM

## 2023-03-17 LAB
A1C WITH ESTIMATED AVERAGE GLUCOSE RESULT: 6.2 % — HIGH (ref 4–5.6)
ALBUMIN SERPL ELPH-MCNC: 4.2 G/DL — SIGNIFICANT CHANGE UP (ref 3.3–5.2)
ALP SERPL-CCNC: 81 U/L — SIGNIFICANT CHANGE UP (ref 40–120)
ALT FLD-CCNC: 9 U/L — SIGNIFICANT CHANGE UP
ANION GAP SERPL CALC-SCNC: 11 MMOL/L — SIGNIFICANT CHANGE UP (ref 5–17)
APTT BLD: 31.8 SEC — SIGNIFICANT CHANGE UP (ref 27.5–35.5)
AST SERPL-CCNC: 15 U/L — SIGNIFICANT CHANGE UP
BASOPHILS # BLD AUTO: 0.05 K/UL — SIGNIFICANT CHANGE UP (ref 0–0.2)
BASOPHILS NFR BLD AUTO: 1 % — SIGNIFICANT CHANGE UP (ref 0–2)
BILIRUB SERPL-MCNC: 0.2 MG/DL — LOW (ref 0.4–2)
BUN SERPL-MCNC: 12.4 MG/DL — SIGNIFICANT CHANGE UP (ref 8–20)
CALCIUM SERPL-MCNC: 9.7 MG/DL — SIGNIFICANT CHANGE UP (ref 8.4–10.5)
CHLORIDE SERPL-SCNC: 102 MMOL/L — SIGNIFICANT CHANGE UP (ref 96–108)
CO2 SERPL-SCNC: 27 MMOL/L — SIGNIFICANT CHANGE UP (ref 22–29)
CREAT SERPL-MCNC: 0.83 MG/DL — SIGNIFICANT CHANGE UP (ref 0.5–1.3)
EGFR: 87 ML/MIN/1.73M2 — SIGNIFICANT CHANGE UP
EOSINOPHIL # BLD AUTO: 0.09 K/UL — SIGNIFICANT CHANGE UP (ref 0–0.5)
EOSINOPHIL NFR BLD AUTO: 1.9 % — SIGNIFICANT CHANGE UP (ref 0–6)
ESTIMATED AVERAGE GLUCOSE: 131 MG/DL — HIGH (ref 68–114)
GLUCOSE SERPL-MCNC: 83 MG/DL — SIGNIFICANT CHANGE UP (ref 70–99)
HCT VFR BLD CALC: 41.7 % — SIGNIFICANT CHANGE UP (ref 39–50)
HGB BLD-MCNC: 13.7 G/DL — SIGNIFICANT CHANGE UP (ref 13–17)
IMM GRANULOCYTES NFR BLD AUTO: 0.2 % — SIGNIFICANT CHANGE UP (ref 0–0.9)
INR BLD: 0.99 RATIO — SIGNIFICANT CHANGE UP (ref 0.88–1.16)
LYMPHOCYTES # BLD AUTO: 1.55 K/UL — SIGNIFICANT CHANGE UP (ref 1–3.3)
LYMPHOCYTES # BLD AUTO: 32.5 % — SIGNIFICANT CHANGE UP (ref 13–44)
MCHC RBC-ENTMCNC: 25.3 PG — LOW (ref 27–34)
MCHC RBC-ENTMCNC: 32.9 GM/DL — SIGNIFICANT CHANGE UP (ref 32–36)
MCV RBC AUTO: 76.9 FL — LOW (ref 80–100)
MONOCYTES # BLD AUTO: 0.38 K/UL — SIGNIFICANT CHANGE UP (ref 0–0.9)
MONOCYTES NFR BLD AUTO: 8 % — SIGNIFICANT CHANGE UP (ref 2–14)
NEUTROPHILS # BLD AUTO: 2.69 K/UL — SIGNIFICANT CHANGE UP (ref 1.8–7.4)
NEUTROPHILS NFR BLD AUTO: 56.4 % — SIGNIFICANT CHANGE UP (ref 43–77)
PLATELET # BLD AUTO: 203 K/UL — SIGNIFICANT CHANGE UP (ref 150–400)
POTASSIUM SERPL-MCNC: 4.7 MMOL/L — SIGNIFICANT CHANGE UP (ref 3.5–5.3)
POTASSIUM SERPL-SCNC: 4.7 MMOL/L — SIGNIFICANT CHANGE UP (ref 3.5–5.3)
PROT SERPL-MCNC: 7.2 G/DL — SIGNIFICANT CHANGE UP (ref 6.6–8.7)
PROTHROM AB SERPL-ACNC: 11.5 SEC — SIGNIFICANT CHANGE UP (ref 10.5–13.4)
RBC # BLD: 5.42 M/UL — SIGNIFICANT CHANGE UP (ref 4.2–5.8)
RBC # FLD: 15.4 % — HIGH (ref 10.3–14.5)
SODIUM SERPL-SCNC: 140 MMOL/L — SIGNIFICANT CHANGE UP (ref 135–145)
WBC # BLD: 4.77 K/UL — SIGNIFICANT CHANGE UP (ref 3.8–10.5)
WBC # FLD AUTO: 4.77 K/UL — SIGNIFICANT CHANGE UP (ref 3.8–10.5)

## 2023-03-17 PROCEDURE — 85025 COMPLETE CBC W/AUTO DIFF WBC: CPT

## 2023-03-17 PROCEDURE — 83036 HEMOGLOBIN GLYCOSYLATED A1C: CPT

## 2023-03-17 PROCEDURE — G0463: CPT

## 2023-03-17 PROCEDURE — 36415 COLL VENOUS BLD VENIPUNCTURE: CPT

## 2023-03-17 PROCEDURE — 80053 COMPREHEN METABOLIC PANEL: CPT

## 2023-03-17 PROCEDURE — 85610 PROTHROMBIN TIME: CPT

## 2023-03-17 PROCEDURE — 93010 ELECTROCARDIOGRAM REPORT: CPT

## 2023-03-17 PROCEDURE — 85730 THROMBOPLASTIN TIME PARTIAL: CPT

## 2023-03-17 PROCEDURE — 93005 ELECTROCARDIOGRAM TRACING: CPT

## 2023-03-17 RX ORDER — LACTULOSE 10 G/15ML
15 SOLUTION ORAL
Qty: 0 | Refills: 0 | DISCHARGE

## 2023-03-17 RX ORDER — PANTOPRAZOLE SODIUM 20 MG/1
1 TABLET, DELAYED RELEASE ORAL
Qty: 0 | Refills: 0 | DISCHARGE

## 2023-03-17 RX ORDER — DIVALPROEX SODIUM 500 MG/1
2 TABLET, DELAYED RELEASE ORAL
Qty: 0 | Refills: 0 | DISCHARGE

## 2023-03-17 NOTE — H&P PST ADULT - PROBLEM SELECTOR PLAN 1
Patient is scheduled for CT guided left lung biopsy on 3/24/23 with Dr Pino in radiology.  Medical and cardiac evaluation pending

## 2023-03-17 NOTE — H&P PST ADULT - HISTORY OF PRESENT ILLNESS
79 yo M Hx of PMH of HTN, HLD, chronic RBBB, COPD, gastric cancer 2020 s/p Mediport placement in August, 2020 (completed chemo), s/p admission to [facility] on 9/13/20 for unwitnessed fall, found to have right SDH and midline shift, loop recorder placed,  presents for finding of lung nodule on CT 8/2022 new from 4/2021    Patient denies dyspnea, wheezing, cough, hemoptysis, fever, chills, weight loss, night sweats  Patient is scheduled for CT guided left lung biopsy on 3/24/23 with Dr Pino in radiology  82 year old male with PMH of HTN, HLD, DM2, RBBB, ILR, COPD, gastric cancer 2020 s/p gastrectomy and chemotherapy, SDH s/p fall 2020. Presents to [facility] for finding of lung nodule on CT 8/2022, new from 4/2021. Patient is s/p PET scan 3/2/23 that revealed:     1. Left upper lobe nodule anterolaterally has increased in size and   intensity of activity compatible with a malignant process.    2. Relatively stable bilateral hilar and mediastinal kong uptake.    3. Nonspecific activity at the remaining proximal stomach with no focal   abnormal activity at the gastrojejunostomy site or in the right upper   quadrant anastomotic sites.    4. New cutaneous thickening in the left side of the intergluteal cleft   with increased activity most likely represents an inflammatory process;   please examine area for confirmation.    Patient denies dyspnea, wheezing, cough, hemoptysis, fever, chills, weight loss, night sweats. Patient is scheduled for CT guided left lung biopsy on 3/24/23 with Dr Pino in radiology.  Medical and cardiac evaluation pending

## 2023-03-17 NOTE — H&P PST ADULT - ASSESSMENT
CAPRINI SCORE    AGE RELATED RISK FACTORS                                                             [ ] Age 41-60 years                                            (1 Point)  [ ] Age: 61-74 years                                           (2 Points)                 [ ] Age= 75 years                                                (3 Points)             DISEASE RELATED RISK FACTORS                                                       [ ] Edema in the lower extremities                 (1 Point)                     [ ] Varicose veins                                               (1 Point)                                 [ ] BMI > 25 Kg/m2                                            (1 Point)                                  [ ] Serious infection (ie PNA)                            (1 Point)                     [ ] Lung disease ( COPD, Emphysema)            (1 Point)                                                                          [ ] Acute myocardial infarction                         (1 Point)                  [ ] Congestive heart failure (in the previous month)  (1 Point)         [ ] Inflammatory bowel disease                            (1 Point)                  [ ] Central venous access, PICC or Port               (2 points)       (within the last month)                                                                [ ] Stroke (in the previous month)                        (5 Points)    [ ] Previous or present malignancy                       (2 points)                                                                                                                                                         HEMATOLOGY RELATED FACTORS                                                         [ ] Prior episodes of VTE                                     (3 Points)                     [ ] Positive family history for VTE                      (3 Points)                  [ ] Prothrombin 56131 A                                     (3 Points)                     [ ] Factor V Leiden                                                (3 Points)                        [ ] Lupus anticoagulants                                      (3 Points)                                                           [ ] Anticardiolipin antibodies                              (3 Points)                                                       [ ] High homocysteine in the blood                   (3 Points)                                             [ ] Other congenital or acquired thrombophilia      (3 Points)                                                [ ] Heparin induced thrombocytopenia                  (3 Points)                                        MOBILITY RELATED FACTORS  [ ] Bed rest                                                         (1 Point)  [ ] Plaster cast                                                    (2 points)  [ ] Bed bound for more than 72 hours           (2 Points)    GENDER SPECIFIC FACTORS  [ ] Pregnancy or had a baby within the last month   (1 Point)  [ ] Post-partum < 6 weeks                                   (1 Point)  [ ] Hormonal therapy  or oral contraception   (1 Point)  [ ] History of pregnancy complications              (1 point)  [ ] Unexplained or recurrent              (1 Point)    OTHER RISK FACTORS                                           (1 Point)  [ ] BMI >40, smoking, diabetes requiring insulin, chemotherapy  blood transfusions and length of surgery over 2 hours    SURGERY RELATED RISK FACTORS  [ ]  Section within the last month     (1 Point)  [ ] Minor surgery                                                  (1 Point)  [ ] Arthroscopic surgery                                       (2 Points)  [ ] Planned major surgery lasting more            (2 Points)      than 45 minutes     [ ] Elective hip or knee joint replacement       (5 points)       surgery                                                TRAUMA RELATED RISK FACTORS  [ ] Fracture of the hip, pelvis, or leg                       (5 Points)  [ ] Spinal cord injury resulting in paralysis             (5 points)       (in the previous month)    [ ] Paralysis  (less than 1 month)                             (5 Points)  [ ] Multiple Trauma within 1 month                        (5 Points)    Total Score [        ]    Caprini Score 0-2: Low Risk, NO VTE prophylaxis required for most patients, encourage ambulation  Caprini Score 3-6: Moderate Risk , pharmacologic VTE prophylaxis is indicated for most patients (in the absence of contraindications)  Caprini Score Greater than or =7: High risk, pharmocologic VTE prophylaxis indicated for most patients (in the absence of contraindications)                OPIOID RISK TOOL    POLO EACH BOX THAT APPLIES AND ADD TOTALS AT THE END    FAMILY HISTORY OF SUBSTANCE ABUSE                 FEMALE         MALE                                                Alcohol                             [  ]1 pt          [  ]3pts                                               Illegal Durgs                     [  ]2 pts        [  ]3pts                                               Rx Drugs                           [  ]4 pts        [  ]4 pts    PERSONAL HISTORY OF SUBSTANCE ABUSE                                                                                          Alcohol                             [  ]3 pts       [  ]3 pts                                               Illegal Durgs                     [  ]4 pts        [  ]4 pts                                               Rx Drugs                           [  ]5 pts        [  ]5 pts    AGE BETWEEN 16-45 YEARS                                      [  ]1 pt         [  ]1 pt    HISTORY OF PREADOLESCENT   SEXUAL ABUSE                                                             [  ]3 pts        [  ]0pts    PSYCHOLOGICAL DISEASE                     ADD, OCD, Bipolar, Schizophrenia        [  ]2 pts         [  ]2 pts                      Depression                                               [  ]1 pt           [  ]1 pt           SCORING TOTAL   (add numbers and type here)              (***)                                     A score of 3 or lower indicated LOW risk for future opiod abuse  A score of 4 to 7 indicated moderate risk for future opiod abuse  A score of 8 or higher indicates a high risk for opiod abuse    Patient educated on surgical scrub, COVID testing, preadmission instructions, medical clearance and day of procedure medications, verbalizes understanding.   Pt instructed to stop vitamins/supplements/herbal medications/ASA/NSAIDS for one week prior to surgery and discuss with PMD.                CAPRINI SCORE    AGE RELATED RISK FACTORS                                                             [ ] Age 41-60 years                                            (1 Point)  [ ] Age: 61-74 years                                           (2 Points)                 [x ] Age= 75 years                                                (3 Points)             DISEASE RELATED RISK FACTORS                                                       [ ] Edema in the lower extremities                 (1 Point)                     [ ] Varicose veins                                               (1 Point)                                 [ ] BMI > 25 Kg/m2                                            (1 Point)                                  [ ] Serious infection (ie PNA)                            (1 Point)                     [x ] Lung disease ( COPD, Emphysema)            (1 Point)                                                                          [ ] Acute myocardial infarction                         (1 Point)                  [ ] Congestive heart failure (in the previous month)  (1 Point)         [ ] Inflammatory bowel disease                            (1 Point)                  [ ] Central venous access, PICC or Port               (2 points)       (within the last month)                                                                [ ] Stroke (in the previous month)                        (5 Points)    [x ] Previous or present malignancy                       (2 points)                                                                                                                                                         HEMATOLOGY RELATED FACTORS                                                         [ ] Prior episodes of VTE                                     (3 Points)                     [ ] Positive family history for VTE                      (3 Points)                  [ ] Prothrombin 39232 A                                     (3 Points)                     [ ] Factor V Leiden                                                (3 Points)                        [ ] Lupus anticoagulants                                      (3 Points)                                                           [ ] Anticardiolipin antibodies                              (3 Points)                                                       [ ] High homocysteine in the blood                   (3 Points)                                             [ ] Other congenital or acquired thrombophilia      (3 Points)                                                [ ] Heparin induced thrombocytopenia                  (3 Points)                                        MOBILITY RELATED FACTORS  [ ] Bed rest                                                         (1 Point)  [ ] Plaster cast                                                    (2 points)  [ ] Bed bound for more than 72 hours           (2 Points)    GENDER SPECIFIC FACTORS  [ ] Pregnancy or had a baby within the last month   (1 Point)  [ ] Post-partum < 6 weeks                                   (1 Point)  [ ] Hormonal therapy  or oral contraception   (1 Point)  [ ] History of pregnancy complications              (1 point)  [ ] Unexplained or recurrent              (1 Point)    OTHER RISK FACTORS                                           (1 Point)  [ ] BMI >40, smoking, diabetes requiring insulin, chemotherapy  blood transfusions and length of surgery over 2 hours    SURGERY RELATED RISK FACTORS  [ ]  Section within the last month     (1 Point)  [x ] Minor surgery                                                  (1 Point)  [ ] Arthroscopic surgery                                       (2 Points)  [ ] Planned major surgery lasting more            (2 Points)      than 45 minutes     [ ] Elective hip or knee joint replacement       (5 points)       surgery                                                TRAUMA RELATED RISK FACTORS  [ ] Fracture of the hip, pelvis, or leg                       (5 Points)  [ ] Spinal cord injury resulting in paralysis             (5 points)       (in the previous month)    [ ] Paralysis  (less than 1 month)                             (5 Points)  [ ] Multiple Trauma within 1 month                        (5 Points)    Total Score [   7     ]    Caprini Score 0-2: Low Risk, NO VTE prophylaxis required for most patients, encourage ambulation  Caprini Score 3-6: Moderate Risk , pharmacologic VTE prophylaxis is indicated for most patients (in the absence of contraindications)  Caprini Score Greater than or =7: High risk, pharmocologic VTE prophylaxis indicated for most patients (in the absence of contraindications)        OPIOID RISK TOOL    POLO EACH BOX THAT APPLIES AND ADD TOTALS AT THE END    FAMILY HISTORY OF SUBSTANCE ABUSE                 FEMALE         MALE                                                Alcohol                             [  ]1 pt          [  ]3pts                                               Illegal Durgs                     [  ]2 pts        [  ]3pts                                               Rx Drugs                           [  ]4 pts        [  ]4 pts    PERSONAL HISTORY OF SUBSTANCE ABUSE                                                                                          Alcohol                             [  ]3 pts       [  ]3 pts                                               Illegal Durgs                     [  ]4 pts        [  ]4 pts                                               Rx Drugs                           [  ]5 pts        [  ]5 pts    AGE BETWEEN 16-45 YEARS                                      [  ]1 pt         [  ]1 pt    HISTORY OF PREADOLESCENT   SEXUAL ABUSE                                                             [  ]3 pts        [  ]0pts    PSYCHOLOGICAL DISEASE                     ADD, OCD, Bipolar, Schizophrenia        [  ]2 pts         [  ]2 pts                      Depression                                               [  ]1 pt           [  ]1 pt           SCORING TOTAL  0                                   A score of 3 or lower indicated LOW risk for future opiod abuse  A score of 4 to 7 indicated moderate risk for future opiod abuse  A score of 8 or higher indicates a high risk for opiod abuse      82 year old male with PMH of HTN, HLD, DM2, RBBB, ILR, COPD, gastric cancer  s/p gastrectomy and chemotherapy, SDH s/p fall . Presents to PST for finding of lung nodule on CT 2022, new from 2021. Patient is s/p PET scan 3/2/23 that revealed:     1. Left upper lobe nodule anterolaterally has increased in size and   intensity of activity compatible with a malignant process.    2. Relatively stable bilateral hilar and mediastinal kong uptake.    3. Nonspecific activity at the remaining proximal stomach with no focal   abnormal activity at the gastrojejunostomy site or in the right upper   quadrant anastomotic sites.    4. New cutaneous thickening in the left side of the intergluteal cleft   with increased activity most likely represents an inflammatory process;   please examine area for confirmation.    Patient denies dyspnea, wheezing, cough, hemoptysis, fever, chills, weight loss, night sweats. Patient is scheduled for CT guided left lung biopsy on 3/24/23 with Dr Pino in radiology. Patient educated on surgical scrub, preadmission instructions, medical, cardiac clearance and day of procedure medications, verbalizes understanding. Pt instructed to stop vitamins/supplements/herbal medications/NSAIDS for one week prior to surgery and discuss with PMD. Asked the patient to consult with PCP/cardiologist about holding ASA and the pt  agreed.

## 2023-03-17 NOTE — H&P PST ADULT - NSICDXPASTMEDICALHX_GEN_ALL_CORE_FT
PAST MEDICAL HISTORY:  Benign essential HTN     DM (diabetes mellitus)     Gastric adenocarcinoma     Gastric cancer on chemo    History of chemotherapy     Lung nodules     Prostate cancer     RBBB (right bundle branch block)     SDH (subdural hematoma)

## 2023-03-17 NOTE — H&P PST ADULT - RESPIRATORY
no wheezes/no rales/no rhonchi/no respiratory distress/diminished breath sounds, L/diminished breath sounds, R

## 2023-03-17 NOTE — H&P PST ADULT - NSICDXPASTSURGICALHX_GEN_ALL_CORE_FT
PAST SURGICAL HISTORY:  History of gastrectomy     Port-A-Cath in place 8/2020 right ACW    Status post placement of implantable loop recorder left ACW

## 2023-03-24 ENCOUNTER — TRANSCRIPTION ENCOUNTER (OUTPATIENT)
Age: 83
End: 2023-03-24

## 2023-03-24 ENCOUNTER — INPATIENT (INPATIENT)
Facility: HOSPITAL | Age: 83
LOS: 0 days | Discharge: HOME | DRG: 201 | End: 2023-03-25
Attending: STUDENT IN AN ORGANIZED HEALTH CARE EDUCATION/TRAINING PROGRAM | Admitting: STUDENT IN AN ORGANIZED HEALTH CARE EDUCATION/TRAINING PROGRAM
Payer: MEDICARE

## 2023-03-24 ENCOUNTER — RESULT REVIEW (OUTPATIENT)
Age: 83
End: 2023-03-24

## 2023-03-24 VITALS
HEIGHT: 66 IN | OXYGEN SATURATION: 98 % | SYSTOLIC BLOOD PRESSURE: 141 MMHG | DIASTOLIC BLOOD PRESSURE: 67 MMHG | HEART RATE: 64 BPM | RESPIRATION RATE: 20 BRPM | WEIGHT: 134.92 LBS | TEMPERATURE: 97 F

## 2023-03-24 DIAGNOSIS — C16.9 MALIGNANT NEOPLASM OF STOMACH, UNSPECIFIED: ICD-10-CM

## 2023-03-24 DIAGNOSIS — Z90.3 ACQUIRED ABSENCE OF STOMACH [PART OF]: Chronic | ICD-10-CM

## 2023-03-24 DIAGNOSIS — J93.9 PNEUMOTHORAX, UNSPECIFIED: ICD-10-CM

## 2023-03-24 DIAGNOSIS — E11.9 TYPE 2 DIABETES MELLITUS WITHOUT COMPLICATIONS: ICD-10-CM

## 2023-03-24 DIAGNOSIS — Z95.828 PRESENCE OF OTHER VASCULAR IMPLANTS AND GRAFTS: Chronic | ICD-10-CM

## 2023-03-24 DIAGNOSIS — R91.1 SOLITARY PULMONARY NODULE: ICD-10-CM

## 2023-03-24 DIAGNOSIS — Z95.818 PRESENCE OF OTHER CARDIAC IMPLANTS AND GRAFTS: Chronic | ICD-10-CM

## 2023-03-24 LAB
GLUCOSE BLDC GLUCOMTR-MCNC: 101 MG/DL — HIGH (ref 70–99)
GLUCOSE BLDC GLUCOMTR-MCNC: 127 MG/DL — HIGH (ref 70–99)
GLUCOSE BLDC GLUCOMTR-MCNC: 97 MG/DL — SIGNIFICANT CHANGE UP (ref 70–99)
SARS-COV-2 RNA SPEC QL NAA+PROBE: SIGNIFICANT CHANGE UP

## 2023-03-24 PROCEDURE — 32408 CORE NDL BX LNG/MED PERQ: CPT

## 2023-03-24 PROCEDURE — 88341 IMHCHEM/IMCYTCHM EA ADD ANTB: CPT | Mod: 26

## 2023-03-24 PROCEDURE — 88305 TISSUE EXAM BY PATHOLOGIST: CPT | Mod: 26

## 2023-03-24 PROCEDURE — 71045 X-RAY EXAM CHEST 1 VIEW: CPT | Mod: 26,77

## 2023-03-24 PROCEDURE — 71045 X-RAY EXAM CHEST 1 VIEW: CPT | Mod: 26,76

## 2023-03-24 PROCEDURE — 99222 1ST HOSP IP/OBS MODERATE 55: CPT

## 2023-03-24 PROCEDURE — 88342 IMHCHEM/IMCYTCHM 1ST ANTB: CPT | Mod: 26

## 2023-03-24 PROCEDURE — 32557 INSERT CATH PLEURA W/ IMAGE: CPT | Mod: LT

## 2023-03-24 RX ORDER — SODIUM CHLORIDE 9 MG/ML
1000 INJECTION, SOLUTION INTRAVENOUS
Refills: 0 | Status: DISCONTINUED | OUTPATIENT
Start: 2023-03-24 | End: 2023-03-25

## 2023-03-24 RX ORDER — METFORMIN HYDROCHLORIDE 850 MG/1
0 TABLET ORAL
Qty: 0 | Refills: 0 | DISCHARGE

## 2023-03-24 RX ORDER — DEXTROSE 50 % IN WATER 50 %
15 SYRINGE (ML) INTRAVENOUS ONCE
Refills: 0 | Status: DISCONTINUED | OUTPATIENT
Start: 2023-03-24 | End: 2023-03-25

## 2023-03-24 RX ORDER — INSULIN LISPRO 100/ML
VIAL (ML) SUBCUTANEOUS ONCE
Refills: 0 | Status: COMPLETED | OUTPATIENT
Start: 2023-03-24 | End: 2023-03-24

## 2023-03-24 RX ORDER — GLUCAGON INJECTION, SOLUTION 0.5 MG/.1ML
1 INJECTION, SOLUTION SUBCUTANEOUS ONCE
Refills: 0 | Status: DISCONTINUED | OUTPATIENT
Start: 2023-03-24 | End: 2023-03-24

## 2023-03-24 RX ORDER — FERROUS SULFATE 325(65) MG
325 TABLET ORAL ONCE
Refills: 0 | Status: DISCONTINUED | OUTPATIENT
Start: 2023-03-24 | End: 2023-03-24

## 2023-03-24 RX ORDER — ACETAMINOPHEN 500 MG
650 TABLET ORAL EVERY 6 HOURS
Refills: 0 | Status: DISCONTINUED | OUTPATIENT
Start: 2023-03-24 | End: 2023-03-25

## 2023-03-24 RX ORDER — DEXTROSE 50 % IN WATER 50 %
12.5 SYRINGE (ML) INTRAVENOUS ONCE
Refills: 0 | Status: DISCONTINUED | OUTPATIENT
Start: 2023-03-24 | End: 2023-03-25

## 2023-03-24 RX ORDER — MULTIVIT-MIN/FERROUS GLUCONATE 9 MG/15 ML
0 LIQUID (ML) ORAL
Qty: 0 | Refills: 0 | DISCHARGE

## 2023-03-24 RX ORDER — FERROUS SULFATE 325(65) MG
325 TABLET ORAL DAILY
Refills: 0 | Status: DISCONTINUED | OUTPATIENT
Start: 2023-03-25 | End: 2023-03-25

## 2023-03-24 RX ORDER — ASPIRIN/CALCIUM CARB/MAGNESIUM 324 MG
1 TABLET ORAL
Qty: 0 | Refills: 0 | DISCHARGE

## 2023-03-24 RX ORDER — ASPIRIN/CALCIUM CARB/MAGNESIUM 324 MG
81 TABLET ORAL DAILY
Refills: 0 | Status: DISCONTINUED | OUTPATIENT
Start: 2023-03-25 | End: 2023-03-25

## 2023-03-24 RX ORDER — SODIUM CHLORIDE 9 MG/ML
3 INJECTION INTRAMUSCULAR; INTRAVENOUS; SUBCUTANEOUS ONCE
Refills: 0 | Status: COMPLETED | OUTPATIENT
Start: 2023-03-24 | End: 2023-03-24

## 2023-03-24 RX ORDER — DEXTROSE 50 % IN WATER 50 %
25 SYRINGE (ML) INTRAVENOUS ONCE
Refills: 0 | Status: DISCONTINUED | OUTPATIENT
Start: 2023-03-24 | End: 2023-03-25

## 2023-03-24 RX ORDER — ACETAMINOPHEN 500 MG
650 TABLET ORAL ONCE
Refills: 0 | Status: DISCONTINUED | OUTPATIENT
Start: 2023-03-24 | End: 2023-03-24

## 2023-03-24 RX ORDER — FERROUS SULFATE 325(65) MG
1 TABLET ORAL
Qty: 0 | Refills: 0 | DISCHARGE

## 2023-03-24 RX ADMIN — SODIUM CHLORIDE 3 MILLILITER(S): 9 INJECTION INTRAMUSCULAR; INTRAVENOUS; SUBCUTANEOUS at 18:10

## 2023-03-24 NOTE — ASU DISCHARGE PLAN (ADULT/PEDIATRIC) - CALL YOUR DOCTOR IF YOU HAVE ANY OF THE FOLLOWING:
Chest pain or shortness of breath, go to nearest emergency room/Bleeding that does not stop/Pain not relieved by Medications/Wound/Surgical Site with redness, or foul smelling discharge or pus

## 2023-03-24 NOTE — PATIENT PROFILE ADULT - FALL HARM RISK - HARM RISK INTERVENTIONS

## 2023-03-24 NOTE — CONSULT NOTE ADULT - ASSESSMENT
82y Male with PMH of HTN, HLD, DM2, RBBB, ILR, COPD, gastric cancer 2020 s/p gastrectomy and chemotherapy, SDH s/p fall 2020, Left upper lobe lung nodule on CT chest in august and again in December,  with + Pet findings.  Presented today to IR for an outpatient elective YONI lung nodule biopsy.  Post procedure he was noted to have a left pneumothorax for which a pigtail was placed by IR.  Thoracic surgery called to admit patient and manage pigtail.

## 2023-03-24 NOTE — PROCEDURE NOTE - PROCEDURE FINDINGS AND DETAILS
2 cores of Left upper lobe lung mass in formalin  trace PTX aspirated     check CXR 2 hrs
6F nonlocking catheter placed into left apical pleural space. chest tube secured to skin with suture and fixation device. placed to water-seal. postprocedural CT noted with resolution of pneumothorax.

## 2023-03-24 NOTE — CONSULT NOTE ADULT - PROBLEM SELECTOR RECOMMENDATION 9
Admit to thoracic surgery.  Dr. Ramon.  4 tower tele and .  Maintain left pigtail   Daily cxr while in place.   Discussed with Dr. Ramon.

## 2023-03-24 NOTE — CONSULT NOTE ADULT - SUBJECTIVE AND OBJECTIVE BOX
Surgeon: Yenny    Consult requesting by: IR    HISTORY OF PRESENT ILLNESS:  82y Male with PMH of HTN, HLD, DM2, RBBB, ILR, COPD, gastric cancer 2020 s/p gastrectomy and chemotherapy, SDH s/p fall 2020, Left upper lobe lung nodule on CT chest in august and again in December,  with + Pet findings.  Presented today to IR for an outpatient elective YONI lung nodule biopsy.  Post procedure he was noted to have a left pneumothorax for which a pigtail was placed by IR.  Thoracic surgery called to admit patient and manage pigtail.          PAST MEDICAL & SURGICAL HISTORY:  Prostate cancer      DM (diabetes mellitus)      Gastric cancer  on chemo      Benign essential HTN      SDH (subdural hematoma)      RBBB (right bundle branch block)      Gastric adenocarcinoma      History of chemotherapy      Lung nodules      Port-A-Cath in place  8/2020 right ACW      Status post placement of implantable loop recorder  left ACW      History of gastrectomy          MEDICATIONS  (STANDING):  sodium chloride 0.9% lock flush 3 milliLiter(s) IV Push Once    MEDICATIONS  (PRN):    Antiplatelet therapy:                           Last dose/amt:    Allergies    No Known Allergies    Intolerances        SOCIAL HISTORY:  Smoker: [ ] Yes  [ ] No        PACK YEARS:                         WHEN QUIT?  ETOH use: [ ] Yes  [ ] No              FREQUENCY / QUANTITY:  Ilicit Drug use:  [ ] Yes  [ ] No  Occupation:  Live with:  Assisted device use:    FAMILY HISTORY:  FH: diabetes mellitus (Father)        Review of Systems  CONSTITUTIONAL:  Fevers[ ] chills[ ] sweats[ ] fatigue[ ] weight loss[ ] weight gain [ ]                                     NEGATIVE [ ]   NEURO:  parathesias[ ] seizures [ ]  syncope [ ]  confusion [ ]                                                                                NEGATIVE[ ]   EYES: glasses[ ]  blurry vision[ ]  discharge[ ] pain[ ] glaucoma [ ]                                                                          NEGATIVE[ ]   ENMT:  difficulty hearing [ ]  vertigo[ ]  dysphagia[ ] epistaxis[ ] recent dental work [ ]                                    NEGATIVE[ ]   CV:  chest pain[ ] palpitations[ ] MORROW [ ] diaphoresis [ ]                                                                                           NEGATIVE[ ]   RESPIRATORY:  wheezing[ ] SOB[ ] cough [ ] sputum[ ] hemoptysis[ ]                                                                  NEGATIVE[ ]   GI:  nausea[ ]  vommiting [ ]  diarrhea[ ] constipation [ ] melena [ ]                                                                      NEGATIVE[ ]   : hematuria[ ]  dysuria[ ] urgency[ ] incontinence[ ]                                                                                            NEGATIVE[ ]   MUSKULOSKELETAL:  arthritis[ ]  joint swelling [ ] muscle weakness [ ]                                                                NEGATIVE[ ]   SKIN/BREAST:  rash[ ] itching [ ]  hair loss[ ] masses[ ]                                                                                              NEGATIVE[ ]   PSYCH:  dementia [ ] depresion [ ] anxiety[ ]                                                                                                               NEGATIVE[ ]   HEME/LYMPH:  bruises easily[ ] enlarged lymph nodes[ ] tender lymph nodes[ ]                                               NEGATIVE[ ]   ENDOCRINE:  cold intolerance[ ] heat intolerance[ ] polydipsia[ ]                                                                          NEGATIVE[ ]     PHYSICAL EXAM  Vital Signs Last 24 Hrs  T(C): 36.2 (24 Mar 2023 07:53), Max: 36.2 (24 Mar 2023 07:53)  T(F): 97.2 (24 Mar 2023 07:53), Max: 97.2 (24 Mar 2023 07:53)  HR: 64 (24 Mar 2023 07:53) (64 - 64)  BP: 141/67 (24 Mar 2023 07:53) (141/67 - 141/67)  BP(mean): --  RR: 20 (24 Mar 2023 07:53) (20 - 20)  SpO2: 98% (24 Mar 2023 07:53) (98% - 98%)        CONSTITUTIONAL:                                                                          WNL[ ]   Neuro: WNL[ ] Normal exam oriented to person/place & time with no focal motor or sensory  deficits. Other                     Eyes: WNL[ ]   Normal exam of conjunctiva & lids, pupils equally reactive. Other     ENT: WNL[ ]    Normal exam of nasal/oral mucosa with absence of cyanosis. Other  Neck: WNL[ ]  Normal exam of jugular veins, trachea & thyroid. Other  Chest: WNL[ ] Normal lung exam with good air movement absence of wheezes, rales, or rhonchi: Other                                                                                CV:  Auscultation: normal [ ] S3[ ] S4[ ] Irregular [ ] Rub[ ] Clicks[ ]    Murmurs none:[ ]systolic [ ]  diastolic [ ] holosystolic [ ]  Carotids: No Bruits[ ] Other                 Abdominal Aorta: normal [ ] nonpalpable[ ]Other                                                                                      GI:           WNL[ ] Normal exam of abdomen, liver & spleen with no noted masses or tenderness. Other                                                                                                        Extremities: WNL[ ] Normal no evidence of cyanosis or deformity Edema: none[ ]trace[ ]1+[ ]2+[ ]3+[ ]4+[ ]  Lower Extremity Pulses: Right[ ] Left[ ]Varicosities[ ]  SKIN :WNL[ ] Normal exam to inspection & palation. Other:                                                          LABS:                                       Surgeon: Yenny    Consult requesting by: IR    HISTORY OF PRESENT ILLNESS:  82y Male with PMH of HTN, HLD, DM2, RBBB, ILR, COPD, gastric cancer 2020 s/p gastrectomy and chemotherapy, SDH s/p fall 2020, Left upper lobe lung nodule on CT chest in august and again in December,  with + Pet findings.  Presented today to IR for an outpatient elective YONI lung nodule biopsy.  Post procedure he was noted to have a left pneumothorax for which a pigtail was placed by IR.  Thoracic surgery called to admit patient and manage pigtail.          PAST MEDICAL & SURGICAL HISTORY:  Prostate cancer      DM (diabetes mellitus)      Gastric cancer  on chemo      Benign essential HTN      SDH (subdural hematoma)      RBBB (right bundle branch block)      Gastric adenocarcinoma      History of chemotherapy      Lung nodules      Port-A-Cath in place  8/2020 right ACW      Status post placement of implantable loop recorder  left ACW      History of gastrectomy          MEDICATIONS  (STANDING):  sodium chloride 0.9% lock flush 3 milliLiter(s) IV Push Once    MEDICATIONS  (PRN):    Antiplatelet therapy:                           Last dose/amt:    Allergies    No Known Allergies    Intolerances        SOCIAL HISTORY:  Smoker: [x ] Yes  [ ] No    Remote history of     PACK YEARS:                         WHEN QUIT?  ETOH use: [ ] Yes  [x ] No              FREQUENCY / QUANTITY:  Ilicit Drug use:  [ ] Yes  [x ] No  Occupation: retired  Live with: family   Assisted device use: denies use    FAMILY HISTORY:  FH: diabetes mellitus (Father)    Review of Systems  CONSTITUTIONAL:  Fevers[ ] chills[ ] sweats[ ] fatigue[ ] weight loss[ ] weight gain [ ]                                     NEGATIVE [x ]   NEURO:  parathesias[ ] seizures [ ]  syncope [ ]  confusion [ ]                                                                                NEGATIVE[x ]   EYES: glasses[ ]  blurry vision[ ]  discharge[ ] pain[ ] glaucoma [ ]                                                                          NEGATIVE[x ]   ENMT:  difficulty hearing [ ]  vertigo[ ]  dysphagia[ ] epistaxis[ ] recent dental work [ ]                                    NEGATIVE[x ]   CV:  chest pain[ ] palpitations[ ] MORROW [ ] diaphoresis [ ]                                                                                           NEGATIVE[x ]   RESPIRATORY:  wheezing[ ] SOB[ ] cough [ ] sputum[ ] hemoptysis[ ]                                                                  NEGATIVE[x ]   GI:  nausea[ ]  vommiting [ ]  diarrhea[ ] constipation [ ] melena [ ]                                                                      NEGATIVE[x ]   : hematuria[ ]  dysuria[ ] urgency[ ] incontinence[ ]                                                                                            NEGATIVE[x ]   MUSKULOSKELETAL:  arthritis[ ]  joint swelling [ ] muscle weakness [ ]                                                                NEGATIVE[x ]   SKIN/BREAST:  rash[ ] itching [ ]  hair loss[ ] masses[ ]                                                                                              NEGATIVE[x ]   PSYCH:  dementia [ ] depresion [ ] anxiety[ ]                                                                                                               NEGATIVE[x ]   HEME/LYMPH:  bruises easily[ ] enlarged lymph nodes[ ] tender lymph nodes[ ]                                               NEGATIVE[x ]   ENDOCRINE:  cold intolerance[ ] heat intolerance[ ] polydipsia[ ]                                                                          NEGATIVE[x ]     PHYSICAL EXAM  Vital Signs Last 24 Hrs  T(C): 36.2 (24 Mar 2023 07:53), Max: 36.2 (24 Mar 2023 07:53)  T(F): 97.2 (24 Mar 2023 07:53), Max: 97.2 (24 Mar 2023 07:53)  HR: 64 (24 Mar 2023 07:53) (64 - 64)  BP: 141/67 (24 Mar 2023 07:53) (141/67 - 141/67)  BP(mean): --  RR: 20 (24 Mar 2023 07:53) (20 - 20)  SpO2: 98% (24 Mar 2023 07:53) (98% - 98%)        CONSTITUTIONAL:                                                                            Neuro: WNL[x ] Normal exam oriented to person/place & time with no focal motor or sensory  deficits. Other                     Eyes: WNL[x ]   Normal exam of conjunctiva & lids, pupils equally reactive. Other     ENT: WNL[x ]    Normal exam of nasal/oral mucosa with absence of cyanosis. Other  Neck: WNL[x ]  Normal exam of jugular veins, trachea & thyroid. Other  Chest: WNL[x ] Normal lung exam with good air movement absence of wheezes, rales, or rhonchi: Other  + left pigtail to waterseal.   + airleak.                                                                              CV:  Auscultation: normal [x ] S3[ ] S4[ ] Irregular [ ] Rub[ ] Clicks[ ]    Murmurs none:[x ]systolic [ ]  diastolic [ ] holosystolic [ ]  Carotids: No Bruits[x ] Other                 Abdominal Aorta: normal [x ] nonpalpable[ ]Other                                                                                      GI:           WNL[x ] Normal exam of abdomen, liver & spleen with no noted masses or tenderness. Other                                                                                                        Extremities: WNL[ x] Normal no evidence of cyanosis or deformity Edema: none[ ]trace[ ]1+[ ]2+[ ]3+[ ]4+[ ]  Lower Extremity Pulses: Right[pp ] Left[pp ]Varicosities[- ]  SKIN :WNL[x ] Normal exam to inspection & palpation. Other:

## 2023-03-25 ENCOUNTER — TRANSCRIPTION ENCOUNTER (OUTPATIENT)
Age: 83
End: 2023-03-25

## 2023-03-25 VITALS
TEMPERATURE: 98 F | HEART RATE: 77 BPM | RESPIRATION RATE: 18 BRPM | OXYGEN SATURATION: 98 % | SYSTOLIC BLOOD PRESSURE: 139 MMHG | DIASTOLIC BLOOD PRESSURE: 79 MMHG

## 2023-03-25 LAB
ALBUMIN SERPL ELPH-MCNC: 4 G/DL — SIGNIFICANT CHANGE UP (ref 3.3–5.2)
ALP SERPL-CCNC: 67 U/L — SIGNIFICANT CHANGE UP (ref 40–120)
ALT FLD-CCNC: 11 U/L — SIGNIFICANT CHANGE UP
ANION GAP SERPL CALC-SCNC: 11 MMOL/L — SIGNIFICANT CHANGE UP (ref 5–17)
AST SERPL-CCNC: 13 U/L — SIGNIFICANT CHANGE UP
BASOPHILS # BLD AUTO: 0.05 K/UL — SIGNIFICANT CHANGE UP (ref 0–0.2)
BASOPHILS NFR BLD AUTO: 0.9 % — SIGNIFICANT CHANGE UP (ref 0–2)
BILIRUB SERPL-MCNC: 0.3 MG/DL — LOW (ref 0.4–2)
BUN SERPL-MCNC: 11.5 MG/DL — SIGNIFICANT CHANGE UP (ref 8–20)
CALCIUM SERPL-MCNC: 9.2 MG/DL — SIGNIFICANT CHANGE UP (ref 8.4–10.5)
CHLORIDE SERPL-SCNC: 101 MMOL/L — SIGNIFICANT CHANGE UP (ref 96–108)
CO2 SERPL-SCNC: 25 MMOL/L — SIGNIFICANT CHANGE UP (ref 22–29)
CREAT SERPL-MCNC: 0.93 MG/DL — SIGNIFICANT CHANGE UP (ref 0.5–1.3)
EGFR: 81 ML/MIN/1.73M2 — SIGNIFICANT CHANGE UP
EOSINOPHIL # BLD AUTO: 0.1 K/UL — SIGNIFICANT CHANGE UP (ref 0–0.5)
EOSINOPHIL NFR BLD AUTO: 1.8 % — SIGNIFICANT CHANGE UP (ref 0–6)
GLUCOSE BLDC GLUCOMTR-MCNC: 85 MG/DL — SIGNIFICANT CHANGE UP (ref 70–99)
GLUCOSE BLDC GLUCOMTR-MCNC: 93 MG/DL — SIGNIFICANT CHANGE UP (ref 70–99)
GLUCOSE SERPL-MCNC: 92 MG/DL — SIGNIFICANT CHANGE UP (ref 70–99)
HCT VFR BLD CALC: 43.3 % — SIGNIFICANT CHANGE UP (ref 39–50)
HGB BLD-MCNC: 13.9 G/DL — SIGNIFICANT CHANGE UP (ref 13–17)
IMM GRANULOCYTES NFR BLD AUTO: 0.2 % — SIGNIFICANT CHANGE UP (ref 0–0.9)
LYMPHOCYTES # BLD AUTO: 1.74 K/UL — SIGNIFICANT CHANGE UP (ref 1–3.3)
LYMPHOCYTES # BLD AUTO: 32 % — SIGNIFICANT CHANGE UP (ref 13–44)
MCHC RBC-ENTMCNC: 25.1 PG — LOW (ref 27–34)
MCHC RBC-ENTMCNC: 32.1 GM/DL — SIGNIFICANT CHANGE UP (ref 32–36)
MCV RBC AUTO: 78.3 FL — LOW (ref 80–100)
MONOCYTES # BLD AUTO: 0.59 K/UL — SIGNIFICANT CHANGE UP (ref 0–0.9)
MONOCYTES NFR BLD AUTO: 10.9 % — SIGNIFICANT CHANGE UP (ref 2–14)
NEUTROPHILS # BLD AUTO: 2.94 K/UL — SIGNIFICANT CHANGE UP (ref 1.8–7.4)
NEUTROPHILS NFR BLD AUTO: 54.2 % — SIGNIFICANT CHANGE UP (ref 43–77)
PLATELET # BLD AUTO: 168 K/UL — SIGNIFICANT CHANGE UP (ref 150–400)
POTASSIUM SERPL-MCNC: 4.4 MMOL/L — SIGNIFICANT CHANGE UP (ref 3.5–5.3)
POTASSIUM SERPL-SCNC: 4.4 MMOL/L — SIGNIFICANT CHANGE UP (ref 3.5–5.3)
PROT SERPL-MCNC: 6.7 G/DL — SIGNIFICANT CHANGE UP (ref 6.6–8.7)
RBC # BLD: 5.53 M/UL — SIGNIFICANT CHANGE UP (ref 4.2–5.8)
RBC # FLD: 15.4 % — HIGH (ref 10.3–14.5)
SODIUM SERPL-SCNC: 137 MMOL/L — SIGNIFICANT CHANGE UP (ref 135–145)
WBC # BLD: 5.43 K/UL — SIGNIFICANT CHANGE UP (ref 3.8–10.5)
WBC # FLD AUTO: 5.43 K/UL — SIGNIFICANT CHANGE UP (ref 3.8–10.5)

## 2023-03-25 PROCEDURE — 71045 X-RAY EXAM CHEST 1 VIEW: CPT | Mod: 26

## 2023-03-25 PROCEDURE — 85025 COMPLETE CBC W/AUTO DIFF WBC: CPT

## 2023-03-25 PROCEDURE — 82962 GLUCOSE BLOOD TEST: CPT

## 2023-03-25 PROCEDURE — U0005: CPT

## 2023-03-25 PROCEDURE — 88305 TISSUE EXAM BY PATHOLOGIST: CPT

## 2023-03-25 PROCEDURE — 71045 X-RAY EXAM CHEST 1 VIEW: CPT

## 2023-03-25 PROCEDURE — 80053 COMPREHEN METABOLIC PANEL: CPT

## 2023-03-25 PROCEDURE — C1729: CPT

## 2023-03-25 PROCEDURE — 99238 HOSP IP/OBS DSCHRG MGMT 30/<: CPT

## 2023-03-25 PROCEDURE — U0003: CPT

## 2023-03-25 PROCEDURE — 36415 COLL VENOUS BLD VENIPUNCTURE: CPT

## 2023-03-25 PROCEDURE — 88341 IMHCHEM/IMCYTCHM EA ADD ANTB: CPT

## 2023-03-25 PROCEDURE — 77012 CT SCAN FOR NEEDLE BIOPSY: CPT

## 2023-03-25 RX ORDER — ACETAMINOPHEN 500 MG
2 TABLET ORAL
Qty: 0 | Refills: 0 | DISCHARGE
Start: 2023-03-25

## 2023-03-25 RX ADMIN — Medication 81 MILLIGRAM(S): at 09:29

## 2023-03-25 RX ADMIN — Medication 325 MILLIGRAM(S): at 09:29

## 2023-03-25 NOTE — DISCHARGE NOTE NURSING/CASE MANAGEMENT/SOCIAL WORK - PATIENT PORTAL LINK FT
You can access the FollowMyHealth Patient Portal offered by [facility] by registering at the following website: [email or web address]. By joining [employer]’s FollowMyHealth portal, you will also be able to view your health information using other applications (apps) compatible with our system.

## 2023-03-25 NOTE — DOWNTIME INTERRUPTION NOTE - WHICH MANUAL FORMS INITIATED?
See paper records for clinical information entered during [facility] downtime. Medication administration and i&o's entered

## 2023-03-25 NOTE — DISCHARGE NOTE PROVIDER - NSDCPNSUBOBJ_GEN_ALL_CORE
Subjective: Pt in bed NAD no issues overnight   T(C): 36.4 (03-24-23 @ 20:01), Max: 36.4 (03-24-23 @ 18:11)  HR: 66 (03-24-23 @ 20:01) (66 - 74)  BP: 142/81 (03-24-23 @ 20:01) (142/81 - 160/80)  ABP: --  ABP(mean): --  RR: 17 (03-24-23 @ 20:01) (17 - 18)  SpO2: 96% (03-24-23 @ 20:01) (96% - 98%) RA   Wt(kg): --  CVP(mm Hg): --  CO: --  CI: --  PA: --                                              Tele: SR     CHEST TUBE:    0cc                           OUTPUT:     per 24 hours    AIR LEAKS:  [ ] YES [ x] NO          03-25    137  |  101  |  11.5  ----------------------------<  92  4.4   |  25.0  |  0.93    Ca    9.2      25 Mar 2023 05:55    TPro  6.7  /  Alb  4.0  /  TBili  0.3<L>  /  DBili  x   /  AST  13  /  ALT  11  /  AlkPhos  67  03-25                               13.9   5.43  )-----------( 168      ( 25 Mar 2023 05:55 )             43.3                 CAPILLARY BLOOD GLUCOSE      POCT Blood Glucose.: 93 mg/dL (25 Mar 2023 08:17)  POCT Blood Glucose.: 101 mg/dL (24 Mar 2023 21:28)  POCT Blood Glucose.: 127 mg/dL (24 Mar 2023 18:33)           CXR: awaiting post pull CXR         exam   Neuro:  Alert awake NAD  Pulm: decreased at bases   CV: RRR S1 S2   Abd:  Soft   Extremities:  warm         Assessment:  82yMale    with PAST MEDICAL & SURGICAL HISTORY:  Prostate cancer      DM (diabetes mellitus)      Gastric cancer  on chemo      Benign essential HTN      SDH (subdural hematoma)      RBBB (right bundle branch block)      Gastric adenocarcinoma      History of chemotherapy      Lung nodules      Port-A-Cath in place  8/2020 right ACW      Status post placement of implantable loop recorder  left ACW      History of gastrectomy            plan  82y Male with PMH of HTN, HLD, DM2, RBBB, ILR, COPD, gastric cancer 2020 s/p gastrectomy and chemotherapy, SDH s/p fall 2020, Left upper lobe lung nodule on CT chest in august and again in December,  with + Pet findings.  Presented today to IR for an outpatient elective YONI lung nodule biopsy.  Post procedure he was noted to have a left pneumothorax for which a pigtail was placed by IR.  Thoracic surgery called to admit patient and manage pigtail.     Problem/Recommendation - 1:  ·  Problem: Pneumothorax, left.   ·  Recommendation: Admit to thoracic surgery.  Dr. Ramon.  Pigtail d/c - no airleak noted before pull   Awaiting CXR post pull  plan to d/c home if CXR results are OK  DW Dr cha

## 2023-03-25 NOTE — DISCHARGE NOTE PROVIDER - NSDCMRMEDTOKEN_GEN_ALL_CORE_FT
acetaminophen 325 mg oral tablet: 2 tab(s) orally every 6 hours As needed Mild Pain (1 - 3), Moderate Pain (4 - 6)  aspirin 81 mg oral tablet, chewable: 1 tab(s) orally once a day  ferrous sulfate 324 mg (65 mg elemental iron) oral delayed release tablet: 1 tab(s) orally once a day  metFORMIN 500 mg oral tablet: orally once a day  Prosteon oral tablet: orally once a day

## 2023-03-25 NOTE — DISCHARGE NOTE PROVIDER - CARE PROVIDER_API CALL
Nathan Ramon (MD)  Surgery; Thoracic Surgery  [address] [place] [address]  Phone: [phone]  Fax: [phone]  Follow Up Time:

## 2023-03-25 NOTE — DISCHARGE NOTE NURSING/CASE MANAGEMENT/SOCIAL WORK - NSDCPEFALRISK_GEN_ALL_CORE
For information on Fall & Injury Prevention, visit: [email or web address] OR  [email or web address] OR  https://www.cdc.gov/steadi/patient.html

## 2023-03-25 NOTE — DISCHARGE NOTE PROVIDER - NSDCFUSCHEDAPPT_GEN_ALL_CORE_FT
Mely Billings  [facility] Physician [facility]  [facility]  Scheduled Appointment: 04/07/2023    Kleiner, Myron I  [facility] Physician [facility] [address]  Scheduled Appointment: 04/10/2023    Jay Jay Kennedy  [facility]  [facility] [address] [facility] [address]  Scheduled Appointment: 04/25/2023

## 2023-03-25 NOTE — DISCHARGE NOTE PROVIDER - NSDCCPCAREPLAN_GEN_ALL_CORE_FT
PRINCIPAL DISCHARGE DIAGNOSIS  Diagnosis: Pneumothorax, left  Assessment and Plan of Treatment:

## 2023-03-30 LAB — SURGICAL PATHOLOGY STUDY: SIGNIFICANT CHANGE UP

## 2023-04-04 PROBLEM — R91.8 OTHER NONSPECIFIC ABNORMAL FINDING OF LUNG FIELD: Chronic | Status: ACTIVE | Noted: 2023-03-17

## 2023-04-04 PROBLEM — C16.9 MALIGNANT NEOPLASM OF STOMACH, UNSPECIFIED: Chronic | Status: ACTIVE | Noted: 2023-03-17

## 2023-04-04 PROBLEM — Z92.21 PERSONAL HISTORY OF ANTINEOPLASTIC CHEMOTHERAPY: Chronic | Status: ACTIVE | Noted: 2023-03-17

## 2023-04-05 NOTE — REVIEW OF SYSTEMS
[Negative] : Allergic/Immunologic [de-identified] : mild numbness and tingling of fingers and toes

## 2023-04-05 NOTE — HISTORY OF PRESENT ILLNESS
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 He was sent for CT by his PCP, Dr. Charles Smith, due to anemia. CT showed in the upper central abdomen abutting the posterior antrum of the stomach and neck of the pancreas there is a 3 x 2 x 3 cm mass. There is an additional heterogeneous enhancing 2.7 x 2.3 x 2.5 lobulated mass in the ventral upper abdominal peritoneal cavity anterosuperiorly to the antrum of the stomach with internal and surrounding enhancing vessels. Endoscopy with biopsy of the gastric mass was c/w moderately differentiated adenocarcinoma. Staging PET showed hypermetabolic thickening of the distal stomach, consistent with primary gastric cancer. Hypermetabolic activity in the distal stomach, inseparable from the perigastric lymphadenopathy, consistent with metastatic kong disease. \par \par TNM stage: T3, N2, M0 \par \par \par Patient completed 4 cycles of postoperative adjuvant FLOT for gastric adenocarcinoma s/p robotic assisted laparoscopic distal gastrectomy with Billroth 2 gastrojejunostomy reconstruction and placement of feeding jejunostomy tube on 11/9/20 with Dr Young. He is s/p 4 cycles neoadjuvant FLOT. \par + Fatigue, intermittent and mild. + Peripheral neuropathy, improved with gabapentin. + Occasional emesis after "eating too much," denies nausea. + Constipation, improved. + Alopecia. + Nail changes. + Cold intolerance improving. + Decreased appetite with mild weight loss. PEG removed by Dr. Young. + Grade 1 H/F, xeroderma only, no pain. No myalgias. No weakness. No dysgeusia. No fevers, no cough, no SOB. \par \par \par  [de-identified] : 8/6/21: Interval PET on 8/3 showed 1. Unchanged nonspecific mild diffuse FDG avidity in the residual stomach, status post gastrectomy and gastrojejunostomy, possibly physiologic.  No evidence of FDG avid perigastric lymph nodes.  Resolution of diffuse marrow FDG avidity. Unchanged subcentimeter left upper lobe pulmonary nodules, too small to characterize.  Nonspecific new minimally FDG avid small bilateral hilar lymph nodes.\par \par Pt has mild tingling of fingers and toes, much improved from before. Continues to take gabapentin. His appetite is good. His weight is stable. Denies HA. CP, SOB, abd pain, constipation, diarrhea, melena, hematuria, dysuria.\par \par 11/4/21: Pt feels well. Right 4th finger lock up, no pain, does not bother him. Mild numbness/tingling for fingers and toes, improving.  Taking gabapentin. Appetite is good. Denies HA. CP, SOB, abd pain, constipation, diarrhea, melena, hematuria, dysuria. \par \par 2/13/22: CT scan on 2/4/22 showed no evidence of recurrent dz. Pt feels well. His appetite is good and gained some weight. Endorses neuropathy of hands. His fingers sometimes lock up. Denies HA. CP, SOB, abd pain, constipation, diarrhea, melena, hematuria, dysuria. \par \par 8/15/22: pt is here for follow up for gastric cancer s/p FLOT and resection. CT scan 8/8/22 showed Increase in size of 9 mm left upper lobe pulmonary nodule raising suspicion for small primary lung neoplasm.  No evidence of recurrent or metastatic disease within the abdomen/pelvis. Discussed findings with pt.\par \par \par 12/9/22: pt is here for follow up for gastric cancer s/p FLOT and resection. Pt feels well. Denies HA. CP, SOB, abd pain, constipation, diarrhea, melena, hematuria, dysuria. \par \par CT chest on 12/2/22 showed Irregular left upper lobe 1 cm nodular opacity unchanged compared to 8/8/2022 and appears to be new compared to 4/15/2021 PET/CT; this nodule is indeterminate, but one diagnostic consideration is for primary lung neoplasm. Couple of additional left lung nodules measuring up to 0.5 cm unchanged compared to 4/15/2021 PET/CT.\par \par 3/8/23:  Patient presents for follow up. He completed 4 cycles of postoperative adjuvant FLOT for gastric adenocarcinoma s/p robotic assisted laparoscopic distal gastrectomy with Billroth 2 gastrojejunostomy reconstruction and placement of feeding jejunostomy tube on 11/9/20 with Dr Young. He is s/p 4 cycles neoadjuvant FLOT. \par + Occasional constipation. + Trigger finger x 2-3 digits. No fatigue. No decreased appetite. No dysphagia. No abdominal pain. No fevers or chills.

## 2023-04-05 NOTE — ASSESSMENT
[FreeTextEntry1] :  ypT3 N2. gastric cancer, her 2 + [was at least T3N1 by EUS criteria].\par -s/p ERCP 7/10/20. Pathology: Gastric, Antrum, Ulcerated mass - moderately differentiated adenocarcinoma\par -s/p 4 cycles neoadjuvant FLOT (8/17/20 - 10/5/20): docetaxel (50 mg/m2), oxaliplatin (85 mg/m2), LV (200 mg/m2) with short-term infusional FU (2600 mg/m2 as a 24-hour infusion), on day 1 Q2 weeks. \par -s/p surgery with Dr. Young 11/9/20. Distal gastrectomy and PEG placed. Path c/w residual gastric adenocarcinoma, moderately differentiated, status post treatment.  Vascular invasion identified. 4/29 lymph nodes involved by adenocarcinoma, negative margins. Tumor stage: ypT3 N2. \par -s/p 4 cycles of postop FLOT. Pt had requested to remain off adjuvant FLOT until 2/21, AMA. Patient also requested dose reduction. \par -post treatment PET 4/21- GRANT\par -CT chest on 12/2/22 showed Irregular left upper lobe 1 cm nodular opacity unchanged compared to 8/8/2022 and appears to be new compared to 4/15/2021 PET/CT; this nodule is indeterminate, but one diagnostic consideration is for primary lung neoplasm. Couple of additional left lung nodules measuring up to 0.5 cm unchanged compared to 4/15/2021 PET/CT.\par - Dr. Billings d/w pt that we can obtain bx ASAP or reassess in 3 months as the nodule remained unchanged. Patient  would like to wait.\par -follow up PET on 3/2/23: Left upper lobe nodule anterolaterally has increased in size and intensity of activity compatible with a malignant process. Relatively stable bilateral hilar and mediastinal kong uptake. Nonspecific activity at the remaining proximal stomach with no focal abnormal activity at the gastrojejunostomy site or in the right upper quadrant anastomotic sites. New cutaneous thickening in the left side of the intergluteal cleft with increased activity most likely represents an inflammatory process; please examine area for confirmation.\par -will schedule biopsy with IR ASAP\par -follow up with Dr. Kennedy moved up to 3/15\par -follow up with Dr. Billings after biopsy to discuss further plan

## 2023-04-07 ENCOUNTER — APPOINTMENT (OUTPATIENT)
Dept: HEMATOLOGY ONCOLOGY | Facility: CLINIC | Age: 83
End: 2023-04-07
Payer: MEDICARE

## 2023-04-07 ENCOUNTER — NON-APPOINTMENT (OUTPATIENT)
Age: 83
End: 2023-04-07

## 2023-04-07 VITALS
HEIGHT: 64.5 IN | DIASTOLIC BLOOD PRESSURE: 83 MMHG | BODY MASS INDEX: 23.78 KG/M2 | WEIGHT: 141.04 LBS | SYSTOLIC BLOOD PRESSURE: 146 MMHG | OXYGEN SATURATION: 97 % | HEART RATE: 80 BPM

## 2023-04-07 PROCEDURE — 99214 OFFICE O/P EST MOD 30 MIN: CPT

## 2023-04-10 ENCOUNTER — APPOINTMENT (OUTPATIENT)
Dept: RHEUMATOLOGY | Facility: CLINIC | Age: 83
End: 2023-04-10
Payer: MEDICARE

## 2023-04-10 VITALS
DIASTOLIC BLOOD PRESSURE: 60 MMHG | OXYGEN SATURATION: 98 % | SYSTOLIC BLOOD PRESSURE: 125 MMHG | HEART RATE: 79 BPM | TEMPERATURE: 98.2 F

## 2023-04-10 PROCEDURE — 99214 OFFICE O/P EST MOD 30 MIN: CPT

## 2023-04-12 NOTE — RESULTS/DATA
[FreeTextEntry1] : Follow-up PET/CT April 15, 2021:\par \par IMPRESSION:\par \par 1.  Postsurgical changes of gastrectomy and gastrojejunostomy. Mild homogeneous FDG avidity in the residual stomach is nonspecific, possibly physiologic. No focal FDG avidity at the anastomosis.\par \par 2.  No evidence of FDG avid or enlarged perigastric lymph nodes however evaluation is limited due to lack of intravenous contrast and paucity of intra-abdominal fat which makes evaluation difficult.\par \par 3.  Widespread diffuse FDG avidity of the bone marrow, probably due to administration of colony stimulating factors. Clinical correlation suggested.\par \par 4.  Subcentimeter nodular opacities left upper lobe, too small to characterize.\par \par \par 3/24/23: 1  Left lung biopsy\par \par Final Diagnosis\par Left lung biopsy:\par -Adenocarcinoma, consistent with lung primar

## 2023-04-12 NOTE — ADDENDUM
[FreeTextEntry1] : Documented by Aliyah Call acting as scribe for Dr. Billings on 04/07/2023 \par \par All Medical record entries made by the Scribe were at my, Dr. Billings's, direction and personally dictated by me on 04/07/2023 . I have reviewed the chart and agree that the record accurately reflects my personal performance of the history, physical exam, assessment and plan. I have also personally directed, reviewed, and agreed with the discharge instructions.\par \par

## 2023-04-12 NOTE — HISTORY OF PRESENT ILLNESS
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 He was sent for CT by his PCP, Dr. Charles Smith, due to anemia. CT showed in the upper central abdomen abutting the posterior antrum of the stomach and neck of the pancreas there is a 3 x 2 x 3 cm mass. There is an additional heterogeneous enhancing 2.7 x 2.3 x 2.5 lobulated mass in the ventral upper abdominal peritoneal cavity anterosuperiorly to the antrum of the stomach with internal and surrounding enhancing vessels. Endoscopy with biopsy of the gastric mass was c/w moderately differentiated adenocarcinoma. Staging PET showed hypermetabolic thickening of the distal stomach, consistent with primary gastric cancer. Hypermetabolic activity in the distal stomach, inseparable from the perigastric lymphadenopathy, consistent with metastatic kong disease. \par \par TNM stage: T3, N2, M0 \par \par \par Patient completed 4 cycles of postoperative adjuvant FLOT for gastric adenocarcinoma s/p robotic assisted laparoscopic distal gastrectomy with Billroth 2 gastrojejunostomy reconstruction and placement of feeding jejunostomy tube on 11/9/20 with Dr Young. He is s/p 4 cycles neoadjuvant FLOT. \par + Fatigue, intermittent and mild. + Peripheral neuropathy, improved with gabapentin. + Occasional emesis after "eating too much," denies nausea. + Constipation, improved. + Alopecia. + Nail changes. + Cold intolerance improving. + Decreased appetite with mild weight loss. PEG removed by Dr. Young. + Grade 1 H/F, xeroderma only, no pain. No myalgias. No weakness. No dysgeusia. No fevers, no cough, no SOB. \par \par \par  [de-identified] : 8/6/21: Interval PET on 8/3 showed 1. Unchanged nonspecific mild diffuse FDG avidity in the residual stomach, status post gastrectomy and gastrojejunostomy, possibly physiologic.  No evidence of FDG avid perigastric lymph nodes.  Resolution of diffuse marrow FDG avidity. Unchanged subcentimeter left upper lobe pulmonary nodules, too small to characterize.  Nonspecific new minimally FDG avid small bilateral hilar lymph nodes.\par \par Pt has mild tingling of fingers and toes, much improved from before. Continues to take gabapentin. His appetite is good. His weight is stable. Denies HA. CP, SOB, abd pain, constipation, diarrhea, melena, hematuria, dysuria.\par \par 11/4/21: Pt feels well. Right 4th finger lock up, no pain, does not bother him. Mild numbness/tingling for fingers and toes, improving.  Taking gabapentin. Appetite is good. Denies HA. CP, SOB, abd pain, constipation, diarrhea, melena, hematuria, dysuria. \par \par 2/13/22: CT scan on 2/4/22 showed no evidence of recurrent dz. Pt feels well. His appetite is good and gained some weight. Endorses neuropathy of hands. His fingers sometimes lock up. Denies HA. CP, SOB, abd pain, constipation, diarrhea, melena, hematuria, dysuria. \par \par 8/15/22: pt is here for follow up for gastric cancer s/p FLOT and resection. CT scan 8/8/22 showed Increase in size of 9 mm left upper lobe pulmonary nodule raising suspicion for small primary lung neoplasm.  No evidence of recurrent or metastatic disease within the abdomen/pelvis. Discussed findings with pt.\par \par \par 12/9/22: pt is here for follow up for gastric cancer s/p FLOT and resection. Pt feels well. Denies HA. CP, SOB, abd pain, constipation, diarrhea, melena, hematuria, dysuria. \par \par CT chest on 12/2/22 showed Irregular left upper lobe 1 cm nodular opacity unchanged compared to 8/8/2022 and appears to be new compared to 4/15/2021 PET/CT; this nodule is indeterminate, but one diagnostic consideration is for primary lung neoplasm. Couple of additional left lung nodules measuring up to 0.5 cm unchanged compared to 4/15/2021 PET/CT.\par \par 3/8/23:  Patient presents for follow up. He completed 4 cycles of postoperative adjuvant FLOT for gastric adenocarcinoma s/p robotic assisted laparoscopic distal gastrectomy with Billroth 2 gastrojejunostomy reconstruction and placement of feeding jejunostomy tube on 11/9/20 with Dr Young. He is s/p 4 cycles neoadjuvant FLOT. \par + Occasional constipation. + Trigger finger x 2-3 digits. No fatigue. No decreased appetite. No dysphagia. No abdominal pain. No fevers or chills.\par \par 4/7/23: Patient presents for a followup (transfer of care form Dr. Corral) \par Patient doing well, offers no complaints\par

## 2023-04-12 NOTE — ASSESSMENT
[FreeTextEntry1] : \par \par # Lung Adenocarcinoma \par -CT chest on 12/2/22 showed Irregular left upper lobe 1 cm nodular opacity unchanged compared to 8/8/2022 and appears to be new compared to 4/15/2021 PET/CT; this nodule is indeterminate, but one diagnostic consideration is for primary lung neoplasm. Couple of additional left lung nodules measuring up to 0.5 cm unchanged compared to 4/15/2021 PET/CT.\par - Dr. Billings d/w pt that we can obtain bx ASAP or reassess in 3 months as the nodule remained unchanged. Patient  would like to wait.\par -follow up PET on 3/2/23: Left upper lobe nodule anterolaterally has increased in size and intensity of activity compatible with a malignant process. Relatively stable bilateral hilar and mediastinal kong uptake. Nonspecific activity at the remaining proximal stomach with no focal abnormal activity at the gastrojejunostomy site or in the right upper quadrant anastomotic sites. New cutaneous thickening in the left side of the intergluteal cleft with increased activity most likely represents an inflammatory process; please examine area for confirmation.\par -Reviewed 3/24/23 Left lung biopsy which showed adenocarcinoma, consistent with lung primary.\par -MRI Brain\par -Will refer patient to Dr. Ramon for evaluation for EBUS and possible excision. If the patient is not a candidate for surgery, would refer to RT. \par -F/u in 3-4 weeks\par \par # Gastric Cancer\par  ypT3 N2. gastric cancer, her 2 + [was at least T3N1 by EUS criteria].\par -s/p ERCP 7/10/20. Pathology: Gastric, Antrum, Ulcerated mass - moderately differentiated adenocarcinoma\par -s/p 4 cycles neoadjuvant FLOT (8/17/20 - 10/5/20): docetaxel (50 mg/m2), oxaliplatin (85 mg/m2), LV (200 mg/m2) with short-term infusional FU (2600 mg/m2 as a 24-hour infusion), on day 1 Q2 weeks. \par -s/p surgery with Dr. Young 11/9/20. Distal gastrectomy and PEG placed. Path c/w residual gastric adenocarcinoma, moderately differentiated, status post treatment.  Vascular invasion identified. 4/29 lymph nodes involved by adenocarcinoma, negative margins. Tumor stage: ypT3 N2. \par -s/p 4 cycles of postop FLOT. Pt had requested to remain off adjuvant FLOT until 2/21, AMA. Patient also requested dose reduction. \par -post treatment PET 4/21- GRANT\par \par

## 2023-04-12 NOTE — REVIEW OF SYSTEMS
[Negative] : Allergic/Immunologic [de-identified] : mild numbness and tingling of fingers and toes [FreeTextEntry2] : negative except as indicated in interval history

## 2023-04-18 ENCOUNTER — APPOINTMENT (OUTPATIENT)
Dept: MRI IMAGING | Facility: CLINIC | Age: 83
End: 2023-04-18

## 2023-04-24 ENCOUNTER — APPOINTMENT (OUTPATIENT)
Dept: THORACIC SURGERY | Facility: CLINIC | Age: 83
End: 2023-04-24
Payer: MEDICARE

## 2023-04-24 VITALS
HEART RATE: 71 BPM | RESPIRATION RATE: 18 BRPM | DIASTOLIC BLOOD PRESSURE: 64 MMHG | HEIGHT: 64.5 IN | OXYGEN SATURATION: 98 % | BODY MASS INDEX: 23.78 KG/M2 | SYSTOLIC BLOOD PRESSURE: 102 MMHG | WEIGHT: 141 LBS | TEMPERATURE: 98 F

## 2023-04-24 PROCEDURE — 99214 OFFICE O/P EST MOD 30 MIN: CPT

## 2023-04-24 NOTE — ASSESSMENT
[FreeTextEntry1] : Jarod is an 82-year-old male with a patchy opacity in the left upper lobe that is biopsy-proven lung cancer.  The discussion was had today regarding treatment options.  I do believe he is a surgical candidate but the possibility of SBRT is an option as well.  He is scheduled to see radiation oncology in the near future and a decision can be made regarding the course of treatment.\par \par Thank you for allowing me to participate in the care of your patient.\par \par 45 minutes was spent during this encounter.\par \par Nathan Ramon MD\par Department of Cardiovascular and Thoracic Surgery\par \par [employer] [facility]\par

## 2023-04-25 ENCOUNTER — APPOINTMENT (OUTPATIENT)
Dept: PULMONOLOGY | Facility: CLINIC | Age: 83
End: 2023-04-25
Payer: MEDICARE

## 2023-04-25 VITALS
HEIGHT: 64.5 IN | RESPIRATION RATE: 16 BRPM | SYSTOLIC BLOOD PRESSURE: 130 MMHG | BODY MASS INDEX: 23.61 KG/M2 | WEIGHT: 140 LBS | DIASTOLIC BLOOD PRESSURE: 74 MMHG | HEART RATE: 75 BPM | OXYGEN SATURATION: 97 %

## 2023-04-25 DIAGNOSIS — Z92.89 PERSONAL HISTORY OF OTHER MEDICAL TREATMENT: ICD-10-CM

## 2023-04-25 PROCEDURE — 99215 OFFICE O/P EST HI 40 MIN: CPT

## 2023-04-25 NOTE — CONSULT LETTER
[Dear  ___] : Dear  [unfilled], [Consult Letter:] : I had the pleasure of evaluating your patient, [unfilled]. [Please see my note below.] : Please see my note below. [Consult Closing:] : Thank you very much for allowing me to participate in the care of this patient.  If you have any questions, please do not hesitate to contact me. [Sincerely,] : Sincerely, [FreeTextEntry3] : Jay Jay Kennedy MD FCCP\par Pulmonary/Critical Care/Sleep Medicine\par Department of Internal Medicine\par \par [facility] [employer]\par

## 2023-04-25 NOTE — DISCUSSION/SUMMARY
[FreeTextEntry1] : 82-year-old male with a history of gastric carcinoma seen today status post biopsy of the left upper lobe nodule complicated by pneumothorax.  Lesion appears to be a non-small cell carcinoma stage I A2.  Patient's lung function makes him an excellent surgical candidate.  Because of age other considerations include SBRT.

## 2023-04-25 NOTE — HISTORY OF PRESENT ILLNESS
[TextBox_4] : Hospital Course: \par Discharge Date	25-Mar-2023 \par Admission Date	24-Mar-2023 17:48 \par Reason for Admission	PTX \par Hospital Course	 \par 82y Male with PMH of HTN, HLD, DM2, RBBB, ILR, COPD, gastric cancer 2020 s/p \par gastrectomy and chemotherapy, SDH s/p fall 2020, Left upper lobe lung nodule on \par CT chest in august and again in December,  with + Pet findings.  Presented \par today to IR for an outpatient elective YONI lung nodule biopsy.  Post procedure \par he was noted to have a left pneumothorax for which a pigtail was placed by IR. \par Thoracic surgery called to admit patient and manage pigtail\par \par \par Status post discharge.  No complaints of cough wheeze or sputum production

## 2023-04-25 NOTE — END OF VISIT
[FreeTextEntry3] : Hospitalization review, path review [Time Spent: ___ minutes] : I have spent [unfilled] minutes of time on the encounter.

## 2023-04-26 ENCOUNTER — OUTPATIENT (OUTPATIENT)
Dept: OUTPATIENT SERVICES | Facility: HOSPITAL | Age: 83
LOS: 1 days | Discharge: HOME | End: 2023-04-26

## 2023-04-26 DIAGNOSIS — Z95.828 PRESENCE OF OTHER VASCULAR IMPLANTS AND GRAFTS: Chronic | ICD-10-CM

## 2023-04-26 DIAGNOSIS — Z95.818 PRESENCE OF OTHER CARDIAC IMPLANTS AND GRAFTS: Chronic | ICD-10-CM

## 2023-04-26 DIAGNOSIS — Z90.3 ACQUIRED ABSENCE OF STOMACH [PART OF]: Chronic | ICD-10-CM

## 2023-04-26 DIAGNOSIS — C16.9 MALIGNANT NEOPLASM OF STOMACH, UNSPECIFIED: ICD-10-CM

## 2023-05-01 ENCOUNTER — APPOINTMENT (OUTPATIENT)
Dept: MRI IMAGING | Facility: CLINIC | Age: 83
End: 2023-05-01
Payer: MEDICARE

## 2023-05-01 ENCOUNTER — RESULT REVIEW (OUTPATIENT)
Age: 83
End: 2023-05-01

## 2023-05-01 ENCOUNTER — OUTPATIENT (OUTPATIENT)
Dept: OUTPATIENT SERVICES | Facility: HOSPITAL | Age: 83
LOS: 1 days | End: 2023-05-01

## 2023-05-01 DIAGNOSIS — Z95.818 PRESENCE OF OTHER CARDIAC IMPLANTS AND GRAFTS: Chronic | ICD-10-CM

## 2023-05-01 DIAGNOSIS — Z90.3 ACQUIRED ABSENCE OF STOMACH [PART OF]: Chronic | ICD-10-CM

## 2023-05-01 DIAGNOSIS — C16.9 MALIGNANT NEOPLASM OF STOMACH, UNSPECIFIED: ICD-10-CM

## 2023-05-01 DIAGNOSIS — Z95.828 PRESENCE OF OTHER VASCULAR IMPLANTS AND GRAFTS: Chronic | ICD-10-CM

## 2023-05-01 PROCEDURE — 70200 X-RAY EXAM OF EYE SOCKETS: CPT | Mod: 26

## 2023-05-02 ENCOUNTER — OUTPATIENT (OUTPATIENT)
Dept: OUTPATIENT SERVICES | Facility: HOSPITAL | Age: 83
LOS: 1 days | Discharge: HOME | End: 2023-05-02

## 2023-05-02 ENCOUNTER — RESULT REVIEW (OUTPATIENT)
Age: 83
End: 2023-05-02

## 2023-05-02 ENCOUNTER — APPOINTMENT (OUTPATIENT)
Dept: HEMATOLOGY ONCOLOGY | Facility: CLINIC | Age: 83
End: 2023-05-02
Payer: MEDICARE

## 2023-05-02 VITALS
OXYGEN SATURATION: 97 % | SYSTOLIC BLOOD PRESSURE: 144 MMHG | BODY MASS INDEX: 24.38 KG/M2 | WEIGHT: 144.56 LBS | DIASTOLIC BLOOD PRESSURE: 78 MMHG | HEART RATE: 75 BPM | HEIGHT: 64.5 IN | TEMPERATURE: 97.8 F

## 2023-05-02 DIAGNOSIS — Z95.818 PRESENCE OF OTHER CARDIAC IMPLANTS AND GRAFTS: Chronic | ICD-10-CM

## 2023-05-02 DIAGNOSIS — Z90.3 ACQUIRED ABSENCE OF STOMACH [PART OF]: Chronic | ICD-10-CM

## 2023-05-02 DIAGNOSIS — Z95.828 PRESENCE OF OTHER VASCULAR IMPLANTS AND GRAFTS: Chronic | ICD-10-CM

## 2023-05-02 PROCEDURE — 99214 OFFICE O/P EST MOD 30 MIN: CPT

## 2023-05-03 ENCOUNTER — OUTPATIENT (OUTPATIENT)
Dept: OUTPATIENT SERVICES | Facility: HOSPITAL | Age: 83
LOS: 1 days | Discharge: HOME | End: 2023-05-03
Payer: MEDICARE

## 2023-05-03 ENCOUNTER — APPOINTMENT (OUTPATIENT)
Dept: RADIATION ONCOLOGY | Facility: CLINIC | Age: 83
End: 2023-05-03
Payer: MEDICARE

## 2023-05-03 VITALS
RESPIRATION RATE: 16 BRPM | BODY MASS INDEX: 24.5 KG/M2 | OXYGEN SATURATION: 94 % | WEIGHT: 145 LBS | HEART RATE: 71 BPM | SYSTOLIC BLOOD PRESSURE: 125 MMHG | DIASTOLIC BLOOD PRESSURE: 76 MMHG

## 2023-05-03 DIAGNOSIS — Z95.828 PRESENCE OF OTHER VASCULAR IMPLANTS AND GRAFTS: Chronic | ICD-10-CM

## 2023-05-03 DIAGNOSIS — Z85.46 PERSONAL HISTORY OF MALIGNANT NEOPLASM OF PROSTATE: ICD-10-CM

## 2023-05-03 DIAGNOSIS — Z95.818 PRESENCE OF OTHER CARDIAC IMPLANTS AND GRAFTS: Chronic | ICD-10-CM

## 2023-05-03 DIAGNOSIS — Z87.891 PERSONAL HISTORY OF NICOTINE DEPENDENCE: ICD-10-CM

## 2023-05-03 DIAGNOSIS — Z90.3 ACQUIRED ABSENCE OF STOMACH [PART OF]: ICD-10-CM

## 2023-05-03 DIAGNOSIS — Z90.3 ACQUIRED ABSENCE OF STOMACH [PART OF]: Chronic | ICD-10-CM

## 2023-05-03 DIAGNOSIS — Z92.21 PERSONAL HISTORY OF ANTINEOPLASTIC CHEMOTHERAPY: ICD-10-CM

## 2023-05-03 DIAGNOSIS — G62.0 DRUG-INDUCED POLYNEUROPATHY: ICD-10-CM

## 2023-05-03 DIAGNOSIS — T45.1X5A DRUG-INDUCED POLYNEUROPATHY: ICD-10-CM

## 2023-05-03 PROCEDURE — 99204 OFFICE O/P NEW MOD 45 MIN: CPT | Mod: 25

## 2023-05-03 PROCEDURE — 77263 THER RADIOLOGY TX PLNG CPLX: CPT

## 2023-05-04 PROBLEM — Z90.3 HISTORY OF PARTIAL GASTRECTOMY: Status: ACTIVE | Noted: 2023-05-04

## 2023-05-04 PROBLEM — G62.0 CHEMOTHERAPY-INDUCED PERIPHERAL NEUROPATHY: Status: ACTIVE | Noted: 2021-03-09

## 2023-05-04 PROBLEM — Z87.891 FORMER CIGARETTE SMOKER: Status: ACTIVE | Noted: 2023-03-15

## 2023-05-04 PROBLEM — Z92.21 PERSONAL HISTORY OF CHEMOTHERAPY: Status: ACTIVE | Noted: 2023-05-04

## 2023-05-04 NOTE — DISEASE MANAGEMENT
[Clinical] : TNM Stage: c [I] : I [TTNM] : 1 [NTNM] : 0 [MTNM] : 0 [de-identified] : 5000cGy [de-identified] : YONI Lung

## 2023-05-04 NOTE — LETTER GREETING
[Dear Doctor] : Dear Doctor, [Consult Letter:] : Your patient, [unfilled] was seen in my office today for consultation. [Please see my note below.] : Please see my note below. [FreeTextEntry2] : Nathan Ramon MD

## 2023-05-04 NOTE — HISTORY OF PRESENT ILLNESS
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 He was sent for CT by his PCP, Dr. Charles Smith, due to anemia. CT showed in the upper central abdomen abutting the posterior antrum of the stomach and neck of the pancreas there is a 3 x 2 x 3 cm mass. There is an additional heterogeneous enhancing 2.7 x 2.3 x 2.5 lobulated mass in the ventral upper abdominal peritoneal cavity anterosuperiorly to the antrum of the stomach with internal and surrounding enhancing vessels. Endoscopy with biopsy of the gastric mass was c/w moderately differentiated adenocarcinoma. Staging PET showed hypermetabolic thickening of the distal stomach, consistent with primary gastric cancer. Hypermetabolic activity in the distal stomach, inseparable from the perigastric lymphadenopathy, consistent with metastatic kong disease. \par \par TNM stage: T3, N2, M0 \par \par \par Patient completed 4 cycles of postoperative adjuvant FLOT for gastric adenocarcinoma s/p robotic assisted laparoscopic distal gastrectomy with Billroth 2 gastrojejunostomy reconstruction and placement of feeding jejunostomy tube on 11/9/20 with Dr Young. He is s/p 4 cycles neoadjuvant FLOT. \par + Fatigue, intermittent and mild. + Peripheral neuropathy, improved with gabapentin. + Occasional emesis after "eating too much," denies nausea. + Constipation, improved. + Alopecia. + Nail changes. + Cold intolerance improving. + Decreased appetite with mild weight loss. PEG removed by Dr. Young. + Grade 1 H/F, xeroderma only, no pain. No myalgias. No weakness. No dysgeusia. No fevers, no cough, no SOB. \par \par Socx: H/o smoking \par . \par \par \par \par Interval History: 8/6/21: Interval PET on 8/3 showed 1. Unchanged nonspecific mild diffuse FDG avidity in the residual stomach, status post gastrectomy and gastrojejunostomy, possibly physiologic. No evidence of FDG avid perigastric lymph nodes. Resolution of diffuse marrow FDG avidity. Unchanged subcentimeter left upper lobe pulmonary nodules, too small to characterize. Nonspecific new minimally FDG avid small bilateral hilar lymph nodes.\par \par Pt has mild tingling of fingers and toes, much improved from before. Continues to take gabapentin. His appetite is good. His weight is stable. Denies HA. CP, SOB, abd pain, constipation, diarrhea, melena, hematuria, dysuria.\par \par 11/4/21: Pt feels well. Right 4th finger lock up, no pain, does not bother him. Mild numbness/tingling for fingers and toes, improving. Taking gabapentin. Appetite is good. Denies HA. CP, SOB, abd pain, constipation, diarrhea, melena, hematuria, dysuria. \par \par 2/13/22: CT scan on 2/4/22 showed no evidence of recurrent dz. Pt feels well. His appetite is good and gained some weight. Endorses neuropathy of hands. His fingers sometimes lock up. Denies HA. CP, SOB, abd pain, constipation, diarrhea, melena, hematuria, dysuria. \par \par 8/15/22: pt is here for follow up for gastric cancer s/p FLOT and resection. CT scan 8/8/22 showed Increase in size of 9 mm left upper lobe pulmonary nodule raising suspicion for small primary lung neoplasm. No evidence of recurrent or metastatic disease within the abdomen/pelvis. Discussed findings with pt.\par \par \par 12/9/22: pt is here for follow up for gastric cancer s/p FLOT and resection. Pt feels well. Denies HA. CP, SOB, abd pain, constipation, diarrhea, melena, hematuria, dysuria. \par \par CT chest on 12/2/22 showed Irregular left upper lobe 1 cm nodular opacity unchanged compared to 8/8/2022 and appears to be new compared to 4/15/2021 PET/CT; this nodule is indeterminate, but one diagnostic consideration is for primary lung neoplasm. Couple of additional left lung nodules measuring up to 0.5 cm unchanged compared to 4/15/2021 PET/CT.\par \par 3/8/23: Patient presents for follow up. He completed 4 cycles of postoperative adjuvant FLOT for gastric adenocarcinoma s/p robotic assisted laparoscopic distal gastrectomy with Billroth 2 gastrojejunostomy reconstruction and placement of feeding jejunostomy tube on 11/9/20 with Dr Young. He is s/p 4 cycles neoadjuvant FLOT. \par + Occasional constipation. + Trigger finger x 2-3 digits. No fatigue. No decreased appetite. No dysphagia. No abdominal pain. No fevers or chills.\par \par 4/7/23: Patient presents for a followup (transfer of care form Dr. Corral) \par Patient doing well, offers no complaints\par \par 5/2/23: Returns for follow up visit. Feels well overall. \par Reports has mediport in place. Denies acute pain, abdominal discomfort. \par  \par

## 2023-05-04 NOTE — PHYSICAL EXAM
[Thin] : thin [Normal] : oriented to person, place and time, the affect was normal, the mood was normal and not anxious [de-identified] : bronchial breath sounds. [de-identified] : well healed surgical scars.

## 2023-05-04 NOTE — LETTER CLOSING
[Consult Closing:] : Thank you for allowing me to participate in the care of this patient.  If you have any questions, please do not hesitate to contact me. [Sincerely yours,] : Sincerely yours, [FreeTextEntry3] : Munir Somers MD\par Physician in Chief\par Department of Radiation Medicine\par [facility] [place]\par [facility] [place]\par \par  of Radiation Medicine\par [facility]\par at  [facility]\par \par Radiation \par [facility]/\par [facility] [place]\par [address]\par [place], [place] [address]\par \par Tel: [phone]\par Fax: [phone]\par

## 2023-05-09 DIAGNOSIS — C34.12 MALIGNANT NEOPLASM OF UPPER LOBE, LEFT BRONCHUS OR LUNG: ICD-10-CM

## 2023-05-09 RX ORDER — OMEPRAZOLE 20 MG/1
20 CAPSULE, DELAYED RELEASE ORAL DAILY
Qty: 90 | Refills: 0 | Status: DISCONTINUED | COMMUNITY
Start: 2022-10-14 | End: 2023-05-09

## 2023-05-09 NOTE — HISTORY OF PRESENT ILLNESS
[FreeTextEntry1] : 82-year-old man for followup office visit regarding his joint symptoms and rheumatic diseases, including osteoarthritis, Raynaud's, abnormal scleroderma.\par \par Patient feels fairly well.  He denies recent joint pain.  He discontinued the etodolac after 3 to 4 days secondary to GI upset.  He denies recent Raynaud's.  He continues calcium supplement.  Patient denies rash or side effects with their medications.  Patient is content with their current medications.\par \par PMH: Status post left lung biopsy of nodule--primary lung adenocarcinoma--he was referred to surgery

## 2023-05-09 NOTE — PHYSICAL EXAM
[General Appearance - Alert] : alert [General Appearance - In No Acute Distress] : in no acute distress [General Appearance - Well Nourished] : well nourished [General Appearance - Well Developed] : well developed [Sclera] : the sclera and conjunctiva were normal [PERRL With Normal Accommodation] : pupils were equal in size, round, and reactive to light [Extraocular Movements] : extraocular movements were intact [Outer Ear] : the ears and nose were normal in appearance [Neck Appearance] : the appearance of the neck was normal [Neck Cervical Mass (___cm)] : no neck mass was observed [Jugular Venous Distention Increased] : there was no jugular-venous distention [Thyroid Diffuse Enlargement] : the thyroid was not enlarged [Thyroid Nodule] : there were no palpable thyroid nodules [] : no respiratory distress [Lungs Percussion] : the lungs were normal to percussion [No CVA Tenderness] : no ~M costovertebral angle tenderness [No Spinal Tenderness] : no spinal tenderness [FreeTextEntry1] : Shoulders/elbows--normal; wrists--crepitus bilateral first CMC joints\par Hands-Davonte's/Heberden's nodes, mild diffuse swelling bilateral fingers, flexor tenosynovitis left first finger and right first/third/fifth fingers, bilateral decreased fist ability\par Hips-bilateral decreased external rotation\par Knees-bilateral crepitus\par Ankles/feet-normal

## 2023-05-09 NOTE — CONSULT LETTER
[Dear  ___] : Dear  [unfilled], [Consult Letter:] : I had the pleasure of evaluating your patient, [unfilled]. [Please see my note below.] : Please see my note below. [Consult Closing:] : Thank you very much for allowing me to participate in the care of this patient.  If you have any questions, please do not hesitate to contact me. [Sincerely,] : Sincerely, [FreeTextEntry3] : Yung\par Myron I. Kleiner, M.D., FACR\par Chief, Division of Rheumatology\par Department of Medicine\par [facility]

## 2023-05-09 NOTE — ASSESSMENT
[FreeTextEntry1] : Impression: 82-year-old man for followup office visit regarding his joint symptoms and rheumatic diseases, including osteoarthritis, Raynaud's, limited scleroderma.\par \par Patient has been feeling fairly well.  He denies recent joint pain with his osteoarthritis good control.  He had discontinued the etodolac after 3 to 4 days secondary to GI upset.  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  He denies recent Raynaud's episodes.  On exam he has flexor tenosynovitis left first finger and right first/third/fifth fingers which are not bothersome.  He has dry eyes on exam--I will continue to monitor for Sjogren's syndrome.  Patient denies rash or side effects otherwise with their medications.  Patient is content with their current treatment regimen.  Of note, lung biopsy of pulmonary nodule revealed primary lung adenocarcinoma for which he was referred to surgery.\par \par Plan: I reviewed previous lab results with patient--extensive discussion\par Laboratory tests ordered today-see list below--with coordination of care\par Diagnosis and prognosis discussed\par Continue other current medications (other than those changed below)\par Stay off Etodolac\par Therefore, also stay off omeprazole\par Keep hands and feet and torso warm--patient warned of the dangers of gangrene/digital amputation with Raynaud's\par Tylenol 1000 mg t.i.d. p.r.n. joint pain or Tylenol 1300 mg b.i.d. p.r.n. joint pain (possible side effects explained)\par Return visit 4 months\par

## 2023-05-10 NOTE — ADDENDUM
[FreeTextEntry1] : Documented by Fanny Sewell acting as scribe for Dr. Billings on  05/02/2023.\par \par All Medical record entries made by the Scribe were at my, Dr. Billings's, direction and personally dictated by me on  05/02/2023. I have reviewed the chart and agree that the record accurately reflects my personal performance of the history, physical exam, assessment and plan. I have also personally directed, reviewed, and agreed with the discharge instructions. \par \par
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
## 2023-05-10 NOTE — HISTORY OF PRESENT ILLNESS
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 He was sent for CT by his PCP, Dr. Charles Smith, due to anemia. CT showed in the upper central abdomen abutting the posterior antrum of the stomach and neck of the pancreas there is a 3 x 2 x 3 cm mass. There is an additional heterogeneous enhancing 2.7 x 2.3 x 2.5 lobulated mass in the ventral upper abdominal peritoneal cavity anterosuperiorly to the antrum of the stomach with internal and surrounding enhancing vessels. Endoscopy with biopsy of the gastric mass was c/w moderately differentiated adenocarcinoma. Staging PET showed hypermetabolic thickening of the distal stomach, consistent with primary gastric cancer. Hypermetabolic activity in the distal stomach, inseparable from the perigastric lymphadenopathy, consistent with metastatic kong disease. \par \par TNM stage: T3, N2, M0 \par \par \par Patient completed 4 cycles of postoperative adjuvant FLOT for gastric adenocarcinoma s/p robotic assisted laparoscopic distal gastrectomy with Billroth 2 gastrojejunostomy reconstruction and placement of feeding jejunostomy tube on 11/9/20 with Dr Young. He is s/p 4 cycles neoadjuvant FLOT. \par + Fatigue, intermittent and mild. + Peripheral neuropathy, improved with gabapentin. + Occasional emesis after "eating too much," denies nausea. + Constipation, improved. + Alopecia. + Nail changes. + Cold intolerance improving. + Decreased appetite with mild weight loss. PEG removed by Dr. Young. + Grade 1 H/F, xeroderma only, no pain. No myalgias. No weakness. No dysgeusia. No fevers, no cough, no SOB. \par \par Socx: H/o smoking \par  [de-identified] : 8/6/21: Interval PET on 8/3 showed 1. Unchanged nonspecific mild diffuse FDG avidity in the residual stomach, status post gastrectomy and gastrojejunostomy, possibly physiologic.  No evidence of FDG avid perigastric lymph nodes.  Resolution of diffuse marrow FDG avidity. Unchanged subcentimeter left upper lobe pulmonary nodules, too small to characterize.  Nonspecific new minimally FDG avid small bilateral hilar lymph nodes.\par \par Pt has mild tingling of fingers and toes, much improved from before. Continues to take gabapentin. His appetite is good. His weight is stable. Denies HA. CP, SOB, abd pain, constipation, diarrhea, melena, hematuria, dysuria.\par \par 11/4/21: Pt feels well. Right 4th finger lock up, no pain, does not bother him. Mild numbness/tingling for fingers and toes, improving.  Taking gabapentin. Appetite is good. Denies HA. CP, SOB, abd pain, constipation, diarrhea, melena, hematuria, dysuria. \par \par 2/13/22: CT scan on 2/4/22 showed no evidence of recurrent dz. Pt feels well. His appetite is good and gained some weight. Endorses neuropathy of hands. His fingers sometimes lock up. Denies HA. CP, SOB, abd pain, constipation, diarrhea, melena, hematuria, dysuria. \par \par 8/15/22: pt is here for follow up for gastric cancer s/p FLOT and resection. CT scan 8/8/22 showed Increase in size of 9 mm left upper lobe pulmonary nodule raising suspicion for small primary lung neoplasm.  No evidence of recurrent or metastatic disease within the abdomen/pelvis. Discussed findings with pt.\par \par \par 12/9/22: pt is here for follow up for gastric cancer s/p FLOT and resection. Pt feels well. Denies HA. CP, SOB, abd pain, constipation, diarrhea, melena, hematuria, dysuria. \par \par CT chest on 12/2/22 showed Irregular left upper lobe 1 cm nodular opacity unchanged compared to 8/8/2022 and appears to be new compared to 4/15/2021 PET/CT; this nodule is indeterminate, but one diagnostic consideration is for primary lung neoplasm. Couple of additional left lung nodules measuring up to 0.5 cm unchanged compared to 4/15/2021 PET/CT.\par \par 3/8/23:  Patient presents for follow up. He completed 4 cycles of postoperative adjuvant FLOT for gastric adenocarcinoma s/p robotic assisted laparoscopic distal gastrectomy with Billroth 2 gastrojejunostomy reconstruction and placement of feeding jejunostomy tube on 11/9/20 with Dr Young. He is s/p 4 cycles neoadjuvant FLOT. \par + Occasional constipation. + Trigger finger x 2-3 digits. No fatigue. No decreased appetite. No dysphagia. No abdominal pain. No fevers or chills.\par \par 4/7/23: Patient presents for a followup (transfer of care form Dr. Corral). Biopsy of a lung lesion done given concerning imaging findings,  significant for adenocarcinoma. \par Patient doing well, offers no complaints\par \par 5/2/23: Returns for follow up visit. Feels well overall. \par Reports has mediport in place. Denies acute pain, abdominal discomfort. \par

## 2023-05-12 PROCEDURE — 77290 THER RAD SIMULAJ FIELD CPLX: CPT | Mod: 26

## 2023-05-12 PROCEDURE — 77334 RADIATION TREATMENT AID(S): CPT | Mod: 26

## 2023-05-15 ENCOUNTER — APPOINTMENT (OUTPATIENT)
Dept: CT IMAGING | Facility: CLINIC | Age: 83
End: 2023-05-15
Payer: MEDICARE

## 2023-05-15 ENCOUNTER — OUTPATIENT (OUTPATIENT)
Dept: OUTPATIENT SERVICES | Facility: HOSPITAL | Age: 83
LOS: 1 days | End: 2023-05-15

## 2023-05-15 DIAGNOSIS — C34.92 MALIGNANT NEOPLASM OF UNSPECIFIED PART OF LEFT BRONCHUS OR LUNG: ICD-10-CM

## 2023-05-15 DIAGNOSIS — Z90.3 ACQUIRED ABSENCE OF STOMACH [PART OF]: Chronic | ICD-10-CM

## 2023-05-15 DIAGNOSIS — Z95.828 PRESENCE OF OTHER VASCULAR IMPLANTS AND GRAFTS: Chronic | ICD-10-CM

## 2023-05-15 DIAGNOSIS — Z95.818 PRESENCE OF OTHER CARDIAC IMPLANTS AND GRAFTS: Chronic | ICD-10-CM

## 2023-05-15 PROCEDURE — 70470 CT HEAD/BRAIN W/O & W/DYE: CPT | Mod: 26

## 2023-05-21 DIAGNOSIS — D50.9 IRON DEFICIENCY ANEMIA, UNSPECIFIED: ICD-10-CM

## 2023-05-22 PROCEDURE — 77300 RADIATION THERAPY DOSE PLAN: CPT | Mod: 26

## 2023-05-22 PROCEDURE — 77334 RADIATION TREATMENT AID(S): CPT | Mod: 26

## 2023-05-22 PROCEDURE — 77295 3-D RADIOTHERAPY PLAN: CPT | Mod: 26

## 2023-05-24 ENCOUNTER — APPOINTMENT (OUTPATIENT)
Dept: HEMATOLOGY ONCOLOGY | Facility: CLINIC | Age: 83
End: 2023-05-24
Payer: MEDICARE

## 2023-05-24 VITALS
HEIGHT: 64.5 IN | BODY MASS INDEX: 23.78 KG/M2 | HEART RATE: 76 BPM | SYSTOLIC BLOOD PRESSURE: 119 MMHG | WEIGHT: 141.03 LBS | DIASTOLIC BLOOD PRESSURE: 67 MMHG | OXYGEN SATURATION: 96 %

## 2023-05-24 DIAGNOSIS — C61 MALIGNANT NEOPLASM OF PROSTATE: ICD-10-CM

## 2023-05-24 PROCEDURE — 99214 OFFICE O/P EST MOD 30 MIN: CPT

## 2023-05-24 NOTE — HISTORY OF PRESENT ILLNESS
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 He was sent for CT by his PCP, Dr. Charles Smith, due to anemia. CT showed in the upper central abdomen abutting the posterior antrum of the stomach and neck of the pancreas there is a 3 x 2 x 3 cm mass. There is an additional heterogeneous enhancing 2.7 x 2.3 x 2.5 lobulated mass in the ventral upper abdominal peritoneal cavity anterosuperiorly to the antrum of the stomach with internal and surrounding enhancing vessels. Endoscopy with biopsy of the gastric mass was c/w moderately differentiated adenocarcinoma. Staging PET showed hypermetabolic thickening of the distal stomach, consistent with primary gastric cancer. Hypermetabolic activity in the distal stomach, inseparable from the perigastric lymphadenopathy, consistent with metastatic kong disease. \par \par TNM stage: T3, N2, M0 \par \par \par Patient completed 4 cycles of postoperative adjuvant FLOT for gastric adenocarcinoma s/p robotic assisted laparoscopic distal gastrectomy with Billroth 2 gastrojejunostomy reconstruction and placement of feeding jejunostomy tube on 11/9/20 with Dr Young. He is s/p 4 cycles neoadjuvant FLOT. \par + Fatigue, intermittent and mild. + Peripheral neuropathy, improved with gabapentin. + Occasional emesis after "eating too much," denies nausea. + Constipation, improved. + Alopecia. + Nail changes. + Cold intolerance improving. + Decreased appetite with mild weight loss. PEG removed by Dr. Young. + Grade 1 H/F, xeroderma only, no pain. No myalgias. No weakness. No dysgeusia. No fevers, no cough, no SOB. \par \par Socx: H/o smoking \par  [de-identified] : 8/6/21: Interval PET on 8/3 showed 1. Unchanged nonspecific mild diffuse FDG avidity in the residual stomach, status post gastrectomy and gastrojejunostomy, possibly physiologic.  No evidence of FDG avid perigastric lymph nodes.  Resolution of diffuse marrow FDG avidity. Unchanged subcentimeter left upper lobe pulmonary nodules, too small to characterize.  Nonspecific new minimally FDG avid small bilateral hilar lymph nodes.\par \par Pt has mild tingling of fingers and toes, much improved from before. Continues to take gabapentin. His appetite is good. His weight is stable. Denies HA. CP, SOB, abd pain, constipation, diarrhea, melena, hematuria, dysuria.\par \par 11/4/21: Pt feels well. Right 4th finger lock up, no pain, does not bother him. Mild numbness/tingling for fingers and toes, improving.  Taking gabapentin. Appetite is good. Denies HA. CP, SOB, abd pain, constipation, diarrhea, melena, hematuria, dysuria. \par \par 2/13/22: CT scan on 2/4/22 showed no evidence of recurrent dz. Pt feels well. His appetite is good and gained some weight. Endorses neuropathy of hands. His fingers sometimes lock up. Denies HA. CP, SOB, abd pain, constipation, diarrhea, melena, hematuria, dysuria. \par \par 8/15/22: pt is here for follow up for gastric cancer s/p FLOT and resection. CT scan 8/8/22 showed Increase in size of 9 mm left upper lobe pulmonary nodule raising suspicion for small primary lung neoplasm.  No evidence of recurrent or metastatic disease within the abdomen/pelvis. Discussed findings with pt.\par \par \par 12/9/22: pt is here for follow up for gastric cancer s/p FLOT and resection. Pt feels well. Denies HA. CP, SOB, abd pain, constipation, diarrhea, melena, hematuria, dysuria. \par \par CT chest on 12/2/22 showed Irregular left upper lobe 1 cm nodular opacity unchanged compared to 8/8/2022 and appears to be new compared to 4/15/2021 PET/CT; this nodule is indeterminate, but one diagnostic consideration is for primary lung neoplasm. Couple of additional left lung nodules measuring up to 0.5 cm unchanged compared to 4/15/2021 PET/CT.\par \par 3/8/23:  Patient presents for follow up. He completed 4 cycles of postoperative adjuvant FLOT for gastric adenocarcinoma s/p robotic assisted laparoscopic distal gastrectomy with Billroth 2 gastrojejunostomy reconstruction and placement of feeding jejunostomy tube on 11/9/20 with Dr Young. He is s/p 4 cycles neoadjuvant FLOT. \par + Occasional constipation. + Trigger finger x 2-3 digits. No fatigue. No decreased appetite. No dysphagia. No abdominal pain. No fevers or chills.\par \par 4/7/23: Patient presents for a followup (transfer of care form Dr. Corral). Biopsy of a lung lesion done given concerning imaging findings,  significant for adenocarcinoma. \par Patient doing well, offers no complaints\par \par 5/2/23: Returns for follow up visit. Feels well overall. \par Reports has mediport in place. Denies acute pain, abdominal discomfort. \par \par 5/24/23: Returns for follow up visit. Transferring care from Dr. Billings. Pending starting RT on 5/26/23 \par Denies N/V, acute pain, cough, SOB, fever, chills.   On ASA, po iron, Ca+, Vit D tabs \par  [Date: ____________] : Patient's last distress assessment performed on [unfilled]. [0 - No Distress] : Distress Level: 0 [90: Able to carry normal activity; minor signs or symptoms of disease.] : 90: Able to carry normal activity; minor signs or symptoms of disease.  [ECOG Performance Status: 1 - Restricted in physically strenuous activity but ambulatory and able to carry out work of a light or sedentary nature] : Performance Status: 1 - Restricted in physically strenuous activity but ambulatory and able to carry out work of a light or sedentary nature, e.g., light house work, office work

## 2023-05-24 NOTE — PHYSICAL EXAM
[Restricted in physically strenuous activity but ambulatory and able to carry out work of a light or sedentary nature] : Status 1- Restricted in physically strenuous activity but ambulatory and able to carry out work of a light or sedentary nature, e.g., light house work, office work [Normal] : affect appropriate [de-identified] : mediport on right side.

## 2023-05-24 NOTE — ADDENDUM
[FreeTextEntry1] : Documented by Fanny Sewell acting as scribe for Dr. Elise on 05/24/2023.\par \par All Medical record entries made by the Scribe were at my, Dr. Elise, direction and personally dictated by me on 05/24/2023. I have reviewed the chart and agree that the record accurately reflects my personal performance of the history, physical exam, assessment and plan. I have also personally directed, reviewed, and agreed with the discharge instructions.\par  \par \par

## 2023-05-24 NOTE — RESULTS/DATA
[FreeTextEntry1] : 5/15/23 CT Head IMPRESSION:\par \par 1. Left frontal subcortical hyperdense lesion remains unchanged since 2019, most consistent with cavernous angioma\par \par 2. Ischemic white matter disease and atrophy typical for age. Intracranial atherosclerosis\par \par 3. Subdural hemorrhage present in 2021 has resolved without residual\par \par 4. No adverse interval change is recognized\par \par \par 3/2/23 PET/CT IMPRESSION:\par 1. Left upper lobe nodule anterolaterally has increased in size and intensity of activity compatible with a malignant process.\par \par 2. Relatively stable bilateral hilar and mediastinal kong uptake.\par \par 3. Nonspecific activity at the remaining proximal stomach with no focal abnormal activity at the gastrojejunostomy site or in the right upper quadrant anastomotic sites.\par \par 4. New cutaneous thickening in the left side of the intergluteal cleft with increased activity most likely represents an inflammatory process; please examine area for confirmation.\par \par \par \par  Follow-up PET/CT April 15, 2021:\par \par IMPRESSION:\par \par 1.  Postsurgical changes of gastrectomy and gastrojejunostomy. Mild homogeneous FDG avidity in the residual stomach is nonspecific, possibly physiologic. No focal FDG avidity at the anastomosis.\par \par 2.  No evidence of FDG avid or enlarged perigastric lymph nodes however evaluation is limited due to lack of intravenous contrast and paucity of intra-abdominal fat which makes evaluation difficult.\par \par 3.  Widespread diffuse FDG avidity of the bone marrow, probably due to administration of colony stimulating factors. Clinical correlation suggested.\par \par 4.  Subcentimeter nodular opacities left upper lobe, too small to characterize.\par \par \par 3/24/23: 1  Left lung biopsy\par \par Final Diagnosis\par Left lung biopsy:\par -Adenocarcinoma, consistent with lung primar
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
## 2023-06-05 ENCOUNTER — NON-APPOINTMENT (OUTPATIENT)
Age: 83
End: 2023-06-05

## 2023-06-05 VITALS
OXYGEN SATURATION: 99 % | BODY MASS INDEX: 24.17 KG/M2 | HEART RATE: 75 BPM | SYSTOLIC BLOOD PRESSURE: 144 MMHG | DIASTOLIC BLOOD PRESSURE: 77 MMHG | WEIGHT: 143 LBS | RESPIRATION RATE: 16 BRPM

## 2023-06-05 NOTE — REVIEW OF SYSTEMS
Eval and POC set 12/20/21    Problem: Occupational Therapy Goal  Goal: Occupational Therapy Goal  Description: Goals to be met by: 7 days (12/27/21)     Patient will increase functional independence with ADLs by performing:    UE Dressing with Minimal Assistance.  LE Dressing with Minimal Assistance.  Grooming while standing at sink with Minimal Assistance.  Toileting from toilet with Minimal Assistance for hygiene and clothing management.   Supine to sit with Contact Guard Assistance.  Toilet transfer to toilet with Minimal Assistance.  Increased functional strength to WFL for ADLs.    Outcome: Ongoing, Progressing      [Fatigue: Grade 0] : Fatigue: Grade 0 [Cough: Grade 0] : Cough: Grade 0 [Dyspnea: Grade 0] : Dyspnea: Grade 0

## 2023-06-07 PROCEDURE — 77435 SBRT MANAGEMENT: CPT

## 2023-07-09 NOTE — DISEASE MANAGEMENT
[Clinical] : TNM Stage: c [I] : I [TTNM] : T1 [NTNM] : 0 [MTNM] : 0 [de-identified] : 9704 [de-identified] : 1383 [de-identified] : lung

## 2023-07-09 NOTE — HISTORY OF PRESENT ILLNESS
[FreeTextEntry1] : he patient is an 83 y/o man with remote history of prostate cancer, recent history of gastric cancer , s/p chemo and surgery ([facility]) , and now recently diagnosed stage I adenocarcinoma of the lung ([facility]).\par \par Patient presents for radiation treatment. Completed 4000/5000 today.\par pt denies orthopnea, dyspnea, cough, pain, edema, weight loss

## 2023-07-13 NOTE — DATA REVIEWED
Airway    Performed by: Daniel De Guzman MD  Authorized by: Daniel De Guzman MD    Final Airway Type:  Endotracheal airway  Final Endotracheal Airway*:  ETT  ETT Size (mm)*:  7.5  Cuff*:  Regular  Technique Used for Successful ETT Placement:  Direct laryngoscopy  Devices/Methods Used in Placement*:  Mask  Intubation Procedure*:  Preoxygenation, ETCO2, Atraumatic, Dentition Unchanged and Pharynx Clear  Insertion Site:  Oral  Blade Type*:  MAC  Blade Size*:  3  Placement Verified by: auscultation, capnometry and equal breath sounds    Glottic View*:  2 - partial view of glottis  Attempts*:  1   Patient Identified, Procedure confirmed, Emergency equipment available and Safety protocols followed  Location:  OR  Urgency:  Elective  Difficult Airway: No    Indications for Airway Management:  Anesthesia  Spontaneous Ventilation: absent    Sedation Level:  Anesthetized  Mask Difficulty Assessment:  2 - vent by mask + OA or adjuvant +/- NMBA  Start Time: 7/13/2023 12:15 PM   EZMV, posterior cords visualized         
[FreeTextEntry1] : Independently reviewed the following:\par PET/CT on 3/2/23: \par 1.Left upper lobe nodule anterolaterally has increased in size and intensity of activity compatible with a malignant process.\par 2. Relatively stable bilateral hilar and mediastinal kong uptake.\par 3. Nonspecific activity at the remaining proximal stomach with no focal abnormal activity at the gastrojejunostomy site or in the right upper quadrant anastomotic sites.\par 4. New cutaneous thickening in the left side of the intergluteal cleft with increased activity most likely represents an inflammatory process; please examine area for confirmation\par \par Path on 3/24/23: Adenocarcinoma, consistent with lung primary

## 2023-07-19 ENCOUNTER — APPOINTMENT (OUTPATIENT)
Dept: RHEUMATOLOGY | Facility: CLINIC | Age: 83
End: 2023-07-19
Payer: MEDICARE

## 2023-07-19 ENCOUNTER — NON-APPOINTMENT (OUTPATIENT)
Age: 83
End: 2023-07-19

## 2023-07-19 ENCOUNTER — APPOINTMENT (OUTPATIENT)
Dept: RADIATION ONCOLOGY | Facility: CLINIC | Age: 83
End: 2023-07-19
Payer: MEDICARE

## 2023-07-19 VITALS
SYSTOLIC BLOOD PRESSURE: 128 MMHG | HEIGHT: 65 IN | HEART RATE: 74 BPM | DIASTOLIC BLOOD PRESSURE: 62 MMHG | OXYGEN SATURATION: 97 % | TEMPERATURE: 98 F

## 2023-07-19 VITALS — BODY MASS INDEX: 23.96 KG/M2 | WEIGHT: 144 LBS

## 2023-07-19 DIAGNOSIS — R91.1 SOLITARY PULMONARY NODULE: ICD-10-CM

## 2023-07-19 PROCEDURE — 99215 OFFICE O/P EST HI 40 MIN: CPT

## 2023-07-19 PROCEDURE — 99024 POSTOP FOLLOW-UP VISIT: CPT

## 2023-07-19 RX ORDER — ACETAMINOPHEN 500 MG/1
500 TABLET, COATED ORAL
Qty: 100 | Refills: 0 | Status: DISCONTINUED | COMMUNITY
Start: 2023-05-09 | End: 2023-07-19

## 2023-08-02 ENCOUNTER — APPOINTMENT (OUTPATIENT)
Dept: HEMATOLOGY ONCOLOGY | Facility: CLINIC | Age: 83
End: 2023-08-02

## 2023-08-19 ENCOUNTER — OUTPATIENT (OUTPATIENT)
Dept: OUTPATIENT SERVICES | Facility: HOSPITAL | Age: 83
LOS: 1 days | Discharge: HOME | End: 2023-08-19

## 2023-08-19 DIAGNOSIS — Z90.3 ACQUIRED ABSENCE OF STOMACH [PART OF]: Chronic | ICD-10-CM

## 2023-08-19 DIAGNOSIS — C16.9 MALIGNANT NEOPLASM OF STOMACH, UNSPECIFIED: ICD-10-CM

## 2023-08-19 DIAGNOSIS — Z95.828 PRESENCE OF OTHER VASCULAR IMPLANTS AND GRAFTS: Chronic | ICD-10-CM

## 2023-08-19 DIAGNOSIS — Z95.818 PRESENCE OF OTHER CARDIAC IMPLANTS AND GRAFTS: Chronic | ICD-10-CM

## 2023-08-23 ENCOUNTER — RESULT REVIEW (OUTPATIENT)
Age: 83
End: 2023-08-23

## 2023-08-23 ENCOUNTER — APPOINTMENT (OUTPATIENT)
Dept: HEMATOLOGY ONCOLOGY | Facility: CLINIC | Age: 83
End: 2023-08-23
Payer: MEDICARE

## 2023-08-23 VITALS
SYSTOLIC BLOOD PRESSURE: 154 MMHG | OXYGEN SATURATION: 97 % | BODY MASS INDEX: 23.58 KG/M2 | HEART RATE: 71 BPM | WEIGHT: 141.53 LBS | DIASTOLIC BLOOD PRESSURE: 80 MMHG | HEIGHT: 65 IN

## 2023-08-23 LAB
BASOPHILS # BLD AUTO: 0.1 K/UL — SIGNIFICANT CHANGE UP (ref 0–0.2)
BASOPHILS NFR BLD AUTO: 1.4 % — SIGNIFICANT CHANGE UP (ref 0–2)
EOSINOPHIL # BLD AUTO: 0.1 K/UL — SIGNIFICANT CHANGE UP (ref 0–0.5)
EOSINOPHIL NFR BLD AUTO: 1.1 % — SIGNIFICANT CHANGE UP (ref 0–6)
HCT VFR BLD CALC: 43.3 % — SIGNIFICANT CHANGE UP (ref 39–50)
HGB BLD-MCNC: 14.1 G/DL — SIGNIFICANT CHANGE UP (ref 13–17)
LYMPHOCYTES # BLD AUTO: 1.2 K/UL — SIGNIFICANT CHANGE UP (ref 1–3.3)
LYMPHOCYTES # BLD AUTO: 24.8 % — SIGNIFICANT CHANGE UP (ref 13–44)
MCHC RBC-ENTMCNC: 25.5 PG — LOW (ref 27–34)
MCHC RBC-ENTMCNC: 32.6 G/DL — SIGNIFICANT CHANGE UP (ref 32–36)
MCV RBC AUTO: 78.1 FL — LOW (ref 80–100)
MONOCYTES # BLD AUTO: 0.4 K/UL — SIGNIFICANT CHANGE UP (ref 0–0.9)
MONOCYTES NFR BLD AUTO: 8.1 % — SIGNIFICANT CHANGE UP (ref 2–14)
NEUTROPHILS # BLD AUTO: 3.1 K/UL — SIGNIFICANT CHANGE UP (ref 1.8–7.4)
NEUTROPHILS NFR BLD AUTO: 64.6 % — SIGNIFICANT CHANGE UP (ref 43–77)
PLATELET # BLD AUTO: 176 K/UL — SIGNIFICANT CHANGE UP (ref 150–400)
RBC # BLD: 5.54 M/UL — SIGNIFICANT CHANGE UP (ref 4.2–5.8)
RBC # FLD: 12.6 % — SIGNIFICANT CHANGE UP (ref 10.3–14.5)
WBC # BLD: 4.8 K/UL — SIGNIFICANT CHANGE UP (ref 3.8–10.5)
WBC # FLD AUTO: 4.8 K/UL — SIGNIFICANT CHANGE UP (ref 3.8–10.5)

## 2023-08-23 PROCEDURE — 99214 OFFICE O/P EST MOD 30 MIN: CPT

## 2023-08-23 NOTE — PHYSICAL EXAM
[Restricted in physically strenuous activity but ambulatory and able to carry out work of a light or sedentary nature] : Status 1- Restricted in physically strenuous activity but ambulatory and able to carry out work of a light or sedentary nature, e.g., light house work, office work [Thin] : thin [Normal] : full range of motion and no deformities appreciated [de-identified] : mediport on right side.
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
## 2023-08-23 NOTE — RESULTS/DATA
[FreeTextEntry1] : 5/15/23 CT Head IMPRESSION:  1. Left frontal subcortical hyperdense lesion remains unchanged since 2019, most consistent with cavernous angioma  2. Ischemic white matter disease and atrophy typical for age. Intracranial atherosclerosis  3. Subdural hemorrhage present in 2021 has resolved without residual  4. No adverse interval change is recognized   3/2/23 PET/CT IMPRESSION: 1. Left upper lobe nodule anterolaterally has increased in size and intensity of activity compatible with a malignant process.  2. Relatively stable bilateral hilar and mediastinal kong uptake.  3. Nonspecific activity at the remaining proximal stomach with no focal abnormal activity at the gastrojejunostomy site or in the right upper quadrant anastomotic sites.  4. New cutaneous thickening in the left side of the intergluteal cleft with increased activity most likely represents an inflammatory process; please examine area for confirmation.     Follow-up PET/CT April 15, 2021:  IMPRESSION:  1.  Postsurgical changes of gastrectomy and gastrojejunostomy. Mild homogeneous FDG avidity in the residual stomach is nonspecific, possibly physiologic. No focal FDG avidity at the anastomosis.  2.  No evidence of FDG avid or enlarged perigastric lymph nodes however evaluation is limited due to lack of intravenous contrast and paucity of intra-abdominal fat which makes evaluation difficult.  3.  Widespread diffuse FDG avidity of the bone marrow, probably due to administration of colony stimulating factors. Clinical correlation suggested.  4.  Sub centimeter nodular opacities left upper lobe, too small to characterize.   3/24/23: 1  Left lung biopsy  Final Diagnosis Left lung biopsy: -Adenocarcinoma, consistent with lung primary 08/23/2023 CBC WBC 4.8 HGB 14  000

## 2023-08-24 PROBLEM — R91.1 LUNG NODULE: Status: ACTIVE | Noted: 2022-12-01

## 2023-08-24 LAB
ALBUMIN SERPL ELPH-MCNC: 4.6 G/DL
ALP BLD-CCNC: 82 U/L
ALT SERPL-CCNC: 9 U/L
ANION GAP SERPL CALC-SCNC: 12 MMOL/L
AST SERPL-CCNC: 17 U/L
BILIRUB SERPL-MCNC: 0.3 MG/DL
BUN SERPL-MCNC: 15 MG/DL
CALCIUM SERPL-MCNC: 10.1 MG/DL
CEA SERPL-MCNC: 7.8 NG/ML
CHLORIDE SERPL-SCNC: 101 MMOL/L
CO2 SERPL-SCNC: 25 MMOL/L
CREAT SERPL-MCNC: 0.89 MG/DL
EGFR: 85 ML/MIN/1.73M2
GLUCOSE SERPL-MCNC: 131 MG/DL
MAGNESIUM SERPL-MCNC: 2.3 MG/DL
POTASSIUM SERPL-SCNC: 4.8 MMOL/L
PROT SERPL-MCNC: 7.3 G/DL
SODIUM SERPL-SCNC: 138 MMOL/L

## 2023-08-24 NOTE — DISEASE MANAGEMENT
[FreeTextEntry4] : adenocarcinoma [TTNM] : 1 [NTNM] : 0 [MTNM] : 0 [de-identified] : 5000 cGy [de-identified] : lung

## 2023-08-24 NOTE — HISTORY OF PRESENT ILLNESS
[FreeTextEntry1] : The patient is an 81 y/o man with remote history of prostate cancer, recent history of gastric cancer , s/p chemo and surgery ([facility]), and now recently diagnosed stage I adenocarcinoma of the lung ([facility]).  Ms. Petty completed radiation therapy on 6/7/2023.  Denies orthopnea, SOB, cough, pain, weight loss.   Reports he feels great.  Breath sounds CTA b/l.

## 2023-08-31 LAB
ALBUMIN MFR SERPL ELPH: 58.4 %
ALBUMIN SERPL ELPH-MCNC: 4.5 G/DL
ALBUMIN SERPL-MCNC: 3.9 G/DL
ALBUMIN/GLOB SERPL: 1.4 RATIO
ALP BLD-CCNC: 79 U/L
ALPHA1 GLOB MFR SERPL ELPH: 4.1 %
ALPHA1 GLOB SERPL ELPH-MCNC: 0.3 G/DL
ALPHA2 GLOB MFR SERPL ELPH: 12.7 %
ALPHA2 GLOB SERPL ELPH-MCNC: 0.8 G/DL
ALT SERPL-CCNC: 10 U/L
ANION GAP SERPL CALC-SCNC: 12 MMOL/L
APPEARANCE: CLEAR
AST SERPL-CCNC: 14 U/L
B-GLOBULIN MFR SERPL ELPH: 11.9 %
B-GLOBULIN SERPL ELPH-MCNC: 0.8 G/DL
BACTERIA: NEGATIVE /HPF
BILIRUB SERPL-MCNC: 0.4 MG/DL
BILIRUBIN URINE: NEGATIVE
BLOOD URINE: NEGATIVE
BUN SERPL-MCNC: 10 MG/DL
CALCIUM SERPL-MCNC: 9.7 MG/DL
CAST: 0 /LPF
CHLORIDE SERPL-SCNC: 101 MMOL/L
CK SERPL-CCNC: 129 U/L
CO2 SERPL-SCNC: 25 MMOL/L
COLOR: YELLOW
CREAT SERPL-MCNC: 0.86 MG/DL
CRP SERPL-MCNC: <3 MG/L
DEPRECATED KAPPA LC FREE/LAMBDA SER: 1.28 RATIO
EGFR: 86 ML/MIN/1.73M2
ENA RNP AB SER IA-ACNC: <0.2 AL
ENA SCL70 IGG SER IA-ACNC: <0.2 AL
ENA SM AB SER IA-ACNC: <0.2 AL
ENA SS-A AB SER IA-ACNC: <0.2 AL
ENA SS-B AB SER IA-ACNC: <0.2 AL
EPITHELIAL CELLS: 0 /HPF
ERYTHROCYTE [SEDIMENTATION RATE] IN BLOOD BY WESTERGREN METHOD: 27 MM/HR
GAMMA GLOB FLD ELPH-MCNC: 0.9 G/DL
GAMMA GLOB MFR SERPL ELPH: 12.9 %
GLUCOSE QUALITATIVE U: NEGATIVE MG/DL
GLUCOSE SERPL-MCNC: 107 MG/DL
IGA SER QL IEP: 313 MG/DL
IGG SER QL IEP: 893 MG/DL
IGG SUBSET TOTAL IGG: 947 MG/DL
IGG1 SER-MCNC: 617 MG/DL
IGG2 SER-MCNC: 142 MG/DL
IGG3 SER-MCNC: 90 MG/DL
IGG4 SER-MCNC: 22 MG/DL
IGM SER QL IEP: 48 MG/DL
INTERPRETATION SERPL IEP-IMP: NORMAL
KAPPA LC CSF-MCNC: 1.67 MG/DL
KAPPA LC SERPL-MCNC: 2.13 MG/DL
KETONES URINE: NEGATIVE MG/DL
LDH SERPL-CCNC: 194 U/L
LEUKOCYTE ESTERASE URINE: ABNORMAL
M PROTEIN SPEC IFE-MCNC: NORMAL
MICROSCOPIC-UA: NORMAL
NITRITE URINE: NEGATIVE
PH URINE: 6
PHOSPHATE SERPL-MCNC: 2.8 MG/DL
POTASSIUM SERPL-SCNC: 4.7 MMOL/L
PROT SERPL-MCNC: 6.6 G/DL
PROT SERPL-MCNC: 6.6 G/DL
PROT SERPL-MCNC: 6.9 G/DL
PROTEIN URINE: NEGATIVE MG/DL
RED BLOOD CELLS URINE: 1 /HPF
RNA POLYMERASE III IGG: 8 UNITS
SODIUM SERPL-SCNC: 138 MMOL/L
SPECIFIC GRAVITY URINE: 1.01
TSH SERPL-ACNC: 4.88 UIU/ML
UROBILINOGEN URINE: 0.2 MG/DL
WHITE BLOOD CELLS URINE: 1 /HPF

## 2023-08-31 NOTE — ASSESSMENT
[FreeTextEntry1] : Impression: 82-year-old man for followup office visit regarding his joint symptoms and rheumatic diseases, including osteoarthritis, Raynaud's, limited scleroderma.\par \par Patient feels fairly well. Denies recent joint pain with his osteoarthritis quiescent. He has persistent skin changes on his fingers with persistent mild diffuse swelling bilateral fingers secondary to limited scleroderma. On exam he has persistent flexor tenosynovitis left first finger and right first/third/fifth fingers which are not bothersome. No recent Raynaud?s episodes, he has a mild case on exam today although not bothersome . Denies dry eyes and dry mouth, on exam patient has dry eyes - I will continue to monitor for Sjogren's syndrome. He continues calcium with vitamin D supplement. Patient denies rash or side effects with current medications. Patient is content with current medication regimen. \par \par Plan: I reviewed chart and previous records\par Laboratory tests ordered today-see list below--with coordination of care\par Diagnosis and prognosis discussed\par Continue other current medications (other than those changed below)\par Keep hands and feet and torso warm - patient warned of dangers of gangrene/digital amputation with Raynaud's\par Tylenol 1000 mg t.i.d. p.r.n. joint pain or Tylenol 1300 mg b.i.d. p.r.n. joint pain (possible side effects explained)\par Artificial tears one drop each eye q.i.d. and p.r.n.(Possible side effects explained)\par Biotene mouthwash/spray q.i.d. and p.r.n.(Possible side effects explained) \par Oral Hydration\par Patient declines oral medication for dryness\par Return visit 4 months\par All questions and concerns were addressed

## 2023-08-31 NOTE — PHYSICAL EXAM
From: Rene Casas  To: Jalene Brittle, MD  Sent: 1/15/2021 9:16 PM EST  Subject: Non-Urgent Medical Question    I was wondering if you could refer me to a neurologist again. Katerin Wellington been wanting to try again but I wasnt sure if I needed a new referral for new insurance.  Thank you!  -Dasia Chatman [General Appearance - Alert] : alert [General Appearance - In No Acute Distress] : in no acute distress [General Appearance - Well Nourished] : well nourished [General Appearance - Well Developed] : well developed [Sclera] : the sclera and conjunctiva were normal [PERRL With Normal Accommodation] : pupils were equal in size, round, and reactive to light [Extraocular Movements] : extraocular movements were intact [Outer Ear] : the ears and nose were normal in appearance [Neck Appearance] : the appearance of the neck was normal [Neck Cervical Mass (___cm)] : no neck mass was observed [Jugular Venous Distention Increased] : there was no jugular-venous distention [Thyroid Diffuse Enlargement] : the thyroid was not enlarged [Thyroid Nodule] : there were no palpable thyroid nodules [Lungs Percussion] : the lungs were normal to percussion [No CVA Tenderness] : no ~M costovertebral angle tenderness [No Spinal Tenderness] : no spinal tenderness [Heart Rate And Rhythm] : heart rate was normal and rhythm regular [Edema] : there was no peripheral edema [Abdomen Soft] : soft [Abdomen Tenderness] : non-tender [Abdomen Mass (___ Cm)] : no abdominal mass palpated [Cervical Lymph Nodes Enlarged Posterior Bilaterally] : posterior cervical [Cervical Lymph Nodes Enlarged Anterior Bilaterally] : anterior cervical [Supraclavicular Lymph Nodes Enlarged Bilaterally] : supraclavicular [Axillary Lymph Nodes Enlarged Bilaterally] : axillary [Skin Color & Pigmentation] : normal skin color and pigmentation [Skin Turgor] : normal skin turgor [] : no rash [Cranial Nerves] : cranial nerves 2-12 were intact [Deep Tendon Reflexes (DTR)] : deep tendon reflexes were 2+ and symmetric [Sensation] : the sensory exam was normal to light touch and pinprick [Motor Exam] : the motor exam was normal [No Focal Deficits] : no focal deficits [Oriented To Time, Place, And Person] : oriented to person, place, and time [Impaired Insight] : insight and judgment were intact [Affect] : the affect was normal [Mood] : the mood was normal [FreeTextEntry1] : Shoulders/Elbows- normal\par Wrists- crepitus bilateral 1st CMC joints\par Hands- Davonte's/Heberden's nodes, mild diffuse swelling bilateral fingers, flexor tenosynovitis left first finger and right first/third/fifth fingers, bilateral decreased fist ability, bilateral fingers cool/slightly purple/nontender\par Hips- bilateral decreased external rotation\par Knees- bilateral crepitus\par Ankles/Feet- normal

## 2023-08-31 NOTE — ADDENDUM
[FreeTextEntry1] : I, Hudson Poornimaa, acted solely as a scribe for Dr. Myron I. Kleiner, MD. on 07/19/2023 .

## 2023-08-31 NOTE — HISTORY OF PRESENT ILLNESS
[FreeTextEntry1] : PETER GAMBOA is a 82 year old man who presents for follow up office visit for further evaluation of joint symptoms and rheumatic diseases including osteoarthritis, Raynaud's, limited scleroderma.  Patient feels fairly well. Denies recent joint pain. No recent Raynaud's episodes. Denies dry eyes and dry mouth. He continues calcium with vitamin D supplement. Patient denies rash or side effects with their medications. Patient is content with their current medications.  [facility] - Lung cancer - radiation therapy completed June 2023

## 2023-08-31 NOTE — CONSULT LETTER
[Dear  ___] : Dear  [unfilled], [Consult Letter:] : I had the pleasure of evaluating your patient, [unfilled]. [Please see my note below.] : Please see my note below. [Consult Closing:] : Thank you very much for allowing me to participate in the care of this patient.  If you have any questions, please do not hesitate to contact me. [Sincerely,] : Sincerely, [FreeTextEntry3] : Yung\par Myron I. Kleiner, M.D., FACR\par Chief, Division of Rheumatology\par Department of Medicine\par [facility]

## 2023-09-07 ENCOUNTER — RESULT REVIEW (OUTPATIENT)
Age: 83
End: 2023-09-07

## 2023-09-07 ENCOUNTER — APPOINTMENT (OUTPATIENT)
Age: 83
End: 2023-09-07

## 2023-09-07 LAB
ALBUMIN SERPL ELPH-MCNC: 4.2 G/DL — SIGNIFICANT CHANGE UP (ref 3.3–5)
ALP SERPL-CCNC: 64 U/L — SIGNIFICANT CHANGE UP (ref 40–120)
ALT FLD-CCNC: 9 U/L — LOW (ref 10–45)
ANION GAP SERPL CALC-SCNC: 12 MMOL/L — SIGNIFICANT CHANGE UP (ref 5–17)
AST SERPL-CCNC: 16 U/L — SIGNIFICANT CHANGE UP (ref 10–40)
BASOPHILS # BLD AUTO: 0.1 K/UL — SIGNIFICANT CHANGE UP (ref 0–0.2)
BASOPHILS NFR BLD AUTO: 2 % — SIGNIFICANT CHANGE UP (ref 0–2)
BILIRUB SERPL-MCNC: 0.2 MG/DL — SIGNIFICANT CHANGE UP (ref 0.2–1.2)
BUN SERPL-MCNC: 13 MG/DL — SIGNIFICANT CHANGE UP (ref 7–23)
CALCIUM SERPL-MCNC: 9.6 MG/DL — SIGNIFICANT CHANGE UP (ref 8.4–10.5)
CEA SERPL-MCNC: 6.9 NG/ML — HIGH (ref 0–3.8)
CHLORIDE SERPL-SCNC: 103 MMOL/L — SIGNIFICANT CHANGE UP (ref 96–108)
CO2 SERPL-SCNC: 20 MMOL/L — LOW (ref 22–31)
CREAT SERPL-MCNC: 0.94 MG/DL — SIGNIFICANT CHANGE UP (ref 0.5–1.3)
EGFR: 80 ML/MIN/1.73M2 — SIGNIFICANT CHANGE UP
EOSINOPHIL # BLD AUTO: 0.1 K/UL — SIGNIFICANT CHANGE UP (ref 0–0.5)
EOSINOPHIL NFR BLD AUTO: 1.8 % — SIGNIFICANT CHANGE UP (ref 0–6)
GLUCOSE SERPL-MCNC: 104 MG/DL — HIGH (ref 70–99)
HCT VFR BLD CALC: 40.6 % — SIGNIFICANT CHANGE UP (ref 39–50)
HGB BLD-MCNC: 13.2 G/DL — SIGNIFICANT CHANGE UP (ref 13–17)
LYMPHOCYTES # BLD AUTO: 1.2 K/UL — SIGNIFICANT CHANGE UP (ref 1–3.3)
LYMPHOCYTES # BLD AUTO: 23.4 % — SIGNIFICANT CHANGE UP (ref 13–44)
MAGNESIUM SERPL-MCNC: 2.2 MG/DL — SIGNIFICANT CHANGE UP (ref 1.6–2.6)
MCHC RBC-ENTMCNC: 25.1 PG — LOW (ref 27–34)
MCHC RBC-ENTMCNC: 32.6 G/DL — SIGNIFICANT CHANGE UP (ref 32–36)
MCV RBC AUTO: 77 FL — LOW (ref 80–100)
MONOCYTES # BLD AUTO: 0.4 K/UL — SIGNIFICANT CHANGE UP (ref 0–0.9)
MONOCYTES NFR BLD AUTO: 7.7 % — SIGNIFICANT CHANGE UP (ref 2–14)
NEUTROPHILS # BLD AUTO: 3.4 K/UL — SIGNIFICANT CHANGE UP (ref 1.8–7.4)
NEUTROPHILS NFR BLD AUTO: 65.2 % — SIGNIFICANT CHANGE UP (ref 43–77)
PLATELET # BLD AUTO: 163 K/UL — SIGNIFICANT CHANGE UP (ref 150–400)
POTASSIUM SERPL-MCNC: 4.1 MMOL/L — SIGNIFICANT CHANGE UP (ref 3.5–5.3)
POTASSIUM SERPL-SCNC: 4.1 MMOL/L — SIGNIFICANT CHANGE UP (ref 3.5–5.3)
PROT SERPL-MCNC: 6.9 G/DL — SIGNIFICANT CHANGE UP (ref 6–8.3)
RBC # BLD: 5.27 M/UL — SIGNIFICANT CHANGE UP (ref 4.2–5.8)
RBC # FLD: 12.4 % — SIGNIFICANT CHANGE UP (ref 10.3–14.5)
SODIUM SERPL-SCNC: 136 MMOL/L — SIGNIFICANT CHANGE UP (ref 135–145)
WBC # BLD: 5.2 K/UL — SIGNIFICANT CHANGE UP (ref 3.8–10.5)
WBC # FLD AUTO: 5.2 K/UL — SIGNIFICANT CHANGE UP (ref 3.8–10.5)

## 2023-09-20 NOTE — PATIENT PROFILE ADULT - FUNCTIONAL ASSESSMENT - DAILY ACTIVITY 2.
No protocol for requested medication     Medication: Norco  Last office visit date: 3/22/2023  Pharmacy: [id number] - [place], [place] - [address], [address]    Order pended, routed to clinician for review.      4 = No assist / stand by assistance

## 2023-09-26 ENCOUNTER — RESULT REVIEW (OUTPATIENT)
Age: 83
End: 2023-09-26

## 2023-10-11 ENCOUNTER — OUTPATIENT (OUTPATIENT)
Dept: OUTPATIENT SERVICES | Facility: HOSPITAL | Age: 83
LOS: 1 days | Discharge: HOME | End: 2023-10-11

## 2023-10-11 DIAGNOSIS — C16.9 MALIGNANT NEOPLASM OF STOMACH, UNSPECIFIED: ICD-10-CM

## 2023-10-11 DIAGNOSIS — Z90.3 ACQUIRED ABSENCE OF STOMACH [PART OF]: Chronic | ICD-10-CM

## 2023-10-11 DIAGNOSIS — Z95.828 PRESENCE OF OTHER VASCULAR IMPLANTS AND GRAFTS: Chronic | ICD-10-CM

## 2023-10-11 DIAGNOSIS — Z95.818 PRESENCE OF OTHER CARDIAC IMPLANTS AND GRAFTS: Chronic | ICD-10-CM

## 2023-10-19 ENCOUNTER — RESULT REVIEW (OUTPATIENT)
Age: 83
End: 2023-10-19

## 2023-10-19 ENCOUNTER — APPOINTMENT (OUTPATIENT)
Dept: PULMONOLOGY | Facility: CLINIC | Age: 83
End: 2023-10-19
Payer: MEDICARE

## 2023-10-19 ENCOUNTER — APPOINTMENT (OUTPATIENT)
Age: 83
End: 2023-10-19

## 2023-10-19 VITALS — SYSTOLIC BLOOD PRESSURE: 118 MMHG | DIASTOLIC BLOOD PRESSURE: 60 MMHG

## 2023-10-19 VITALS — HEART RATE: 72 BPM | RESPIRATION RATE: 16 BRPM | OXYGEN SATURATION: 97 %

## 2023-10-19 VITALS — HEIGHT: 65.5 IN | BODY MASS INDEX: 23.04 KG/M2 | WEIGHT: 140 LBS

## 2023-10-19 DIAGNOSIS — J44.9 CHRONIC OBSTRUCTIVE PULMONARY DISEASE, UNSPECIFIED: ICD-10-CM

## 2023-10-19 LAB
ALBUMIN SERPL ELPH-MCNC: 4.3 G/DL — SIGNIFICANT CHANGE UP (ref 3.3–5)
ALBUMIN SERPL ELPH-MCNC: 4.3 G/DL — SIGNIFICANT CHANGE UP (ref 3.3–5)
ALP SERPL-CCNC: 71 U/L — SIGNIFICANT CHANGE UP (ref 40–120)
ALP SERPL-CCNC: 71 U/L — SIGNIFICANT CHANGE UP (ref 40–120)
ALT FLD-CCNC: 9 U/L — LOW (ref 10–45)
ALT FLD-CCNC: 9 U/L — LOW (ref 10–45)
ANION GAP SERPL CALC-SCNC: 11 MMOL/L — SIGNIFICANT CHANGE UP (ref 5–17)
ANION GAP SERPL CALC-SCNC: 11 MMOL/L — SIGNIFICANT CHANGE UP (ref 5–17)
AST SERPL-CCNC: 21 U/L — SIGNIFICANT CHANGE UP (ref 10–40)
AST SERPL-CCNC: 21 U/L — SIGNIFICANT CHANGE UP (ref 10–40)
BASOPHILS # BLD AUTO: 0.1 K/UL — SIGNIFICANT CHANGE UP (ref 0–0.2)
BASOPHILS # BLD AUTO: 0.1 K/UL — SIGNIFICANT CHANGE UP (ref 0–0.2)
BASOPHILS NFR BLD AUTO: 1.2 % — SIGNIFICANT CHANGE UP (ref 0–2)
BASOPHILS NFR BLD AUTO: 1.2 % — SIGNIFICANT CHANGE UP (ref 0–2)
BILIRUB SERPL-MCNC: 0.2 MG/DL — SIGNIFICANT CHANGE UP (ref 0.2–1.2)
BILIRUB SERPL-MCNC: 0.2 MG/DL — SIGNIFICANT CHANGE UP (ref 0.2–1.2)
BUN SERPL-MCNC: 11 MG/DL — SIGNIFICANT CHANGE UP (ref 7–23)
BUN SERPL-MCNC: 11 MG/DL — SIGNIFICANT CHANGE UP (ref 7–23)
CALCIUM SERPL-MCNC: 9.8 MG/DL — SIGNIFICANT CHANGE UP (ref 8.4–10.5)
CALCIUM SERPL-MCNC: 9.8 MG/DL — SIGNIFICANT CHANGE UP (ref 8.4–10.5)
CEA SERPL-MCNC: 7.2 NG/ML — HIGH (ref 0–3.8)
CEA SERPL-MCNC: 7.2 NG/ML — HIGH (ref 0–3.8)
CHLORIDE SERPL-SCNC: 105 MMOL/L — SIGNIFICANT CHANGE UP (ref 96–108)
CHLORIDE SERPL-SCNC: 105 MMOL/L — SIGNIFICANT CHANGE UP (ref 96–108)
CO2 SERPL-SCNC: 25 MMOL/L — SIGNIFICANT CHANGE UP (ref 22–31)
CO2 SERPL-SCNC: 25 MMOL/L — SIGNIFICANT CHANGE UP (ref 22–31)
CREAT SERPL-MCNC: 0.81 MG/DL — SIGNIFICANT CHANGE UP (ref 0.5–1.3)
CREAT SERPL-MCNC: 0.81 MG/DL — SIGNIFICANT CHANGE UP (ref 0.5–1.3)
EGFR: 87 ML/MIN/1.73M2 — SIGNIFICANT CHANGE UP
EGFR: 87 ML/MIN/1.73M2 — SIGNIFICANT CHANGE UP
EOSINOPHIL # BLD AUTO: 0.1 K/UL — SIGNIFICANT CHANGE UP (ref 0–0.5)
EOSINOPHIL # BLD AUTO: 0.1 K/UL — SIGNIFICANT CHANGE UP (ref 0–0.5)
EOSINOPHIL NFR BLD AUTO: 2 % — SIGNIFICANT CHANGE UP (ref 0–6)
EOSINOPHIL NFR BLD AUTO: 2 % — SIGNIFICANT CHANGE UP (ref 0–6)
GLUCOSE SERPL-MCNC: 153 MG/DL — HIGH (ref 70–99)
GLUCOSE SERPL-MCNC: 153 MG/DL — HIGH (ref 70–99)
HCT VFR BLD CALC: 41.8 % — SIGNIFICANT CHANGE UP (ref 39–50)
HCT VFR BLD CALC: 41.8 % — SIGNIFICANT CHANGE UP (ref 39–50)
HGB BLD-MCNC: 13.7 G/DL — SIGNIFICANT CHANGE UP (ref 13–17)
HGB BLD-MCNC: 13.7 G/DL — SIGNIFICANT CHANGE UP (ref 13–17)
LYMPHOCYTES # BLD AUTO: 1.4 K/UL — SIGNIFICANT CHANGE UP (ref 1–3.3)
LYMPHOCYTES # BLD AUTO: 1.4 K/UL — SIGNIFICANT CHANGE UP (ref 1–3.3)
LYMPHOCYTES # BLD AUTO: 26 % — SIGNIFICANT CHANGE UP (ref 13–44)
LYMPHOCYTES # BLD AUTO: 26 % — SIGNIFICANT CHANGE UP (ref 13–44)
MAGNESIUM SERPL-MCNC: 1.9 MG/DL — SIGNIFICANT CHANGE UP (ref 1.6–2.6)
MAGNESIUM SERPL-MCNC: 1.9 MG/DL — SIGNIFICANT CHANGE UP (ref 1.6–2.6)
MCHC RBC-ENTMCNC: 26.3 PG — LOW (ref 27–34)
MCHC RBC-ENTMCNC: 26.3 PG — LOW (ref 27–34)
MCHC RBC-ENTMCNC: 32.8 G/DL — SIGNIFICANT CHANGE UP (ref 32–36)
MCHC RBC-ENTMCNC: 32.8 G/DL — SIGNIFICANT CHANGE UP (ref 32–36)
MCV RBC AUTO: 80 FL — SIGNIFICANT CHANGE UP (ref 80–100)
MCV RBC AUTO: 80 FL — SIGNIFICANT CHANGE UP (ref 80–100)
MONOCYTES # BLD AUTO: 0.3 K/UL — SIGNIFICANT CHANGE UP (ref 0–0.9)
MONOCYTES # BLD AUTO: 0.3 K/UL — SIGNIFICANT CHANGE UP (ref 0–0.9)
MONOCYTES NFR BLD AUTO: 5 % — SIGNIFICANT CHANGE UP (ref 2–14)
MONOCYTES NFR BLD AUTO: 5 % — SIGNIFICANT CHANGE UP (ref 2–14)
NEUTROPHILS # BLD AUTO: 3.5 K/UL — SIGNIFICANT CHANGE UP (ref 1.8–7.4)
NEUTROPHILS # BLD AUTO: 3.5 K/UL — SIGNIFICANT CHANGE UP (ref 1.8–7.4)
NEUTROPHILS NFR BLD AUTO: 65.9 % — SIGNIFICANT CHANGE UP (ref 43–77)
NEUTROPHILS NFR BLD AUTO: 65.9 % — SIGNIFICANT CHANGE UP (ref 43–77)
PLAT MORPH BLD: NORMAL — SIGNIFICANT CHANGE UP
PLAT MORPH BLD: NORMAL — SIGNIFICANT CHANGE UP
PLATELET # BLD AUTO: 165 K/UL — SIGNIFICANT CHANGE UP (ref 150–400)
PLATELET # BLD AUTO: 165 K/UL — SIGNIFICANT CHANGE UP (ref 150–400)
POTASSIUM SERPL-MCNC: 4.2 MMOL/L — SIGNIFICANT CHANGE UP (ref 3.5–5.3)
POTASSIUM SERPL-MCNC: 4.2 MMOL/L — SIGNIFICANT CHANGE UP (ref 3.5–5.3)
POTASSIUM SERPL-SCNC: 4.2 MMOL/L — SIGNIFICANT CHANGE UP (ref 3.5–5.3)
POTASSIUM SERPL-SCNC: 4.2 MMOL/L — SIGNIFICANT CHANGE UP (ref 3.5–5.3)
PROT SERPL-MCNC: 7.1 G/DL — SIGNIFICANT CHANGE UP (ref 6–8.3)
PROT SERPL-MCNC: 7.1 G/DL — SIGNIFICANT CHANGE UP (ref 6–8.3)
RBC # BLD: 5.22 M/UL — SIGNIFICANT CHANGE UP (ref 4.2–5.8)
RBC # BLD: 5.22 M/UL — SIGNIFICANT CHANGE UP (ref 4.2–5.8)
RBC # FLD: 12.4 % — SIGNIFICANT CHANGE UP (ref 10.3–14.5)
RBC # FLD: 12.4 % — SIGNIFICANT CHANGE UP (ref 10.3–14.5)
RBC BLD AUTO: ABNORMAL
RBC BLD AUTO: ABNORMAL
SODIUM SERPL-SCNC: 140 MMOL/L — SIGNIFICANT CHANGE UP (ref 135–145)
SODIUM SERPL-SCNC: 140 MMOL/L — SIGNIFICANT CHANGE UP (ref 135–145)
WBC # BLD: 5.3 K/UL — SIGNIFICANT CHANGE UP (ref 3.8–10.5)
WBC # BLD: 5.3 K/UL — SIGNIFICANT CHANGE UP (ref 3.8–10.5)
WBC # FLD AUTO: 5.3 K/UL — SIGNIFICANT CHANGE UP (ref 3.8–10.5)
WBC # FLD AUTO: 5.3 K/UL — SIGNIFICANT CHANGE UP (ref 3.8–10.5)

## 2023-10-19 PROCEDURE — 94010 BREATHING CAPACITY TEST: CPT

## 2023-10-19 PROCEDURE — 99214 OFFICE O/P EST MOD 30 MIN: CPT | Mod: 25

## 2023-10-31 ENCOUNTER — APPOINTMENT (OUTPATIENT)
Dept: RADIATION ONCOLOGY | Facility: CLINIC | Age: 83
End: 2023-10-31
Payer: MEDICARE

## 2023-10-31 VITALS
SYSTOLIC BLOOD PRESSURE: 144 MMHG | BODY MASS INDEX: 22.94 KG/M2 | HEART RATE: 75 BPM | OXYGEN SATURATION: 99 % | RESPIRATION RATE: 16 BRPM | DIASTOLIC BLOOD PRESSURE: 78 MMHG | WEIGHT: 140 LBS

## 2023-10-31 PROCEDURE — 99213 OFFICE O/P EST LOW 20 MIN: CPT

## 2023-11-16 ENCOUNTER — APPOINTMENT (OUTPATIENT)
Dept: HEMATOLOGY ONCOLOGY | Facility: CLINIC | Age: 83
End: 2023-11-16
Payer: MEDICARE

## 2023-11-16 VITALS
TEMPERATURE: 97.8 F | SYSTOLIC BLOOD PRESSURE: 135 MMHG | OXYGEN SATURATION: 98 % | HEIGHT: 65.5 IN | HEART RATE: 75 BPM | WEIGHT: 139 LBS | DIASTOLIC BLOOD PRESSURE: 75 MMHG | BODY MASS INDEX: 22.88 KG/M2

## 2023-11-16 PROCEDURE — 99214 OFFICE O/P EST MOD 30 MIN: CPT

## 2023-11-30 ENCOUNTER — APPOINTMENT (OUTPATIENT)
Dept: RHEUMATOLOGY | Facility: CLINIC | Age: 83
End: 2023-11-30
Payer: MEDICARE

## 2023-11-30 ENCOUNTER — APPOINTMENT (OUTPATIENT)
Age: 83
End: 2023-11-30

## 2023-11-30 VITALS
DIASTOLIC BLOOD PRESSURE: 78 MMHG | WEIGHT: 139 LBS | HEART RATE: 72 BPM | HEIGHT: 65.5 IN | BODY MASS INDEX: 22.88 KG/M2 | TEMPERATURE: 98 F | SYSTOLIC BLOOD PRESSURE: 116 MMHG

## 2023-11-30 DIAGNOSIS — M19.90 UNSPECIFIED OSTEOARTHRITIS, UNSPECIFIED SITE: ICD-10-CM

## 2023-11-30 DIAGNOSIS — M25.50 PAIN IN UNSPECIFIED JOINT: ICD-10-CM

## 2023-11-30 DIAGNOSIS — M24.849 OTHER SPECIFIC JOINT DERANGEMENTS OF UNSPECIFIED HAND, NOT ELSEWHERE CLASSIFIED: ICD-10-CM

## 2023-11-30 PROCEDURE — 99215 OFFICE O/P EST HI 40 MIN: CPT | Mod: 25

## 2023-11-30 PROCEDURE — G2212 PROLONG OUTPT/OFFICE VIS: CPT

## 2023-12-01 LAB
ALBUMIN SERPL ELPH-MCNC: 4.6 G/DL
ALP BLD-CCNC: 81 U/L
ALT SERPL-CCNC: 9 U/L
ANION GAP SERPL CALC-SCNC: 15 MMOL/L
AST SERPL-CCNC: 12 U/L
BASOPHILS # BLD AUTO: 0.06 K/UL
BASOPHILS NFR BLD AUTO: 1.3 %
BILIRUB SERPL-MCNC: 0.2 MG/DL
BUN SERPL-MCNC: 15 MG/DL
CALCIUM SERPL-MCNC: 9.8 MG/DL
CHLORIDE SERPL-SCNC: 102 MMOL/L
CK SERPL-CCNC: 146 U/L
CO2 SERPL-SCNC: 23 MMOL/L
CREAT SERPL-MCNC: 0.87 MG/DL
CRP SERPL-MCNC: <3 MG/L
EGFR: 85 ML/MIN/1.73M2
ENA RNP AB SER IA-ACNC: <0.2 AL
ENA SCL70 IGG SER IA-ACNC: <0.2 AL
ENA SM AB SER IA-ACNC: <0.2 AL
ENA SS-A AB SER IA-ACNC: <0.2 AL
ENA SS-B AB SER IA-ACNC: <0.2 AL
EOSINOPHIL # BLD AUTO: 0.06 K/UL
EOSINOPHIL NFR BLD AUTO: 1.3 %
ERYTHROCYTE [SEDIMENTATION RATE] IN BLOOD BY WESTERGREN METHOD: 11 MM/HR
GLUCOSE SERPL-MCNC: 102 MG/DL
HCT VFR BLD CALC: 41.1 %
HGB BLD-MCNC: 13.7 G/DL
IMM GRANULOCYTES NFR BLD AUTO: 0 %
LDH SERPL-CCNC: 179 U/L
LYMPHOCYTES # BLD AUTO: 1.74 K/UL
LYMPHOCYTES NFR BLD AUTO: 36.3 %
MAGNESIUM SERPL-MCNC: 2.1 MG/DL
MAN DIFF?: NORMAL
MCHC RBC-ENTMCNC: 25.9 PG
MCHC RBC-ENTMCNC: 33.3 GM/DL
MCV RBC AUTO: 77.7 FL
MONOCYTES # BLD AUTO: 0.39 K/UL
MONOCYTES NFR BLD AUTO: 8.1 %
NEUTROPHILS # BLD AUTO: 2.55 K/UL
NEUTROPHILS NFR BLD AUTO: 53 %
PHOSPHATE SERPL-MCNC: 3.8 MG/DL
PLATELET # BLD AUTO: 177 K/UL
POTASSIUM SERPL-SCNC: 4.6 MMOL/L
PROT SERPL-MCNC: 7.3 G/DL
RBC # BLD: 5.29 M/UL
RBC # FLD: 15.5 %
SODIUM SERPL-SCNC: 140 MMOL/L
WBC # FLD AUTO: 4.8 K/UL

## 2023-12-01 RX ORDER — FERROUS SULFATE 325(65) MG
325 (65 FE) TABLET ORAL
Refills: 0 | Status: DISCONTINUED | COMMUNITY
Start: 2021-08-06 | End: 2023-12-01

## 2023-12-02 LAB
FERRITIN SERPL-MCNC: 36 NG/ML
IRON SATN MFR SERPL: 24 %
IRON SERPL-MCNC: 81 UG/DL
TIBC SERPL-MCNC: 340 UG/DL
UIBC SERPL-MCNC: 259 UG/DL

## 2023-12-06 ENCOUNTER — APPOINTMENT (OUTPATIENT)
Dept: NUCLEAR MEDICINE | Facility: CLINIC | Age: 83
End: 2023-12-06

## 2023-12-06 ENCOUNTER — APPOINTMENT (OUTPATIENT)
Dept: CT IMAGING | Facility: CLINIC | Age: 83
End: 2023-12-06

## 2023-12-06 ENCOUNTER — OUTPATIENT (OUTPATIENT)
Dept: OUTPATIENT SERVICES | Facility: HOSPITAL | Age: 83
LOS: 1 days | End: 2023-12-06
Payer: MEDICARE

## 2023-12-06 DIAGNOSIS — C34.92 MALIGNANT NEOPLASM OF UNSPECIFIED PART OF LEFT BRONCHUS OR LUNG: ICD-10-CM

## 2023-12-06 DIAGNOSIS — Z95.818 PRESENCE OF OTHER CARDIAC IMPLANTS AND GRAFTS: Chronic | ICD-10-CM

## 2023-12-06 DIAGNOSIS — Z90.3 ACQUIRED ABSENCE OF STOMACH [PART OF]: Chronic | ICD-10-CM

## 2023-12-06 DIAGNOSIS — Z95.828 PRESENCE OF OTHER VASCULAR IMPLANTS AND GRAFTS: Chronic | ICD-10-CM

## 2023-12-06 LAB
HGB A MFR BLD: 64 %
HGB A2 MFR BLD: 1.8 %
HGB FRACT BLD-IMP: NORMAL
HGB OTHER MFR BLD ELPH: 1.5 %
HGB S BLD QL SOLY: POSITIVE
HGB S MFR BLD: 32.7 %
RNA POLYMERASE III IGG: 6 UNITS

## 2023-12-06 PROCEDURE — 74177 CT ABD & PELVIS W/CONTRAST: CPT | Mod: 26

## 2023-12-06 PROCEDURE — 78815 PET IMAGE W/CT SKULL-THIGH: CPT | Mod: 26,PS

## 2023-12-06 PROCEDURE — 71260 CT THORAX DX C+: CPT | Mod: 26

## 2023-12-07 ENCOUNTER — OUTPATIENT (OUTPATIENT)
Dept: OUTPATIENT SERVICES | Facility: HOSPITAL | Age: 83
LOS: 1 days | End: 2023-12-07
Payer: MEDICARE

## 2023-12-07 ENCOUNTER — APPOINTMENT (OUTPATIENT)
Dept: RADIOLOGY | Facility: CLINIC | Age: 83
End: 2023-12-07
Payer: MEDICARE

## 2023-12-07 DIAGNOSIS — M79.89 OTHER SPECIFIED SOFT TISSUE DISORDERS: ICD-10-CM

## 2023-12-07 DIAGNOSIS — M65.9 SYNOVITIS AND TENOSYNOVITIS, UNSPECIFIED: ICD-10-CM

## 2023-12-07 DIAGNOSIS — Z90.3 ACQUIRED ABSENCE OF STOMACH [PART OF]: Chronic | ICD-10-CM

## 2023-12-07 PROCEDURE — 73110 X-RAY EXAM OF WRIST: CPT | Mod: 26,50

## 2023-12-07 PROCEDURE — 73130 X-RAY EXAM OF HAND: CPT | Mod: 26,50

## 2023-12-07 PROCEDURE — 73110 X-RAY EXAM OF WRIST: CPT

## 2023-12-07 PROCEDURE — 73130 X-RAY EXAM OF HAND: CPT

## 2023-12-28 NOTE — ED PROVIDER NOTE - NS ED ROS FT
Patient Name: Brown Blandon  : 1962    MRN: [id number]                              Leigh Cortez, PT  12/28/2023     no weight change, no fever or chills  no recent travel, no recent abox, no sick contacts  no recent change in medications  no rash, no bruises  no visual changes no eye discharge  no cough cold or congestion,   no sob, no chest pain  follows with cardiology  Hx of stress test  no orthopnea, no pnd  no abd pain, (+) vomiting, no n/d  no endoscopy, hx of colonoscopy  no hematuria, no change in urinary habits  no joint pain, no deformity  no headache, no paresthesia, no numbness/tingling, (+) syncope

## 2023-12-28 NOTE — ED ADULT TRIAGE NOTE - CHIEF COMPLAINT QUOTE
28-Dec-2023 18:53
Pt A&Ox4 states "I was drinking coffee and I got warm and vomited what looked like coffee and they said I passed out."  BIBA c/o vomiting and syncope. Patient has DM, had syncopal episode after vomiting dark in color. EKG completed
O-L Flap Text: Given the location of the defect, shape of the defect and the proximity to free margins an O-L flap was deemed most appropriate.  Using a sterile surgical marker, an appropriate advancement flap was drawn incorporating the defect and placing the expected incisions within the relaxed skin tension lines where possible.    The area thus outlined was incised deep to adipose tissue with a #15c scalpel blade.  The skin margins were undermined to an appropriate distance in all directions utilizing iris scissors.

## 2024-01-09 ENCOUNTER — OUTPATIENT (OUTPATIENT)
Dept: OUTPATIENT SERVICES | Facility: HOSPITAL | Age: 84
LOS: 1 days | Discharge: HOME | End: 2024-01-09

## 2024-01-09 DIAGNOSIS — Z90.3 ACQUIRED ABSENCE OF STOMACH [PART OF]: Chronic | ICD-10-CM

## 2024-01-09 DIAGNOSIS — Z95.828 PRESENCE OF OTHER VASCULAR IMPLANTS AND GRAFTS: Chronic | ICD-10-CM

## 2024-01-09 DIAGNOSIS — C16.9 MALIGNANT NEOPLASM OF STOMACH, UNSPECIFIED: ICD-10-CM

## 2024-01-09 DIAGNOSIS — Z95.818 PRESENCE OF OTHER CARDIAC IMPLANTS AND GRAFTS: Chronic | ICD-10-CM

## 2024-01-11 ENCOUNTER — RESULT REVIEW (OUTPATIENT)
Age: 84
End: 2024-01-11

## 2024-01-11 ENCOUNTER — APPOINTMENT (OUTPATIENT)
Dept: HEMATOLOGY ONCOLOGY | Facility: CLINIC | Age: 84
End: 2024-01-11
Payer: MEDICARE

## 2024-01-11 ENCOUNTER — APPOINTMENT (OUTPATIENT)
Age: 84
End: 2024-01-11

## 2024-01-11 VITALS
HEIGHT: 65.5 IN | SYSTOLIC BLOOD PRESSURE: 147 MMHG | BODY MASS INDEX: 22.72 KG/M2 | OXYGEN SATURATION: 95 % | DIASTOLIC BLOOD PRESSURE: 82 MMHG | HEART RATE: 85 BPM | WEIGHT: 138.02 LBS

## 2024-01-11 LAB
ALBUMIN SERPL ELPH-MCNC: 4.1 G/DL — SIGNIFICANT CHANGE UP (ref 3.3–5)
ALBUMIN SERPL ELPH-MCNC: 4.1 G/DL — SIGNIFICANT CHANGE UP (ref 3.3–5)
ALP SERPL-CCNC: 76 U/L — SIGNIFICANT CHANGE UP (ref 40–120)
ALP SERPL-CCNC: 76 U/L — SIGNIFICANT CHANGE UP (ref 40–120)
ALT FLD-CCNC: 11 U/L — SIGNIFICANT CHANGE UP (ref 10–45)
ALT FLD-CCNC: 11 U/L — SIGNIFICANT CHANGE UP (ref 10–45)
ANION GAP SERPL CALC-SCNC: 13 MMOL/L — SIGNIFICANT CHANGE UP (ref 5–17)
ANION GAP SERPL CALC-SCNC: 13 MMOL/L — SIGNIFICANT CHANGE UP (ref 5–17)
AST SERPL-CCNC: 18 U/L — SIGNIFICANT CHANGE UP (ref 10–40)
AST SERPL-CCNC: 18 U/L — SIGNIFICANT CHANGE UP (ref 10–40)
BASOPHILS # BLD AUTO: 0.1 K/UL — SIGNIFICANT CHANGE UP (ref 0–0.2)
BASOPHILS # BLD AUTO: 0.1 K/UL — SIGNIFICANT CHANGE UP (ref 0–0.2)
BASOPHILS NFR BLD AUTO: 1.6 % — SIGNIFICANT CHANGE UP (ref 0–2)
BASOPHILS NFR BLD AUTO: 1.6 % — SIGNIFICANT CHANGE UP (ref 0–2)
BILIRUB SERPL-MCNC: 0.2 MG/DL — SIGNIFICANT CHANGE UP (ref 0.2–1.2)
BILIRUB SERPL-MCNC: 0.2 MG/DL — SIGNIFICANT CHANGE UP (ref 0.2–1.2)
BUN SERPL-MCNC: 16 MG/DL — SIGNIFICANT CHANGE UP (ref 7–23)
BUN SERPL-MCNC: 16 MG/DL — SIGNIFICANT CHANGE UP (ref 7–23)
CALCIUM SERPL-MCNC: 9.2 MG/DL — SIGNIFICANT CHANGE UP (ref 8.4–10.5)
CALCIUM SERPL-MCNC: 9.2 MG/DL — SIGNIFICANT CHANGE UP (ref 8.4–10.5)
CEA SERPL-MCNC: 7.4 NG/ML — HIGH (ref 0–3.8)
CEA SERPL-MCNC: 7.4 NG/ML — HIGH (ref 0–3.8)
CHLORIDE SERPL-SCNC: 105 MMOL/L — SIGNIFICANT CHANGE UP (ref 96–108)
CHLORIDE SERPL-SCNC: 105 MMOL/L — SIGNIFICANT CHANGE UP (ref 96–108)
CO2 SERPL-SCNC: 22 MMOL/L — SIGNIFICANT CHANGE UP (ref 22–31)
CO2 SERPL-SCNC: 22 MMOL/L — SIGNIFICANT CHANGE UP (ref 22–31)
CREAT SERPL-MCNC: 0.83 MG/DL — SIGNIFICANT CHANGE UP (ref 0.5–1.3)
CREAT SERPL-MCNC: 0.83 MG/DL — SIGNIFICANT CHANGE UP (ref 0.5–1.3)
EGFR: 86 ML/MIN/1.73M2 — SIGNIFICANT CHANGE UP
EGFR: 86 ML/MIN/1.73M2 — SIGNIFICANT CHANGE UP
EOSINOPHIL # BLD AUTO: 0.1 K/UL — SIGNIFICANT CHANGE UP (ref 0–0.5)
EOSINOPHIL # BLD AUTO: 0.1 K/UL — SIGNIFICANT CHANGE UP (ref 0–0.5)
EOSINOPHIL NFR BLD AUTO: 3 % — SIGNIFICANT CHANGE UP (ref 0–6)
EOSINOPHIL NFR BLD AUTO: 3 % — SIGNIFICANT CHANGE UP (ref 0–6)
GLUCOSE SERPL-MCNC: 129 MG/DL — HIGH (ref 70–99)
GLUCOSE SERPL-MCNC: 129 MG/DL — HIGH (ref 70–99)
HCT VFR BLD CALC: 42.9 % — SIGNIFICANT CHANGE UP (ref 39–50)
HCT VFR BLD CALC: 42.9 % — SIGNIFICANT CHANGE UP (ref 39–50)
HGB BLD-MCNC: 13.7 G/DL — SIGNIFICANT CHANGE UP (ref 13–17)
HGB BLD-MCNC: 13.7 G/DL — SIGNIFICANT CHANGE UP (ref 13–17)
LYMPHOCYTES # BLD AUTO: 1.3 K/UL — SIGNIFICANT CHANGE UP (ref 1–3.3)
LYMPHOCYTES # BLD AUTO: 1.3 K/UL — SIGNIFICANT CHANGE UP (ref 1–3.3)
LYMPHOCYTES # BLD AUTO: 33.8 % — SIGNIFICANT CHANGE UP (ref 13–44)
LYMPHOCYTES # BLD AUTO: 33.8 % — SIGNIFICANT CHANGE UP (ref 13–44)
MAGNESIUM SERPL-MCNC: 2.2 MG/DL — SIGNIFICANT CHANGE UP (ref 1.6–2.6)
MAGNESIUM SERPL-MCNC: 2.2 MG/DL — SIGNIFICANT CHANGE UP (ref 1.6–2.6)
MCHC RBC-ENTMCNC: 25.6 PG — LOW (ref 27–34)
MCHC RBC-ENTMCNC: 25.6 PG — LOW (ref 27–34)
MCHC RBC-ENTMCNC: 32 G/DL — SIGNIFICANT CHANGE UP (ref 32–36)
MCHC RBC-ENTMCNC: 32 G/DL — SIGNIFICANT CHANGE UP (ref 32–36)
MCV RBC AUTO: 79.8 FL — LOW (ref 80–100)
MCV RBC AUTO: 79.8 FL — LOW (ref 80–100)
MONOCYTES # BLD AUTO: 0.3 K/UL — SIGNIFICANT CHANGE UP (ref 0–0.9)
MONOCYTES # BLD AUTO: 0.3 K/UL — SIGNIFICANT CHANGE UP (ref 0–0.9)
MONOCYTES NFR BLD AUTO: 8.8 % — SIGNIFICANT CHANGE UP (ref 2–14)
MONOCYTES NFR BLD AUTO: 8.8 % — SIGNIFICANT CHANGE UP (ref 2–14)
NEUTROPHILS # BLD AUTO: 2.1 K/UL — SIGNIFICANT CHANGE UP (ref 1.8–7.4)
NEUTROPHILS # BLD AUTO: 2.1 K/UL — SIGNIFICANT CHANGE UP (ref 1.8–7.4)
NEUTROPHILS NFR BLD AUTO: 52.8 % — SIGNIFICANT CHANGE UP (ref 43–77)
NEUTROPHILS NFR BLD AUTO: 52.8 % — SIGNIFICANT CHANGE UP (ref 43–77)
PLATELET # BLD AUTO: 156 K/UL — SIGNIFICANT CHANGE UP (ref 150–400)
PLATELET # BLD AUTO: 156 K/UL — SIGNIFICANT CHANGE UP (ref 150–400)
POTASSIUM SERPL-MCNC: 4.4 MMOL/L — SIGNIFICANT CHANGE UP (ref 3.5–5.3)
POTASSIUM SERPL-MCNC: 4.4 MMOL/L — SIGNIFICANT CHANGE UP (ref 3.5–5.3)
POTASSIUM SERPL-SCNC: 4.4 MMOL/L — SIGNIFICANT CHANGE UP (ref 3.5–5.3)
POTASSIUM SERPL-SCNC: 4.4 MMOL/L — SIGNIFICANT CHANGE UP (ref 3.5–5.3)
PROT SERPL-MCNC: 7.1 G/DL — SIGNIFICANT CHANGE UP (ref 6–8.3)
PROT SERPL-MCNC: 7.1 G/DL — SIGNIFICANT CHANGE UP (ref 6–8.3)
RBC # BLD: 5.38 M/UL — SIGNIFICANT CHANGE UP (ref 4.2–5.8)
RBC # BLD: 5.38 M/UL — SIGNIFICANT CHANGE UP (ref 4.2–5.8)
RBC # FLD: 13 % — SIGNIFICANT CHANGE UP (ref 10.3–14.5)
RBC # FLD: 13 % — SIGNIFICANT CHANGE UP (ref 10.3–14.5)
SODIUM SERPL-SCNC: 140 MMOL/L — SIGNIFICANT CHANGE UP (ref 135–145)
SODIUM SERPL-SCNC: 140 MMOL/L — SIGNIFICANT CHANGE UP (ref 135–145)
WBC # BLD: 3.9 K/UL — SIGNIFICANT CHANGE UP (ref 3.8–10.5)
WBC # BLD: 3.9 K/UL — SIGNIFICANT CHANGE UP (ref 3.8–10.5)
WBC # FLD AUTO: 3.9 K/UL — SIGNIFICANT CHANGE UP (ref 3.8–10.5)
WBC # FLD AUTO: 3.9 K/UL — SIGNIFICANT CHANGE UP (ref 3.8–10.5)

## 2024-01-11 PROCEDURE — 99214 OFFICE O/P EST MOD 30 MIN: CPT

## 2024-01-11 NOTE — REVIEW OF SYSTEMS
[Patient Intake Form Reviewed] : Patient intake form was reviewed [Fever] : no fever [Fatigue] : no fatigue [Recent Change In Weight] : ~T no recent weight change [Vision Problems] : no vision problems [Dysphagia] : no dysphagia [Hoarseness] : no hoarseness [Chest Pain] : no chest pain [Leg Claudication] : no intermittent leg claudication [Lower Ext Edema] : no lower extremity edema [Shortness Of Breath] : no shortness of breath [Cough] : no cough [SOB on Exertion] : no shortness of breath during exertion [Abdominal Pain] : no abdominal pain [Vomiting] : no vomiting [Constipation] : no constipation [Dysuria] : no dysuria [Joint Pain] : no joint pain [Skin Rash] : no skin rash [Easy Bleeding] : no tendency for easy bleeding [Easy Bruising] : no tendency for easy bruising [Swollen Glands] : no swollen glands [FreeTextEntry2] : negative except as indicated in interval history

## 2024-01-11 NOTE — PHYSICAL EXAM
[Restricted in physically strenuous activity but ambulatory and able to carry out work of a light or sedentary nature] : Status 1- Restricted in physically strenuous activity but ambulatory and able to carry out work of a light or sedentary nature, e.g., light house work, office work [Thin] : thin [Normal] : affect appropriate [de-identified] : mediport on right side.

## 2024-01-11 NOTE — HISTORY OF PRESENT ILLNESS
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 He was sent for CT by his PCP, Dr. Charles Smith, due to anemia. CT showed in the upper central abdomen abutting the posterior antrum of the stomach and neck of the pancreas there is a 3 x 2 x 3 cm mass. There is an additional heterogeneous enhancing 2.7 x 2.3 x 2.5 lobulated mass in the ventral upper abdominal peritoneal cavity anterosuperiorly to the antrum of the stomach with internal and surrounding enhancing vessels. Endoscopy with biopsy of the gastric mass was c/w moderately differentiated adenocarcinoma. Staging PET showed hypermetabolic thickening of the distal stomach, consistent with primary gastric cancer. Hypermetabolic activity in the distal stomach, inseparable from the perigastric lymphadenopathy, consistent with metastatic kong disease. \par  \par  TNM stage: T3, N2, M0 \par  \par  \par  Patient completed 4 cycles of postoperative adjuvant FLOT for gastric adenocarcinoma s/p robotic assisted laparoscopic distal gastrectomy with Billroth 2 gastrojejunostomy reconstruction and placement of feeding jejunostomy tube on 11/9/20 with Dr Young. He is s/p 4 cycles neoadjuvant FLOT. \par  + Fatigue, intermittent and mild. + Peripheral neuropathy, improved with gabapentin. + Occasional emesis after "eating too much," denies nausea. + Constipation, improved. + Alopecia. + Nail changes. + Cold intolerance improving. + Decreased appetite with mild weight loss. PEG removed by Dr. Young. + Grade 1 H/F, xeroderma only, no pain. No myalgias. No weakness. No dysgeusia. No fevers, no cough, no SOB. \par  \par  Socx: H/o smoking \par   [FreeTextEntry1] : status post FLOT , resection; 2020 RT to left lung 2023 [de-identified] : 8/6/21: Interval PET on 8/3 showed 1. Unchanged nonspecific mild diffuse FDG avidity in the residual stomach, status post gastrectomy and gastrojejunostomy, possibly physiologic.  No evidence of FDG avid perigastric lymph nodes.  Resolution of diffuse marrow FDG avidity. Unchanged subcentimeter left upper lobe pulmonary nodules, too small to characterize.  Nonspecific new minimally FDG avid small bilateral hilar lymph nodes.  Pt has mild tingling of fingers and toes, much improved from before. Continues to take gabapentin. His appetite is good. His weight is stable. Denies HA. CP, SOB, abd pain, constipation, diarrhea, melena, hematuria, dysuria. 08/23/2023 He is feeling well. NO cough no hemoptysis, no hospitalization and no change in medication. He has not had his Medioport flushed. he has seen a urologist outside our system. He is planning to request records to be sent to our team  11/4/21: Pt feels well. Right 4th finger lock up, no pain, does not bother him. Mild numbness/tingling for fingers and toes, improving.  Taking gabapentin. Appetite is good. Denies HA. CP, SOB, abd pain, constipation, diarrhea, melena, hematuria, dysuria.   2/13/22: CT scan on 2/4/22 showed no evidence of recurrent dz. Pt feels well. His appetite is good and gained some weight. Endorses neuropathy of hands. His fingers sometimes lock up. Denies HA. CP, SOB, abd pain, constipation, diarrhea, melena, hematuria, dysuria.   8/15/22: pt is here for follow up for gastric cancer s/p FLOT and resection. CT scan 8/8/22 showed Increase in size of 9 mm left upper lobe pulmonary nodule raising suspicion for small primary lung neoplasm.  No evidence of recurrent or metastatic disease within the abdomen/pelvis. Discussed findings with pt.   12/9/22: pt is here for follow up for gastric cancer s/p FLOT and resection. Pt feels well. Denies HA. CP, SOB, abd pain, constipation, diarrhea, melena, hematuria, dysuria.   CT chest on 12/2/22 showed Irregular left upper lobe 1 cm nodular opacity unchanged compared to 8/8/2022 and appears to be new compared to 4/15/2021 PET/CT; this nodule is indeterminate, but one diagnostic consideration is for primary lung neoplasm. Couple of additional left lung nodules measuring up to 0.5 cm unchanged compared to 4/15/2021 PET/CT.  3/8/23:  Patient presents for follow up. He completed 4 cycles of postoperative adjuvant FLOT for gastric adenocarcinoma s/p robotic assisted laparoscopic distal gastrectomy with Billroth 2 gastrojejunostomy reconstruction and placement of feeding jejunostomy tube on 11/9/20 with Dr Young. He is s/p 4 cycles neoadjuvant FLOT.  + Occasional constipation. + Trigger finger x 2-3 digits. No fatigue. No decreased appetite. No dysphagia. No abdominal pain. No fevers or chills.  4/7/23: Patient presents for a followup (transfer of care form Dr. Corral). Biopsy of a lung lesion done given concerning imaging findings,  significant for adenocarcinoma.  Patient doing well, offers no complaints  5/2/23: Returns for follow up visit. Feels well overall.  Reports has mediport in place. Denies acute pain, abdominal discomfort.   5/24/23: Returns for follow up visit. Transferring care from Dr. Billings. Pending starting RT on 5/26/23  Denies N/V, acute pain, cough, SOB, fever, chills.   On ASA, po iron, Ca+, Vit D tabs   11/16/23:  Mr Petty returns for follow up. He has recently completed SBRT for his stage I lung adenocarcinoma with Dr. Somers and is doing well with no complaints.  Dr. Somers has ordered a PET scan which Mr Petty said he will be scheduling.  This will also surve as surviellance for his gastric cancer. Good energy and appetite, follow up in 2 months.  01/11/24: Mr Petty returns for follow up.  He feels well overall. PET/CT show no evidence of active malignancy. Maintaining weight. Follow up in 3 months  [Date: ____________] : Patient's last distress assessment performed on [unfilled]. [0 - No Distress] : Distress Level: 0 [90: Able to carry normal activity; minor signs or symptoms of disease.] : 90: Able to carry normal activity; minor signs or symptoms of disease.  [ECOG Performance Status: 1 - Restricted in physically strenuous activity but ambulatory and able to carry out work of a light or sedentary nature] : Performance Status: 1 - Restricted in physically strenuous activity but ambulatory and able to carry out work of a light or sedentary nature, e.g., light house work, office work
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
## 2024-02-05 ENCOUNTER — EMERGENCY (EMERGENCY)
Facility: HOSPITAL | Age: 84
LOS: 1 days | Discharge: HOME | End: 2024-02-05
Attending: EMERGENCY MEDICINE
Payer: MEDICARE

## 2024-02-05 VITALS
DIASTOLIC BLOOD PRESSURE: 87 MMHG | RESPIRATION RATE: 18 BRPM | TEMPERATURE: 98 F | HEART RATE: 88 BPM | OXYGEN SATURATION: 98 % | SYSTOLIC BLOOD PRESSURE: 155 MMHG

## 2024-02-05 VITALS
HEIGHT: 65.5 IN | OXYGEN SATURATION: 97 % | SYSTOLIC BLOOD PRESSURE: 153 MMHG | RESPIRATION RATE: 17 BRPM | DIASTOLIC BLOOD PRESSURE: 94 MMHG | TEMPERATURE: 98 F | HEART RATE: 97 BPM

## 2024-02-05 DIAGNOSIS — Z95.828 PRESENCE OF OTHER VASCULAR IMPLANTS AND GRAFTS: Chronic | ICD-10-CM

## 2024-02-05 DIAGNOSIS — Z90.3 ACQUIRED ABSENCE OF STOMACH [PART OF]: Chronic | ICD-10-CM

## 2024-02-05 DIAGNOSIS — Z95.818 PRESENCE OF OTHER CARDIAC IMPLANTS AND GRAFTS: Chronic | ICD-10-CM

## 2024-02-05 LAB
ALBUMIN SERPL ELPH-MCNC: 4 G/DL — SIGNIFICANT CHANGE UP (ref 3.3–5.2)
ALP SERPL-CCNC: 82 U/L — SIGNIFICANT CHANGE UP (ref 40–120)
ALT FLD-CCNC: 9 U/L — SIGNIFICANT CHANGE UP
ANION GAP SERPL CALC-SCNC: 15 MMOL/L — SIGNIFICANT CHANGE UP (ref 5–17)
APPEARANCE UR: CLEAR — SIGNIFICANT CHANGE UP
AST SERPL-CCNC: 14 U/L — SIGNIFICANT CHANGE UP
BACTERIA # UR AUTO: NEGATIVE /HPF — SIGNIFICANT CHANGE UP
BASOPHILS # BLD AUTO: 0.04 K/UL — SIGNIFICANT CHANGE UP (ref 0–0.2)
BASOPHILS NFR BLD AUTO: 0.6 % — SIGNIFICANT CHANGE UP (ref 0–2)
BILIRUB SERPL-MCNC: 0.3 MG/DL — LOW (ref 0.4–2)
BILIRUB UR-MCNC: NEGATIVE — SIGNIFICANT CHANGE UP
BUN SERPL-MCNC: 12.6 MG/DL — SIGNIFICANT CHANGE UP (ref 8–20)
CALCIUM SERPL-MCNC: 9.5 MG/DL — SIGNIFICANT CHANGE UP (ref 8.4–10.5)
CAST: 0 /LPF — SIGNIFICANT CHANGE UP (ref 0–4)
CHLORIDE SERPL-SCNC: 100 MMOL/L — SIGNIFICANT CHANGE UP (ref 96–108)
CO2 SERPL-SCNC: 24 MMOL/L — SIGNIFICANT CHANGE UP (ref 22–29)
COLOR SPEC: YELLOW — SIGNIFICANT CHANGE UP
CREAT SERPL-MCNC: 0.81 MG/DL — SIGNIFICANT CHANGE UP (ref 0.5–1.3)
DIFF PNL FLD: ABNORMAL
EGFR: 87 ML/MIN/1.73M2 — SIGNIFICANT CHANGE UP
EOSINOPHIL # BLD AUTO: 0.04 K/UL — SIGNIFICANT CHANGE UP (ref 0–0.5)
EOSINOPHIL NFR BLD AUTO: 0.6 % — SIGNIFICANT CHANGE UP (ref 0–6)
GLUCOSE SERPL-MCNC: 117 MG/DL — HIGH (ref 70–99)
GLUCOSE UR QL: NEGATIVE MG/DL — SIGNIFICANT CHANGE UP
HCT VFR BLD CALC: 42.7 % — SIGNIFICANT CHANGE UP (ref 39–50)
HGB BLD-MCNC: 13.9 G/DL — SIGNIFICANT CHANGE UP (ref 13–17)
IMM GRANULOCYTES NFR BLD AUTO: 0.2 % — SIGNIFICANT CHANGE UP (ref 0–0.9)
KETONES UR-MCNC: NEGATIVE MG/DL — SIGNIFICANT CHANGE UP
LEUKOCYTE ESTERASE UR-ACNC: ABNORMAL
LYMPHOCYTES # BLD AUTO: 1.49 K/UL — SIGNIFICANT CHANGE UP (ref 1–3.3)
LYMPHOCYTES # BLD AUTO: 23.6 % — SIGNIFICANT CHANGE UP (ref 13–44)
MCHC RBC-ENTMCNC: 24.8 PG — LOW (ref 27–34)
MCHC RBC-ENTMCNC: 32.6 GM/DL — SIGNIFICANT CHANGE UP (ref 32–36)
MCV RBC AUTO: 76.3 FL — LOW (ref 80–100)
MONOCYTES # BLD AUTO: 0.45 K/UL — SIGNIFICANT CHANGE UP (ref 0–0.9)
MONOCYTES NFR BLD AUTO: 7.1 % — SIGNIFICANT CHANGE UP (ref 2–14)
NEUTROPHILS # BLD AUTO: 4.29 K/UL — SIGNIFICANT CHANGE UP (ref 1.8–7.4)
NEUTROPHILS NFR BLD AUTO: 67.9 % — SIGNIFICANT CHANGE UP (ref 43–77)
NITRITE UR-MCNC: NEGATIVE — SIGNIFICANT CHANGE UP
PH UR: 6 — SIGNIFICANT CHANGE UP (ref 5–8)
PLATELET # BLD AUTO: 189 K/UL — SIGNIFICANT CHANGE UP (ref 150–400)
POTASSIUM SERPL-MCNC: 4.4 MMOL/L — SIGNIFICANT CHANGE UP (ref 3.5–5.3)
POTASSIUM SERPL-SCNC: 4.4 MMOL/L — SIGNIFICANT CHANGE UP (ref 3.5–5.3)
PROT SERPL-MCNC: 6.8 G/DL — SIGNIFICANT CHANGE UP (ref 6.6–8.7)
PROT UR-MCNC: NEGATIVE MG/DL — SIGNIFICANT CHANGE UP
RBC # BLD: 5.6 M/UL — SIGNIFICANT CHANGE UP (ref 4.2–5.8)
RBC # FLD: 14.7 % — HIGH (ref 10.3–14.5)
RBC CASTS # UR COMP ASSIST: 217 /HPF — HIGH (ref 0–4)
SODIUM SERPL-SCNC: 139 MMOL/L — SIGNIFICANT CHANGE UP (ref 135–145)
SP GR SPEC: 1.01 — SIGNIFICANT CHANGE UP (ref 1–1.03)
SQUAMOUS # UR AUTO: 0 /HPF — SIGNIFICANT CHANGE UP (ref 0–5)
UROBILINOGEN FLD QL: 0.2 MG/DL — SIGNIFICANT CHANGE UP (ref 0.2–1)
WBC # BLD: 6.32 K/UL — SIGNIFICANT CHANGE UP (ref 3.8–10.5)
WBC # FLD AUTO: 6.32 K/UL — SIGNIFICANT CHANGE UP (ref 3.8–10.5)
WBC UR QL: 5 /HPF — SIGNIFICANT CHANGE UP (ref 0–5)

## 2024-02-05 PROCEDURE — 99283 EMERGENCY DEPT VISIT LOW MDM: CPT

## 2024-02-05 PROCEDURE — 99284 EMERGENCY DEPT VISIT MOD MDM: CPT

## 2024-02-05 PROCEDURE — 51702 INSERT TEMP BLADDER CATH: CPT

## 2024-02-05 PROCEDURE — 85025 COMPLETE CBC W/AUTO DIFF WBC: CPT

## 2024-02-05 PROCEDURE — 36415 COLL VENOUS BLD VENIPUNCTURE: CPT

## 2024-02-05 PROCEDURE — 80053 COMPREHEN METABOLIC PANEL: CPT

## 2024-02-05 PROCEDURE — 81001 URINALYSIS AUTO W/SCOPE: CPT

## 2024-02-05 PROCEDURE — 87086 URINE CULTURE/COLONY COUNT: CPT

## 2024-02-05 RX ORDER — CEPHALEXIN 500 MG
500 CAPSULE ORAL ONCE
Refills: 0 | Status: COMPLETED | OUTPATIENT
Start: 2024-02-05 | End: 2024-02-05

## 2024-02-05 RX ORDER — CEPHALEXIN 500 MG
1 CAPSULE ORAL
Qty: 20 | Refills: 0
Start: 2024-02-05 | End: 2024-02-14

## 2024-02-05 RX ADMIN — Medication 500 MILLIGRAM(S): at 09:09

## 2024-02-05 NOTE — ED ADULT TRIAGE NOTE - GLASGOW COMA SCALE: EYE OPENING, MLM
Problem: Patient Care Overview  Goal: Plan of Care Review  Outcome: Ongoing (interventions implemented as appropriate)  No change in respiratory status, will continue to monitor.       (E4) spontaneous

## 2024-02-05 NOTE — ED PROVIDER NOTE - CLINICAL SUMMARY MEDICAL DECISION MAKING FREE TEXT BOX
83y Male with PMH of HTN, HLD, DM2, RBBB, ILR, COPD, gastric cancer 2020 s/p gastrectomy and chemotherapy, SDH s/p fall 2020 presents because he has been unable to urinate since last night around 8 pm and noted increase discomfort this morning.  Melo placed. Ua is as noted. Labs pending.

## 2024-02-05 NOTE — ED ADULT NURSE REASSESSMENT NOTE - NS ED NURSE REASSESS COMMENT FT1
Report received from HELLEN Amaral. Pt. AxO3 with equal and nonlabored respirations. Pt. has Melo catheter placed. Pt. awaiting MD orders and updated on plan of care.

## 2024-02-05 NOTE — ED PROVIDER NOTE - PATIENT PORTAL LINK FT
You can access the FollowMyHealth Patient Portal offered by [facility] by registering at the following website: [email or web address]. By joining [employer]’s FollowMyHealth portal, you will also be able to view your health information using other applications (apps) compatible with our system.

## 2024-02-05 NOTE — ED PROVIDER NOTE - CARE PROVIDER_API CALL
Stephani Deshpande  Urology  [address], [address]  [place] [address]  Phone: [phone]  Fax: [phone]  Follow Up Time: Urgent

## 2024-02-05 NOTE — ED PROVIDER NOTE - NSFOLLOWUPINSTRUCTIONS_ED_ALL_ED_FT
1) Follow up with your doctor or urologist within one week  2) Return to the ER for worsening or concerning symptoms    Acute Urinary Retention, Male       Acute urinary retention is a condition in which a person is unable to pass urine. This can last for a short time or for a long time. If left untreated, it can result in kidney damage or other serious complications.    What are the causes?  This condition may be caused by:    Obstruction or narrowing of the tube that drains the bladder (urethra). This may be caused by surgery or problems with nearby organs, such as the prostate gland, which can press or squeeze the urethra.  Problems with the nerves in the bladder. These can be caused by diseases, such as multiple sclerosis, or by spinal cord injuries.  Certain medicines.  Tumors in the area of the pelvis, bladder, or urethra.  Diabetes.  Degenerative cognitive conditions such as delirium or dementia.  Bladder or urinary tract infection.  Constipation.  Blood in the urine (hematuria).  Injury to the bladder or urethra.   Psychological (psychogenic) conditions. Someone may hold his urine due to trauma or because he does not want to use the bathroom.    What increases the risk?  This condition is more likely to develop in older men. As men age, their prostate may become larger and may start pressing or squeezing on the bladder or the urethra.    What are the signs or symptoms?  Symptoms of this condition include:    Trouble urinating.  Pain in the lower abdomen.    Symptoms usually come on slowly over a long period of time.    How is this diagnosed?  This condition is diagnosed based on a physical exam and a medical history. You may also have other tests, including:    An ultrasound of the bladder or kidneys or both.  Blood tests.  A urine analysis.  Additional tests may be needed such as an MRI, kidney, or bladder function tests.    How is this treated?  Treatment for this condition may include:    Medicines.  Placing a thin, sterile tube (catheter) into the bladder to drain urine out of the body. This is called an indwelling urinary catheter. After being inserted, the catheter is held in place with a small balloon that is filled with sterile water. Urine drains from the catheter into a collection bag outside of the body.  Behavioral therapy.  Treatment for any underlying conditions.  If needed, you may be treated in the hospital for kidney function problems or to manage other complications.    Follow these instructions at home:  Take over-the-counter and prescription medicines only as told by your health care provider. Avoid certain medicines, such as decongestants, antihistamines, and some prescription medicines. Do not take any medicine unless your health care provider has approved.  If you were given an indwelling urinary catheter, take care of it as told by your health care provider.  Drink enough fluid to keep your urine clear or pale yellow.  If you were prescribed an antibiotic, take it as told by your health care provider. Do not stop taking the antibiotic even if you start to feel better.  Do not use any products that contain nicotine or tobacco, such as cigarettes and e-cigarettes. If you need help quitting, ask your health care provider.  Monitor any changes in your symptoms. Tell your health care provider about any changes.  If instructed, monitor your blood pressure at home. Report changes as told by your health care provider.  Keep all follow-up visits as told by your health care provider. This is important.    Contact a health care provider if:  You have uncomfortable bladder contractions that you cannot control (spasms) or you leak urine with the spasms.    Get help right away if:  You have chills or fever.  You have blood in your urine.  You have a catheter and:    Your catheter stops draining urine.  Your catheter falls out.    Summary  Acute urinary retention is a condition in which a person is unable to pass urine. If left untreated, it can result in kidney damage or other serious complications.  The cause of this condition may include an enlarged prostate. As men age, their prostate gland may become larger and may start pressing or squeezing on the bladder or the urethra.  Treatment for this condition may include medicines and placement of an indwelling urinary catheter.  Monitor any changes in your symptoms. Tell your health care provider about any changes.    ADDITIONAL NOTES AND INSTRUCTIONS    Please follow up with your Primary MD in 24-48 hr.  Seek immediate medical care for any new/worsening signs or symptoms. 1) Follow up with your doctor or urologist within one week  2) Return to the ER for worsening or concerning symptoms    Acute Urinary Retention, Male       Acute urinary retention is a condition in which a person is unable to pass urine. This can last for a short time or for a long time. If left untreated, it can result in kidney damage or other serious complications.    What are the causes?  This condition may be caused by:    Obstruction or narrowing of the tube that drains the bladder (urethra). This may be caused by surgery or problems with nearby organs, such as the prostate gland, which can press or squeeze the urethra.  Problems with the nerves in the bladder. These can be caused by diseases, such as multiple sclerosis, or by spinal cord injuries.  Certain medicines.  Tumors in the area of the pelvis, bladder, or urethra.  Diabetes.  Degenerative cognitive conditions such as delirium or dementia.  Bladder or urinary tract infection.  Constipation.  Blood in the urine (hematuria).  Injury to the bladder or urethra.   Psychological (psychogenic) conditions. Someone may hold his urine due to trauma or because he does not want to use the bathroom.    What increases the risk?  This condition is more likely to develop in older men. As men age, their prostate may become larger and may start pressing or squeezing on the bladder or the urethra.    What are the signs or symptoms?  Symptoms of this condition include:    Trouble urinating.  Pain in the lower abdomen.    Symptoms usually come on slowly over a long period of time.    How is this diagnosed?  This condition is diagnosed based on a physical exam and a medical history. You may also have other tests, including:    An ultrasound of the bladder or kidneys or both.  Blood tests.  A urine analysis.  Additional tests may be needed such as an MRI, kidney, or bladder function tests.    How is this treated?  Treatment for this condition may include:    Medicines.  Placing a thin, sterile tube (catheter) into the bladder to drain urine out of the body. This is called an indwelling urinary catheter. After being inserted, the catheter is held in place with a small balloon that is filled with sterile water. Urine drains from the catheter into a collection bag outside of the body.  Behavioral therapy.  Treatment for any underlying conditions.  If needed, you may be treated in the hospital for kidney function problems or to manage other complications.    Follow these instructions at home:  Take over-the-counter and prescription medicines only as told by your health care provider. Avoid certain medicines, such as decongestants, antihistamines, and some prescription medicines. Do not take any medicine unless your health care provider has approved.  If you were given an indwelling urinary catheter, take care of it as told by your health care provider.  Drink enough fluid to keep your urine clear or pale yellow.  If you were prescribed an antibiotic, take it as told by your health care provider. Do not stop taking the antibiotic even if you start to feel better.  Do not use any products that contain nicotine or tobacco, such as cigarettes and e-cigarettes. If you need help quitting, ask your health care provider.  Monitor any changes in your symptoms. Tell your health care provider about any changes.  If instructed, monitor your blood pressure at home. Report changes as told by your health care provider.  Keep all follow-up visits as told by your health care provider. This is important.    Contact a health care provider if:  You have uncomfortable bladder contractions that you cannot control (spasms) or you leak urine with the spasms.    Get help right away if:  You have chills or fever.  You have blood in your urine.  You have a catheter and:    Your catheter stops draining urine.  Your catheter falls out.    Summary  Acute urinary retention is a condition in which a person is unable to pass urine. If left untreated, it can result in kidney damage or other serious complications.  The cause of this condition may include an enlarged prostate. As men age, their prostate gland may become larger and may start pressing or squeezing on the bladder or the urethra.  Treatment for this condition may include medicines and placement of an indwelling urinary catheter.  Monitor any changes in your symptoms. Tell your health care provider about any changes.    ADDITIONAL NOTES AND INSTRUCTIONS    Please follow up with your Primary MD in 24-48 hr.  Seek immediate medical care for any new/worsening signs or symptoms.    Indwelling Urinary Catheter Care, Adult  An indwelling urinary catheter is a thin tube that is put into your bladder. The tube helps to drain pee (urine) out of your body. The tube goes in through your urethra. Your urethra is where pee comes out of your body. Your pee will come out through the catheter, then it will go into a bag (drainage bag).    Take good care of your catheter so it will work well.    What are the risks?  Germs may get into your bladder and cause an infection.  The tube can become blocked.  Tissue near the catheter may become irritated and may bleed.  How to wear your catheter and drainage bag  Supplies needed    Sticky tape (adhesive tape) or a leg strap.  Alcohol wipe or soap and water (if you use tape).  A clean towel (if you use tape).  Large overnight bag.  Smaller bag (leg bag).  Wearing your catheter    Attach your catheter to your leg with tape or a leg strap.  Make sure the catheter is not pulled tight.  If a leg strap gets wet, take it off and put on a dry strap.  If you use tape to hold the bag on your leg:  Use an alcohol wipe or soap and water to wash your skin where the tape made it sticky before.  Use a clean towel to pat-dry that skin.  Use new tape to make the bag stay on your leg.  Wearing your bags    You should have been given a large overnight bag.  You may wear the overnight bag in the day or night.  Always have the overnight bag lower than your bladder. Do not let the bag touch the floor.  Before you go to sleep, put a clean plastic bag in a wastebasket. Then, hang the overnight bag inside the wastebasket.  You should also have a smaller leg bag that fits under your clothes.  Wear the leg bag as told by the product maker. This may be above or below the knee, depending on the length of the tubing.  Make sure that the leg bag is below the bladder.  Make sure that the tubing does not have loops or too much tension.  Do not wear your leg bag at night.  How to care for your skin and catheter  Supplies needed    A clean washcloth.  Water and mild soap.  A clean towel.  Caring for your skin and catheter    Female anatomy showing the labia, urethra, and an indwelling urinary catheter in the bladder.  Male anatomy showing the penis, urethra, and an indwelling urinary catheter in the bladder.  Clean the skin around your catheter every day.  Wash your hands with soap and water.  Wet a clean washcloth in warm water and mild soap.  Clean the skin around your urethra.  If you are female:  Gently spread the folds of skin around your vagina (labia).  With the washcloth in your other hand, wipe the inner side of your labia on each side. Wipe from front to back.  If you are male:  Pull back any skin that covers the end of your penis (foreskin).  With the washcloth in your other hand, wipe your penis in small circles. Start wiping at the tip of your penis, then move away from the catheter.  Move the foreskin back in place, if needed.  With your free hand, hold the catheter close to where it goes into your body.  Keep holding the catheter during cleaning so it does not get pulled out.  With the washcloth in your other hand, clean the catheter.  Only wipe downward on the catheter, toward the drainage bag.  Do not wipe upward toward your body. Doing this may push germs into your urethra and cause infection.  Use a clean towel to pat-dry the catheter and the skin around it. Make sure to wipe off all soap.  Wash your hands with soap and water.  Shower every day. Do not take baths.  Do not use cream, ointment, or lotion on the area where the catheter goes into your body, unless your doctor tells you to.  Do not use powders, sprays, or lotions on your genital area.  Check your skin around the catheter every day for signs of infection. Check for:  Redness, swelling, or pain.  Fluid or blood.  Warmth.  Pus or a bad smell.  How to empty the bag  Supplies needed    Rubbing alcohol.  Gauze pad or cotton ball.  Tape or a leg strap.  Emptying the bag    Pour the pee out of your bag when it is ?–½ full, or at least 2–3 times a day. Do this for your overnight bag and your leg bag.  Wash your hands with soap and water.  Separate (detach) the bag from your leg.  Hold the bag over the toilet or a clean pail. Keep the bag lower than your hips and bladder. This is so the pee (urine) does not go back into the tube.  Open the pour spout. It is at the bottom of the bag.  Empty the pee into the toilet or pail. Do not let the pour spout touch any surface.  Put rubbing alcohol on a gauze pad or cotton ball.  Use the gauze pad or cotton ball to clean the pour spout.  Close the pour spout.  Attach the bag to your leg with tape or a leg strap.  Wash your hands with soap and water.  Follow instructions for cleaning the drainage bag. Instructions can come from:  The product maker.  Your doctor.  How to change the bag  Changing the bag    Replace your bag when it starts to leak, smell bad, or look dirty.  Wash your hands with soap and water.  Separate the dirty bag from your leg.  Pinch the catheter with your fingers so that pee does not spill out.  Separate the catheter tube from the bag tube where these tubes connect (at the connection valve). Do not let the tubes touch any surface.  Clean the end of the catheter tube with an alcohol wipe. Use a different alcohol wipe to clean the end of the bag tube.  Connect the catheter tube to the tube of the clean bag.  Attach the clean bag to your leg with tape or a leg strap. Do not make the bag tight on your leg.  Wash your hands with soap and water.  General instructions  A person washing hands with soap and water.  Never pull on your catheter. Never try to take it out. Doing that can hurt you.  Always wash your hands before and after you touch your catheter or bag. Use a mild, fragrance-free soap. If you do not have soap and water, use hand .  Always make sure there are no twists, bends, or kinks in the catheter tube.  Always make sure there are no leaks in the catheter or bag.  Drink enough fluid to keep your pee pale yellow.  Do not take baths, swim, or use a hot tub.  If you are female, wipe from front to back after you poop (have a bowel movement).  Contact a doctor if:  Your catheter gets clogged.  Your catheter leaks.  You have signs of infection at the catheter site, such as:  Redness, swelling, or pain where the catheter goes into your body.  Fluid, blood, pus, or a bad smell coming from the area where the catheter goes into your body.  Skin feels warm where the catheter goes into your body.  You have signs of a bladder infection, such as:  Fever.  Chills.  Pee smells worse than usual.  Cloudy pee.  Pain in your belly, legs, lower back, or bladder.  Vomiting or feel like vomiting.  Get help right away if:  You see blood in the catheter.  Your pee is pink or red.  Your bladder feels full.  Your pee is not draining into the bag.  Your catheter gets pulled out.  Summary  An indwelling urinary catheter is a thin tube that is placed into the bladder to help drain pee (urine) out of the body.  The catheter is placed into the part of the body that drains pee from the bladder (urethra).  Taking good care of your catheter will keep it working well.  Always wash your hands before and after touching your catheter or bag.  Never pull on your catheter or try to take it out.  This information is not intended to replace advice given to you by your health care provider. Make sure you discuss any questions you have with your health care provider.

## 2024-02-05 NOTE — ED PROVIDER NOTE - OBJECTIVE STATEMENT
83y Male with PMH of HTN, HLD, DM2, RBBB, ILR, COPD, gastric cancer 2020 s/p gastrectomy and chemotherapy, SDH s/p fall 2020 presents because he has been unable to urinate since last night around 8 pm and noted increase discomfort this morning.

## 2024-02-06 LAB
CULTURE RESULTS: SIGNIFICANT CHANGE UP
SPECIMEN SOURCE: SIGNIFICANT CHANGE UP

## 2024-02-07 NOTE — DISCHARGE NOTE PROVIDER - NSDCHHPTASSISTHOME_GEN_ALL_CORE
Spoke with pt to r/s July appt with Dr. Jacinto Bass on 2/7/2024 at 11:02 AM     Patient Needs Assistance to Leave Residence...

## 2024-02-22 ENCOUNTER — APPOINTMENT (OUTPATIENT)
Age: 84
End: 2024-02-22

## 2024-03-26 ENCOUNTER — OUTPATIENT (OUTPATIENT)
Dept: OUTPATIENT SERVICES | Facility: HOSPITAL | Age: 84
LOS: 1 days | Discharge: HOME | End: 2024-03-26

## 2024-03-26 DIAGNOSIS — Z90.3 ACQUIRED ABSENCE OF STOMACH [PART OF]: Chronic | ICD-10-CM

## 2024-03-26 DIAGNOSIS — Z95.818 PRESENCE OF OTHER CARDIAC IMPLANTS AND GRAFTS: Chronic | ICD-10-CM

## 2024-03-26 DIAGNOSIS — C16.9 MALIGNANT NEOPLASM OF STOMACH, UNSPECIFIED: ICD-10-CM

## 2024-03-26 DIAGNOSIS — Z95.828 PRESENCE OF OTHER VASCULAR IMPLANTS AND GRAFTS: Chronic | ICD-10-CM

## 2024-04-03 ENCOUNTER — APPOINTMENT (OUTPATIENT)
Dept: RHEUMATOLOGY | Facility: CLINIC | Age: 84
End: 2024-04-03
Payer: MEDICARE

## 2024-04-03 DIAGNOSIS — M65.9 SYNOVITIS AND TENOSYNOVITIS, UNSPECIFIED: ICD-10-CM

## 2024-04-03 DIAGNOSIS — Z79.1 LONG TERM (CURRENT) USE OF NON-STEROIDAL ANTI-INFLAMMATORIES (NSAID): ICD-10-CM

## 2024-04-03 DIAGNOSIS — M79.89 OTHER SPECIFIED SOFT TISSUE DISORDERS: ICD-10-CM

## 2024-04-03 DIAGNOSIS — M15.9 POLYOSTEOARTHRITIS, UNSPECIFIED: ICD-10-CM

## 2024-04-03 DIAGNOSIS — M34.9 SYSTEMIC SCLEROSIS, UNSPECIFIED: ICD-10-CM

## 2024-04-03 DIAGNOSIS — I73.00 RAYNAUD'S SYNDROME W/OUT GANGRENE: ICD-10-CM

## 2024-04-03 DIAGNOSIS — Z86.2 PERSONAL HISTORY OF DISEASES OF THE BLOOD AND BLOOD-FORMING ORGANS AND CERTAIN DISORDERS INVOLVING THE IMMUNE MECHANISM: ICD-10-CM

## 2024-04-03 DIAGNOSIS — M24.849 OTHER SPECIFIC JOINT DERANGEMENTS OF UNSPECIFIED HAND, NOT ELSEWHERE CLASSIFIED: ICD-10-CM

## 2024-04-03 DIAGNOSIS — H04.123 DRY EYE SYNDROME OF BILATERAL LACRIMAL GLANDS: ICD-10-CM

## 2024-04-03 DIAGNOSIS — M21.952 UNSPECIFIED ACQUIRED DEFORMITY OF RIGHT THIGH: ICD-10-CM

## 2024-04-03 DIAGNOSIS — M21.951 UNSPECIFIED ACQUIRED DEFORMITY OF RIGHT THIGH: ICD-10-CM

## 2024-04-03 DIAGNOSIS — M35.01 SICCA SYNDROME WITH KERATOCONJUNCTIVITIS: ICD-10-CM

## 2024-04-03 DIAGNOSIS — R68.2 DRY MOUTH, UNSPECIFIED: ICD-10-CM

## 2024-04-03 PROCEDURE — 99215 OFFICE O/P EST HI 40 MIN: CPT

## 2024-04-03 PROCEDURE — G2212 PROLONG OUTPT/OFFICE VIS: CPT

## 2024-04-03 PROCEDURE — G2211 COMPLEX E/M VISIT ADD ON: CPT

## 2024-04-03 RX ORDER — NABUMETONE 500 MG/1
500 TABLET, FILM COATED ORAL
Qty: 60 | Refills: 3 | Status: ACTIVE | COMMUNITY
Start: 2024-04-03 | End: 1900-01-01

## 2024-04-03 RX ORDER — CELECOXIB 200 MG/1
200 CAPSULE ORAL
Qty: 30 | Refills: 3 | Status: DISCONTINUED | COMMUNITY
Start: 2023-11-30 | End: 2024-04-03

## 2024-04-03 RX ORDER — OMEPRAZOLE 20 MG/1
20 CAPSULE, DELAYED RELEASE ORAL DAILY
Qty: 30 | Refills: 3 | Status: ACTIVE | COMMUNITY
Start: 2024-04-03 | End: 1900-01-01

## 2024-04-04 ENCOUNTER — RESULT REVIEW (OUTPATIENT)
Age: 84
End: 2024-04-04

## 2024-04-04 ENCOUNTER — APPOINTMENT (OUTPATIENT)
Age: 84
End: 2024-04-04

## 2024-04-04 ENCOUNTER — APPOINTMENT (OUTPATIENT)
Dept: HEMATOLOGY ONCOLOGY | Facility: CLINIC | Age: 84
End: 2024-04-04
Payer: MEDICARE

## 2024-04-04 VITALS
TEMPERATURE: 97.4 F | OXYGEN SATURATION: 98 % | DIASTOLIC BLOOD PRESSURE: 76 MMHG | BODY MASS INDEX: 24.53 KG/M2 | SYSTOLIC BLOOD PRESSURE: 145 MMHG | HEART RATE: 74 BPM | HEIGHT: 65.5 IN | WEIGHT: 149.01 LBS

## 2024-04-04 DIAGNOSIS — C16.9 MALIGNANT NEOPLASM OF STOMACH, UNSPECIFIED: ICD-10-CM

## 2024-04-04 LAB
BASOPHILS # BLD AUTO: 0.1 K/UL — SIGNIFICANT CHANGE UP (ref 0–0.2)
BASOPHILS NFR BLD AUTO: 1.8 % — SIGNIFICANT CHANGE UP (ref 0–2)
CEA SERPL-MCNC: 6.5 NG/ML — HIGH (ref 0–3.8)
EOSINOPHIL # BLD AUTO: 0.1 K/UL — SIGNIFICANT CHANGE UP (ref 0–0.5)
EOSINOPHIL NFR BLD AUTO: 1.4 % — SIGNIFICANT CHANGE UP (ref 0–6)
HCT VFR BLD CALC: 40.2 % — SIGNIFICANT CHANGE UP (ref 39–50)
HGB BLD-MCNC: 13 G/DL — SIGNIFICANT CHANGE UP (ref 13–17)
LYMPHOCYTES # BLD AUTO: 1.3 K/UL — SIGNIFICANT CHANGE UP (ref 1–3.3)
LYMPHOCYTES # BLD AUTO: 29.7 % — SIGNIFICANT CHANGE UP (ref 13–44)
MCHC RBC-ENTMCNC: 25.3 PG — LOW (ref 27–34)
MCHC RBC-ENTMCNC: 32.2 G/DL — SIGNIFICANT CHANGE UP (ref 32–36)
MCV RBC AUTO: 78.4 FL — LOW (ref 80–100)
MONOCYTES # BLD AUTO: 0.3 K/UL — SIGNIFICANT CHANGE UP (ref 0–0.9)
MONOCYTES NFR BLD AUTO: 6.3 % — SIGNIFICANT CHANGE UP (ref 2–14)
NEUTROPHILS # BLD AUTO: 2.7 K/UL — SIGNIFICANT CHANGE UP (ref 1.8–7.4)
NEUTROPHILS NFR BLD AUTO: 60.8 % — SIGNIFICANT CHANGE UP (ref 43–77)
PLATELET # BLD AUTO: 170 K/UL — SIGNIFICANT CHANGE UP (ref 150–400)
RBC # BLD: 5.12 M/UL — SIGNIFICANT CHANGE UP (ref 4.2–5.8)
RBC # FLD: 14 % — SIGNIFICANT CHANGE UP (ref 10.3–14.5)
WBC # BLD: 4.5 K/UL — SIGNIFICANT CHANGE UP (ref 3.8–10.5)
WBC # FLD AUTO: 4.5 K/UL — SIGNIFICANT CHANGE UP (ref 3.8–10.5)

## 2024-04-04 PROCEDURE — 99214 OFFICE O/P EST MOD 30 MIN: CPT

## 2024-04-04 NOTE — HISTORY OF PRESENT ILLNESS
[Date: ____________] : Patient's last distress assessment performed on [unfilled]. [0 - No Distress] : Distress Level: 0 [90: Able to carry normal activity; minor signs or symptoms of disease.] : 90: Able to carry normal activity; minor signs or symptoms of disease.  [ECOG Performance Status: 1 - Restricted in physically strenuous activity but ambulatory and able to carry out work of a light or sedentary nature] : Performance Status: 1 - Restricted in physically strenuous activity but ambulatory and able to carry out work of a light or sedentary nature, e.g., light house work, office work [de-identified] : \par  The patient was diagnosed with gastric adenocarcinoma in June 2020 at the age of 79. He was sent for CT by his PCP, Dr. Charles Smith, due to anemia. CT showed in the upper central abdomen abutting the posterior antrum of the stomach and neck of the pancreas there is a 3 x 2 x 3 cm mass. There is an additional heterogeneous enhancing 2.7 x 2.3 x 2.5 lobulated mass in the ventral upper abdominal peritoneal cavity anterosuperiorly to the antrum of the stomach with internal and surrounding enhancing vessels. Endoscopy with biopsy of the gastric mass was c/w moderately differentiated adenocarcinoma. Staging PET showed hypermetabolic thickening of the distal stomach, consistent with primary gastric cancer. Hypermetabolic activity in the distal stomach, inseparable from the perigastric lymphadenopathy, consistent with metastatic kong disease. \par  \par  TNM stage: T3, N2, M0 \par  \par  \par  Patient completed 4 cycles of postoperative adjuvant FLOT for gastric adenocarcinoma s/p robotic assisted laparoscopic distal gastrectomy with Billroth 2 gastrojejunostomy reconstruction and placement of feeding jejunostomy tube on 11/9/20 with Dr Young. He is s/p 4 cycles neoadjuvant FLOT. \par  + Fatigue, intermittent and mild. + Peripheral neuropathy, improved with gabapentin. + Occasional emesis after "eating too much," denies nausea. + Constipation, improved. + Alopecia. + Nail changes. + Cold intolerance improving. + Decreased appetite with mild weight loss. PEG removed by Dr. Young. + Grade 1 H/F, xeroderma only, no pain. No myalgias. No weakness. No dysgeusia. No fevers, no cough, no SOB. \par  \par  Socx: H/o smoking \par   [FreeTextEntry1] : status post FLOT , resection; 2020 RT to left lung 2023 [de-identified] : Patient presents for follow up. He completed 4 cycles of postoperative adjuvant FLOT for gastric adenocarcinoma s/p robotic assisted laparoscopic distal gastrectomy with Billroth 2 gastrojejunostomy reconstruction and placement of feeding jejunostomy tube on 11/9/20 with Dr Young. He is s/p 4 cycles neoadjuvant FLOT.  He is also s/p SBRT for a Stage 1A adenocarcinoma of the lung.  + Very occasional constipation, otherwise moving bowels and eating normally. No fatigue. No decreased appetite. No dysphagia. No abdominal pain. No fevers or chills.
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
## 2024-04-05 LAB
ALBUMIN SERPL ELPH-MCNC: 4.2 G/DL — SIGNIFICANT CHANGE UP (ref 3.3–5)
ALP SERPL-CCNC: 89 U/L — SIGNIFICANT CHANGE UP (ref 40–120)
ALT FLD-CCNC: 14 U/L — SIGNIFICANT CHANGE UP (ref 10–45)
ANION GAP SERPL CALC-SCNC: 17 MMOL/L — SIGNIFICANT CHANGE UP (ref 5–17)
AST SERPL-CCNC: 21 U/L — SIGNIFICANT CHANGE UP (ref 10–40)
BILIRUB SERPL-MCNC: 0.2 MG/DL — SIGNIFICANT CHANGE UP (ref 0.2–1.2)
BUN SERPL-MCNC: 16 MG/DL — SIGNIFICANT CHANGE UP (ref 7–23)
CALCIUM SERPL-MCNC: 9.7 MG/DL — SIGNIFICANT CHANGE UP (ref 8.4–10.5)
CHLORIDE SERPL-SCNC: 102 MMOL/L — SIGNIFICANT CHANGE UP (ref 96–108)
CO2 SERPL-SCNC: 17 MMOL/L — LOW (ref 22–31)
CREAT SERPL-MCNC: 0.91 MG/DL — SIGNIFICANT CHANGE UP (ref 0.5–1.3)
EGFR: 83 ML/MIN/1.73M2 — SIGNIFICANT CHANGE UP
GLUCOSE SERPL-MCNC: 104 MG/DL — HIGH (ref 70–99)
MAGNESIUM SERPL-MCNC: 1.9 MG/DL — SIGNIFICANT CHANGE UP (ref 1.6–2.6)
POTASSIUM SERPL-MCNC: 4.5 MMOL/L — SIGNIFICANT CHANGE UP (ref 3.5–5.3)
POTASSIUM SERPL-SCNC: 4.5 MMOL/L — SIGNIFICANT CHANGE UP (ref 3.5–5.3)
PROT SERPL-MCNC: 6.9 G/DL — SIGNIFICANT CHANGE UP (ref 6–8.3)
SODIUM SERPL-SCNC: 137 MMOL/L — SIGNIFICANT CHANGE UP (ref 135–145)

## 2024-04-06 PROBLEM — Z86.2 HISTORY OF SICKLE CELL TRAIT: Status: RESOLVED | Noted: 2024-04-06 | Resolved: 2024-04-06

## 2024-04-06 NOTE — CONSULT LETTER
[Dear  ___] : Dear  [unfilled], [Consult Letter:] : I had the pleasure of evaluating your patient, [unfilled]. [Please see my note below.] : Please see my note below. [Consult Closing:] : Thank you very much for allowing me to participate in the care of this patient.  If you have any questions, please do not hesitate to contact me. [Sincerely,] : Sincerely, [DrRobert  ___] : Dr. LARRY [DrRobert ___] : Dr. LARRY [FreeTextEntry3] : Yung\par  Myron I. Kleiner, M.D., FACR\par  Chief, Division of Rheumatology\par  Department of Medicine\par  [facility]

## 2024-04-06 NOTE — ADDENDUM
[FreeTextEntry1] :  I, Aron Sotelo, acted solely as a scribe for Dr. Myron I. Kleiner, MD. on 04/03/2024. I personally performed the services described in the documentation, reviewed the documentation recorded by the scribe in my presence, and it accurately and completely records my words and actions.

## 2024-04-06 NOTE — PHYSICAL EXAM
[General Appearance - Alert] : alert [General Appearance - In No Acute Distress] : in no acute distress [General Appearance - Well Nourished] : well nourished [General Appearance - Well Developed] : well developed [Sclera] : the sclera and conjunctiva were normal [PERRL With Normal Accommodation] : pupils were equal in size, round, and reactive to light [Extraocular Movements] : extraocular movements were intact [Outer Ear] : the ears and nose were normal in appearance [Oropharynx] : the oropharynx was normal [Neck Appearance] : the appearance of the neck was normal [Neck Cervical Mass (___cm)] : no neck mass was observed [Jugular Venous Distention Increased] : there was no jugular-venous distention [Thyroid Diffuse Enlargement] : the thyroid was not enlarged [Lungs Percussion] : the lungs were normal to percussion [Heart Rate And Rhythm] : heart rate was normal and rhythm regular [Edema] : there was no peripheral edema [Abdomen Soft] : soft [Abdomen Tenderness] : non-tender [Abdomen Mass (___ Cm)] : no abdominal mass palpated [Cervical Lymph Nodes Enlarged Posterior Bilaterally] : posterior cervical [Cervical Lymph Nodes Enlarged Anterior Bilaterally] : anterior cervical [Supraclavicular Lymph Nodes Enlarged Bilaterally] : supraclavicular [Axillary Lymph Nodes Enlarged Bilaterally] : axillary [No CVA Tenderness] : no ~M costovertebral angle tenderness [No Spinal Tenderness] : no spinal tenderness [Skin Color & Pigmentation] : normal skin color and pigmentation [Skin Turgor] : normal skin turgor [Cranial Nerves] : cranial nerves 2-12 were intact [Sensation] : the sensory exam was normal to light touch and pinprick [Motor Exam] : the motor exam was normal [No Focal Deficits] : no focal deficits [Oriented To Time, Place, And Person] : oriented to person, place, and time [Impaired Insight] : insight and judgment were intact [Affect] : the affect was normal [Mood] : the mood was normal [General Appearance - Well-Appearing] : healthy appearing [] : normal voice and communication [FreeTextEntry1] : Strength-5/5

## 2024-04-06 NOTE — ASSESSMENT
[FreeTextEntry1] : Impression: PETER GAMBOA is a 83 year old man who presents for follow up office visit for further evaluation of joint symptoms and rheumatic diseases including osteoarthritis, Raynaud's, limited scleroderma, Sjogren Syndrome  Note: Patient last seen November 2023  Patient feels fairly well. Occasional locking bilateral 4th fingers with some mild pain secondary to flexor tenosynovitis - on exam patient has flexor tenosynovitis bilateral 1st/4th fingers contributing to his joint pain. On exam patient has dry eyes Secondary to Sjogren's syndrome. Using artificial tears, and oral hydration with relief. Celebrex is not giving him adequate relief. On exam patient has persistent mild diffuse swelling bilateral fingers secondary to limited scleroderma. No recent Raynaud's episodes - he was having a mild episode on exam today, not bothersome. Recent xrays results revealed osteoarthritis bilateral first CMC joints and bilateral first IP joints otherwise normal -- with extensive discussion. Recent lab tests results revealed positive sickle cell trait -- pt is aware from previous dx, MCV 78, hemoglobin 13.7 otherwise normal -- with extensive discussion. He continues calcium with vitamin D supplement. Patient denies rash or side effects with current medications. Patient is content with current medication regimen.   Plan: I reviewed chart and previous records I reviewed his previous lab results with extensive discussion with the patient X-rays results reviewed with the patient with extensive discussion Laboratory tests ordered today-see list below--with coordination of care Diagnosis and prognosis discussed. Continue other current medications (other than those changed below) Discontinue Celebrex  Nabumetone 500 mg b.i.d. end of breakfast and supper (Possible side effects explained including cardiovascular risk/MI/CVA) Change timing of Prophylactic aspirin 81 mg q.d. end of Lunch (Possible side effects explained) Omeprazole 20 mg q.d. a.c. breakfast (possible side effects explained) Artificial tears one drop each eye q.i.d. and p.r.n.(Possible side effects explained) Biotene mouthwash/spray q.i.d. and p.r.n.(Possible side effects explained) Oral Hydration Patient declines oral medication for dryness Keep hands and feet and torso warm - patient warned of dangers of gangrene/digital amputation with Raynaud's -- with extensive discussion Increase home thermostat to at least 72 to 74 F -- with extensive discussion  Cardiology consultation for echocardiogram to monitor for pulmonary hypertension - send me a copy of the results and consult report -- reemphasized ---With coordination of care Return visit 4 months All questions and concerns were addressed. Total time for this office visit, including face-to-face time and non-face-to-face time, 86 minutes--- including review of the chart and previous records, Detailed review of his medical history, review of previous lab results with extensive discussion with the patient, ordering lab tests with coordination of care, review of recent imaging reports/x-ray results with extensive discussion with the patient, detailed medication history, review of medications going forward with their possible side effects, Ordered cardiology consultation with echocardiogram with coordination of care, Review of protocol for prevention of Raynaud's as noted aboveWith extensive discussion, We reviewed the treatment of his dryness as noted above, reviewed the impact of the patient's rheumatic disease on their other medical problems, reviewed the impact of the patient's other medical problems on their rheumatic disease

## 2024-04-06 NOTE — HISTORY OF PRESENT ILLNESS
[FreeTextEntry1] : PETER GAMBOA is a 83 year old man who presents for follow up office visit for further evaluation of joint symptoms and rheumatic diseases including osteoarthritis, Raynaud's, limited scleroderma.  Note: Patient last seen November 2023   Patient feels fairly well. Occasional locking left Fourth fingers. Denies recent other joint pain. No recent Raynauds Episodes. Mentions recent dry eyes and denies dry mouth and skin. Using artificial tears, and oral hydration with relief. He continues calcium with vitamin D supplement. Patient denies rash or side effects with their medications. Patient is content with their current medications. Pt did not perform echocardiogram as instructed from last visit.   PM[facility] Oncology f/u January lung cancer "stable" Chest Cat scan - RT May; no chemotherapy; no surgery.  Oncology f/u gastric carcinoma -- abdominal/pelvic cat scan no recurrence.  On further questioning---Previous history positive for sickle cell trait

## 2024-04-09 ENCOUNTER — APPOINTMENT (OUTPATIENT)
Dept: RADIATION ONCOLOGY | Facility: CLINIC | Age: 84
End: 2024-04-09
Payer: MEDICARE

## 2024-04-09 VITALS
HEART RATE: 92 BPM | OXYGEN SATURATION: 99 % | WEIGHT: 149 LBS | RESPIRATION RATE: 16 BRPM | DIASTOLIC BLOOD PRESSURE: 59 MMHG | SYSTOLIC BLOOD PRESSURE: 104 MMHG | BODY MASS INDEX: 24.42 KG/M2

## 2024-04-09 DIAGNOSIS — Z92.3 PERSONAL HISTORY OF IRRADIATION: ICD-10-CM

## 2024-04-09 DIAGNOSIS — C34.92 MALIGNANT NEOPLASM OF UNSPECIFIED PART OF LEFT BRONCHUS OR LUNG: ICD-10-CM

## 2024-04-09 PROCEDURE — 99213 OFFICE O/P EST LOW 20 MIN: CPT

## 2024-04-10 PROBLEM — Z92.3 PERSONAL HISTORY OF RADIATION THERAPY: Status: ACTIVE | Noted: 2023-11-03

## 2024-04-10 PROBLEM — C34.92 ADENOCARCINOMA OF LEFT LUNG, STAGE 1: Status: ACTIVE | Noted: 2023-04-25

## 2024-04-10 NOTE — ASSESSMENT
[No evidence of disease] : No evidence of disease [FreeTextEntry1] : No appreciable treatment-related sequelae.

## 2024-04-10 NOTE — HISTORY OF PRESENT ILLNESS
[FreeTextEntry1] : The patient is an 82 y/o man with remote history of prostate cancer, recent history of gastric cancer, s/p chemo and surgery ([facility]), and more recently diagnosed with stage I adenocarcinoma of the lung (YONI).  Mr. Petty completed radiation therapy (SBRT) on 6/7/2023.  Denies orthopnea, SOB, cough, pain, weight loss.   Reports he feels great.  Breath sounds CTA b/l.  Follows with Dr. Elise/Constantino Mcgovern.

## 2024-04-10 NOTE — DISEASE MANAGEMENT
[Clinical] : TNM Stage: c [I] : I [FreeTextEntry4] : adenocarcinoma [TTNM] : 1 [NTNM] : 0 [MTNM] : 0 [de-identified] : 5000 cGy [de-identified] : lung (SBRT)

## 2024-04-10 NOTE — DISCHARGE NOTE PROVIDER - DISCHARGE DATE
11-Oct-2020 Outreach attempt was made to schedule a Medicare Wellness Visit. This was the first attempt. Contact was not made, left message. Letter was mailed out.

## 2024-04-10 NOTE — REVIEW OF SYSTEMS
[Negative] : Allergic/Immunologic [FreeTextEntry6] : history of stage I lung cancer and SBRT for same. [FreeTextEntry7] : Hx gastric cancer [FreeTextEntry8] : Hx prostate cancer [de-identified] : s/p chemotherapy

## 2024-04-10 NOTE — LETTER GREETING
[Dear Doctor] : Dear Doctor, [Follow-Up] : Your patient, [unfilled] was seen in my office today for follow-up [Please see my note below.] : Please see my note below. [FreeTextEntry2] : Aron Mckenzie MD

## 2024-04-10 NOTE — LETTER CLOSING
[Sincerely yours,] : Sincerely yours, [FreeTextEntry3] : Munir Somers MD Physician in Chief Department of Radiation Medicine [facility]   of Radiation Medicine [employer] at  [facility]  Radiation  [facility] [place] [address]  Tel: [phone] Fax: [phone]

## 2024-05-10 NOTE — PATIENT PROFILE ADULT - NS PRO AD NO ADVANCE DIRECTIVE
Health Maintenance Due   Topic Date Due    Pneumococcal Vaccines (Age 0-64) (1 of 2 - PCV) 05/17/2002    Low Dose Statin  Never done    COVID-19 Vaccine (5 - 2023-24 season) 09/01/2023    Foot Exam  04/22/2024    Lipid Panel  04/22/2024    Diabetes Urine Screening  04/22/2024    Eye Exam  06/29/2024     Triggered LINKS and reconciled immunizations. Updated Care Everywhere. Record request sent to [facility] asking for a copy of pt's most recent eye exam results. Chart review completed.     
No

## 2024-05-13 LAB
ALBUMIN SERPL ELPH-MCNC: 4.4 G/DL
ALP BLD-CCNC: 90 U/L
ALT SERPL-CCNC: 12 U/L
ANION GAP SERPL CALC-SCNC: 11 MMOL/L
APPEARANCE: CLEAR
AST SERPL-CCNC: 15 U/L
BACTERIA: NEGATIVE /HPF
BASOPHILS # BLD AUTO: 0.06 K/UL
BASOPHILS NFR BLD AUTO: 1.3 %
BILIRUB SERPL-MCNC: 0.5 MG/DL
BILIRUBIN URINE: NEGATIVE
BLOOD URINE: ABNORMAL
BUN SERPL-MCNC: 17 MG/DL
CALCIUM SERPL-MCNC: 9.9 MG/DL
CAST: 0 /LPF
CHLORIDE SERPL-SCNC: 101 MMOL/L
CK SERPL-CCNC: 113 U/L
CO2 SERPL-SCNC: 26 MMOL/L
COLOR: YELLOW
CREAT SERPL-MCNC: 1.04 MG/DL
CRP SERPL-MCNC: <3 MG/L
EGFR: 71 ML/MIN/1.73M2
ENA RNP AB SER IA-ACNC: <0.2 AL
ENA SCL70 IGG SER IA-ACNC: <0.2 AL
ENA SM AB SER IA-ACNC: <0.2 AL
ENA SS-A AB SER IA-ACNC: <0.2 AL
ENA SS-B AB SER IA-ACNC: <0.2 AL
EOSINOPHIL # BLD AUTO: 0.17 K/UL
EOSINOPHIL NFR BLD AUTO: 3.7 %
EPITHELIAL CELLS: 0 /HPF
ERYTHROCYTE [SEDIMENTATION RATE] IN BLOOD BY WESTERGREN METHOD: 19 MM/HR
GLUCOSE QUALITATIVE U: NEGATIVE MG/DL
GLUCOSE SERPL-MCNC: 87 MG/DL
HCT VFR BLD CALC: 40.3 %
HGB BLD-MCNC: 13.4 G/DL
IGG SER QL IEP: 917 MG/DL
IGG1 SER-MCNC: 656 MG/DL
IGG2 SER-MCNC: 154 MG/DL
IGG3 SER-MCNC: 103.9 MG/DL
IGG4 SER-MCNC: 27.6 MG/DL
IMM GRANULOCYTES NFR BLD AUTO: 0.2 %
KETONES URINE: NEGATIVE MG/DL
LDH SERPL-CCNC: 181 U/L
LEUKOCYTE ESTERASE URINE: NEGATIVE
LYMPHOCYTES # BLD AUTO: 1.88 K/UL
LYMPHOCYTES NFR BLD AUTO: 41.4 %
MAGNESIUM SERPL-MCNC: 1.9 MG/DL
MAN DIFF?: NORMAL
MCHC RBC-ENTMCNC: 24.9 PG
MCHC RBC-ENTMCNC: 33.3 GM/DL
MCV RBC AUTO: 74.9 FL
MICROSCOPIC-UA: NORMAL
MONOCYTES # BLD AUTO: 0.5 K/UL
MONOCYTES NFR BLD AUTO: 11 %
NEUTROPHILS # BLD AUTO: 1.92 K/UL
NEUTROPHILS NFR BLD AUTO: 42.4 %
NITRITE URINE: NEGATIVE
PH URINE: 7
PHOSPHATE SERPL-MCNC: 4 MG/DL
PLATELET # BLD AUTO: 174 K/UL
POTASSIUM SERPL-SCNC: 5.1 MMOL/L
PROT SERPL-MCNC: 7 G/DL
PROTEIN URINE: NEGATIVE MG/DL
RBC # BLD: 5.38 M/UL
RBC # FLD: 15.8 %
RED BLOOD CELLS URINE: 14 /HPF
RNA POLYMERASE III IGG: 5 UNITS
SODIUM SERPL-SCNC: 138 MMOL/L
SPECIFIC GRAVITY URINE: 1.01
UROBILINOGEN URINE: 0.2 MG/DL
WBC # FLD AUTO: 4.54 K/UL
WHITE BLOOD CELLS URINE: 6 /HPF

## 2024-05-14 ENCOUNTER — RESULT REVIEW (OUTPATIENT)
Age: 84
End: 2024-05-14

## 2024-05-21 ENCOUNTER — OUTPATIENT (OUTPATIENT)
Dept: OUTPATIENT SERVICES | Facility: HOSPITAL | Age: 84
LOS: 1 days | Discharge: HOME | End: 2024-05-21

## 2024-05-21 DIAGNOSIS — Z95.818 PRESENCE OF OTHER CARDIAC IMPLANTS AND GRAFTS: Chronic | ICD-10-CM

## 2024-05-21 DIAGNOSIS — Z90.3 ACQUIRED ABSENCE OF STOMACH [PART OF]: Chronic | ICD-10-CM

## 2024-05-21 DIAGNOSIS — Z95.828 PRESENCE OF OTHER VASCULAR IMPLANTS AND GRAFTS: Chronic | ICD-10-CM

## 2024-05-21 DIAGNOSIS — C16.9 MALIGNANT NEOPLASM OF STOMACH, UNSPECIFIED: ICD-10-CM

## 2024-05-25 ENCOUNTER — APPOINTMENT (OUTPATIENT)
Dept: CT IMAGING | Facility: CLINIC | Age: 84
End: 2024-05-25
Payer: MEDICARE

## 2024-05-25 ENCOUNTER — OUTPATIENT (OUTPATIENT)
Dept: OUTPATIENT SERVICES | Facility: HOSPITAL | Age: 84
LOS: 1 days | End: 2024-05-25

## 2024-05-25 DIAGNOSIS — Z95.828 PRESENCE OF OTHER VASCULAR IMPLANTS AND GRAFTS: Chronic | ICD-10-CM

## 2024-05-25 DIAGNOSIS — Z95.818 PRESENCE OF OTHER CARDIAC IMPLANTS AND GRAFTS: Chronic | ICD-10-CM

## 2024-05-25 DIAGNOSIS — Z90.3 ACQUIRED ABSENCE OF STOMACH [PART OF]: Chronic | ICD-10-CM

## 2024-05-25 DIAGNOSIS — C16.9 MALIGNANT NEOPLASM OF STOMACH, UNSPECIFIED: ICD-10-CM

## 2024-05-25 PROCEDURE — 74177 CT ABD & PELVIS W/CONTRAST: CPT | Mod: 26

## 2024-05-25 PROCEDURE — 71260 CT THORAX DX C+: CPT | Mod: 26

## 2024-05-29 ENCOUNTER — RESULT REVIEW (OUTPATIENT)
Age: 84
End: 2024-05-29

## 2024-05-29 ENCOUNTER — APPOINTMENT (OUTPATIENT)
Age: 84
End: 2024-05-29

## 2024-05-29 LAB
BASOPHILS # BLD AUTO: 0.1 K/UL — SIGNIFICANT CHANGE UP (ref 0–0.2)
BASOPHILS NFR BLD AUTO: 2.3 % — HIGH (ref 0–2)
EOSINOPHIL # BLD AUTO: 0.1 K/UL — SIGNIFICANT CHANGE UP (ref 0–0.5)
EOSINOPHIL NFR BLD AUTO: 2.6 % — SIGNIFICANT CHANGE UP (ref 0–6)
HCT VFR BLD CALC: 37.6 % — LOW (ref 39–50)
HGB BLD-MCNC: 12.3 G/DL — LOW (ref 13–17)
LYMPHOCYTES # BLD AUTO: 1.2 K/UL — SIGNIFICANT CHANGE UP (ref 1–3.3)
LYMPHOCYTES # BLD AUTO: 35.5 % — SIGNIFICANT CHANGE UP (ref 13–44)
MCHC RBC-ENTMCNC: 25.8 PG — LOW (ref 27–34)
MCHC RBC-ENTMCNC: 32.7 G/DL — SIGNIFICANT CHANGE UP (ref 32–36)
MCV RBC AUTO: 78.9 FL — LOW (ref 80–100)
MONOCYTES # BLD AUTO: 0.3 K/UL — SIGNIFICANT CHANGE UP (ref 0–0.9)
MONOCYTES NFR BLD AUTO: 7.6 % — SIGNIFICANT CHANGE UP (ref 2–14)
NEUTROPHILS # BLD AUTO: 1.8 K/UL — SIGNIFICANT CHANGE UP (ref 1.8–7.4)
NEUTROPHILS NFR BLD AUTO: 52.1 % — SIGNIFICANT CHANGE UP (ref 43–77)
PLATELET # BLD AUTO: 155 K/UL — SIGNIFICANT CHANGE UP (ref 150–400)
RBC # BLD: 4.76 M/UL — SIGNIFICANT CHANGE UP (ref 4.2–5.8)
RBC # FLD: 13.2 % — SIGNIFICANT CHANGE UP (ref 10.3–14.5)
WBC # BLD: 3.4 K/UL — LOW (ref 3.8–10.5)
WBC # FLD AUTO: 3.4 K/UL — LOW (ref 3.8–10.5)

## 2024-05-30 LAB
ALBUMIN SERPL ELPH-MCNC: 4.2 G/DL — SIGNIFICANT CHANGE UP (ref 3.3–5)
ALP SERPL-CCNC: 91 U/L — SIGNIFICANT CHANGE UP (ref 40–120)
ALT FLD-CCNC: 12 U/L — SIGNIFICANT CHANGE UP (ref 10–45)
ANION GAP SERPL CALC-SCNC: 13 MMOL/L — SIGNIFICANT CHANGE UP (ref 5–17)
AST SERPL-CCNC: 20 U/L — SIGNIFICANT CHANGE UP (ref 10–40)
BILIRUB SERPL-MCNC: 0.3 MG/DL — SIGNIFICANT CHANGE UP (ref 0.2–1.2)
BUN SERPL-MCNC: 16 MG/DL — SIGNIFICANT CHANGE UP (ref 7–23)
CALCIUM SERPL-MCNC: 9.5 MG/DL — SIGNIFICANT CHANGE UP (ref 8.4–10.5)
CEA SERPL-MCNC: 6.5 NG/ML — HIGH (ref 0–3.8)
CHLORIDE SERPL-SCNC: 100 MMOL/L — SIGNIFICANT CHANGE UP (ref 96–108)
CO2 SERPL-SCNC: 22 MMOL/L — SIGNIFICANT CHANGE UP (ref 22–31)
CREAT SERPL-MCNC: 1.15 MG/DL — SIGNIFICANT CHANGE UP (ref 0.5–1.3)
EGFR: 63 ML/MIN/1.73M2 — SIGNIFICANT CHANGE UP
GLUCOSE SERPL-MCNC: 83 MG/DL — SIGNIFICANT CHANGE UP (ref 70–99)
MAGNESIUM SERPL-MCNC: 2.1 MG/DL — SIGNIFICANT CHANGE UP (ref 1.6–2.6)
POTASSIUM SERPL-MCNC: 4.4 MMOL/L — SIGNIFICANT CHANGE UP (ref 3.5–5.3)
POTASSIUM SERPL-SCNC: 4.4 MMOL/L — SIGNIFICANT CHANGE UP (ref 3.5–5.3)
PROT SERPL-MCNC: 6.8 G/DL — SIGNIFICANT CHANGE UP (ref 6–8.3)
SODIUM SERPL-SCNC: 136 MMOL/L — SIGNIFICANT CHANGE UP (ref 135–145)

## 2024-06-19 ENCOUNTER — APPOINTMENT (OUTPATIENT)
Dept: HEMATOLOGY ONCOLOGY | Facility: CLINIC | Age: 84
End: 2024-06-19
Payer: MEDICARE

## 2024-06-19 VITALS
BODY MASS INDEX: 23.88 KG/M2 | HEIGHT: 65 IN | OXYGEN SATURATION: 96 % | TEMPERATURE: 97.4 F | SYSTOLIC BLOOD PRESSURE: 118 MMHG | DIASTOLIC BLOOD PRESSURE: 78 MMHG | HEART RATE: 76 BPM | WEIGHT: 143.31 LBS

## 2024-06-19 PROCEDURE — 99214 OFFICE O/P EST MOD 30 MIN: CPT
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
## 2024-06-19 NOTE — PHYSICAL EXAM
[Restricted in physically strenuous activity but ambulatory and able to carry out work of a light or sedentary nature] : Status 1- Restricted in physically strenuous activity but ambulatory and able to carry out work of a light or sedentary nature, e.g., light house work, office work [Thin] : thin [Normal] : affect appropriate [de-identified] : mediport on right side.

## 2024-06-19 NOTE — HISTORY OF PRESENT ILLNESS
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 He was sent for CT by his PCP, Dr. Charles Smith, due to anemia. CT showed in the upper central abdomen abutting the posterior antrum of the stomach and neck of the pancreas there is a 3 x 2 x 3 cm mass. There is an additional heterogeneous enhancing 2.7 x 2.3 x 2.5 lobulated mass in the ventral upper abdominal peritoneal cavity anterosuperiorly to the antrum of the stomach with internal and surrounding enhancing vessels. Endoscopy with biopsy of the gastric mass was c/w moderately differentiated adenocarcinoma. Staging PET showed hypermetabolic thickening of the distal stomach, consistent with primary gastric cancer. Hypermetabolic activity in the distal stomach, inseparable from the perigastric lymphadenopathy, consistent with metastatic kong disease. \par  \par  TNM stage: T3, N2, M0 \par  \par  \par  Patient completed 4 cycles of postoperative adjuvant FLOT for gastric adenocarcinoma s/p robotic assisted laparoscopic distal gastrectomy with Billroth 2 gastrojejunostomy reconstruction and placement of feeding jejunostomy tube on 11/9/20 with Dr Young. He is s/p 4 cycles neoadjuvant FLOT. \par  + Fatigue, intermittent and mild. + Peripheral neuropathy, improved with gabapentin. + Occasional emesis after "eating too much," denies nausea. + Constipation, improved. + Alopecia. + Nail changes. + Cold intolerance improving. + Decreased appetite with mild weight loss. PEG removed by Dr. Young. + Grade 1 H/F, xeroderma only, no pain. No myalgias. No weakness. No dysgeusia. No fevers, no cough, no SOB. \par  \par  Socx: H/o smoking \par   [FreeTextEntry1] : status post FLOT , resection; 2020 RT to left lung 2023 [de-identified] : Patient presents for follow up. He completed 4 cycles of postoperative adjuvant FLOT for gastric adenocarcinoma s/p robotic assisted laparoscopic distal gastrectomy with Billroth 2 gastrojejunostomy reconstruction and placement of feeding jejunostomy tube on 11/9/20 with Dr Young. He is s/p 4 cycles neoadjuvant FLOT.  He is also s/p SBRT for a Stage 1A adenocarcinoma of the lung.  + Very occasional constipation, otherwise moving bowels and eating normally. No fatigue. No decreased appetite. No dysphagia. No abdominal pain. No fevers or chills.  06/19/24: Mr Petty returns for follow up. His CT imaging from 05/25/24 shows left upper lung subpleural 6 mm lesion with adjacent 5 mm nodule similar prior exam, however comparison is limited as thin slices are not provided. Since 12/6/2023 there are new reticular opacities surrounding the aforementioned nodule which may represent postradiation changes. He feels well with no new complaints. Currently with [facility]. Follow up in 3 months  [Date: ____________] : Patient's last distress assessment performed on [unfilled]. [0 - No Distress] : Distress Level: 0 [90: Able to carry normal activity; minor signs or symptoms of disease.] : 90: Able to carry normal activity; minor signs or symptoms of disease.  [ECOG Performance Status: 1 - Restricted in physically strenuous activity but ambulatory and able to carry out work of a light or sedentary nature] : Performance Status: 1 - Restricted in physically strenuous activity but ambulatory and able to carry out work of a light or sedentary nature, e.g., light house work, office work

## 2024-06-20 ENCOUNTER — RESULT REVIEW (OUTPATIENT)
Age: 84
End: 2024-06-20

## 2024-06-20 ENCOUNTER — APPOINTMENT (OUTPATIENT)
Dept: HEMATOLOGY ONCOLOGY | Facility: CLINIC | Age: 84
End: 2024-06-20

## 2024-06-20 LAB
BASOPHILS # BLD AUTO: 0.1 K/UL — SIGNIFICANT CHANGE UP (ref 0–0.2)
BASOPHILS NFR BLD AUTO: 2.4 % — HIGH (ref 0–2)
EOSINOPHIL # BLD AUTO: 0.1 K/UL — SIGNIFICANT CHANGE UP (ref 0–0.5)
EOSINOPHIL NFR BLD AUTO: 1.7 % — SIGNIFICANT CHANGE UP (ref 0–6)
HCT VFR BLD CALC: 43.1 % — SIGNIFICANT CHANGE UP (ref 39–50)
HGB BLD-MCNC: 14.1 G/DL — SIGNIFICANT CHANGE UP (ref 13–17)
LYMPHOCYTES # BLD AUTO: 1.4 K/UL — SIGNIFICANT CHANGE UP (ref 1–3.3)
LYMPHOCYTES # BLD AUTO: 26.4 % — SIGNIFICANT CHANGE UP (ref 13–44)
MCHC RBC-ENTMCNC: 25.7 PG — LOW (ref 27–34)
MCHC RBC-ENTMCNC: 32.7 G/DL — SIGNIFICANT CHANGE UP (ref 32–36)
MCV RBC AUTO: 78.6 FL — LOW (ref 80–100)
MONOCYTES # BLD AUTO: 0.3 K/UL — SIGNIFICANT CHANGE UP (ref 0–0.9)
MONOCYTES NFR BLD AUTO: 6.6 % — SIGNIFICANT CHANGE UP (ref 2–14)
NEUTROPHILS # BLD AUTO: 3.3 K/UL — SIGNIFICANT CHANGE UP (ref 1.8–7.4)
NEUTROPHILS NFR BLD AUTO: 62.9 % — SIGNIFICANT CHANGE UP (ref 43–77)
PLATELET # BLD AUTO: 166 K/UL — SIGNIFICANT CHANGE UP (ref 150–400)
RBC # BLD: 5.48 M/UL — SIGNIFICANT CHANGE UP (ref 4.2–5.8)
RBC # FLD: 13 % — SIGNIFICANT CHANGE UP (ref 10.3–14.5)
WBC # BLD: 5.3 K/UL — SIGNIFICANT CHANGE UP (ref 3.8–10.5)
WBC # FLD AUTO: 5.3 K/UL — SIGNIFICANT CHANGE UP (ref 3.8–10.5)

## 2024-06-27 LAB
FERRITIN SERPL-MCNC: 28 NG/ML
FOLATE SERPL-MCNC: >20 NG/ML
HAPTOGLOB SERPL-MCNC: 207 MG/DL
IRON SATN MFR SERPL: 29 %
IRON SERPL-MCNC: 106 UG/DL
LDH SERPL-CCNC: 185 U/L
TIBC SERPL-MCNC: 373 UG/DL
UIBC SERPL-MCNC: 267 UG/DL
VIT B12 SERPL-MCNC: 535 PG/ML

## 2024-07-17 ENCOUNTER — OUTPATIENT (OUTPATIENT)
Dept: OUTPATIENT SERVICES | Facility: HOSPITAL | Age: 84
LOS: 1 days | Discharge: HOME | End: 2024-07-17

## 2024-07-17 DIAGNOSIS — Z95.828 PRESENCE OF OTHER VASCULAR IMPLANTS AND GRAFTS: Chronic | ICD-10-CM

## 2024-07-17 DIAGNOSIS — Z90.3 ACQUIRED ABSENCE OF STOMACH [PART OF]: Chronic | ICD-10-CM

## 2024-07-17 DIAGNOSIS — Z95.818 PRESENCE OF OTHER CARDIAC IMPLANTS AND GRAFTS: Chronic | ICD-10-CM

## 2024-07-17 DIAGNOSIS — C16.9 MALIGNANT NEOPLASM OF STOMACH, UNSPECIFIED: ICD-10-CM

## 2024-08-02 ENCOUNTER — APPOINTMENT (OUTPATIENT)
Age: 84
End: 2024-08-02

## 2024-08-13 ENCOUNTER — APPOINTMENT (OUTPATIENT)
Age: 84
End: 2024-08-13

## 2024-08-13 ENCOUNTER — APPOINTMENT (OUTPATIENT)
Dept: RHEUMATOLOGY | Facility: CLINIC | Age: 84
End: 2024-08-13
Payer: MEDICARE

## 2024-08-13 VITALS
HEART RATE: 73 BPM | SYSTOLIC BLOOD PRESSURE: 100 MMHG | TEMPERATURE: 97.7 F | OXYGEN SATURATION: 96 % | HEIGHT: 65 IN | DIASTOLIC BLOOD PRESSURE: 68 MMHG

## 2024-08-13 DIAGNOSIS — H04.123 DRY EYE SYNDROME OF BILATERAL LACRIMAL GLANDS: ICD-10-CM

## 2024-08-13 DIAGNOSIS — M65.9 SYNOVITIS AND TENOSYNOVITIS, UNSPECIFIED: ICD-10-CM

## 2024-08-13 DIAGNOSIS — M21.951 UNSPECIFIED ACQUIRED DEFORMITY OF RIGHT THIGH: ICD-10-CM

## 2024-08-13 DIAGNOSIS — I73.00 RAYNAUD'S SYNDROME W/OUT GANGRENE: ICD-10-CM

## 2024-08-13 DIAGNOSIS — M79.89 OTHER SPECIFIED SOFT TISSUE DISORDERS: ICD-10-CM

## 2024-08-13 DIAGNOSIS — R68.2 DRY MOUTH, UNSPECIFIED: ICD-10-CM

## 2024-08-13 DIAGNOSIS — M35.01 SICCA SYNDROME WITH KERATOCONJUNCTIVITIS: ICD-10-CM

## 2024-08-13 DIAGNOSIS — Z79.1 LONG TERM (CURRENT) USE OF NON-STEROIDAL ANTI-INFLAMMATORIES (NSAID): ICD-10-CM

## 2024-08-13 DIAGNOSIS — M24.849 OTHER SPECIFIC JOINT DERANGEMENTS OF UNSPECIFIED HAND, NOT ELSEWHERE CLASSIFIED: ICD-10-CM

## 2024-08-13 DIAGNOSIS — M15.9 POLYOSTEOARTHRITIS, UNSPECIFIED: ICD-10-CM

## 2024-08-13 DIAGNOSIS — M21.952 UNSPECIFIED ACQUIRED DEFORMITY OF RIGHT THIGH: ICD-10-CM

## 2024-08-13 DIAGNOSIS — M34.9 SYSTEMIC SCLEROSIS, UNSPECIFIED: ICD-10-CM

## 2024-08-13 PROCEDURE — 81003 URINALYSIS AUTO W/O SCOPE: CPT | Mod: QW

## 2024-08-13 PROCEDURE — G2212 PROLONG OUTPT/OFFICE VIS: CPT

## 2024-08-13 PROCEDURE — G2211 COMPLEX E/M VISIT ADD ON: CPT

## 2024-08-13 PROCEDURE — 99215 OFFICE O/P EST HI 40 MIN: CPT

## 2024-08-13 NOTE — PROGRESS NOTE BEHAVIORAL HEALTH - NS ED [facility] MED ROS MUSCULOSKELETAL
EMERGENCY DEPARTMENT HISTORY AND PHYSICAL EXAM    12:13 PM      Date: 8/13/2024  Patient Name: Donny Pena    History of Presenting Illness     Chief Complaint   Patient presents with    Anxiety       History From: Patient and girlfriend  HPI  Donny Pena is a 39 y.o. who male  has no past medical history on file.. Donny Pena is a 39 y.o. male presenting with 1 month history of waking up at night with palpitations and covered in light sweats.  Girlfriend says when this happens he does stop breathing for up to 30 seconds at a time.  He does snore as well.  He notes that during the day he does start to get anxious thinking about things and then he starts getting lightheaded and uncomfortable and believes he is suffering from anxiety.  He does not have a primary care provider so came here for further workup and now that he is 39 he would like a referral to a primary care physician.  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      Nursing Notes were all reviewed and agreed with or any disagreements were addressed in the HPI.    PCP: No primary care provider on file.    No current facility-administered medications for this encounter.     No current outpatient medications on file.       Past History     Past Medical History:  History reviewed. No pertinent past medical history.    Past Surgical History:  History reviewed. No pertinent surgical history.    Family History:  Family History   Problem Relation Age of Onset    Lung Disease Neg Hx     Heart Disease Neg Hx     Diabetes Neg Hx     No Known Problems Father     No Known Problems Mother        Social History:  Social History     Tobacco Use    Smoking status: Never     Passive exposure: Current    Smokeless tobacco: Never   Substance Use Topics    Alcohol use: Yes     Comment: occ    Drug use: Yes     Types: Marijuana (Weed)     Comment: daily       Allergies:  No Known Allergies          Focused Physical Exam   /86   
No complaints

## 2024-08-14 LAB
APPEARANCE: CLEAR
BACTERIA: NEGATIVE /HPF
BILIRUB UR QL STRIP: NORMAL
BILIRUBIN URINE: NEGATIVE
BLOOD URINE: ABNORMAL
CAST: 0 /LPF
CLARITY UR: CLEAR
COLLECTION METHOD: NORMAL
COLOR: YELLOW
EPITHELIAL CELLS: 1 /HPF
GLUCOSE QUALITATIVE U: NEGATIVE MG/DL
GLUCOSE UR-MCNC: NORMAL
HCG UR QL: 0.2 EU/DL
HGB UR QL STRIP.AUTO: NORMAL
KETONES UR-MCNC: NORMAL
KETONES URINE: NEGATIVE MG/DL
LEUKOCYTE ESTERASE UR QL STRIP: NORMAL
LEUKOCYTE ESTERASE URINE: ABNORMAL
MICROSCOPIC-UA: NORMAL
NITRITE UR QL STRIP: NORMAL
NITRITE URINE: NEGATIVE
PH UR STRIP: 5.5
PH URINE: 6
PROT UR STRIP-MCNC: NORMAL
PROTEIN URINE: 30 MG/DL
RED BLOOD CELLS URINE: 16 /HPF
SP GR UR STRIP: 1.02
SPECIFIC GRAVITY URINE: 1.02
UROBILINOGEN URINE: 0.2 MG/DL
WHITE BLOOD CELLS URINE: 8 /HPF

## 2024-08-14 NOTE — ADDENDUM
-- DO NOT REPLY / DO NOT REPLY ALL --  -- Message is from the [facility]--    COVID-19 Universal Screening: N/A - Not about scheduling    General Patient Message      Reason for Call: Patients mom received from the insurance that the hormones were denied, would like to know if the MRI is still okay to do and approved by insurance. MRI scheduled for July 8th at [facility]. Please reach out to assist at [phone].     Caller Information       Type Contact Phone    06/23/2021 02:20 PM CDT Phone (Incoming) MACKENZIECESAR VALDEZ (Mother) [phone] (M)          Alternative phone number: None    Turnaround time given to caller:   \"This message will be sent to [state Provider's name]. The clinical team will fulfill your request as soon as they review your message.\"     [FreeTextEntry1] :  I, Aron Sotelo, acted solely as a scribe for Dr. Myron I. Kleiner, MD. on 08/13/2024. I personally performed the services described in the documentation, reviewed the documentation recorded by the scribe in my presence, and it accurately and completely records my words and actions.

## 2024-08-14 NOTE — ASSESSMENT
[FreeTextEntry1] : Impression: PETER GAMBOA is a 83 year old man who presents for follow up office visit for further evaluation of joint symptoms and rheumatic diseases including osteoarthritis, Raynaud's, limited scleroderma, Sjogren Syndrome  Patient feels fairly well. Denies recent joint pain with his osteoarthritis under good control. Occasional locking left 4th finger Secondary to flexor tenosynovitis bilateral 1st/4th fingers as seen on todays exam. On exam pt has mild diffuse swelling bilateral fingers Secondary to limited scleroderma. Some recent dry eyes and dry mouth - on exam pt has eyes somewhat dry and tongue moist Secondary to Sjogren Syndrome. Using artificial tears, biotene mouthwash, and oral hydration with relief. No recent Raynauds Episodes. Recent lab tests results revealed hemoglobin 13.4 repeat 14.1, MCV 78.6 (), Ferritin 28 (), Iron 106 (), TIBC 373 (220-430) - microcytosis with decreased ferritin, otherwise unrevealing, with extensive discussion. Recent Chest/Abdominal/Pelvic CAT Scan results revealed left upper lobe subpleural 6mm lesion with adjacent 5 mm nodule similar to pervious study -- with extensive discussion. The patient continues calcium and vitamin D supplements. Patient denies rash or side effects with current medications. Patient is content with current medication regimen.  Plan: I reviewed chart and previous records I reviewed his previous lab results with extensive discussion with the patient X-rays results reviewed with the patient with extensive discussion Chest/Abdominal/Pelvic CAT Scan results reviewed with the patient with extensive discussion Laboratory tests ordered today-see list below--with coordination of care Diagnosis and prognosis discussed. Continue other current medications (other than those changed below) Discontinue Nabumetone - restart if joint pain recurs Discontinue Omeprazole  - restart of joint pain recurs Artificial tears one drop each eye q.i.d. and p.r.n.(Possible side effects explained) Biotene mouthwash/spray q.i.d. and p.r.n.(Possible side effects explained) Oral Hydration Patient declines oral medication for dryness Keep hands and feet and torso warm - patient warned of dangers of gangrene/digital amputation with Raynaud's -- with extensive discussion Increase home thermostat to at least 72 to 74 F -- with extensive discussion  I request consult report from Cardiology and results of echocardiogram -- emphasized -- with coordination of care Pulmonary consultation with PFTs with DLCO -- with coordination of care -- please send me a consult report PCP f/u regarding iron deficiency--consider colonoscopy-- with coordination of care   Return visit 4 months All questions and concerns were addressed. Total time for this office visit, including face-to-face time and non-face-to-face time, 71 minutes--- including review of the chart and previous records, detailed review of his medical history, review of previous lab results with extensive discussion with the patient, ordering lab tests with coordination of care, review of previous imaging reports/x-ray results with extensive discussion with the patient, reviewed the recent chest/abdomen/pelvic CAT scans with extensive discussion,, detailed medication history, review of medications going forward with their possible side effects, reviewed the protocol for prevention of Raynaud's with extensive discussion as noted above, reviewed the modalities for treatment of his dryness with extensive discussion, ordered pulmonary consultation with PFTs with coordination of care, requested that cardiology consultation and echocardiogram results to be sent to me with coordination of care

## 2024-08-14 NOTE — CONSULT LETTER
[Dear  ___] : Dear  [unfilled], [Consult Letter:] : I had the pleasure of evaluating your patient, [unfilled]. [Please see my note below.] : Please see my note below. [Consult Closing:] : Thank you very much for allowing me to participate in the care of this patient.  If you have any questions, please do not hesitate to contact me. [Sincerely,] : Sincerely, [DrRobert  ___] : Dr. LARRY [DrRobert ___] : Dr. LARRY [FreeTextEntry3] : Yung\par  Myron I. Kleiner, M.D., FACR\par  Chief, Division of Rheumatology\par  Department of Medicine\par  [facility]

## 2024-08-14 NOTE — PHYSICAL EXAM
[General Appearance - Alert] : alert [General Appearance - In No Acute Distress] : in no acute distress [General Appearance - Well Nourished] : well nourished [General Appearance - Well Developed] : well developed [General Appearance - Well-Appearing] : healthy appearing [Sclera] : the sclera and conjunctiva were normal [PERRL With Normal Accommodation] : pupils were equal in size, round, and reactive to light [Extraocular Movements] : extraocular movements were intact [Outer Ear] : the ears and nose were normal in appearance [Oropharynx] : the oropharynx was normal [Neck Appearance] : the appearance of the neck was normal [Neck Cervical Mass (___cm)] : no neck mass was observed [Jugular Venous Distention Increased] : there was no jugular-venous distention [Thyroid Diffuse Enlargement] : the thyroid was not enlarged [Lungs Percussion] : the lungs were normal to percussion [Heart Rate And Rhythm] : heart rate was normal and rhythm regular [Edema] : there was no peripheral edema [Abdomen Soft] : soft [Abdomen Tenderness] : non-tender [Abdomen Mass (___ Cm)] : no abdominal mass palpated [Cervical Lymph Nodes Enlarged Posterior Bilaterally] : posterior cervical [Cervical Lymph Nodes Enlarged Anterior Bilaterally] : anterior cervical [Supraclavicular Lymph Nodes Enlarged Bilaterally] : supraclavicular [Axillary Lymph Nodes Enlarged Bilaterally] : axillary [No CVA Tenderness] : no ~M costovertebral angle tenderness [No Spinal Tenderness] : no spinal tenderness [Skin Color & Pigmentation] : normal skin color and pigmentation [] : no rash [Cranial Nerves] : cranial nerves 2-12 were intact [Sensation] : the sensory exam was normal to light touch and pinprick [Motor Exam] : the motor exam was normal [No Focal Deficits] : no focal deficits [Oriented To Time, Place, And Person] : oriented to person, place, and time [Impaired Insight] : insight and judgment were intact [Affect] : the affect was normal [Mood] : the mood was normal [FreeTextEntry1] : Strength-5/5

## 2024-08-14 NOTE — ADDENDUM
[FreeTextEntry1] :  I, Aron Sotelo, acted solely as a scribe for Dr. Myron I. Kleiner, MD. on 08/13/2024. I personally performed the services described in the documentation, reviewed the documentation recorded by the scribe in my presence, and it accurately and completely records my words and actions.

## 2024-08-14 NOTE — HISTORY OF PRESENT ILLNESS
[FreeTextEntry1] : PETER GAMBOA is a 83 year old man who presents for follow up office visit for further evaluation of joint symptoms and rheumatic diseases including osteoarthritis, Raynaud's, Sjogren's syndrome, limited scleroderma.  Patient feels fairly well. Denies recent joint pain. No recent Raynauds Episodes. Occasional locking left 4th finger. Some recent dry eyes and dry mouth. Using artificial tears, biotene mouthwash, and oral hydration with relief. The patient continues calcium and vitamin D supplements. Patient denies rash or side effects with their medications. Patient is content with their current medications.   [facility]cology f/u monitoring lung and gastric cancer - "everything good"

## 2024-08-14 NOTE — CONSULT LETTER
[Dear  ___] : Dear  [unfilled], [Consult Letter:] : I had the pleasure of evaluating your patient, [unfilled]. [Please see my note below.] : Please see my note below. [Consult Closing:] : Thank you very much for allowing me to participate in the care of this patient.  If you have any questions, please do not hesitate to contact me. [Sincerely,] : Sincerely, [DrRobert  ___] : Dr. LARRY [DrRobert ___] : Dr. LARRY [FreeTextEntry3] : Yung\par  Myron I. Kleiner, M.D., FACR\par  Chief, Division of Rheumatology\par  Department of Medicine\par  [facility]

## 2024-08-14 NOTE — HISTORY OF PRESENT ILLNESS
[FreeTextEntry1] : PETER GAMBOA is a 83 year old man who presents for follow up office visit for further evaluation of joint symptoms and rheumatic diseases including osteoarthritis, Raynaud's, Sjogren's syndrome, limited scleroderma.  Patient feels fairly well. Denies recent joint pain. No recent Raynauds Episodes. Occasional locking left 4th finger. Some recent dry eyes and dry mouth. Using artificial tears, biotene mouthwash, and oral hydration with relief. The patient continues calcium and vitamin D supplements. Patient denies rash or side effects with their medications. Patient is content with their current medications.   [facility] Oncology f/u monitoring lung and gastric cancer - "everything good"

## 2024-08-15 ENCOUNTER — RESULT REVIEW (OUTPATIENT)
Age: 84
End: 2024-08-15

## 2024-08-15 ENCOUNTER — APPOINTMENT (OUTPATIENT)
Age: 84
End: 2024-08-15

## 2024-08-15 LAB
ALBUMIN SERPL ELPH-MCNC: 4.3 G/DL — SIGNIFICANT CHANGE UP (ref 3.3–5)
ALP SERPL-CCNC: 75 U/L — SIGNIFICANT CHANGE UP (ref 40–120)
ALT FLD-CCNC: 10 U/L — SIGNIFICANT CHANGE UP (ref 10–45)
ANION GAP SERPL CALC-SCNC: 13 MMOL/L — SIGNIFICANT CHANGE UP (ref 5–17)
AST SERPL-CCNC: 17 U/L — SIGNIFICANT CHANGE UP (ref 10–40)
BASOPHILS # BLD AUTO: 0.1 K/UL — SIGNIFICANT CHANGE UP (ref 0–0.2)
BASOPHILS NFR BLD AUTO: 1.4 % — SIGNIFICANT CHANGE UP (ref 0–2)
BILIRUB SERPL-MCNC: 0.4 MG/DL — SIGNIFICANT CHANGE UP (ref 0.2–1.2)
BUN SERPL-MCNC: 20 MG/DL — SIGNIFICANT CHANGE UP (ref 7–23)
CALCIUM SERPL-MCNC: 9.7 MG/DL — SIGNIFICANT CHANGE UP (ref 8.4–10.5)
CEA SERPL-MCNC: 6.9 NG/ML — HIGH (ref 0–3.8)
CHLORIDE SERPL-SCNC: 101 MMOL/L — SIGNIFICANT CHANGE UP (ref 96–108)
CO2 SERPL-SCNC: 23 MMOL/L — SIGNIFICANT CHANGE UP (ref 22–31)
CREAT SERPL-MCNC: 1.08 MG/DL — SIGNIFICANT CHANGE UP (ref 0.5–1.3)
EGFR: 68 ML/MIN/1.73M2 — SIGNIFICANT CHANGE UP
EOSINOPHIL # BLD AUTO: 0.1 K/UL — SIGNIFICANT CHANGE UP (ref 0–0.5)
EOSINOPHIL NFR BLD AUTO: 1.9 % — SIGNIFICANT CHANGE UP (ref 0–6)
GLUCOSE SERPL-MCNC: 96 MG/DL — SIGNIFICANT CHANGE UP (ref 70–99)
HCT VFR BLD CALC: 41.1 % — SIGNIFICANT CHANGE UP (ref 39–50)
HGB BLD-MCNC: 13.2 G/DL — SIGNIFICANT CHANGE UP (ref 13–17)
LYMPHOCYTES # BLD AUTO: 1.4 K/UL — SIGNIFICANT CHANGE UP (ref 1–3.3)
LYMPHOCYTES # BLD AUTO: 28.1 % — SIGNIFICANT CHANGE UP (ref 13–44)
MAGNESIUM SERPL-MCNC: 2 MG/DL — SIGNIFICANT CHANGE UP (ref 1.6–2.6)
MCHC RBC-ENTMCNC: 25.8 PG — LOW (ref 27–34)
MCHC RBC-ENTMCNC: 32.1 G/DL — SIGNIFICANT CHANGE UP (ref 32–36)
MCV RBC AUTO: 80.6 FL — SIGNIFICANT CHANGE UP (ref 80–100)
MONOCYTES # BLD AUTO: 0.4 K/UL — SIGNIFICANT CHANGE UP (ref 0–0.9)
MONOCYTES NFR BLD AUTO: 8.8 % — SIGNIFICANT CHANGE UP (ref 2–14)
NEUTROPHILS # BLD AUTO: 3 K/UL — SIGNIFICANT CHANGE UP (ref 1.8–7.4)
NEUTROPHILS NFR BLD AUTO: 59.8 % — SIGNIFICANT CHANGE UP (ref 43–77)
PLATELET # BLD AUTO: 155 K/UL — SIGNIFICANT CHANGE UP (ref 150–400)
POTASSIUM SERPL-MCNC: 4.5 MMOL/L — SIGNIFICANT CHANGE UP (ref 3.5–5.3)
POTASSIUM SERPL-SCNC: 4.5 MMOL/L — SIGNIFICANT CHANGE UP (ref 3.5–5.3)
PROT SERPL-MCNC: 7.2 G/DL — SIGNIFICANT CHANGE UP (ref 6–8.3)
RBC # BLD: 5.1 M/UL — SIGNIFICANT CHANGE UP (ref 4.2–5.8)
RBC # FLD: 13.3 % — SIGNIFICANT CHANGE UP (ref 10.3–14.5)
SODIUM SERPL-SCNC: 138 MMOL/L — SIGNIFICANT CHANGE UP (ref 135–145)
WBC # BLD: 5 K/UL — SIGNIFICANT CHANGE UP (ref 3.8–10.5)
WBC # FLD AUTO: 5 K/UL — SIGNIFICANT CHANGE UP (ref 3.8–10.5)
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
## 2024-08-19 LAB
ALBUMIN SERPL ELPH-MCNC: 4.3 G/DL
ALP BLD-CCNC: 78 U/L
ALT SERPL-CCNC: 11 U/L
ANION GAP SERPL CALC-SCNC: 12 MMOL/L
AST SERPL-CCNC: 14 U/L
BASOPHILS # BLD AUTO: 0.05 K/UL
BASOPHILS NFR BLD AUTO: 1.1 %
BILIRUB SERPL-MCNC: 0.3 MG/DL
BUN SERPL-MCNC: 18 MG/DL
CALCIUM SERPL-MCNC: 9.7 MG/DL
CHLORIDE SERPL-SCNC: 102 MMOL/L
CK SERPL-CCNC: 127 U/L
CO2 SERPL-SCNC: 23 MMOL/L
CREAT SERPL-MCNC: 1.09 MG/DL
CRP SERPL-MCNC: <3 MG/L
EGFR: 67 ML/MIN/1.73M2
EOSINOPHIL # BLD AUTO: 0.14 K/UL
EOSINOPHIL NFR BLD AUTO: 3.1 %
ERYTHROCYTE [SEDIMENTATION RATE] IN BLOOD BY WESTERGREN METHOD: 19 MM/HR
FERRITIN SERPL-MCNC: 30 NG/ML
GLUCOSE SERPL-MCNC: 104 MG/DL
HCT VFR BLD CALC: 39.8 %
HGB BLD-MCNC: 13.1 G/DL
IMM GRANULOCYTES NFR BLD AUTO: 0.2 %
IRON SATN MFR SERPL: 32 %
IRON SERPL-MCNC: 103 UG/DL
LDH SERPL-CCNC: 177 U/L
LYMPHOCYTES # BLD AUTO: 1.73 K/UL
LYMPHOCYTES NFR BLD AUTO: 38.1 %
MAGNESIUM SERPL-MCNC: 2 MG/DL
MAN DIFF?: NORMAL
MCHC RBC-ENTMCNC: 25.6 PG
MCHC RBC-ENTMCNC: 32.9 GM/DL
MCV RBC AUTO: 77.7 FL
MONOCYTES # BLD AUTO: 0.55 K/UL
MONOCYTES NFR BLD AUTO: 12.1 %
NEUTROPHILS # BLD AUTO: 2.06 K/UL
NEUTROPHILS NFR BLD AUTO: 45.4 %
PHOSPHATE SERPL-MCNC: 3.6 MG/DL
PLATELET # BLD AUTO: 183 K/UL
POTASSIUM SERPL-SCNC: 4.6 MMOL/L
PROT SERPL-MCNC: 6.5 G/DL
RBC # BLD: 5.12 M/UL
RBC # FLD: 15.8 %
SODIUM SERPL-SCNC: 137 MMOL/L
TIBC SERPL-MCNC: 328 UG/DL
TSH SERPL-ACNC: 3.88 UIU/ML
UIBC SERPL-MCNC: 224 UG/DL
WBC # FLD AUTO: 4.54 K/UL

## 2024-08-20 LAB
ENA RNP AB SER IA-ACNC: <0.2 AL
ENA SCL70 IGG SER IA-ACNC: <0.2 AL
ENA SM AB SER IA-ACNC: <0.2 AL
ENA SS-A AB SER IA-ACNC: <0.2 AL
ENA SS-B AB SER IA-ACNC: <0.2 AL

## 2024-08-22 LAB
ALBUMIN MFR SERPL ELPH: 59.1 %
ALBUMIN SERPL-MCNC: 3.8 G/DL
ALBUMIN/GLOB SERPL: 1.4 RATIO
ALPHA1 GLOB MFR SERPL ELPH: 3.8 %
ALPHA1 GLOB SERPL ELPH-MCNC: 0.2 G/DL
ALPHA2 GLOB MFR SERPL ELPH: 12.4 %
ALPHA2 GLOB SERPL ELPH-MCNC: 0.8 G/DL
B-GLOBULIN MFR SERPL ELPH: 12.2 %
B-GLOBULIN SERPL ELPH-MCNC: 0.8 G/DL
DEPRECATED KAPPA LC FREE/LAMBDA SER: 1.37 RATIO
GAMMA GLOB FLD ELPH-MCNC: 0.8 G/DL
GAMMA GLOB MFR SERPL ELPH: 12.5 %
IGA SER QL IEP: 286 MG/DL
IGG SER QL IEP: 861 MG/DL
IGM SER QL IEP: 37 MG/DL
INTERPRETATION SERPL IEP-IMP: NORMAL
KAPPA LC CSF-MCNC: 1.46 MG/DL
KAPPA LC SERPL-MCNC: 2 MG/DL
M PROTEIN SPEC IFE-MCNC: NORMAL
PROT SERPL-MCNC: 6.5 G/DL
PROT SERPL-MCNC: 6.5 G/DL
RNA POLYMERASE III IGG: 6 UNITS

## 2024-09-10 ENCOUNTER — OUTPATIENT (OUTPATIENT)
Dept: OUTPATIENT SERVICES | Facility: HOSPITAL | Age: 84
LOS: 1 days | Discharge: HOME | End: 2024-09-10

## 2024-09-10 DIAGNOSIS — Z95.828 PRESENCE OF OTHER VASCULAR IMPLANTS AND GRAFTS: Chronic | ICD-10-CM

## 2024-09-10 DIAGNOSIS — C16.9 MALIGNANT NEOPLASM OF STOMACH, UNSPECIFIED: ICD-10-CM

## 2024-09-10 DIAGNOSIS — Z90.3 ACQUIRED ABSENCE OF STOMACH [PART OF]: Chronic | ICD-10-CM

## 2024-09-10 DIAGNOSIS — Z95.818 PRESENCE OF OTHER CARDIAC IMPLANTS AND GRAFTS: Chronic | ICD-10-CM

## 2024-09-17 ENCOUNTER — NON-APPOINTMENT (OUTPATIENT)
Age: 84
End: 2024-09-17

## 2024-09-18 ENCOUNTER — RESULT REVIEW (OUTPATIENT)
Age: 84
End: 2024-09-18

## 2024-09-18 ENCOUNTER — APPOINTMENT (OUTPATIENT)
Dept: HEMATOLOGY ONCOLOGY | Facility: CLINIC | Age: 84
End: 2024-09-18
Payer: MEDICARE

## 2024-09-18 ENCOUNTER — APPOINTMENT (OUTPATIENT)
Age: 84
End: 2024-09-18

## 2024-09-18 VITALS
OXYGEN SATURATION: 97 % | DIASTOLIC BLOOD PRESSURE: 83 MMHG | BODY MASS INDEX: 24.43 KG/M2 | SYSTOLIC BLOOD PRESSURE: 141 MMHG | HEART RATE: 75 BPM | HEIGHT: 65 IN | TEMPERATURE: 96.9 F | WEIGHT: 146.6 LBS

## 2024-09-18 LAB
BASOPHILS # BLD AUTO: 0.1 K/UL — SIGNIFICANT CHANGE UP (ref 0–0.2)
BASOPHILS NFR BLD AUTO: 1.9 % — SIGNIFICANT CHANGE UP (ref 0–2)
EOSINOPHIL # BLD AUTO: 0.1 K/UL — SIGNIFICANT CHANGE UP (ref 0–0.5)
EOSINOPHIL NFR BLD AUTO: 2.4 % — SIGNIFICANT CHANGE UP (ref 0–6)
HCT VFR BLD CALC: 41.4 % — SIGNIFICANT CHANGE UP (ref 39–50)
HGB BLD-MCNC: 13.2 G/DL — SIGNIFICANT CHANGE UP (ref 13–17)
LYMPHOCYTES # BLD AUTO: 1.5 K/UL — SIGNIFICANT CHANGE UP (ref 1–3.3)
LYMPHOCYTES # BLD AUTO: 31.2 % — SIGNIFICANT CHANGE UP (ref 13–44)
MCHC RBC-ENTMCNC: 26 PG — LOW (ref 27–34)
MCHC RBC-ENTMCNC: 31.8 G/DL — LOW (ref 32–36)
MCV RBC AUTO: 81.6 FL — SIGNIFICANT CHANGE UP (ref 80–100)
MONOCYTES # BLD AUTO: 0.4 K/UL — SIGNIFICANT CHANGE UP (ref 0–0.9)
MONOCYTES NFR BLD AUTO: 8.2 % — SIGNIFICANT CHANGE UP (ref 2–14)
NEUTROPHILS # BLD AUTO: 2.7 K/UL — SIGNIFICANT CHANGE UP (ref 1.8–7.4)
NEUTROPHILS NFR BLD AUTO: 56.3 % — SIGNIFICANT CHANGE UP (ref 43–77)
PLATELET # BLD AUTO: 161 K/UL — SIGNIFICANT CHANGE UP (ref 150–400)
RBC # BLD: 5.06 M/UL — SIGNIFICANT CHANGE UP (ref 4.2–5.8)
RBC # FLD: 13.8 % — SIGNIFICANT CHANGE UP (ref 10.3–14.5)
WBC # BLD: 4.8 K/UL — SIGNIFICANT CHANGE UP (ref 3.8–10.5)
WBC # FLD AUTO: 4.8 K/UL — SIGNIFICANT CHANGE UP (ref 3.8–10.5)

## 2024-09-18 PROCEDURE — 99214 OFFICE O/P EST MOD 30 MIN: CPT

## 2024-09-18 NOTE — RESULTS/DATA
English
[FreeTextEntry1] : 5/15/23 CT Head IMPRESSION:  1. Left frontal subcortical hyperdense lesion remains unchanged since 2019, most consistent with cavernous angioma  2. Ischemic white matter disease and atrophy typical for age. Intracranial atherosclerosis  3. Subdural hemorrhage present in 2021 has resolved without residual  4. No adverse interval change is recognized   3/2/23 PET/CT IMPRESSION: 1. Left upper lobe nodule anterolaterally has increased in size and intensity of activity compatible with a malignant process.  2. Relatively stable bilateral hilar and mediastinal kong uptake.  3. Nonspecific activity at the remaining proximal stomach with no focal abnormal activity at the gastrojejunostomy site or in the right upper quadrant anastomotic sites.  4. New cutaneous thickening in the left side of the intergluteal cleft with increased activity most likely represents an inflammatory process; please examine area for confirmation.     Follow-up PET/CT April 15, 2021:  IMPRESSION:  1.  Postsurgical changes of gastrectomy and gastrojejunostomy. Mild homogeneous FDG avidity in the residual stomach is nonspecific, possibly physiologic. No focal FDG avidity at the anastomosis.  2.  No evidence of FDG avid or enlarged perigastric lymph nodes however evaluation is limited due to lack of intravenous contrast and paucity of intra-abdominal fat which makes evaluation difficult.  3.  Widespread diffuse FDG avidity of the bone marrow, probably due to administration of colony stimulating factors. Clinical correlation suggested.  4.  Sub centimeter nodular opacities left upper lobe, too small to characterize.   3/24/23: 1  Left lung biopsy  Final Diagnosis Left lung biopsy: -Adenocarcinoma, consistent with lung primary 08/23/2023 CBC WBC 4.8 HGB 14  000
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
## 2024-09-18 NOTE — HISTORY OF PRESENT ILLNESS
[Date: ____________] : Patient's last distress assessment performed on [unfilled]. [0 - No Distress] : Distress Level: 0 [90: Able to carry normal activity; minor signs or symptoms of disease.] : 90: Able to carry normal activity; minor signs or symptoms of disease.  [ECOG Performance Status: 1 - Restricted in physically strenuous activity but ambulatory and able to carry out work of a light or sedentary nature] : Performance Status: 1 - Restricted in physically strenuous activity but ambulatory and able to carry out work of a light or sedentary nature, e.g., light house work, office work [de-identified] : \par  The patient was diagnosed with gastric adenocarcinoma in June 2020 at the age of 79. He was sent for CT by his PCP, Dr. Charles Smith, due to anemia. CT showed in the upper central abdomen abutting the posterior antrum of the stomach and neck of the pancreas there is a 3 x 2 x 3 cm mass. There is an additional heterogeneous enhancing 2.7 x 2.3 x 2.5 lobulated mass in the ventral upper abdominal peritoneal cavity anterosuperiorly to the antrum of the stomach with internal and surrounding enhancing vessels. Endoscopy with biopsy of the gastric mass was c/w moderately differentiated adenocarcinoma. Staging PET showed hypermetabolic thickening of the distal stomach, consistent with primary gastric cancer. Hypermetabolic activity in the distal stomach, inseparable from the perigastric lymphadenopathy, consistent with metastatic kong disease. \par  \par  TNM stage: T3, N2, M0 \par  \par  \par  Patient completed 4 cycles of postoperative adjuvant FLOT for gastric adenocarcinoma s/p robotic assisted laparoscopic distal gastrectomy with Billroth 2 gastrojejunostomy reconstruction and placement of feeding jejunostomy tube on 11/9/20 with Dr Young. He is s/p 4 cycles neoadjuvant FLOT. \par  + Fatigue, intermittent and mild. + Peripheral neuropathy, improved with gabapentin. + Occasional emesis after "eating too much," denies nausea. + Constipation, improved. + Alopecia. + Nail changes. + Cold intolerance improving. + Decreased appetite with mild weight loss. PEG removed by Dr. Young. + Grade 1 H/F, xeroderma only, no pain. No myalgias. No weakness. No dysgeusia. No fevers, no cough, no SOB. \par  \par  Socx: H/o smoking \par   [FreeTextEntry1] : status post FLOT , resection; 2020 RT to left lung 2023 [de-identified] : Patient presents for follow up. He completed 4 cycles of postoperative adjuvant FLOT for gastric adenocarcinoma s/p robotic assisted laparoscopic distal gastrectomy with Billroth 2 gastrojejunostomy reconstruction and placement of feeding jejunostomy tube on 11/9/20 with Dr Young. He is s/p 4 cycles neoadjuvant FLOT.  He is also s/p SBRT for a Stage 1A adenocarcinoma of the lung.  + Very occasional constipation, otherwise moving bowels and eating normally. No fatigue. No decreased appetite. No dysphagia. No abdominal pain. No fevers or chills.  06/19/24: Mr Petty returns for follow up. His CT imaging from 05/25/24 shows left upper lung subpleural 6 mm lesion with adjacent 5 mm nodule similar prior exam, however comparison is limited as thin slices are not provided. Since 12/6/2023 there are new reticular opacities surrounding the aforementioned nodule which may represent postradiation changes. He feels well with no new complaints. Currently with GRANT. Follow up in 3 months  09/18/24: MR Carter returns for follow up. Feeling well with good energy and appetite. Will order surveillance imaging for November and he will return to discuss the results.

## 2024-09-18 NOTE — PHYSICAL EXAM
[Restricted in physically strenuous activity but ambulatory and able to carry out work of a light or sedentary nature] : Status 1- Restricted in physically strenuous activity but ambulatory and able to carry out work of a light or sedentary nature, e.g., light house work, office work [Thin] : thin [Normal] : affect appropriate [de-identified] : mediport on right side.

## 2024-09-19 LAB
ALBUMIN SERPL ELPH-MCNC: 4.3 G/DL — SIGNIFICANT CHANGE UP (ref 3.3–5)
ALP SERPL-CCNC: 82 U/L — SIGNIFICANT CHANGE UP (ref 40–120)
ALT FLD-CCNC: 9 U/L — LOW (ref 10–45)
ANION GAP SERPL CALC-SCNC: 11 MMOL/L — SIGNIFICANT CHANGE UP (ref 5–17)
AST SERPL-CCNC: 15 U/L — SIGNIFICANT CHANGE UP (ref 10–40)
BILIRUB SERPL-MCNC: 0.2 MG/DL — SIGNIFICANT CHANGE UP (ref 0.2–1.2)
BUN SERPL-MCNC: 18 MG/DL — SIGNIFICANT CHANGE UP (ref 7–23)
CALCIUM SERPL-MCNC: 9.5 MG/DL — SIGNIFICANT CHANGE UP (ref 8.4–10.5)
CEA SERPL-MCNC: 7.4 NG/ML — HIGH (ref 0–3.8)
CHLORIDE SERPL-SCNC: 103 MMOL/L — SIGNIFICANT CHANGE UP (ref 96–108)
CO2 SERPL-SCNC: 24 MMOL/L — SIGNIFICANT CHANGE UP (ref 22–31)
CREAT SERPL-MCNC: 0.97 MG/DL — SIGNIFICANT CHANGE UP (ref 0.5–1.3)
EGFR: 76 ML/MIN/1.73M2 — SIGNIFICANT CHANGE UP
GLUCOSE SERPL-MCNC: 108 MG/DL — HIGH (ref 70–99)
MAGNESIUM SERPL-MCNC: 1.9 MG/DL — SIGNIFICANT CHANGE UP (ref 1.6–2.6)
POTASSIUM SERPL-MCNC: 4.5 MMOL/L — SIGNIFICANT CHANGE UP (ref 3.5–5.3)
POTASSIUM SERPL-SCNC: 4.5 MMOL/L — SIGNIFICANT CHANGE UP (ref 3.5–5.3)
PROT SERPL-MCNC: 7.2 G/DL — SIGNIFICANT CHANGE UP (ref 6–8.3)
SODIUM SERPL-SCNC: 137 MMOL/L — SIGNIFICANT CHANGE UP (ref 135–145)

## 2024-10-08 ENCOUNTER — OUTPATIENT (OUTPATIENT)
Dept: OUTPATIENT SERVICES | Facility: HOSPITAL | Age: 84
LOS: 1 days | End: 2024-10-08

## 2024-10-08 ENCOUNTER — APPOINTMENT (OUTPATIENT)
Dept: CT IMAGING | Facility: CLINIC | Age: 84
End: 2024-10-08
Payer: MEDICARE

## 2024-10-08 DIAGNOSIS — C16.9 MALIGNANT NEOPLASM OF STOMACH, UNSPECIFIED: ICD-10-CM

## 2024-10-08 DIAGNOSIS — Z95.828 PRESENCE OF OTHER VASCULAR IMPLANTS AND GRAFTS: Chronic | ICD-10-CM

## 2024-10-08 DIAGNOSIS — Z95.818 PRESENCE OF OTHER CARDIAC IMPLANTS AND GRAFTS: Chronic | ICD-10-CM

## 2024-10-08 DIAGNOSIS — Z90.3 ACQUIRED ABSENCE OF STOMACH [PART OF]: Chronic | ICD-10-CM

## 2024-10-08 PROCEDURE — 74177 CT ABD & PELVIS W/CONTRAST: CPT | Mod: 26

## 2024-10-08 PROCEDURE — 71260 CT THORAX DX C+: CPT | Mod: 26

## 2024-10-16 ENCOUNTER — APPOINTMENT (OUTPATIENT)
Dept: RADIATION ONCOLOGY | Facility: CLINIC | Age: 84
End: 2024-10-16
Payer: MEDICARE

## 2024-10-16 VITALS
OXYGEN SATURATION: 98 % | WEIGHT: 145 LBS | BODY MASS INDEX: 24.16 KG/M2 | DIASTOLIC BLOOD PRESSURE: 82 MMHG | RESPIRATION RATE: 16 BRPM | SYSTOLIC BLOOD PRESSURE: 159 MMHG | HEART RATE: 87 BPM | HEIGHT: 65 IN

## 2024-10-16 PROCEDURE — G2211 COMPLEX E/M VISIT ADD ON: CPT

## 2024-10-16 PROCEDURE — 99212 OFFICE O/P EST SF 10 MIN: CPT

## 2024-10-24 ENCOUNTER — APPOINTMENT (OUTPATIENT)
Dept: PULMONOLOGY | Facility: CLINIC | Age: 84
End: 2024-10-24
Payer: MEDICARE

## 2024-10-24 VITALS — WEIGHT: 145 LBS | BODY MASS INDEX: 24.16 KG/M2 | HEIGHT: 65 IN

## 2024-10-24 VITALS
RESPIRATION RATE: 16 BRPM | OXYGEN SATURATION: 97 % | DIASTOLIC BLOOD PRESSURE: 74 MMHG | HEART RATE: 89 BPM | SYSTOLIC BLOOD PRESSURE: 122 MMHG

## 2024-10-24 DIAGNOSIS — J44.9 CHRONIC OBSTRUCTIVE PULMONARY DISEASE, UNSPECIFIED: ICD-10-CM

## 2024-10-24 DIAGNOSIS — C34.92 MALIGNANT NEOPLASM OF UNSPECIFIED PART OF LEFT BRONCHUS OR LUNG: ICD-10-CM

## 2024-10-24 PROCEDURE — 99215 OFFICE O/P EST HI 40 MIN: CPT

## 2024-10-24 PROCEDURE — G2211 COMPLEX E/M VISIT ADD ON: CPT

## 2024-11-11 ENCOUNTER — OUTPATIENT (OUTPATIENT)
Dept: OUTPATIENT SERVICES | Facility: HOSPITAL | Age: 84
LOS: 1 days | Discharge: HOME | End: 2024-11-11

## 2024-11-11 DIAGNOSIS — Z95.818 PRESENCE OF OTHER CARDIAC IMPLANTS AND GRAFTS: Chronic | ICD-10-CM

## 2024-11-11 DIAGNOSIS — C16.9 MALIGNANT NEOPLASM OF STOMACH, UNSPECIFIED: ICD-10-CM

## 2024-11-11 DIAGNOSIS — Z90.3 ACQUIRED ABSENCE OF STOMACH [PART OF]: Chronic | ICD-10-CM

## 2024-11-11 DIAGNOSIS — Z95.828 PRESENCE OF OTHER VASCULAR IMPLANTS AND GRAFTS: Chronic | ICD-10-CM

## 2024-11-18 ENCOUNTER — RESULT REVIEW (OUTPATIENT)
Age: 84
End: 2024-11-18

## 2024-11-18 ENCOUNTER — APPOINTMENT (OUTPATIENT)
Age: 84
End: 2024-11-18

## 2024-11-18 LAB
BASOPHILS # BLD AUTO: 0.2 K/UL — SIGNIFICANT CHANGE UP (ref 0–0.2)
BASOPHILS NFR BLD AUTO: 2.6 % — HIGH (ref 0–2)
EOSINOPHIL # BLD AUTO: 0.1 K/UL — SIGNIFICANT CHANGE UP (ref 0–0.5)
EOSINOPHIL NFR BLD AUTO: 1.3 % — SIGNIFICANT CHANGE UP (ref 0–6)
HCT VFR BLD CALC: 38.4 % — LOW (ref 39–50)
HGB BLD-MCNC: 12.6 G/DL — LOW (ref 13–17)
LYMPHOCYTES # BLD AUTO: 1.3 K/UL — SIGNIFICANT CHANGE UP (ref 1–3.3)
LYMPHOCYTES # BLD AUTO: 21.3 % — SIGNIFICANT CHANGE UP (ref 13–44)
MCHC RBC-ENTMCNC: 26.4 PG — LOW (ref 27–34)
MCHC RBC-ENTMCNC: 32.9 G/DL — SIGNIFICANT CHANGE UP (ref 32–36)
MCV RBC AUTO: 80.5 FL — SIGNIFICANT CHANGE UP (ref 80–100)
MONOCYTES # BLD AUTO: 0.3 K/UL — SIGNIFICANT CHANGE UP (ref 0–0.9)
MONOCYTES NFR BLD AUTO: 5.6 % — SIGNIFICANT CHANGE UP (ref 2–14)
NEUTROPHILS # BLD AUTO: 4.1 K/UL — SIGNIFICANT CHANGE UP (ref 1.8–7.4)
NEUTROPHILS NFR BLD AUTO: 69.2 % — SIGNIFICANT CHANGE UP (ref 43–77)
PLATELET # BLD AUTO: 166 K/UL — SIGNIFICANT CHANGE UP (ref 150–400)
RBC # BLD: 4.77 M/UL — SIGNIFICANT CHANGE UP (ref 4.2–5.8)
RBC # FLD: 14 % — SIGNIFICANT CHANGE UP (ref 10.3–14.5)
WBC # BLD: 5.9 K/UL — SIGNIFICANT CHANGE UP (ref 3.8–10.5)
WBC # FLD AUTO: 5.9 K/UL — SIGNIFICANT CHANGE UP (ref 3.8–10.5)

## 2024-11-19 LAB
ALBUMIN SERPL ELPH-MCNC: 4 G/DL — SIGNIFICANT CHANGE UP (ref 3.3–5)
ALP SERPL-CCNC: 71 U/L — SIGNIFICANT CHANGE UP (ref 40–120)
ALT FLD-CCNC: 10 U/L — SIGNIFICANT CHANGE UP (ref 10–45)
ANION GAP SERPL CALC-SCNC: 12 MMOL/L — SIGNIFICANT CHANGE UP (ref 5–17)
AST SERPL-CCNC: 15 U/L — SIGNIFICANT CHANGE UP (ref 10–40)
BILIRUB SERPL-MCNC: 0.4 MG/DL — SIGNIFICANT CHANGE UP (ref 0.2–1.2)
BUN SERPL-MCNC: 19 MG/DL — SIGNIFICANT CHANGE UP (ref 7–23)
CALCIUM SERPL-MCNC: 9.4 MG/DL — SIGNIFICANT CHANGE UP (ref 8.4–10.5)
CEA SERPL-MCNC: 6.6 NG/ML — HIGH (ref 0–3.8)
CHLORIDE SERPL-SCNC: 101 MMOL/L — SIGNIFICANT CHANGE UP (ref 96–108)
CO2 SERPL-SCNC: 23 MMOL/L — SIGNIFICANT CHANGE UP (ref 22–31)
CREAT SERPL-MCNC: 1.09 MG/DL — SIGNIFICANT CHANGE UP (ref 0.5–1.3)
EGFR: 67 ML/MIN/1.73M2 — SIGNIFICANT CHANGE UP
GLUCOSE SERPL-MCNC: 93 MG/DL — SIGNIFICANT CHANGE UP (ref 70–99)
MAGNESIUM SERPL-MCNC: 1.9 MG/DL — SIGNIFICANT CHANGE UP (ref 1.6–2.6)
POTASSIUM SERPL-MCNC: 4.6 MMOL/L — SIGNIFICANT CHANGE UP (ref 3.5–5.3)
POTASSIUM SERPL-SCNC: 4.6 MMOL/L — SIGNIFICANT CHANGE UP (ref 3.5–5.3)
PROT SERPL-MCNC: 6.5 G/DL — SIGNIFICANT CHANGE UP (ref 6–8.3)
SODIUM SERPL-SCNC: 136 MMOL/L — SIGNIFICANT CHANGE UP (ref 135–145)

## 2024-11-25 NOTE — ED ADULT NURSE NOTE - SUICIDE SCREENING DEPRESSION
Discontinue Regimen: Minocycline 100 mg Initiate Treatment: Keflex 500 mg Detail Level: Detailed Negative

## 2024-12-10 ENCOUNTER — APPOINTMENT (OUTPATIENT)
Dept: HEMATOLOGY ONCOLOGY | Facility: CLINIC | Age: 84
End: 2024-12-10
Payer: MEDICARE

## 2024-12-10 VITALS
OXYGEN SATURATION: 96 % | DIASTOLIC BLOOD PRESSURE: 74 MMHG | HEART RATE: 82 BPM | BODY MASS INDEX: 24.16 KG/M2 | SYSTOLIC BLOOD PRESSURE: 127 MMHG | WEIGHT: 145 LBS | TEMPERATURE: 97.8 F | HEIGHT: 65 IN

## 2024-12-10 DIAGNOSIS — C16.9 MALIGNANT NEOPLASM OF STOMACH, UNSPECIFIED: ICD-10-CM

## 2024-12-10 DIAGNOSIS — C34.92 MALIGNANT NEOPLASM OF UNSPECIFIED PART OF LEFT BRONCHUS OR LUNG: ICD-10-CM

## 2024-12-10 PROCEDURE — 99214 OFFICE O/P EST MOD 30 MIN: CPT

## 2024-12-17 NOTE — ED ADULT NURSE NOTE - HIV OFFER
Currently only on medical marijuana.   Consider resuming counseling and starting SSRI  Orders:  •  TSH, 3rd generation with Free T4 reflex  
Previously Declined (within the last year)

## 2024-12-26 NOTE — HISTORY OF PRESENT ILLNESS
[FreeTextEntry1] : Mr. PETER GAMBOA, 82 year old male, who presents to office for post hospitalization follow up. Patient with Left upper lobe nodule on CT Chest in august and again in December,  with + Pet findings.  3/24/23 s/p IR biopsy of YONI nodule.  Post procedure found to have a left pneumothorax for which a pigtail was placed by IR. Thoracic surgery called to admit patient and manage pigtail. Follows with Oncologist, Dr. Billings. Discussed excision versus RT. Referred today for Thoracic surgery Consultation. \par \par Additional PMhx: HTN, HLD, DM2, RBBB, s/p ILR, COPD, gastric cancer 2020 s/p gastrectomy and chemotherapy, SDH s/p fall 2020.  FAMILY HISTORY:  No pertinent family history in first degree relatives

## 2025-01-10 ENCOUNTER — APPOINTMENT (OUTPATIENT)
Dept: RHEUMATOLOGY | Facility: CLINIC | Age: 85
End: 2025-01-10
Payer: MEDICARE

## 2025-01-10 ENCOUNTER — NON-APPOINTMENT (OUTPATIENT)
Age: 85
End: 2025-01-10

## 2025-01-10 VITALS
HEART RATE: 78 BPM | TEMPERATURE: 97 F | RESPIRATION RATE: 17 BRPM | SYSTOLIC BLOOD PRESSURE: 126 MMHG | OXYGEN SATURATION: 96 % | WEIGHT: 145 LBS | DIASTOLIC BLOOD PRESSURE: 80 MMHG | BODY MASS INDEX: 24.16 KG/M2 | HEIGHT: 65 IN

## 2025-01-10 DIAGNOSIS — M25.562 PAIN IN LEFT KNEE: ICD-10-CM

## 2025-01-10 DIAGNOSIS — M21.952 UNSPECIFIED ACQUIRED DEFORMITY OF RIGHT THIGH: ICD-10-CM

## 2025-01-10 DIAGNOSIS — M35.01 SICCA SYNDROME WITH KERATOCONJUNCTIVITIS: ICD-10-CM

## 2025-01-10 DIAGNOSIS — R68.2 DRY MOUTH, UNSPECIFIED: ICD-10-CM

## 2025-01-10 DIAGNOSIS — M21.951 UNSPECIFIED ACQUIRED DEFORMITY OF RIGHT THIGH: ICD-10-CM

## 2025-01-10 DIAGNOSIS — I73.00 RAYNAUD'S SYNDROME W/OUT GANGRENE: ICD-10-CM

## 2025-01-10 DIAGNOSIS — M24.849 OTHER SPECIFIC JOINT DERANGEMENTS OF UNSPECIFIED HAND, NOT ELSEWHERE CLASSIFIED: ICD-10-CM

## 2025-01-10 DIAGNOSIS — M15.9 POLYOSTEOARTHRITIS, UNSPECIFIED: ICD-10-CM

## 2025-01-10 DIAGNOSIS — M65.949 UNSPECIFIED SYNOVITIS AND TENOSYNOVITIS, UNSPECIFIED HAND: ICD-10-CM

## 2025-01-10 DIAGNOSIS — M79.89 OTHER SPECIFIED SOFT TISSUE DISORDERS: ICD-10-CM

## 2025-01-10 DIAGNOSIS — H04.123 DRY EYE SYNDROME OF BILATERAL LACRIMAL GLANDS: ICD-10-CM

## 2025-01-10 DIAGNOSIS — G89.29 PAIN IN LEFT KNEE: ICD-10-CM

## 2025-01-10 PROCEDURE — 99215 OFFICE O/P EST HI 40 MIN: CPT

## 2025-01-10 PROCEDURE — G2212 PROLONG OUTPT/OFFICE VIS: CPT

## 2025-01-10 PROCEDURE — G2211 COMPLEX E/M VISIT ADD ON: CPT

## 2025-01-13 ENCOUNTER — APPOINTMENT (OUTPATIENT)
Dept: RADIOLOGY | Facility: CLINIC | Age: 85
End: 2025-01-13

## 2025-01-13 ENCOUNTER — OUTPATIENT (OUTPATIENT)
Dept: OUTPATIENT SERVICES | Facility: HOSPITAL | Age: 85
LOS: 1 days | End: 2025-01-13
Payer: MEDICARE

## 2025-01-13 DIAGNOSIS — Z90.3 ACQUIRED ABSENCE OF STOMACH [PART OF]: Chronic | ICD-10-CM

## 2025-01-13 DIAGNOSIS — M34.9 SYSTEMIC SCLEROSIS, UNSPECIFIED: ICD-10-CM

## 2025-01-13 DIAGNOSIS — I73.00 RAYNAUD'S SYNDROME WITHOUT GANGRENE: ICD-10-CM

## 2025-01-13 DIAGNOSIS — M79.89 OTHER SPECIFIED SOFT TISSUE DISORDERS: ICD-10-CM

## 2025-01-13 DIAGNOSIS — Z95.818 PRESENCE OF OTHER CARDIAC IMPLANTS AND GRAFTS: Chronic | ICD-10-CM

## 2025-01-13 DIAGNOSIS — M15.9 POLYOSTEOARTHRITIS, UNSPECIFIED: ICD-10-CM

## 2025-01-13 DIAGNOSIS — M65.949 UNSPECIFIED SYNOVITIS AND TENOSYNOVITIS, UNSPECIFIED HAND: ICD-10-CM

## 2025-01-13 DIAGNOSIS — Z95.828 PRESENCE OF OTHER VASCULAR IMPLANTS AND GRAFTS: Chronic | ICD-10-CM

## 2025-01-13 PROCEDURE — 73110 X-RAY EXAM OF WRIST: CPT | Mod: 26,50

## 2025-01-13 PROCEDURE — 73562 X-RAY EXAM OF KNEE 3: CPT

## 2025-01-13 PROCEDURE — 73110 X-RAY EXAM OF WRIST: CPT

## 2025-01-13 PROCEDURE — 73562 X-RAY EXAM OF KNEE 3: CPT | Mod: 26,50

## 2025-01-13 PROCEDURE — 73130 X-RAY EXAM OF HAND: CPT

## 2025-01-13 PROCEDURE — 73130 X-RAY EXAM OF HAND: CPT | Mod: 26,50

## 2025-01-14 ENCOUNTER — OUTPATIENT (OUTPATIENT)
Dept: OUTPATIENT SERVICES | Facility: HOSPITAL | Age: 85
LOS: 1 days | Discharge: HOME | End: 2025-01-14

## 2025-01-14 DIAGNOSIS — C16.9 MALIGNANT NEOPLASM OF STOMACH, UNSPECIFIED: ICD-10-CM

## 2025-01-14 DIAGNOSIS — Z95.818 PRESENCE OF OTHER CARDIAC IMPLANTS AND GRAFTS: Chronic | ICD-10-CM

## 2025-01-14 DIAGNOSIS — Z90.3 ACQUIRED ABSENCE OF STOMACH [PART OF]: Chronic | ICD-10-CM

## 2025-01-14 DIAGNOSIS — Z95.828 PRESENCE OF OTHER VASCULAR IMPLANTS AND GRAFTS: Chronic | ICD-10-CM

## 2025-01-14 LAB
ALBUMIN SERPL ELPH-MCNC: 4.4 G/DL
ALP BLD-CCNC: 73 U/L
ALT SERPL-CCNC: 9 U/L
ANION GAP SERPL CALC-SCNC: 11 MMOL/L
APPEARANCE: CLEAR
AST SERPL-CCNC: 13 U/L
BACTERIA: NEGATIVE /HPF
BASOPHILS # BLD AUTO: 0.08 K/UL
BASOPHILS NFR BLD AUTO: 1.8 %
BILIRUB SERPL-MCNC: 0.4 MG/DL
BILIRUBIN URINE: NEGATIVE
BLOOD URINE: ABNORMAL
BUN SERPL-MCNC: 17 MG/DL
CALCIUM SERPL-MCNC: 10 MG/DL
CAST: 3 /LPF
CHLORIDE SERPL-SCNC: 103 MMOL/L
CK SERPL-CCNC: 123 U/L
CO2 SERPL-SCNC: 26 MMOL/L
COLOR: YELLOW
CREAT SERPL-MCNC: 1.2 MG/DL
CRP SERPL-MCNC: 4 MG/L
EGFR: 59 ML/MIN/1.73M2
ENA RNP AB SER IA-ACNC: <0.2 AL
ENA SCL70 IGG SER IA-ACNC: <0.2 AL
ENA SM AB SER IA-ACNC: <0.2 AL
ENA SS-A AB SER IA-ACNC: <0.2 AL
ENA SS-B AB SER IA-ACNC: <0.2 AL
EOSINOPHIL # BLD AUTO: 0.13 K/UL
EOSINOPHIL NFR BLD AUTO: 3 %
EPITHELIAL CELLS: 7 /HPF
ERYTHROCYTE [SEDIMENTATION RATE] IN BLOOD BY WESTERGREN METHOD: 34 MM/HR
GLUCOSE QUALITATIVE U: NEGATIVE MG/DL
GLUCOSE SERPL-MCNC: 109 MG/DL
HCT VFR BLD CALC: 40.1 %
HGB BLD-MCNC: 13 G/DL
IMM GRANULOCYTES NFR BLD AUTO: 0.2 %
KETONES URINE: NEGATIVE MG/DL
LDH SERPL-CCNC: 176 U/L
LEUKOCYTE ESTERASE URINE: ABNORMAL
LYMPHOCYTES # BLD AUTO: 1.77 K/UL
LYMPHOCYTES NFR BLD AUTO: 40.5 %
MAGNESIUM SERPL-MCNC: 1.9 MG/DL
MAN DIFF?: NORMAL
MCHC RBC-ENTMCNC: 25.5 PG
MCHC RBC-ENTMCNC: 32.4 G/DL
MCV RBC AUTO: 78.8 FL
MICROSCOPIC-UA: NORMAL
MONOCYTES # BLD AUTO: 0.45 K/UL
MONOCYTES NFR BLD AUTO: 10.3 %
NEUTROPHILS # BLD AUTO: 1.93 K/UL
NEUTROPHILS NFR BLD AUTO: 44.2 %
NITRITE URINE: NEGATIVE
PH URINE: 6
PHOSPHATE SERPL-MCNC: 3.8 MG/DL
PLATELET # BLD AUTO: 174 K/UL
POTASSIUM SERPL-SCNC: 4.7 MMOL/L
PROT SERPL-MCNC: 7 G/DL
PROTEIN URINE: 100 MG/DL
RBC # BLD: 5.09 M/UL
RBC # FLD: 14.8 %
RED BLOOD CELLS URINE: 241 /HPF
SODIUM SERPL-SCNC: 139 MMOL/L
SPECIFIC GRAVITY URINE: 1.01
UROBILINOGEN URINE: 0.2 MG/DL
WBC # FLD AUTO: 4.37 K/UL
WHITE BLOOD CELLS URINE: 34 /HPF

## 2025-01-15 DIAGNOSIS — M34.9 SYSTEMIC SCLEROSIS, UNSPECIFIED: ICD-10-CM

## 2025-01-15 LAB — RNA POLYMERASE III IGG: 5 UNITS

## 2025-01-21 ENCOUNTER — RESULT REVIEW (OUTPATIENT)
Age: 85
End: 2025-01-21

## 2025-01-21 ENCOUNTER — APPOINTMENT (OUTPATIENT)
Age: 85
End: 2025-01-21

## 2025-01-21 LAB
BASOPHILS # BLD AUTO: 0.1 K/UL — SIGNIFICANT CHANGE UP (ref 0–0.2)
BASOPHILS NFR BLD AUTO: 1.6 % — SIGNIFICANT CHANGE UP (ref 0–2)
EOSINOPHIL # BLD AUTO: 0.1 K/UL — SIGNIFICANT CHANGE UP (ref 0–0.5)
EOSINOPHIL NFR BLD AUTO: 2.2 % — SIGNIFICANT CHANGE UP (ref 0–6)
HCT VFR BLD CALC: 39.3 % — SIGNIFICANT CHANGE UP (ref 39–50)
HGB BLD-MCNC: 12.6 G/DL — LOW (ref 13–17)
LYMPHOCYTES # BLD AUTO: 1.6 K/UL — SIGNIFICANT CHANGE UP (ref 1–3.3)
LYMPHOCYTES # BLD AUTO: 32 % — SIGNIFICANT CHANGE UP (ref 13–44)
MCHC RBC-ENTMCNC: 26 PG — LOW (ref 27–34)
MCHC RBC-ENTMCNC: 32 G/DL — SIGNIFICANT CHANGE UP (ref 32–36)
MCV RBC AUTO: 81.3 FL — SIGNIFICANT CHANGE UP (ref 80–100)
MONOCYTES # BLD AUTO: 0.3 K/UL — SIGNIFICANT CHANGE UP (ref 0–0.9)
MONOCYTES NFR BLD AUTO: 6.3 % — SIGNIFICANT CHANGE UP (ref 2–14)
NEUTROPHILS # BLD AUTO: 2.9 K/UL — SIGNIFICANT CHANGE UP (ref 1.8–7.4)
NEUTROPHILS NFR BLD AUTO: 57.9 % — SIGNIFICANT CHANGE UP (ref 43–77)
PLATELET # BLD AUTO: 167 K/UL — SIGNIFICANT CHANGE UP (ref 150–400)
RBC # BLD: 4.83 M/UL — SIGNIFICANT CHANGE UP (ref 4.2–5.8)
RBC # FLD: 13.2 % — SIGNIFICANT CHANGE UP (ref 10.3–14.5)
WBC # BLD: 5 K/UL — SIGNIFICANT CHANGE UP (ref 3.8–10.5)
WBC # FLD AUTO: 5 K/UL — SIGNIFICANT CHANGE UP (ref 3.8–10.5)

## 2025-01-22 LAB
ALBUMIN SERPL ELPH-MCNC: 4.1 G/DL — SIGNIFICANT CHANGE UP (ref 3.3–5)
ALP SERPL-CCNC: 74 U/L — SIGNIFICANT CHANGE UP (ref 40–120)
ALT FLD-CCNC: 12 U/L — SIGNIFICANT CHANGE UP (ref 10–45)
ANION GAP SERPL CALC-SCNC: 13 MMOL/L — SIGNIFICANT CHANGE UP (ref 5–17)
AST SERPL-CCNC: 17 U/L — SIGNIFICANT CHANGE UP (ref 10–40)
BILIRUB SERPL-MCNC: 0.4 MG/DL — SIGNIFICANT CHANGE UP (ref 0.2–1.2)
BUN SERPL-MCNC: 16 MG/DL — SIGNIFICANT CHANGE UP (ref 7–23)
CALCIUM SERPL-MCNC: 9.8 MG/DL — SIGNIFICANT CHANGE UP (ref 8.4–10.5)
CEA SERPL-MCNC: 6.3 NG/ML — HIGH (ref 0–3.8)
CHLORIDE SERPL-SCNC: 100 MMOL/L — SIGNIFICANT CHANGE UP (ref 96–108)
CO2 SERPL-SCNC: 24 MMOL/L — SIGNIFICANT CHANGE UP (ref 22–31)
CREAT SERPL-MCNC: 0.97 MG/DL — SIGNIFICANT CHANGE UP (ref 0.5–1.3)
EGFR: 76 ML/MIN/1.73M2 — SIGNIFICANT CHANGE UP
EGFR: 76 ML/MIN/1.73M2 — SIGNIFICANT CHANGE UP
GLUCOSE SERPL-MCNC: 93 MG/DL — SIGNIFICANT CHANGE UP (ref 70–99)
MAGNESIUM SERPL-MCNC: 1.9 MG/DL — SIGNIFICANT CHANGE UP (ref 1.6–2.6)
POTASSIUM SERPL-MCNC: 4.6 MMOL/L — SIGNIFICANT CHANGE UP (ref 3.5–5.3)
POTASSIUM SERPL-SCNC: 4.6 MMOL/L — SIGNIFICANT CHANGE UP (ref 3.5–5.3)
PROT SERPL-MCNC: 7 G/DL — SIGNIFICANT CHANGE UP (ref 6–8.3)
SODIUM SERPL-SCNC: 137 MMOL/L — SIGNIFICANT CHANGE UP (ref 135–145)

## 2025-01-23 ENCOUNTER — APPOINTMENT (OUTPATIENT)
Dept: PULMONOLOGY | Facility: CLINIC | Age: 85
End: 2025-01-23
Payer: MEDICARE

## 2025-01-23 VITALS — WEIGHT: 144 LBS | BODY MASS INDEX: 23.99 KG/M2 | HEIGHT: 65 IN

## 2025-01-23 PROCEDURE — 94010 BREATHING CAPACITY TEST: CPT

## 2025-01-27 ENCOUNTER — NON-APPOINTMENT (OUTPATIENT)
Age: 85
End: 2025-01-27

## 2025-03-11 ENCOUNTER — RESULT REVIEW (OUTPATIENT)
Age: 85
End: 2025-03-11

## 2025-03-11 ENCOUNTER — APPOINTMENT (OUTPATIENT)
Age: 85
End: 2025-03-11

## 2025-03-11 ENCOUNTER — APPOINTMENT (OUTPATIENT)
Dept: HEMATOLOGY ONCOLOGY | Facility: CLINIC | Age: 85
End: 2025-03-11
Payer: MEDICARE

## 2025-03-11 VITALS
OXYGEN SATURATION: 97 % | SYSTOLIC BLOOD PRESSURE: 148 MMHG | TEMPERATURE: 97.2 F | DIASTOLIC BLOOD PRESSURE: 82 MMHG | HEIGHT: 65 IN | BODY MASS INDEX: 24.5 KG/M2 | WEIGHT: 147.05 LBS | HEART RATE: 66 BPM

## 2025-03-11 LAB
BASOPHILS # BLD AUTO: 0.05 K/UL — SIGNIFICANT CHANGE UP (ref 0–0.2)
BASOPHILS NFR BLD AUTO: 1 % — SIGNIFICANT CHANGE UP (ref 0–2)
EOSINOPHIL # BLD AUTO: 0.08 K/UL — SIGNIFICANT CHANGE UP (ref 0–0.5)
EOSINOPHIL NFR BLD AUTO: 1.6 % — SIGNIFICANT CHANGE UP (ref 0–6)
HCT VFR BLD CALC: 34.6 % — LOW (ref 39–50)
HGB BLD-MCNC: 11.3 G/DL — LOW (ref 13–17)
IMM GRANULOCYTES # BLD AUTO: 0.01 K/UL — SIGNIFICANT CHANGE UP (ref 0–0.07)
IMM GRANULOCYTES NFR BLD AUTO: 0.2 % — SIGNIFICANT CHANGE UP (ref 0–0.9)
LYMPHOCYTES # BLD AUTO: 1.53 K/UL — SIGNIFICANT CHANGE UP (ref 1–3.3)
LYMPHOCYTES NFR BLD AUTO: 30.8 % — SIGNIFICANT CHANGE UP (ref 13–44)
MCHC RBC-ENTMCNC: 25.1 PG — LOW (ref 27–34)
MCHC RBC-ENTMCNC: 32.7 G/DL — SIGNIFICANT CHANGE UP (ref 32–36)
MCV RBC AUTO: 76.9 FL — LOW (ref 80–100)
MONOCYTES # BLD AUTO: 0.56 K/UL — SIGNIFICANT CHANGE UP (ref 0–0.9)
MONOCYTES NFR BLD AUTO: 11.3 % — SIGNIFICANT CHANGE UP (ref 2–14)
NEUTROPHILS # BLD AUTO: 2.73 K/UL — SIGNIFICANT CHANGE UP (ref 1.8–7.4)
NEUTROPHILS NFR BLD AUTO: 55.1 % — SIGNIFICANT CHANGE UP (ref 43–77)
NRBC # BLD AUTO: 0 K/UL — SIGNIFICANT CHANGE UP (ref 0–0)
NRBC # FLD: 0 K/UL — SIGNIFICANT CHANGE UP (ref 0–0)
NRBC BLD AUTO-RTO: 0 /100 WBCS — SIGNIFICANT CHANGE UP (ref 0–0)
PLATELET # BLD AUTO: 157 K/UL — SIGNIFICANT CHANGE UP (ref 150–400)
PMV BLD: 11.1 FL — SIGNIFICANT CHANGE UP (ref 7–13)
RBC # BLD: 4.5 M/UL — SIGNIFICANT CHANGE UP (ref 4.2–5.8)
RBC # FLD: 15 % — HIGH (ref 10.3–14.5)
WBC # BLD: 4.96 K/UL — SIGNIFICANT CHANGE UP (ref 3.8–10.5)
WBC # FLD AUTO: 4.96 K/UL — SIGNIFICANT CHANGE UP (ref 3.8–10.5)

## 2025-03-11 PROCEDURE — 99213 OFFICE O/P EST LOW 20 MIN: CPT

## 2025-03-12 LAB
ALBUMIN SERPL ELPH-MCNC: 4.1 G/DL — SIGNIFICANT CHANGE UP (ref 3.3–5)
ALP SERPL-CCNC: 62 U/L — SIGNIFICANT CHANGE UP (ref 40–120)
ALT FLD-CCNC: 7 U/L — LOW (ref 10–45)
ANION GAP SERPL CALC-SCNC: 13 MMOL/L — SIGNIFICANT CHANGE UP (ref 5–17)
AST SERPL-CCNC: 15 U/L — SIGNIFICANT CHANGE UP (ref 10–40)
BILIRUB SERPL-MCNC: 0.4 MG/DL — SIGNIFICANT CHANGE UP (ref 0.2–1.2)
BUN SERPL-MCNC: 17 MG/DL — SIGNIFICANT CHANGE UP (ref 7–23)
CALCIUM SERPL-MCNC: 9.6 MG/DL — SIGNIFICANT CHANGE UP (ref 8.4–10.5)
CEA SERPL-MCNC: 5.7 NG/ML — HIGH (ref 0–3.8)
CHLORIDE SERPL-SCNC: 103 MMOL/L — SIGNIFICANT CHANGE UP (ref 96–108)
CO2 SERPL-SCNC: 24 MMOL/L — SIGNIFICANT CHANGE UP (ref 22–31)
CREAT SERPL-MCNC: 1.33 MG/DL — HIGH (ref 0.5–1.3)
EGFR: 52 ML/MIN/1.73M2 — LOW
EGFR: 52 ML/MIN/1.73M2 — LOW
GLUCOSE SERPL-MCNC: 81 MG/DL — SIGNIFICANT CHANGE UP (ref 70–99)
MAGNESIUM SERPL-MCNC: 2.1 MG/DL — SIGNIFICANT CHANGE UP (ref 1.6–2.6)
POTASSIUM SERPL-MCNC: 4.2 MMOL/L — SIGNIFICANT CHANGE UP (ref 3.5–5.3)
POTASSIUM SERPL-SCNC: 4.2 MMOL/L — SIGNIFICANT CHANGE UP (ref 3.5–5.3)
PROT SERPL-MCNC: 6.6 G/DL — SIGNIFICANT CHANGE UP (ref 6–8.3)
SODIUM SERPL-SCNC: 141 MMOL/L — SIGNIFICANT CHANGE UP (ref 135–145)

## 2025-03-12 NOTE — PHYSICAL EXAM
[General Appearance - Alert] : alert [General Appearance - In No Acute Distress] : in no acute distress [General Appearance - Well Nourished] : well nourished [General Appearance - Well Developed] : well developed [General Appearance - Well-Appearing] : healthy appearing [Sclera] : the sclera and conjunctiva were normal [PERRL With Normal Accommodation] : pupils were equal in size, round, and reactive to light [Extraocular Movements] : extraocular movements were intact [Outer Ear] : the ears and nose were normal in appearance [Neck Appearance] : the appearance of the neck was normal [Neck Cervical Mass (___cm)] : no neck mass was observed [Jugular Venous Distention Increased] : there was no jugular-venous distention [Thyroid Diffuse Enlargement] : the thyroid was not enlarged [Thyroid Nodule] : there were no palpable thyroid nodules [Lungs Percussion] : the lungs were normal to percussion [Heart Rate And Rhythm] : heart rate was normal and rhythm regular [Edema] : there was no peripheral edema [Abdomen Soft] : soft [Abdomen Tenderness] : non-tender [Abdomen Mass (___ Cm)] : no abdominal mass palpated [Cervical Lymph Nodes Enlarged Posterior Bilaterally] : posterior cervical Odomzo Pregnancy And Lactation Text: This medication is Pregnancy Category X and is absolutely contraindicated during pregnancy. It is unknown if it is excreted in breast milk. [Cervical Lymph Nodes Enlarged Anterior Bilaterally] : anterior cervical Fluconazole Counseling:  Patient counseled regarding adverse effects of fluconazole including but not limited to headache, diarrhea, nausea, upset stomach, liver function test abnormalities, taste disturbance, and stomach pain.  There is a rare possibility of liver failure that can occur when taking fluconazole.  The patient understands that monitoring of LFTs and kidney function test may be required, especially at baseline. The patient verbalized understanding of the proper use and possible adverse effects of fluconazole.  All of the patient's questions and concerns were addressed. [Supraclavicular Lymph Nodes Enlarged Bilaterally] : supraclavicular Soolantra Pregnancy And Lactation Text: This medication is Pregnancy Category C. This medication is considered safe during breast feeding. [Axillary Lymph Nodes Enlarged Bilaterally] : axillary Cellcept Pregnancy And Lactation Text: This medication is Pregnancy Category D and isn't considered safe during pregnancy. It is unknown if this medication is excreted in breast milk. [No CVA Tenderness] : no ~M costovertebral angle tenderness Carac Pregnancy And Lactation Text: This medication is Pregnancy Category X and contraindicated in pregnancy and in women who may become pregnant. It is unknown if this medication is excreted in breast milk. [No Spinal Tenderness] : no spinal tenderness Doxycycline Counseling:  Patient counseled regarding possible photosensitivity and increased risk for sunburn.  Patient instructed to avoid sunlight, if possible.  When exposed to sunlight, patients should wear protective clothing, sunglasses, and sunscreen.  The patient was instructed to call the office immediately if the following severe adverse effects occur:  hearing changes, easy bruising/bleeding, severe headache, or vision changes.  The patient verbalized understanding of the proper use and possible adverse effects of doxycycline.  All of the patient's questions and concerns were addressed. [Skin Color & Pigmentation] : normal skin color and pigmentation Simlandi Pregnancy And Lactation Text: This medication is Pregnancy Category B and is considered safe during pregnancy. It is unknown if this medication is excreted in breast milk. [Skin Turgor] : normal skin turgor Klisyri Pregnancy And Lactation Text: It is unknown if this medication can harm a developing fetus or if it is excreted in breast milk. [] : no rash Ebglyss Pregnancy And Lactation Text: This medication likely crosses the placenta but the risk for the fetus is uncertain. It is unknown if this medication is excreted in breast milk. [Cranial Nerves] : cranial nerves 2-12 were intact Nsaids Pregnancy And Lactation Text: These medications are considered safe up to 30 weeks gestation. It is excreted in breast milk. [Deep Tendon Reflexes (DTR)] : deep tendon reflexes were 2+ and symmetric Valtrex Pregnancy And Lactation Text: this medication is Pregnancy Category B and is considered safe during pregnancy. This medication is not directly found in breast milk but it's metabolite acyclovir is present. [Sensation] : the sensory exam was normal to light touch and pinprick Fluconazole Pregnancy And Lactation Text: This medication is Pregnancy Category C and it isn't know if it is safe during pregnancy. It is also excreted in breast milk. [Motor Exam] : the motor exam was normal Oxempic Pregnancy And Lactation Text: The fetal risk of this medication is unknown and taking while pregnant is not recommended. It is unknown if this medication is present in breast milk. [No Focal Deficits] : no focal deficits Cyclophosphamide Counseling:  I discussed with the patient the risks of cyclophosphamide including but not limited to hair loss, hormonal abnormalities, decreased fertility, abdominal pain, diarrhea, nausea and vomiting, bone marrow suppression and infection. The patient understands that monitoring is required while taking this medication. [Oriented To Time, Place, And Person] : oriented to person, place, and time Xeljanz Counseling: I discussed with the patient the risks of Xeljanz therapy including increased risk of infection, liver issues, headache, diarrhea, or cold symptoms. Live vaccines should be avoided. They were instructed to call if they have any problems. [Impaired Insight] : insight and judgment were intact Topical Retinoid counseling:  Patient advised to apply a pea-sized amount only at bedtime and wait 30 minutes after washing their face before applying.  If too drying, patient may add a non-comedogenic moisturizer. The patient verbalized understanding of the proper use and possible adverse effects of retinoids.  All of the patient's questions and concerns were addressed. [Affect] : the affect was normal Doxycycline Pregnancy And Lactation Text: This medication is Pregnancy Category D and not consider safe during pregnancy. It is also excreted in breast milk but is considered safe for shorter treatment courses. [Mood] : the mood was normal Use Enhanced Medication Counseling?: Yes [FreeTextEntry1] : Strength-5/5  Calcipotriene Counseling:  I discussed with the patient the risks of calcipotriene including but not limited to erythema, scaling, itching, and irritation. Olanzapine Counseling- I discussed with the patient the common side effects of olanzapine including but are not limited to: lack of energy, dry mouth, increased appetite, sleepiness, tremor, constipation, dizziness, changes in behavior, or restlessness.  Explained that teenagers are more likely to experience headaches, abdominal pain, pain in the arms or legs, tiredness, and sleepiness.  Serious side effects include but are not limited: increased risk of death in elderly patients who are confused, have memory loss, or dementia-related psychosis; hyperglycemia; increased cholesterol and triglycerides; and weight gain. Minoxidil Counseling: Minoxidil is a topical medication which can increase blood flow where it is applied. It is uncertain how this medication increases hair growth. Side effects are uncommon and include stinging and allergic reactions. Saxenda Counseling: I reviewed the possible side effects including: thyroid tumors, kidney disease, gallbladder disease, abdominal pain, constipation, diarrhea, nausea, vomiting and pancreatitis. Do not take this medication if you have a history or family history of multiple endocrine neoplasia syndrome type 2. Side effects reviewed, pt to contact office should one occur. Clofazimine Counseling:  I discussed with the patient the risks of clofazimine including but not limited to skin and eye pigmentation, liver damage, nausea/vomiting, gastrointestinal bleeding and allergy. Simponi Counseling:  I discussed with the patient the risks of golimumab including but not limited to myelosuppression, immunosuppression, autoimmune hepatitis, demyelinating diseases, lymphoma, and serious infections.  The patient understands that monitoring is required including a PPD at baseline and must alert us or the primary physician if symptoms of infection or other concerning signs are noted. Detail Level: Zone Cyclophosphamide Pregnancy And Lactation Text: This medication is Pregnancy Category D and it isn't considered safe during pregnancy. This medication is excreted in breast milk. Enbrel Counseling:  I discussed with the patient the risks of etanercept including but not limited to myelosuppression, immunosuppression, autoimmune hepatitis, demyelinating diseases, lymphoma, and infections.  The patient understands that monitoring is required including a PPD at baseline and must alert us or the primary physician if symptoms of infection or other concerning signs are noted. Albendazole Counseling:  I discussed with the patient the risks of albendazole including but not limited to cytopenia, kidney damage, nausea/vomiting and severe allergy.  The patient understands that this medication is being used in an off-label manner. Topical Retinoid Pregnancy And Lactation Text: This medication is Pregnancy Category C. It is unknown if this medication is excreted in breast milk. Xeldougz Pregnancy And Lactation Text: This medication is Pregnancy Category D and is not considered safe during pregnancy.  The risk during breast feeding is also uncertain. Olanzapine Pregnancy And Lactation Text: This medication is pregnancy category C.   There are no adequate and well controlled trials with olanzapine in pregnant females.  Olanzapine should be used during pregnancy only if the potential benefit justifies the potential risk to the fetus.   In a study in lactating healthy women, olanzapine was excreted in breast milk.  It is recommended that women taking olanzapine should not breast feed. Minoxidil Pregnancy And Lactation Text: This medication has not been assigned a Pregnancy Risk Category but animal studies failed to show danger with the topical medication. It is unknown if the medication is excreted in breast milk. Erythromycin Counseling:  I discussed with the patient the risks of erythromycin including but not limited to GI upset, allergic reaction, drug rash, diarrhea, increase in liver enzymes, and yeast infections. Griseofulvin Counseling:  I discussed with the patient the risks of griseofulvin including but not limited to photosensitivity, cytopenia, liver damage, nausea/vomiting and severe allergy.  The patient understands that this medication is best absorbed when taken with a fatty meal (e.g., ice cream or french fries). Simponi Pregnancy And Lactation Text: The risk during pregnancy and breastfeeding is uncertain with this medication. Clofazimine Pregnancy And Lactation Text: This medication is Pregnancy Category C and isn't considered safe during pregnancy. It is excreted in breast milk. Calcipotriene Pregnancy And Lactation Text: The use of this medication during pregnancy or lactation is not recommended as there is insufficient data. Cyclosporine Counseling:  I discussed with the patient the risks of cyclosporine including but not limited to hypertension, gingival hyperplasia,myelosuppression, immunosuppression, liver damage, kidney damage, neurotoxicity, lymphoma, and serious infections. The patient understands that monitoring is required including baseline blood pressure, CBC, CMP, lipid panel and uric acid, and then 1-2 times monthly CMP and blood pressure. Albendazole Pregnancy And Lactation Text: This medication is Pregnancy Category C and it isn't known if it is safe during pregnancy. It is also excreted in breast milk. Griseofulvin Pregnancy And Lactation Text: This medication is Pregnancy Category X and is known to cause serious birth defects. It is unknown if this medication is excreted in breast milk but breast feeding should be avoided. Erythromycin Pregnancy And Lactation Text: This medication is Pregnancy Category B and is considered safe during pregnancy. It is also excreted in breast milk. Dutasteride Male Counseling: Dustasteride Counseling:  I discussed with the patient the risks of use of dutasteride including but not limited to decreased libido, decreased ejaculate volume, and gynecomastia. Women who can become pregnant should not handle medication.  All of the patient's questions and concerns were addressed. Mirvaso Counseling: Mirvaso is a topical medication which can decrease superficial blood flow where applied. Side effects are uncommon and include stinging, redness and allergic reactions. Tazorac Counseling:  Patient advised that medication is irritating and drying.  Patient may need to apply sparingly and wash off after an hour before eventually leaving it on overnight.  The patient verbalized understanding of the proper use and possible adverse effects of tazorac.  All of the patient's questions and concerns were addressed. Ivermectin Counseling:  Patient instructed to take medication on an empty stomach with a full glass of water.  Patient informed of potential adverse effects including but not limited to nausea, diarrhea, dizziness, itching, and swelling of the extremities or lymph nodes.  The patient verbalized understanding of the proper use and possible adverse effects of ivermectin.  All of the patient's questions and concerns were addressed. Cantharidin Counseling:  I discussed with the patient the risks of Cantharidin including but not limited to pain, redness, burning, itching, and blistering. Oral Minoxidil Counseling- I discussed with the patient the risks of oral minoxidil including but not limited to shortness of breath, swelling of the feet or ankles, dizziness, lightheadedness, unwanted hair growth and allergic reaction.  The patient verbalized understanding of the proper use and possible adverse effects of oral minoxidil.  All of the patient's questions and concerns were addressed. Semaglutide Counseling: I reviewed the possible side effects including: thyroid tumors, kidney disease, gallbladder disease, abdominal pain, constipation, diarrhea, nausea, vomiting and pancreatitis. Do not take this medication if you have a history or family history of multiple endocrine neoplasia syndrome type 2. Side effects reviewed, pt to contact office should one occur. Colchicine Counseling:  Patient counseled regarding adverse effects including but not limited to stomach upset (nausea, vomiting, stomach pain, or diarrhea).  Patient instructed to limit alcohol consumption while taking this medication.  Colchicine may reduce blood counts especially with prolonged use.  The patient understands that monitoring of kidney function and blood counts may be required, especially at baseline. The patient verbalized understanding of the proper use and possible adverse effects of colchicine.  All of the patient's questions and concerns were addressed. Winlevi Counseling:  I discussed with the patient the risks of topical clascoterone including but not limited to erythema, scaling, itching, and stinging. Patient voiced their understanding. Metronidazole Counseling:  I discussed with the patient the risks of metronidazole including but not limited to seizures, nausea/vomiting, a metallic taste in the mouth, nausea/vomiting and severe allergy. Sotyktu Counseling:  I discussed the most common side effects of Sotyktu including: common cold, sore throat, sinus infections, cold sores, canker sores, folliculitis, and acne.  I also discussed more serious side effects of Sotyktu including but not limited to: serious allergic reactions; increased risk for infections such as TB; cancers such as lymphomas; rhabdomyolysis and elevated CPK; and elevated triglycerides and liver enzymes.  Cyclosporine Pregnancy And Lactation Text: This medication is Pregnancy Category C and it isn't know if it is safe during pregnancy. This medication is excreted in breast milk. Skyrizi Counseling: I discussed with the patient the risks of risankizumab-rzaa including but not limited to immunosuppression, and serious infections.  The patient understands that monitoring is required including a PPD at baseline and must alert us or the primary physician if symptoms of infection or other concerning signs are noted. Humira Counseling:  I discussed with the patient the risks of adalimumab including but not limited to myelosuppression, immunosuppression, autoimmune hepatitis, demyelinating diseases, lymphoma, and serious infections.  The patient understands that monitoring is required including a PPD at baseline and must alert us or the primary physician if symptoms of infection or other concerning signs are noted. Mirvaso Pregnancy And Lactation Text: This medication has not been assigned a Pregnancy Risk Category. It is unknown if the medication is excreted in breast milk. Tazorac Pregnancy And Lactation Text: This medication is not safe during pregnancy. It is unknown if this medication is excreted in breast milk. Include Pregnancy/Lactation Warning?: No Dutasteride Female Counseling: Dutasteride Counseling:  I discussed with the patient the risks of use of dutasteride including but not limited to decreased libido and sexual dysfunction. Explained the teratogenic nature of the medication and stressed the importance of not getting pregnant during treatment. All of the patient's questions and concerns were addressed. Itraconazole Counseling:  I discussed with the patient the risks of itraconazole including but not limited to liver damage, nausea/vomiting, neuropathy, and severe allergy.  The patient understands that this medication is best absorbed when taken with acidic beverages such as non-diet cola or ginger ale.  The patient understands that monitoring is required including baseline LFTs and repeat LFTs at intervals.  The patient understands that they are to contact us or the primary physician if concerning signs are noted. Oral Minoxidil Pregnancy And Lactation Text: This medication should only be used when clearly needed if you are pregnant, attempting to become pregnant or breast feeding. Cantharidin Pregnancy And Lactation Text: This medication has not been proven safe during pregnancy. It is unknown if this medication is excreted in breast milk. Winlevi Pregnancy And Lactation Text: This medication is considered safe during pregnancy and breastfeeding. Sotyktu Pregnancy And Lactation Text: There is insufficient data to evaluate whether or not Sotyktu is safe to use during pregnancy.   It is not known if Sotyktu passes into breast milk and whether or not it is safe to use when breastfeeding.   Dutasteride Pregnancy And Lactation Text: This medication is absolutely contraindicated in women, especially during pregnancy and breast feeding. Feminization of male fetuses is possible if taking while pregnant. Opzelura Counseling:  I discussed with the patient the risks of Opzelura including but not limited to nasopharngitis, bronchitis, ear infection, eosinophila, hives, diarrhea, folliculitis, tonsillitis, and rhinorrhea.  Taken orally, this medication has been linked to serious infections; higher rate of mortality; malignancy and lymphoproliferative disorders; major adverse cardiovascular events; thrombosis; thrombocytopenia, anemia, and neutropenia; and lipid elevations. Methotrexate Counseling:  Patient counseled regarding adverse effects of methotrexate including but not limited to nausea, vomiting, abnormalities in liver function tests. Patients may develop mouth sores, rash, diarrhea, and abnormalities in blood counts. The patient understands that monitoring is required including LFT's and blood counts.  There is a rare possibility of scarring of the liver and lung problems that can occur when taking methotrexate. Persistent nausea, loss of appetite, pale stools, dark urine, cough, and shortness of breath should be reported immediately. Patient advised to discontinue methotrexate treatment at least three months before attempting to become pregnant.  I discussed the need for folate supplements while taking methotrexate.  These supplements can decrease side effects during methotrexate treatment. The patient verbalized understanding of the proper use and possible adverse effects of methotrexate.  All of the patient's questions and concerns were addressed. Metronidazole Pregnancy And Lactation Text: This medication is Pregnancy Category B and considered safe during pregnancy.  It is also excreted in breast milk. Dapsone Counseling: I discussed with the patient the risks of dapsone including but not limited to hemolytic anemia, agranulocytosis, rashes, methemoglobinemia, kidney failure, peripheral neuropathy, headaches, GI upset, and liver toxicity.  Patients who start dapsone require monitoring including baseline LFTs and weekly CBCs for the first month, then every month thereafter.  The patient verbalized understanding of the proper use and possible adverse effects of dapsone.  All of the patient's questions and concerns were addressed. Topical Clindamycin Counseling: Patient counseled that this medication may cause skin irritation or allergic reactions.  In the event of skin irritation, the patient was advised to reduce the amount of the drug applied or use it less frequently.   The patient verbalized understanding of the proper use and possible adverse effects of clindamycin.  All of the patient's questions and concerns were addressed. Hyrimoz Counseling:  I discussed with the patient the risks of adalimumab including but not limited to myelosuppression, immunosuppression, autoimmune hepatitis, demyelinating diseases, lymphoma, and serious infections.  The patient understands that monitoring is required including a PPD at baseline and must alert us or the primary physician if symptoms of infection or other concerning signs are noted. 5-Fu Counseling: 5-Fluorouracil Counseling:  I discussed with the patient the risks of 5-fluorouracil including but not limited to erythema, scaling, itching, weeping, crusting, and pain. Otezla Counseling: The side effects of Otezla were discussed with the patient, including but not limited to worsening or new depression, weight loss, diarrhea, nausea, upper respiratory tract infection, and headache. Patient instructed to call the office should any adverse effect occur.  The patient verbalized understanding of the proper use and possible adverse effects of Otezla.  All the patient's questions and concerns were addressed. Ketoconazole Counseling:   Patient counseled regarding improving absorption with orange juice.  Adverse effects include but are not limited to breast enlargement, headache, diarrhea, nausea, upset stomach, liver function test abnormalities, taste disturbance, and stomach pain.  There is a rare possibility of liver failure that can occur when taking ketoconazole. The patient understands that monitoring of LFTs may be required, especially at baseline. The patient verbalized understanding of the proper use and possible adverse effects of ketoconazole.  All of the patient's questions and concerns were addressed. Wegovy Counseling: I reviewed the possible side effects including: thyroid tumors, kidney disease, gallbladder disease, abdominal pain, constipation, diarrhea, nausea, vomiting and pancreatitis. Do not take this medication if you have a history or family history of multiple endocrine neoplasia syndrome type 2. Side effects reviewed, pt to contact office should one occur. VTAMA Counseling: I discussed with the patient that VTAMA is not for use in the eyes, mouth or mouth. They should call the office if they develop any signs of allergic reactions to VTAMA. The patient verbalized understanding of the proper use and possible adverse effects of VTAMA.  All of the patient's questions and concerns were addressed. Finasteride Male Counseling: Finasteride Counseling:  I discussed with the patient the risks of use of finasteride including but not limited to decreased libido, decreased ejaculate volume, gynecomastia, and depression. Women should not handle medication.  All of the patient's questions and concerns were addressed. Spevigo Counseling: I discussed with the patient the risks of Spevigo including but not limited to fatigue, nasuea, vomiting, headache, pruritus, urinary tract infection, an infusion related reactions.  The patient understands that monitoring is required including screening for tuberculosis at baseline and yearly screening thereafter while continuing Spevigo therapy. They should contact us if symptoms of infection or other concerning signs are noted. Methotrexate Pregnancy And Lactation Text: This medication is Pregnancy Category X and is known to cause fetal harm. This medication is excreted in breast milk. Minocycline Counseling: Patient advised regarding possible photosensitivity and discoloration of the teeth, skin, lips, tongue and gums.  Patient instructed to avoid sunlight, if possible.  When exposed to sunlight, patients should wear protective clothing, sunglasses, and sunscreen.  The patient was instructed to call the office immediately if the following severe adverse effects occur:  hearing changes, easy bruising/bleeding, severe headache, or vision changes.  The patient verbalized understanding of the proper use and possible adverse effects of minocycline.  All of the patient's questions and concerns were addressed. Topical Clindamycin Pregnancy And Lactation Text: This medication is Pregnancy Category B and is considered safe during pregnancy. It is unknown if it is excreted in breast milk. Otezla Pregnancy And Lactation Text: This medication is Pregnancy Category C and it isn't known if it is safe during pregnancy. It is unknown if it is excreted in breast milk. Dapsone Pregnancy And Lactation Text: This medication is Pregnancy Category C and is not considered safe during pregnancy or breast feeding. Opzelura Pregnancy And Lactation Text: There is insufficient data to evaluate drug-associated risk for major birth defects, miscarriage, or other adverse maternal or fetal outcomes.  There is a pregnancy registry that monitors pregnancy outcomes in pregnant persons exposed to the medication during pregnancy.  It is unknown if this medication is excreted in breast milk.  Do not breastfeed during treatment and for about 4 weeks after the last dose. Ketoconazole Pregnancy And Lactation Text: This medication is Pregnancy Category C and it isn't know if it is safe during pregnancy. It is also excreted in breast milk and breast feeding isn't recommended. Spevigo Pregnancy And Lactation Text: The risk during pregnancy and breastfeeding is uncertain with this medication. This medication does cross the placenta. It is unknown if this medication is found in breast milk. Vtama Pregnancy And Lactation Text: It is unknown if this medication can cause problems during pregnancy and breastfeeding. Finasteride Female Counseling: Finasteride Counseling:  I discussed with the patient the risks of use of finasteride including but not limited to decreased libido and sexual dysfunction. Explained the teratogenic nature of the medication and stressed the importance of not getting pregnant during treatment. All of the patient's questions and concerns were addressed. Topical Ketoconazole Counseling: Patient counseled that this medication may cause skin irritation or allergic reactions.  In the event of skin irritation, the patient was advised to reduce the amount of the drug applied or use it less frequently.   The patient verbalized understanding of the proper use and possible adverse effects of ketoconazole.  All of the patient's questions and concerns were addressed. Prednisone Counseling:  I discussed with the patient the risks of prolonged use of prednisone including but not limited to weight gain, insomnia, osteoporosis, mood changes, diabetes, susceptibility to infection, glaucoma and high blood pressure.  In cases where prednisone use is prolonged, patients should be monitored with blood pressure checks, serum glucose levels and an eye exam.  Additionally, the patient may need to be placed on GI prophylaxis, PCP prophylaxis, and calcium and vitamin D supplementation and/or a bisphosphonate.  The patient verbalized understanding of the proper use and the possible adverse effects of prednisone.  All of the patient's questions and concerns were addressed. Oxybutynin Counseling:  I discussed with the patient the risks of oxybutynin including but not limited to skin rash, drowsiness, dry mouth, difficulty urinating, and blurred vision. Picato Counseling:  I discussed with the patient the risks of Picato including but not limited to erythema, scaling, itching, weeping, crusting, and pain. Minocycline Pregnancy And Lactation Text: This medication is Pregnancy Category D and not consider safe during pregnancy. It is also excreted in breast milk. Drysol Counseling:  I discussed with the patient the risks of drysol/aluminum chloride including but not limited to skin rash, itching, irritation, burning. Stelara Counseling:  I discussed with the patient the risks of ustekinumab including but not limited to immunosuppression, malignancy, posterior leukoencephalopathy syndrome, and serious infections.  The patient understands that monitoring is required including a PPD at baseline and must alert us or the primary physician if symptoms of infection or other concerning signs are noted. Zoryve Counseling:  I discussed with the patient that Zoryve is not for use in the eyes, mouth or vagina. The most commonly reported side effects include diarrhea, headache, insomnia, application site pain, upper respiratory tract infections, and urinary tract infections.  All of the patient's questions and concerns were addressed. Ilumya Counseling: I discussed with the patient the risks of tildrakizumab including but not limited to immunosuppression, malignancy, posterior leukoencephalopathy syndrome, and serious infections.  The patient understands that monitoring is required including a PPD at baseline and must alert us or the primary physician if symptoms of infection or other concerning signs are noted. Zepbound Counseling: I reviewed the possible side effects including: thyroid tumors, kidney disease, gallbladder disease, abdominal pain, constipation, diarrhea, nausea, vomiting and pancreatitis. Do not take this medication if you have a history or family history of multiple endocrine neoplasia syndrome type 2. Side effects reviewed, pt to contact office should one occur. Acitretin Counseling:  I discussed with the patient the risks of acitretin including but not limited to hair loss, dry lips/skin/eyes, liver damage, hyperlipidemia, depression/suicidal ideation, photosensitivity.  Serious rare side effects can include but are not limited to pancreatitis, pseudotumor cerebri, bony changes, clot formation/stroke/heart attack.  Patient understands that alcohol is contraindicated since it can result in liver toxicity and significantly prolong the elimination of the drug by many years. Adbry Counseling: I discussed with the patient the risks of tralokinumab including but not limited to eye infection and irritation, cold sores, injection site reactions, worsening of asthma, allergic reactions and increased risk of parasitic infection.  Live vaccines should be avoided while taking tralokinumab. The patient understands that monitoring is required and they must alert us or the primary physician if symptoms of infection or other concerning signs are noted. Gabapentin Counseling: I discussed with the patient the risks of gabapentin including but not limited to dizziness, somnolence, fatigue and ataxia. Finasteride Pregnancy And Lactation Text: This medication is absolutely contraindicated during pregnancy. It is unknown if it is excreted in breast milk. Quinolones Counseling:  I discussed with the patient the risks of fluoroquinolones including but not limited to GI upset, allergic reaction, drug rash, diarrhea, dizziness, photosensitivity, yeast infections, liver function test abnormalities, tendonitis/tendon rupture. Terbinafine Counseling: Patient counseling regarding adverse effects of terbinafine including but not limited to headache, diarrhea, rash, upset stomach, liver function test abnormalities, itching, taste/smell disturbance, nausea, abdominal pain, and flatulence.  There is a rare possibility of liver failure that can occur when taking terbinafine.  The patient understands that a baseline LFT and kidney function test may be required. The patient verbalized understanding of the proper use and possible adverse effects of terbinafine.  All of the patient's questions and concerns were addressed. Drysol Pregnancy And Lactation Text: This medication is considered safe during pregnancy and breast feeding. Adbry Pregnancy And Lactation Text: It is unknown if this medication will adversely affect pregnancy or breast feeding. Acitretin Pregnancy And Lactation Text: This medication is Pregnancy Category X and should not be given to women who are pregnant or may become pregnant in the future. This medication is excreted in breast milk. Elidel Counseling: Patient may experience a mild burning sensation during topical application. Elidel is not approved in children less than 2 years of age. There have been case reports of hematologic and skin malignancies in patients using topical calcineurin inhibitors although causality is questionable. Birth Control Pills Counseling: Birth Control Pill Counseling: I discussed with the patient the potential side effects of OCPs including but not limited to increased risk of stroke, heart attack, thrombophlebitis, deep venous thrombosis, hepatic adenomas, breast changes, GI upset, headaches, and depression.  The patient verbalized understanding of the proper use and possible adverse effects of OCPs. All of the patient's questions and concerns were addressed. Topical Metronidazole Counseling: Metronidazole is a topical antibiotic medication. You may experience burning, stinging, redness, or allergic reactions.  Please call our office if you develop any problems from using this medication. Terbinafine Pregnancy And Lactation Text: This medication is Pregnancy Category B and is considered safe during pregnancy. It is also excreted in breast milk and breast feeding isn't recommended. Protopic Counseling: Patient may experience a mild burning sensation during topical application. Protopic is not approved in children less than 2 years of age. There have been case reports of hematologic and skin malignancies in patients using topical calcineurin inhibitors although causality is questionable. Glycopyrrolate Counseling:  I discussed with the patient the risks of glycopyrrolate including but not limited to skin rash, drowsiness, dry mouth, difficulty urinating, and blurred vision. Taltz Counseling: I discussed with the patient the risks of ixekizumab including but not limited to immunosuppression, serious infections, worsening of inflammatory bowel disease and drug reactions.  The patient understands that monitoring is required including a PPD at baseline and must alert us or the primary physician if symptoms of infection or other concerning signs are noted. Infliximab Counseling:  I discussed with the patient the risks of infliximab including but not limited to myelosuppression, immunosuppression, autoimmune hepatitis, demyelinating diseases, lymphoma, and serious infections.  The patient understands that monitoring is required including a PPD at baseline and must alert us or the primary physician if symptoms of infection or other concerning signs are noted. Zyclara Counseling:  I discussed with the patient the risks of imiquimod including but not limited to erythema, scaling, itching, weeping, crusting, and pain.  Patient understands that the inflammatory response to imiquimod is variable from person to person and was educated regarded proper titration schedule.  If flu-like symptoms develop, patient knows to discontinue the medication and contact us. Bexarotene Counseling:  I discussed with the patient the risks of bexarotene including but not limited to hair loss, dry lips/skin/eyes, liver abnormalities, hyperlipidemia, pancreatitis, depression/suicidal ideation, photosensitivity, drug rash/allergic reactions, hypothyroidism, anemia, leukopenia, infection, cataracts, and teratogenicity.  Patient understands that they will need regular blood tests to check lipid profile, liver function tests, white blood cell count, thyroid function tests and pregnancy test if applicable. Propranolol Counseling:  I discussed with the patient the risks of propranolol including but not limited to low heart rate, low blood pressure, low blood sugar, restlessness and increased cold sensitivity. They should call the office if they experience any of these side effects. Bimzelx Counseling:  I discussed with the patient the risks of Bimzelx including but not limited to depression, immunosuppression, allergic reactions and infections.  The patient understands that monitoring is required including a PPD at baseline and must alert us or the primary physician if symptoms of infection or other concerning signs are noted. Rifampin Counseling: I discussed with the patient the risks of rifampin including but not limited to liver damage, kidney damage, red-orange body fluids, nausea/vomiting and severe allergy. Birth Control Pills Pregnancy And Lactation Text: This medication should be avoided if pregnant and for the first 30 days post-partum. Protopic Pregnancy And Lactation Text: This medication is Pregnancy Category C. It is unknown if this medication is excreted in breast milk when applied topically. Topical Metronidazole Pregnancy And Lactation Text: This medication is Pregnancy Category B and considered safe during pregnancy.  It is also considered safe to use while breastfeeding. Azithromycin Counseling:  I discussed with the patient the risks of azithromycin including but not limited to GI upset, allergic reaction, drug rash, diarrhea, and yeast infections. Bimzelx Pregnancy And Lactation Text: This medication crosses the placenta and the safety is uncertain during pregnancy. It is unknown if this medication is present in breast milk. Cibinqo Counseling: I discussed with the patient the risks of Cibinqo therapy including but not limited to common cold, nausea, headache, cold sores, increased blood CPK levels, dizziness, UTIs, fatigue, acne, and vomitting. Live vaccines should be avoided.  This medication has been linked to serious infections; higher rate of mortality; malignancy and lymphoproliferative disorders; major adverse cardiovascular events; thrombosis; thrombocytopenia and lymphopenia; lipid elevations; and retinal detachment. Glycopyrrolate Pregnancy And Lactation Text: This medication is Pregnancy Category B and is considered safe during pregnancy. It is unknown if it is excreted breast milk. Propranolol Pregnancy And Lactation Text: This medication is Pregnancy Category C and it isn't known if it is safe during pregnancy. It is excreted in breast milk. Bexarotene Pregnancy And Lactation Text: This medication is Pregnancy Category X and should not be given to women who are pregnant or may become pregnant. This medication should not be used if you are breast feeding. Rifampin Pregnancy And Lactation Text: This medication is Pregnancy Category C and it isn't know if it is safe during pregnancy. It is also excreted in breast milk and should not be used if you are breast feeding. Topical Steroids Counseling: I discussed with the patient that prolonged use of topical steroids can result in the increased appearance of superficial blood vessels (telangiectasias), lightening (hypopigmentation) and thinning of the skin (atrophy).  Patient understands to avoid using high potency steroids in skin folds, the groin or the face.  The patient verbalized understanding of the proper use and possible adverse effects of topical steroids.  All of the patient's questions and concerns were addressed. Azithromycin Pregnancy And Lactation Text: This medication is considered safe during pregnancy and is also secreted in breast milk. Spironolactone Counseling: Patient advised regarding risks of diarrhea, abdominal pain, hyperkalemia, birth defects (for female patients), liver toxicity and renal toxicity. The patient may need blood work to monitor liver and kidney function and potassium levels while on therapy. The patient verbalized understanding of the proper use and possible adverse effects of spironolactone.  All of the patient's questions and concerns were addressed. Qbrexza Counseling:  I discussed with the patient the risks of Qbrexza including but not limited to headache, mydriasis, blurred vision, dry eyes, nasal dryness, dry mouth, dry throat, dry skin, urinary hesitation, and constipation.  Local skin reactions including erythema, burning, stinging, and itching can also occur. Cimzia Counseling:  I discussed with the patient the risks of Cimzia including but not limited to immunosuppression, allergic reactions and infections.  The patient understands that monitoring is required including a PPD at baseline and must alert us or the primary physician if symptoms of infection or other concerning signs are noted. Eucrisa Counseling: Patient may experience a mild burning sensation during topical application. Eucrisa is not approved in children less than 3 months of age. SSKI Counseling:  I discussed with the patient the risks of SSKI including but not limited to thyroid abnormalities, metallic taste, GI upset, fever, headache, acne, arthralgias, paraesthesias, lymphadenopathy, easy bleeding, arrhythmias, and allergic reaction. Aklief counseling:  Patient advised to apply a pea-sized amount only at bedtime and wait 30 minutes after washing their face before applying.  If too drying, patient may add a non-comedogenic moisturizer.  The most commonly reported side effects including irritation, redness, scaling, dryness, stinging, burning, itching, and increased risk of sunburn.  The patient verbalized understanding of the proper use and possible adverse effects of retinoids.  All of the patient's questions and concerns were addressed. Hydroxychloroquine Counseling:  I discussed with the patient that a baseline ophthalmologic exam is needed at the start of therapy and every year thereafter while on therapy. A CBC may also be warranted for monitoring.  The side effects of this medication were discussed with the patient, including but not limited to agranulocytosis, aplastic anemia, seizures, rashes, retinopathy, and liver toxicity. Patient instructed to call the office should any adverse effect occur.  The patient verbalized understanding of the proper use and possible adverse effects of Plaquenil.  All the patient's questions and concerns were addressed. Isotretinoin Counseling: Patient should get monthly blood tests, not donate blood, not drive at night if vision affected, not share medication, and not undergo elective surgery for 6 months after tx completed. Side effects reviewed, pt to contact office should one occur. Tremfya Counseling: I discussed with the patient the risks of guselkumab including but not limited to immunosuppression, serious infections, and drug reactions.  The patient understands that monitoring is required including a PPD at baseline and must alert us or the primary physician if symptoms of infection or other concerning signs are noted. Nemluvio Counseling: I discussed with the patient the risks of nemolizumab including but not limited to headache, gastrointestinal complaints, nasopharyngitis, musculoskeletal complaints, injection site reactions, and allergic reactions. The patient understands that monitoring is required and they must alert us or the primary physician if any side effects are noted. Topical Steroids Applications Pregnancy And Lactation Text: Most topical steroids are considered safe to use during pregnancy and lactation.  Any topical steroid applied to the breast or nipple should be washed off before breastfeeding. Spironolactone Pregnancy And Lactation Text: This medication can cause feminization of the male fetus and should be avoided during pregnancy. The active metabolite is also found in breast milk. Cimetidine Counseling:  I discussed with the patient the risks of Cimetidine including but not limited to gynecomastia, headache, diarrhea, nausea, drowsiness, arrhythmias, pancreatitis, skin rashes, psychosis, bone marrow suppression and kidney toxicity. Cibinqo Pregnancy And Lactation Text: It is unknown if this medication will adversely affect pregnancy or breast feeding.  You should not take this medication if you are currently pregnant or planning a pregnancy or while breastfeeding. Qbrexza Pregnancy And Lactation Text: There is no available data on Qbrexza use in pregnant women.  There is no available data on Qbrexza use in lactation. Hydroxychloroquine Pregnancy And Lactation Text: This medication has been shown to cause fetal harm but it isn't assigned a Pregnancy Risk Category. There are small amounts excreted in breast milk. Aklief Pregnancy And Lactation Text: It is unknown if this medication is safe to use during pregnancy.  It is unknown if this medication is excreted in breast milk.  Breastfeeding women should use the topical cream on the smallest area of the skin for the shortest time needed while breastfeeding.  Do not apply to nipple and areola. Sski Pregnancy And Lactation Text: This medication is Pregnancy Category D and isn't considered safe during pregnancy. It is excreted in breast milk. Bactrim Counseling:  I discussed with the patient the risks of sulfa antibiotics including but not limited to GI upset, allergic reaction, drug rash, diarrhea, dizziness, photosensitivity, and yeast infections.  Rarely, more serious reactions can occur including but not limited to aplastic anemia, agranulocytosis, methemoglobinemia, blood dyscrasias, liver or kidney failure, lung infiltrates or desquamative/blistering drug rashes. Sarecycline Counseling: Patient advised regarding possible photosensitivity and discoloration of the teeth, skin, lips, tongue and gums.  Patient instructed to avoid sunlight, if possible.  When exposed to sunlight, patients should wear protective clothing, sunglasses, and sunscreen.  The patient was instructed to call the office immediately if the following severe adverse effects occur:  hearing changes, easy bruising/bleeding, severe headache, or vision changes.  The patient verbalized understanding of the proper use and possible adverse effects of sarecycline.  All of the patient's questions and concerns were addressed. Nemluvio Pregnancy And Lactation Text: It is not known if Nemluvio causes fetal harm or is present in breast milk. Please proceed with caution if patients who are pregnant or breastfeeding. Isotretinoin Pregnancy And Lactation Text: This medication is Pregnancy Category X and is considered extremely dangerous during pregnancy. It is unknown if it is excreted in breast milk. Cimzia Pregnancy And Lactation Text: This medication crosses the placenta but can be considered safe in certain situations. Cimzia may be excreted in breast milk. Litfulo Counseling: I discussed with the patient the risks of Litfulo therapy including but not limited to upper respiratory tract infections, shingles, cold sores, and nausea. Live vaccines should be avoided.  This medication has been linked to serious infections; higher rate of mortality; malignancy and lymphoproliferative disorders; major adverse cardiovascular events; thrombosis; gastrointestinal perforations; neutropenia; lymphopenia; anemia; liver enzyme elevations; and lipid elevations. Low Dose Naltrexone Counseling- I discussed with the patient the potential risks and side effects of low dose naltrexone including but not limited to: more vivid dreams, headaches, nausea, vomiting, abdominal pain, fatigue, dizziness, and anxiety. Topical Sulfur Applications Counseling: Topical Sulfur Counseling: Patient counseled that this medication may cause skin irritation or allergic reactions.  In the event of skin irritation, the patient was advised to reduce the amount of the drug applied or use it less frequently.   The patient verbalized understanding of the proper use and possible adverse effects of topical sulfur application.  All of the patient's questions and concerns were addressed. Bactrim Pregnancy And Lactation Text: This medication is Pregnancy Category D and is known to cause fetal risk.  It is also excreted in breast milk. Rituxan Counseling:  I discussed with the patient the risks of Rituxan infusions. Side effects can include infusion reactions, severe drug rashes including mucocutaneous reactions, reactivation of latent hepatitis and other infections and rarely progressive multifocal leukoencephalopathy.  All of the patient's questions and concerns were addressed. Rhofade Counseling: Rhofade is a topical medication which can decrease superficial blood flow where applied. Side effects are uncommon and include stinging, redness and allergic reactions. Hydroquinone Counseling:  Patient advised that medication may result in skin irritation, lightening (hypopigmentation), dryness, and burning.  In the event of skin irritation, the patient was advised to reduce the amount of the drug applied or use it less frequently.  Rarely, spots that are treated with hydroquinone can become darker (pseudoochronosis).  Should this occur, patient instructed to stop medication and call the office. The patient verbalized understanding of the proper use and possible adverse effects of hydroquinone.  All of the patient's questions and concerns were addressed. Thalidomide Counseling: I discussed with the patient the risks of thalidomide including but not limited to birth defects, anxiety, weakness, chest pain, dizziness, cough and severe allergy. Doxepin Counseling:  Patient advised that the medication is sedating and not to drive a car after taking this medication. Patient informed of potential adverse effects including but not limited to dry mouth, urinary retention, and blurry vision.  The patient verbalized understanding of the proper use and possible adverse effects of doxepin.  All of the patient's questions and concerns were addressed. Azelaic Acid Counseling: Patient counseled that medicine may cause skin irritation and to avoid applying near the eyes.  In the event of skin irritation, the patient was advised to reduce the amount of the drug applied or use it less frequently.   The patient verbalized understanding of the proper use and possible adverse effects of azelaic acid.  All of the patient's questions and concerns were addressed. High Dose Vitamin A Counseling: Side effects reviewed, pt to contact office should one occur. Opioid Counseling: I discussed with the patient the potential side effects of opioids including but not limited to addiction, altered mental status, and depression. I stressed avoiding alcohol, benzodiazepines, muscle relaxants and sleep aids unless specifically okayed by a physician. The patient verbalized understanding of the proper use and possible adverse effects of opioids. All of the patient's questions and concerns were addressed. They were instructed to flush the remaining pills down the toilet if they did not need them for pain. Xolair Counseling:  Patient informed of potential adverse effects including but not limited to fever, muscle aches, rash and allergic reactions.  The patient verbalized understanding of the proper use and possible adverse effects of Xolair.  All of the patient's questions and concerns were addressed. Cephalexin Counseling: I counseled the patient regarding use of cephalexin as an antibiotic for prophylactic and/or therapeutic purposes. Cephalexin (commonly prescribed under brand name Keflex) is a cephalosporin antibiotic which is active against numerous classes of bacteria, including most skin bacteria. Side effects may include nausea, diarrhea, gastrointestinal upset, rash, hives, yeast infections, and in rare cases, hepatitis, kidney disease, seizures, fever, confusion, neurologic symptoms, and others. Patients with severe allergies to penicillin medications are cautioned that there is about a 10% incidence of cross-reactivity with cephalosporins. When possible, patients with penicillin allergies should use alternatives to cephalosporins for antibiotic therapy. Litfulo Pregnancy And Lactation Text: Based on animal studies, Lifulo may cause embryo-fetal harm when administered to pregnant women.  The medication should not be used in pregnancy.  Breastfeeding is not recommended during treatment. Tetracycline Counseling: Patient counseled regarding possible photosensitivity and increased risk for sunburn.  Patient instructed to avoid sunlight, if possible.  When exposed to sunlight, patients should wear protective clothing, sunglasses, and sunscreen.  The patient was instructed to call the office immediately if the following severe adverse effects occur:  hearing changes, easy bruising/bleeding, severe headache, or vision changes.  The patient verbalized understanding of the proper use and possible adverse effects of tetracycline.  All of the patient's questions and concerns were addressed. Patient understands to avoid pregnancy while on therapy due to potential birth defects. Cosentyx Counseling:  I discussed with the patient the risks of Cosentyx including but not limited to worsening of Crohn's disease, immunosuppression, allergic reactions and infections.  The patient understands that monitoring is required including a PPD at baseline and must alert us or the primary physician if symptoms of infection or other concerning signs are noted. Topical Sulfur Applications Pregnancy And Lactation Text: This medication is considered safe during pregnancy and breast feeding secondary to limited systemic absorption. Low Dose Naltrexone Pregnancy And Lactation Text: Naltrexone is pregnancy category C.  There have been no adequate and well-controlled studies in pregnant women.  It should be used in pregnancy only if the potential benefit justifies the potential risk to the fetus.   Limited data indicates that naltrexone is minimally excreted into breastmilk. High Dose Vitamin A Pregnancy And Lactation Text: High dose vitamin A therapy is contraindicated during pregnancy and breast feeding. Opioid Pregnancy And Lactation Text: These medications can lead to premature delivery and should be avoided during pregnancy. These medications are also present in breast milk in small amounts. Xolair Pregnancy And Lactation Text: This medication is Pregnancy Category B and is considered safe during pregnancy. This medication is excreted in breast milk. Rituxan Pregnancy And Lactation Text: This medication is Pregnancy Category C and it isn't know if it is safe during pregnancy. It is unknown if this medication is excreted in breast milk but similar antibodies are known to be excreted. Wartpeel Counseling:  I discussed with the patient the risks of Wartpeel including but not limited to erythema, scaling, itching, weeping, crusting, and pain. Cephalexin Pregnancy And Lactation Text: This medication is Pregnancy Category B and considered safe during pregnancy.  It is also excreted in breast milk but can be used safely for shorter doses. Azathioprine Counseling:  I discussed with the patient the risks of azathioprine including but not limited to myelosuppression, immunosuppression, hepatotoxicity, lymphoma, and infections.  The patient understands that monitoring is required including baseline LFTs, Creatinine, possible TPMP genotyping and weekly CBCs for the first month and then every 2 weeks thereafter.  The patient verbalized understanding of the proper use and possible adverse effects of azathioprine.  All of the patient's questions and concerns were addressed. Olumiant Counseling: I discussed with the patient the risks of Olumiant therapy including but not limited to upper respiratory tract infections, shingles, cold sores, and nausea. Live vaccines should be avoided.  This medication has been linked to serious infections; higher rate of mortality; malignancy and lymphoproliferative disorders; major adverse cardiovascular events; thrombosis; gastrointestinal perforations; neutropenia; lymphopenia; anemia; liver enzyme elevations; and lipid elevations. Doxepin Pregnancy And Lactation Text: This medication is Pregnancy Category C and it isn't known if it is safe during pregnancy. It is also excreted in breast milk and breast feeding isn't recommended. Tranexamic Acid Counseling:  Patient advised of the small risk of bleeding problems with tranexamic acid. They were also instructed to call if they developed any nausea, vomiting or diarrhea. All of the patient's questions and concerns were addressed. Libtayo Counseling- I discussed with the patient the risks of Libtayo including but not limited to nausea, vomiting, diarrhea, and bone or muscle pain.  The patient verbalized understanding of the proper use and possible adverse effects of Libtayo.  All of the patient's questions and concerns were addressed. Imiquimod Counseling:  I discussed with the patient the risks of imiquimod including but not limited to erythema, scaling, itching, weeping, crusting, and pain.  Patient understands that the inflammatory response to imiquimod is variable from person to person and was educated regarded proper titration schedule.  If flu-like symptoms develop, patient knows to discontinue the medication and contact us. Solaraze Counseling:  I discussed with the patient the risks of Solaraze including but not limited to erythema, scaling, itching, weeping, crusting, and pain. Benzoyl Peroxide Counseling: Patient counseled that medicine may cause skin irritation and bleach clothing.  In the event of skin irritation, the patient was advised to reduce the amount of the drug applied or use it less frequently.   The patient verbalized understanding of the proper use and possible adverse effects of benzoyl peroxide.  All of the patient's questions and concerns were addressed. Siliq Counseling:  I discussed with the patient the risks of Siliq including but not limited to new or worsening depression, suicidal thoughts and behavior, immunosuppression, malignancy, posterior leukoencephalopathy syndrome, and serious infections.  The patient understands that monitoring is required including a PPD at baseline and must alert us or the primary physician if symptoms of infection or other concerning signs are noted. There is also a special program designed to monitor depression which is required with Siliq. Dupixent Counseling: I discussed with the patient the risks of dupilumab including but not limited to eye infection and irritation, cold sores, injection site reactions, worsening of asthma, allergic reactions and increased risk of parasitic infection.  Live vaccines should be avoided while taking dupilumab. Dupilumab will also interact with certain medications such as warfarin and cyclosporine. The patient understands that monitoring is required and they must alert us or the primary physician if symptoms of infection or other concerning signs are noted. Niacinamide Counseling: I recommended taking niacin or niacinamide, also know as vitamin B3, twice daily. Recent evidence suggests that taking vitamin B3 (500 mg twice daily) can reduce the risk of actinic keratoses and non-melanoma skin cancers. Side effects of vitamin B3 include flushing and headache. Olumiant Pregnancy And Lactation Text: Based on animal studies, Olumiant may cause embryo-fetal harm when administered to pregnant women.  The medication should not be used in pregnancy.  Breastfeeding is not recommended during treatment. Hydroxyzine Counseling: Patient advised that the medication is sedating and not to drive a car after taking this medication.  Patient informed of potential adverse effects including but not limited to dry mouth, urinary retention, and blurry vision.  The patient verbalized understanding of the proper use and possible adverse effects of hydroxyzine.  All of the patient's questions and concerns were addressed. Libtayo Pregnancy And Lactation Text: This medication is contraindicated in pregnancy and when breast feeding. Solaraze Pregnancy And Lactation Text: This medication is Pregnancy Category B and is considered safe. There is some data to suggest avoiding during the third trimester. It is unknown if this medication is excreted in breast milk. Clindamycin Counseling: I counseled the patient regarding use of clindamycin as an antibiotic for prophylactic and/or therapeutic purposes. Clindamycin is active against numerous classes of bacteria, including skin bacteria. Side effects may include nausea, diarrhea, gastrointestinal upset, rash, hives, yeast infections, and in rare cases, colitis. Benzoyl Peroxide Pregnancy And Lactation Text: This medication is Pregnancy Category C. It is unknown if benzoyl peroxide is excreted in breast milk. Dupixent Pregnancy And Lactation Text: This medication likely crosses the placenta but the risk for the fetus is uncertain. This medication is excreted in breast milk. Tranexamic Acid Pregnancy And Lactation Text: It is unknown if this medication is safe during pregnancy or breast feeding. Niacinamide Pregnancy And Lactation Text: These medications are considered safe during pregnancy. Rinvoq Counseling: I discussed with the patient the risks of Rinvoq therapy including but not limited to upper respiratory tract infections, shingles, cold sores, bronchitis, nausea, cough, fever, acne, and headache. Live vaccines should be avoided.  This medication has been linked to serious infections; higher rate of mortality; malignancy and lymphoproliferative disorders; major adverse cardiovascular events; thrombosis; thrombocytopenia, anemia, and neutropenia; lipid elevations; liver enzyme elevations; and gastrointestinal perforations. Odomzo Counseling- I discussed with the patient the risks of Odomzo including but not limited to nausea, vomiting, diarrhea, constipation, weight loss, changes in the sense of taste, decreased appetite, muscle spasms, and hair loss.  The patient verbalized understanding of the proper use and possible adverse effects of Odomzo.  All of the patient's questions and concerns were addressed. Cellcept Counseling:  I discussed with the patient the risks of mycophenolate mofetil including but not limited to infection/immunosuppression, GI upset, hypokalemia, hypercholesterolemia, bone marrow suppression, lymphoproliferative disorders, malignancy, GI ulceration/bleed/perforation, colitis, interstitial lung disease, kidney failure, progressive multifocal leukoencephalopathy, and birth defects.  The patient understands that monitoring is required including a baseline creatinine and regular CBC testing. In addition, patient must alert us immediately if symptoms of infection or other concerning signs are noted. Soolantra Counseling: I discussed with the patients the risks of topial Soolantra. This is a medicine which decreases the number of mites and inflammation in the skin. You experience burning, stinging, eye irritation or allergic reactions.  Please call our office if you develop any problems from using this medication. Hydroxyzine Pregnancy And Lactation Text: This medication is not safe during pregnancy and should not be taken. It is also excreted in breast milk and breast feeding isn't recommended. Nsaids Counseling: NSAID Counseling: I discussed with the patient that NSAIDs should be taken with food. Prolonged use of NSAIDs can result in the development of stomach ulcers.  Patient advised to stop taking NSAIDs if abdominal pain occurs.  The patient verbalized understanding of the proper use and possible adverse effects of NSAIDs.  All of the patient's questions and concerns were addressed. Clindamycin Pregnancy And Lactation Text: This medication can be used in pregnancy if certain situations. Clindamycin is also present in breast milk. Klisyri Counseling:  I discussed with the patient the risks of Klisyri including but not limited to erythema, scaling, itching, weeping, crusting, and pain. Arava Counseling:  Patient counseled regarding adverse effects of Arava including but not limited to nausea, vomiting, abnormalities in liver function tests. Patients may develop mouth sores, rash, diarrhea, and abnormalities in blood counts. The patient understands that monitoring is required including LFTs and blood counts.  There is a rare possibility of scarring of the liver and lung problems that can occur when taking methotrexate. Persistent nausea, loss of appetite, pale stools, dark urine, cough, and shortness of breath should be reported immediately. Patient advised to discontinue Arava treatment and consult with a physician prior to attempting conception. The patient will have to undergo a treatment to eliminate Arava from the body prior to conception. Carac Counseling:  I discussed with the patient the risks of Carac including but not limited to erythema, scaling, itching, weeping, crusting, and pain. Erivedge Counseling- I discussed with the patient the risks of Erivedge including but not limited to nausea, vomiting, diarrhea, constipation, weight loss, changes in the sense of taste, decreased appetite, muscle spasms, and hair loss.  The patient verbalized understanding of the proper use and possible adverse effects of Erivedge.  All of the patient's questions and concerns were addressed. Simlandi Counseling:  I discussed with the patient the risks of adalimumab including but not limited to myelosuppression, immunosuppression, autoimmune hepatitis, demyelinating diseases, lymphoma, and serious infections.  The patient understands that monitoring is required including a PPD at baseline and must alert us or the primary physician if symptoms of infection or other concerning signs are noted. Ebglyss Counseling: I discussed with the patient the risks of lebrikizumab including but not limited to eye inflammation and irritation, cold sores, injection site reactions, allergic reactions and increased risk of parasitic infection. The patient understands that monitoring is required and they must alert us or the primary physician if symptoms of infection or other concerning signs are noted. Ozempic Counseling: I reviewed the possible side effects including: thyroid tumors, kidney disease, gallbladder disease, abdominal pain, constipation, diarrhea, nausea, vomiting and pancreatitis. Do not take this medication if you have a history or family history of multiple endocrine neoplasia syndrome type 2. Side effects reviewed, pt to contact office should one occur. Valtrex Counseling: I discussed with the patient the risks of valacyclovir including but not limited to kidney damage, nausea, vomiting and severe allergy.  The patient understands that if the infection seems to be worsening or is not improving, they are to call. Rinvoq Pregnancy And Lactation Text: Based on animal studies, Rinvoq may cause embryo-fetal harm when administered to pregnant women.  The medication should not be used in pregnancy.  Breastfeeding is not recommended during treatment and for 6 days after the last dose.

## 2025-04-24 ENCOUNTER — APPOINTMENT (OUTPATIENT)
Dept: RADIATION ONCOLOGY | Facility: CLINIC | Age: 85
End: 2025-04-24
Payer: MEDICARE

## 2025-04-24 VITALS
OXYGEN SATURATION: 96 % | WEIGHT: 144.6 LBS | DIASTOLIC BLOOD PRESSURE: 78 MMHG | BODY MASS INDEX: 24.06 KG/M2 | SYSTOLIC BLOOD PRESSURE: 142 MMHG | RESPIRATION RATE: 16 BRPM | HEART RATE: 56 BPM

## 2025-04-24 DIAGNOSIS — C34.92 MALIGNANT NEOPLASM OF UNSPECIFIED PART OF LEFT BRONCHUS OR LUNG: ICD-10-CM

## 2025-04-24 DIAGNOSIS — Z92.21 PERSONAL HISTORY OF ANTINEOPLASTIC CHEMOTHERAPY: ICD-10-CM

## 2025-04-24 DIAGNOSIS — Z92.3 PERSONAL HISTORY OF IRRADIATION: ICD-10-CM

## 2025-04-24 PROCEDURE — 99212 OFFICE O/P EST SF 10 MIN: CPT

## 2025-04-28 ENCOUNTER — OUTPATIENT (OUTPATIENT)
Dept: OUTPATIENT SERVICES | Facility: HOSPITAL | Age: 85
LOS: 1 days | Discharge: HOME | End: 2025-04-28

## 2025-04-28 DIAGNOSIS — Z95.818 PRESENCE OF OTHER CARDIAC IMPLANTS AND GRAFTS: Chronic | ICD-10-CM

## 2025-04-28 DIAGNOSIS — Z95.828 PRESENCE OF OTHER VASCULAR IMPLANTS AND GRAFTS: Chronic | ICD-10-CM

## 2025-04-28 DIAGNOSIS — Z90.3 ACQUIRED ABSENCE OF STOMACH [PART OF]: Chronic | ICD-10-CM

## 2025-04-28 DIAGNOSIS — C16.9 MALIGNANT NEOPLASM OF STOMACH, UNSPECIFIED: ICD-10-CM

## 2025-05-06 ENCOUNTER — RESULT REVIEW (OUTPATIENT)
Age: 85
End: 2025-05-06

## 2025-05-06 ENCOUNTER — APPOINTMENT (OUTPATIENT)
Age: 85
End: 2025-05-06

## 2025-05-06 LAB
BASOPHILS # BLD AUTO: 0.04 K/UL — SIGNIFICANT CHANGE UP (ref 0–0.2)
BASOPHILS NFR BLD AUTO: 0.9 % — SIGNIFICANT CHANGE UP (ref 0–2)
EOSINOPHIL # BLD AUTO: 0.06 K/UL — SIGNIFICANT CHANGE UP (ref 0–0.5)
EOSINOPHIL NFR BLD AUTO: 1.4 % — SIGNIFICANT CHANGE UP (ref 0–6)
HCT VFR BLD CALC: 32.6 % — LOW (ref 39–50)
HGB BLD-MCNC: 10.9 G/DL — LOW (ref 13–17)
IMM GRANULOCYTES # BLD AUTO: 0.01 K/UL — SIGNIFICANT CHANGE UP (ref 0–0.07)
IMM GRANULOCYTES NFR BLD AUTO: 0.2 % — SIGNIFICANT CHANGE UP (ref 0–0.9)
LYMPHOCYTES # BLD AUTO: 1.17 K/UL — SIGNIFICANT CHANGE UP (ref 1–3.3)
LYMPHOCYTES NFR BLD AUTO: 26.8 % — SIGNIFICANT CHANGE UP (ref 13–44)
MCHC RBC-ENTMCNC: 25.8 PG — LOW (ref 27–34)
MCHC RBC-ENTMCNC: 33.4 G/DL — SIGNIFICANT CHANGE UP (ref 32–36)
MCV RBC AUTO: 77.1 FL — LOW (ref 80–100)
MONOCYTES # BLD AUTO: 0.43 K/UL — SIGNIFICANT CHANGE UP (ref 0–0.9)
MONOCYTES NFR BLD AUTO: 9.9 % — SIGNIFICANT CHANGE UP (ref 2–14)
NEUTROPHILS # BLD AUTO: 2.65 K/UL — SIGNIFICANT CHANGE UP (ref 1.8–7.4)
NEUTROPHILS NFR BLD AUTO: 60.8 % — SIGNIFICANT CHANGE UP (ref 43–77)
NRBC # BLD AUTO: 0 K/UL — SIGNIFICANT CHANGE UP (ref 0–0)
NRBC # FLD: 0 K/UL — SIGNIFICANT CHANGE UP (ref 0–0)
NRBC BLD AUTO-RTO: 0 /100 WBCS — SIGNIFICANT CHANGE UP (ref 0–0)
PLATELET # BLD AUTO: 176 K/UL — SIGNIFICANT CHANGE UP (ref 150–400)
PMV BLD: 11.4 FL — SIGNIFICANT CHANGE UP (ref 7–13)
RBC # BLD: 4.23 M/UL — SIGNIFICANT CHANGE UP (ref 4.2–5.8)
RBC # FLD: 15.3 % — HIGH (ref 10.3–14.5)
WBC # BLD: 4.36 K/UL — SIGNIFICANT CHANGE UP (ref 3.8–10.5)
WBC # FLD AUTO: 4.36 K/UL — SIGNIFICANT CHANGE UP (ref 3.8–10.5)

## 2025-05-07 LAB
ALBUMIN SERPL ELPH-MCNC: 4.1 G/DL — SIGNIFICANT CHANGE UP (ref 3.3–5)
ALP SERPL-CCNC: 62 U/L — SIGNIFICANT CHANGE UP (ref 40–120)
ALT FLD-CCNC: 14 U/L — SIGNIFICANT CHANGE UP (ref 10–50)
ANION GAP SERPL CALC-SCNC: 13 MMOL/L — SIGNIFICANT CHANGE UP (ref 5–17)
AST SERPL-CCNC: 22 U/L — SIGNIFICANT CHANGE UP (ref 10–40)
BILIRUB SERPL-MCNC: 0.3 MG/DL — SIGNIFICANT CHANGE UP (ref 0.2–1.2)
BUN SERPL-MCNC: 22 MG/DL — SIGNIFICANT CHANGE UP (ref 7–23)
CALCIUM SERPL-MCNC: 9.9 MG/DL — SIGNIFICANT CHANGE UP (ref 8.4–10.5)
CHLORIDE SERPL-SCNC: 103 MMOL/L — SIGNIFICANT CHANGE UP (ref 96–108)
CO2 SERPL-SCNC: 22 MMOL/L — SIGNIFICANT CHANGE UP (ref 22–31)
CREAT SERPL-MCNC: 1.38 MG/DL — HIGH (ref 0.5–1.3)
EGFR: 50 ML/MIN/1.73M2 — LOW
EGFR: 50 ML/MIN/1.73M2 — LOW
GLUCOSE SERPL-MCNC: 78 MG/DL — SIGNIFICANT CHANGE UP (ref 70–99)
MAGNESIUM SERPL-MCNC: 2.1 MG/DL — SIGNIFICANT CHANGE UP (ref 1.6–2.6)
POTASSIUM SERPL-MCNC: 4.2 MMOL/L — SIGNIFICANT CHANGE UP (ref 3.5–5.3)
POTASSIUM SERPL-SCNC: 4.2 MMOL/L — SIGNIFICANT CHANGE UP (ref 3.5–5.3)
PROT SERPL-MCNC: 7.1 G/DL — SIGNIFICANT CHANGE UP (ref 6–8.3)
SODIUM SERPL-SCNC: 138 MMOL/L — SIGNIFICANT CHANGE UP (ref 135–145)

## 2025-05-23 ENCOUNTER — APPOINTMENT (OUTPATIENT)
Dept: RHEUMATOLOGY | Facility: CLINIC | Age: 85
End: 2025-05-23

## 2025-05-23 VITALS
OXYGEN SATURATION: 97 % | TEMPERATURE: 98 F | SYSTOLIC BLOOD PRESSURE: 150 MMHG | WEIGHT: 144 LBS | DIASTOLIC BLOOD PRESSURE: 80 MMHG | HEIGHT: 65 IN | RESPIRATION RATE: 17 BRPM | HEART RATE: 71 BPM | BODY MASS INDEX: 23.99 KG/M2

## 2025-05-23 DIAGNOSIS — R68.2 DRY MOUTH, UNSPECIFIED: ICD-10-CM

## 2025-05-23 DIAGNOSIS — M25.562 PAIN IN LEFT KNEE: ICD-10-CM

## 2025-05-23 DIAGNOSIS — M65.949 UNSPECIFIED SYNOVITIS AND TENOSYNOVITIS, UNSPECIFIED HAND: ICD-10-CM

## 2025-05-23 DIAGNOSIS — I73.00 RAYNAUD'S SYNDROME W/OUT GANGRENE: ICD-10-CM

## 2025-05-23 DIAGNOSIS — M21.952 UNSPECIFIED ACQUIRED DEFORMITY OF RIGHT THIGH: ICD-10-CM

## 2025-05-23 DIAGNOSIS — M21.951 UNSPECIFIED ACQUIRED DEFORMITY OF RIGHT THIGH: ICD-10-CM

## 2025-05-23 DIAGNOSIS — H04.123 DRY EYE SYNDROME OF BILATERAL LACRIMAL GLANDS: ICD-10-CM

## 2025-05-23 DIAGNOSIS — M34.9 SYSTEMIC SCLEROSIS, UNSPECIFIED: ICD-10-CM

## 2025-05-23 DIAGNOSIS — M79.89 OTHER SPECIFIED SOFT TISSUE DISORDERS: ICD-10-CM

## 2025-05-23 DIAGNOSIS — M35.01 SICCA SYNDROME WITH KERATOCONJUNCTIVITIS: ICD-10-CM

## 2025-05-23 DIAGNOSIS — M15.9 POLYOSTEOARTHRITIS, UNSPECIFIED: ICD-10-CM

## 2025-05-23 DIAGNOSIS — D64.9 ANEMIA, UNSPECIFIED: ICD-10-CM

## 2025-05-23 DIAGNOSIS — M24.849 OTHER SPECIFIC JOINT DERANGEMENTS OF UNSPECIFIED HAND, NOT ELSEWHERE CLASSIFIED: ICD-10-CM

## 2025-05-23 DIAGNOSIS — G89.29 PAIN IN LEFT KNEE: ICD-10-CM

## 2025-05-23 PROCEDURE — 99215 OFFICE O/P EST HI 40 MIN: CPT

## 2025-05-23 PROCEDURE — G2212 PROLONG OUTPT/OFFICE VIS: CPT

## 2025-05-23 PROCEDURE — G2211 COMPLEX E/M VISIT ADD ON: CPT

## 2025-05-24 LAB
BASOPHILS # BLD AUTO: 0.05 K/UL
BASOPHILS NFR BLD AUTO: 1.1 %
EOSINOPHIL # BLD AUTO: 0.16 K/UL
EOSINOPHIL NFR BLD AUTO: 3.5 %
HCT VFR BLD CALC: 32.9 %
HGB BLD-MCNC: 10.8 G/DL
IMM GRANULOCYTES NFR BLD AUTO: 0 %
LYMPHOCYTES # BLD AUTO: 1.4 K/UL
LYMPHOCYTES NFR BLD AUTO: 30.4 %
MAN DIFF?: NORMAL
MCHC RBC-ENTMCNC: 25.7 PG
MCHC RBC-ENTMCNC: 32.8 G/DL
MCV RBC AUTO: 78.1 FL
MONOCYTES # BLD AUTO: 0.49 K/UL
MONOCYTES NFR BLD AUTO: 10.6 %
NEUTROPHILS # BLD AUTO: 2.51 K/UL
NEUTROPHILS NFR BLD AUTO: 54.4 %
PLATELET # BLD AUTO: 192 K/UL
RBC # BLD: 4.21 M/UL
RBC # BLD: 4.21 M/UL
RBC # FLD: 15.9 %
RETICS # AUTO: 0.7 %
RETICS AGGREG/RBC NFR: 28.2 K/UL
WBC # FLD AUTO: 4.61 K/UL

## 2025-05-25 LAB
ALBUMIN SERPL ELPH-MCNC: 4.2 G/DL
ALP BLD-CCNC: 67 U/L
ALT SERPL-CCNC: 13 U/L
ANION GAP SERPL CALC-SCNC: 12 MMOL/L
AST SERPL-CCNC: 15 U/L
BILIRUB SERPL-MCNC: 0.3 MG/DL
BUN SERPL-MCNC: 19 MG/DL
CALCIUM SERPL-MCNC: 10.2 MG/DL
CHLORIDE SERPL-SCNC: 101 MMOL/L
CK SERPL-CCNC: 117 U/L
CO2 SERPL-SCNC: 22 MMOL/L
CREAT SERPL-MCNC: 1.61 MG/DL
CRP SERPL-MCNC: 3 MG/L
EGFRCR SERPLBLD CKD-EPI 2021: 42 ML/MIN/1.73M2
ERYTHROCYTE [SEDIMENTATION RATE] IN BLOOD BY WESTERGREN METHOD: 41 MM/HR
FERRITIN SERPL-MCNC: 64 NG/ML
FOLATE SERPL-MCNC: 17.6 NG/ML
GLUCOSE SERPL-MCNC: 92 MG/DL
HAPTOGLOB SERPL-MCNC: 222 MG/DL
IRON SATN MFR SERPL: 24 %
IRON SERPL-MCNC: 69 UG/DL
LDH SERPL-CCNC: 212 U/L
MAGNESIUM SERPL-MCNC: 2.3 MG/DL
PHOSPHATE SERPL-MCNC: 3.7 MG/DL
POTASSIUM SERPL-SCNC: 4.7 MMOL/L
PROT SERPL-MCNC: 6.8 G/DL
SODIUM SERPL-SCNC: 136 MMOL/L
TIBC SERPL-MCNC: 287 UG/DL
TSH SERPL-ACNC: 3.56 UIU/ML
UIBC SERPL-MCNC: 218 UG/DL
VIT B12 SERPL-MCNC: >2000 PG/ML

## 2025-05-26 LAB
ENA RNP AB SER IA-ACNC: <0.2 AL
ENA SCL70 IGG SER IA-ACNC: <0.2 AL
ENA SM AB SER IA-ACNC: <0.2 AL
ENA SS-A AB SER IA-ACNC: 2.2 AL
ENA SS-B AB SER IA-ACNC: <0.2 AL

## 2025-05-27 LAB
ALBUMIN MFR SERPL ELPH: 57.8 %
ALBUMIN SERPL-MCNC: 4 G/DL
ALBUMIN/GLOB SERPL: 1.4 RATIO
ALPHA1 GLOB MFR SERPL ELPH: 4.4 %
ALPHA1 GLOB SERPL ELPH-MCNC: 0.3 G/DL
ALPHA2 GLOB MFR SERPL ELPH: 12.8 %
ALPHA2 GLOB SERPL ELPH-MCNC: 0.9 G/DL
B-GLOBULIN MFR SERPL ELPH: 11.2 %
B-GLOBULIN SERPL ELPH-MCNC: 0.8 G/DL
DEPRECATED KAPPA LC FREE/LAMBDA SER: 1.26 RATIO
GAMMA GLOB FLD ELPH-MCNC: 1 G/DL
GAMMA GLOB MFR SERPL ELPH: 13.8 %
IGA SERPL-MCNC: 311 MG/DL
IGG SERPL-MCNC: 982 MG/DL
IGM SERPL-MCNC: 50 MG/DL
INTERPRETATION SERPL IEP-IMP: NORMAL
KAPPA LC CSF-MCNC: 2.44 MG/DL
KAPPA LC SERPL-MCNC: 3.08 MG/DL
M PROTEIN SPEC IFE-MCNC: NORMAL
PROT SERPL-MCNC: 6.9 G/DL
PROT SERPL-MCNC: 6.9 G/DL

## 2025-05-29 LAB — RNA POLYMERASE III IGG: 4 UNITS

## 2025-06-17 ENCOUNTER — APPOINTMENT (OUTPATIENT)
Dept: HEMATOLOGY ONCOLOGY | Facility: CLINIC | Age: 85
End: 2025-06-17
Payer: MEDICARE

## 2025-06-17 VITALS
HEIGHT: 65 IN | HEART RATE: 73 BPM | SYSTOLIC BLOOD PRESSURE: 156 MMHG | DIASTOLIC BLOOD PRESSURE: 70 MMHG | TEMPERATURE: 98.1 F | OXYGEN SATURATION: 96 % | BODY MASS INDEX: 23.1 KG/M2 | WEIGHT: 138.67 LBS

## 2025-06-17 PROCEDURE — 99214 OFFICE O/P EST MOD 30 MIN: CPT

## 2025-06-24 ENCOUNTER — OUTPATIENT (OUTPATIENT)
Dept: OUTPATIENT SERVICES | Facility: HOSPITAL | Age: 85
LOS: 1 days | Discharge: HOME | End: 2025-06-24

## 2025-06-24 DIAGNOSIS — Z95.828 PRESENCE OF OTHER VASCULAR IMPLANTS AND GRAFTS: Chronic | ICD-10-CM

## 2025-06-24 DIAGNOSIS — Z95.818 PRESENCE OF OTHER CARDIAC IMPLANTS AND GRAFTS: Chronic | ICD-10-CM

## 2025-06-24 DIAGNOSIS — Z90.3 ACQUIRED ABSENCE OF STOMACH [PART OF]: Chronic | ICD-10-CM

## 2025-06-24 DIAGNOSIS — C16.9 MALIGNANT NEOPLASM OF STOMACH, UNSPECIFIED: ICD-10-CM

## 2025-07-01 ENCOUNTER — APPOINTMENT (OUTPATIENT)
Age: 85
End: 2025-07-01

## 2025-07-01 ENCOUNTER — RESULT REVIEW (OUTPATIENT)
Age: 85
End: 2025-07-01

## 2025-07-01 LAB
BASOPHILS # BLD AUTO: 0.06 K/UL — SIGNIFICANT CHANGE UP (ref 0–0.2)
BASOPHILS NFR BLD AUTO: 1.2 % — SIGNIFICANT CHANGE UP (ref 0–2)
EOSINOPHIL # BLD AUTO: 0.08 K/UL — SIGNIFICANT CHANGE UP (ref 0–0.5)
EOSINOPHIL NFR BLD AUTO: 1.6 % — SIGNIFICANT CHANGE UP (ref 0–6)
HCT VFR BLD CALC: 28.3 % — LOW (ref 39–50)
HGB BLD-MCNC: 9.1 G/DL — LOW (ref 13–17)
IMM GRANULOCYTES # BLD AUTO: 0.02 K/UL — SIGNIFICANT CHANGE UP (ref 0–0.07)
IMM GRANULOCYTES NFR BLD AUTO: 0.4 % — SIGNIFICANT CHANGE UP (ref 0–0.9)
LYMPHOCYTES # BLD AUTO: 1.12 K/UL — SIGNIFICANT CHANGE UP (ref 1–3.3)
LYMPHOCYTES NFR BLD AUTO: 22.6 % — SIGNIFICANT CHANGE UP (ref 13–44)
MCHC RBC-ENTMCNC: 25.1 PG — LOW (ref 27–34)
MCHC RBC-ENTMCNC: 32.2 G/DL — SIGNIFICANT CHANGE UP (ref 32–36)
MCV RBC AUTO: 78 FL — LOW (ref 80–100)
MONOCYTES # BLD AUTO: 0.45 K/UL — SIGNIFICANT CHANGE UP (ref 0–0.9)
MONOCYTES NFR BLD AUTO: 9.1 % — SIGNIFICANT CHANGE UP (ref 2–14)
NEUTROPHILS # BLD AUTO: 3.22 K/UL — SIGNIFICANT CHANGE UP (ref 1.8–7.4)
NEUTROPHILS NFR BLD AUTO: 65.1 % — SIGNIFICANT CHANGE UP (ref 43–77)
NRBC # BLD AUTO: 0 K/UL — SIGNIFICANT CHANGE UP (ref 0–0)
NRBC # FLD: 0 K/UL — SIGNIFICANT CHANGE UP (ref 0–0)
NRBC BLD AUTO-RTO: 0 /100 WBCS — SIGNIFICANT CHANGE UP (ref 0–0)
PLATELET # BLD AUTO: 202 K/UL — SIGNIFICANT CHANGE UP (ref 150–400)
PMV BLD: 9.9 FL — SIGNIFICANT CHANGE UP (ref 7–13)
RBC # BLD: 3.63 M/UL — LOW (ref 4.2–5.8)
RBC # FLD: 14.2 % — SIGNIFICANT CHANGE UP (ref 10.3–14.5)
WBC # BLD: 4.95 K/UL — SIGNIFICANT CHANGE UP (ref 3.8–10.5)
WBC # FLD AUTO: 4.95 K/UL — SIGNIFICANT CHANGE UP (ref 3.8–10.5)

## 2025-07-02 LAB
ALBUMIN SERPL ELPH-MCNC: 3.9 G/DL — SIGNIFICANT CHANGE UP (ref 3.3–5)
ALP SERPL-CCNC: 70 U/L — SIGNIFICANT CHANGE UP (ref 40–120)
ALT FLD-CCNC: 11 U/L — SIGNIFICANT CHANGE UP (ref 10–50)
ANION GAP SERPL CALC-SCNC: 13 MMOL/L — SIGNIFICANT CHANGE UP (ref 5–17)
AST SERPL-CCNC: 21 U/L — SIGNIFICANT CHANGE UP (ref 10–40)
BILIRUB SERPL-MCNC: 0.2 MG/DL — SIGNIFICANT CHANGE UP (ref 0.2–1.2)
BUN SERPL-MCNC: 25 MG/DL — HIGH (ref 7–23)
CALCIUM SERPL-MCNC: 9.2 MG/DL — SIGNIFICANT CHANGE UP (ref 8.4–10.5)
CHLORIDE SERPL-SCNC: 100 MMOL/L — SIGNIFICANT CHANGE UP (ref 96–108)
CO2 SERPL-SCNC: 21 MMOL/L — LOW (ref 22–31)
CREAT SERPL-MCNC: 2.6 MG/DL — HIGH (ref 0.5–1.3)
EGFR: 23 ML/MIN/1.73M2 — LOW
EGFR: 23 ML/MIN/1.73M2 — LOW
GLUCOSE SERPL-MCNC: 90 MG/DL — SIGNIFICANT CHANGE UP (ref 70–99)
MAGNESIUM SERPL-MCNC: 2.7 MG/DL — HIGH (ref 1.6–2.6)
POTASSIUM SERPL-MCNC: 4.8 MMOL/L — SIGNIFICANT CHANGE UP (ref 3.5–5.3)
POTASSIUM SERPL-SCNC: 4.8 MMOL/L — SIGNIFICANT CHANGE UP (ref 3.5–5.3)
PROT SERPL-MCNC: 6.7 G/DL — SIGNIFICANT CHANGE UP (ref 6–8.3)
SODIUM SERPL-SCNC: 134 MMOL/L — LOW (ref 135–145)

## 2025-07-03 PROBLEM — Z85.118 HISTORY OF MALIGNANT NEOPLASM OF LUNG: Status: RESOLVED | Noted: 2025-07-03 | Resolved: 2025-07-03

## 2025-07-03 PROBLEM — Z85.028 HISTORY OF MALIGNANT NEOPLASM OF STOMACH: Status: RESOLVED | Noted: 2025-07-03 | Resolved: 2025-07-03

## 2025-07-08 ENCOUNTER — RESULT REVIEW (OUTPATIENT)
Age: 85
End: 2025-07-08

## 2025-07-08 ENCOUNTER — APPOINTMENT (OUTPATIENT)
Dept: HEMATOLOGY ONCOLOGY | Facility: CLINIC | Age: 85
End: 2025-07-08

## 2025-07-08 LAB
ALBUMIN SERPL ELPH-MCNC: 4.2 G/DL
ALP BLD-CCNC: 81 U/L
ALT SERPL-CCNC: 10 U/L
ANION GAP SERPL CALC-SCNC: 14 MMOL/L
AST SERPL-CCNC: 13 U/L
BASOPHILS # BLD AUTO: 0.06 K/UL — SIGNIFICANT CHANGE UP (ref 0–0.2)
BASOPHILS NFR BLD AUTO: 1.1 % — SIGNIFICANT CHANGE UP (ref 0–2)
BILIRUB SERPL-MCNC: 0.3 MG/DL
BUN SERPL-MCNC: 31 MG/DL
CALCIUM SERPL-MCNC: 8.8 MG/DL
CHLORIDE SERPL-SCNC: 104 MMOL/L
CO2 SERPL-SCNC: 20 MMOL/L
CREAT SERPL-MCNC: 2.83 MG/DL
EGFRCR SERPLBLD CKD-EPI 2021: 21 ML/MIN/1.73M2
EOSINOPHIL # BLD AUTO: 0.11 K/UL — SIGNIFICANT CHANGE UP (ref 0–0.5)
EOSINOPHIL NFR BLD AUTO: 2 % — SIGNIFICANT CHANGE UP (ref 0–6)
GLUCOSE SERPL-MCNC: 99 MG/DL
HCT VFR BLD CALC: 31.5 % — LOW (ref 39–50)
HGB BLD-MCNC: 10.1 G/DL — LOW (ref 13–17)
IMM GRANULOCYTES # BLD AUTO: 0.01 K/UL — SIGNIFICANT CHANGE UP (ref 0–0.07)
IMM GRANULOCYTES NFR BLD AUTO: 0.2 % — SIGNIFICANT CHANGE UP (ref 0–0.9)
LYMPHOCYTES # BLD AUTO: 0.98 K/UL — LOW (ref 1–3.3)
LYMPHOCYTES NFR BLD AUTO: 18 % — SIGNIFICANT CHANGE UP (ref 13–44)
MAGNESIUM SERPL-MCNC: 2.8 MG/DL
MCHC RBC-ENTMCNC: 25.2 PG — LOW (ref 27–34)
MCHC RBC-ENTMCNC: 32.1 G/DL — SIGNIFICANT CHANGE UP (ref 32–36)
MCV RBC AUTO: 78.6 FL — LOW (ref 80–100)
MONOCYTES # BLD AUTO: 0.42 K/UL — SIGNIFICANT CHANGE UP (ref 0–0.9)
MONOCYTES NFR BLD AUTO: 7.7 % — SIGNIFICANT CHANGE UP (ref 2–14)
NEUTROPHILS # BLD AUTO: 3.86 K/UL — SIGNIFICANT CHANGE UP (ref 1.8–7.4)
NEUTROPHILS NFR BLD AUTO: 71 % — SIGNIFICANT CHANGE UP (ref 43–77)
NRBC # BLD AUTO: 0 K/UL — SIGNIFICANT CHANGE UP (ref 0–0)
NRBC # FLD: 0 K/UL — SIGNIFICANT CHANGE UP (ref 0–0)
NRBC BLD AUTO-RTO: 0 /100 WBCS — SIGNIFICANT CHANGE UP (ref 0–0)
PLATELET # BLD AUTO: 205 K/UL — SIGNIFICANT CHANGE UP (ref 150–400)
PMV BLD: 9.5 FL — SIGNIFICANT CHANGE UP (ref 7–13)
POTASSIUM SERPL-SCNC: 5.3 MMOL/L
PROT SERPL-MCNC: 7.2 G/DL
RBC # BLD: 4.01 M/UL — LOW (ref 4.2–5.8)
RBC # FLD: 14.3 % — SIGNIFICANT CHANGE UP (ref 10.3–14.5)
SODIUM SERPL-SCNC: 139 MMOL/L
WBC # BLD: 5.44 K/UL — SIGNIFICANT CHANGE UP (ref 3.8–10.5)
WBC # FLD AUTO: 5.44 K/UL — SIGNIFICANT CHANGE UP (ref 3.8–10.5)

## 2025-07-09 ENCOUNTER — NON-APPOINTMENT (OUTPATIENT)
Age: 85
End: 2025-07-09

## 2025-07-09 ENCOUNTER — APPOINTMENT (OUTPATIENT)
Dept: NEPHROLOGY | Facility: CLINIC | Age: 85
End: 2025-07-09
Payer: MEDICARE

## 2025-07-09 VITALS
DIASTOLIC BLOOD PRESSURE: 76 MMHG | OXYGEN SATURATION: 98 % | WEIGHT: 137 LBS | BODY MASS INDEX: 22.8 KG/M2 | SYSTOLIC BLOOD PRESSURE: 138 MMHG | HEART RATE: 80 BPM

## 2025-07-09 PROBLEM — N17.9 AKI (ACUTE KIDNEY INJURY): Status: ACTIVE | Noted: 2025-07-09

## 2025-07-09 PROCEDURE — 99205 OFFICE O/P NEW HI 60 MIN: CPT

## 2025-08-07 ENCOUNTER — APPOINTMENT (OUTPATIENT)
Dept: NEPHROLOGY | Facility: CLINIC | Age: 85
End: 2025-08-07

## 2025-08-17 ENCOUNTER — EMERGENCY (EMERGENCY)
Facility: HOSPITAL | Age: 85
LOS: 1 days | End: 2025-08-17
Attending: EMERGENCY MEDICINE
Payer: MEDICARE

## 2025-08-17 VITALS
RESPIRATION RATE: 18 BRPM | DIASTOLIC BLOOD PRESSURE: 87 MMHG | SYSTOLIC BLOOD PRESSURE: 196 MMHG | HEART RATE: 72 BPM | OXYGEN SATURATION: 97 % | TEMPERATURE: 98 F

## 2025-08-17 VITALS
SYSTOLIC BLOOD PRESSURE: 163 MMHG | RESPIRATION RATE: 18 BRPM | OXYGEN SATURATION: 98 % | TEMPERATURE: 97 F | HEART RATE: 61 BPM | DIASTOLIC BLOOD PRESSURE: 67 MMHG

## 2025-08-17 DIAGNOSIS — Z95.828 PRESENCE OF OTHER VASCULAR IMPLANTS AND GRAFTS: Chronic | ICD-10-CM

## 2025-08-17 DIAGNOSIS — Z90.3 ACQUIRED ABSENCE OF STOMACH [PART OF]: Chronic | ICD-10-CM

## 2025-08-17 DIAGNOSIS — Z95.818 PRESENCE OF OTHER CARDIAC IMPLANTS AND GRAFTS: Chronic | ICD-10-CM

## 2025-08-17 LAB
ALBUMIN SERPL ELPH-MCNC: 4 G/DL — SIGNIFICANT CHANGE UP (ref 3.3–5.2)
ALP SERPL-CCNC: 84 U/L — SIGNIFICANT CHANGE UP (ref 40–120)
ALT FLD-CCNC: 6 U/L — SIGNIFICANT CHANGE UP
ANION GAP SERPL CALC-SCNC: 12 MMOL/L — SIGNIFICANT CHANGE UP (ref 5–17)
ANION GAP SERPL CALC-SCNC: 13 MMOL/L — SIGNIFICANT CHANGE UP (ref 5–17)
ANISOCYTOSIS BLD QL: SLIGHT — SIGNIFICANT CHANGE UP
APPEARANCE UR: CLEAR — SIGNIFICANT CHANGE UP
AST SERPL-CCNC: 14 U/L — SIGNIFICANT CHANGE UP
BACTERIA # UR AUTO: NEGATIVE /HPF — SIGNIFICANT CHANGE UP
BASOPHILS # BLD AUTO: 0.01 K/UL — SIGNIFICANT CHANGE UP (ref 0–0.2)
BASOPHILS # BLD MANUAL: 0 K/UL — SIGNIFICANT CHANGE UP (ref 0–0.2)
BASOPHILS NFR BLD AUTO: 0.1 % — SIGNIFICANT CHANGE UP (ref 0–2)
BASOPHILS NFR BLD MANUAL: 0 % — SIGNIFICANT CHANGE UP (ref 0–2)
BILIRUB SERPL-MCNC: 0.4 MG/DL — SIGNIFICANT CHANGE UP (ref 0.4–2)
BILIRUB UR-MCNC: NEGATIVE — SIGNIFICANT CHANGE UP
BUN SERPL-MCNC: 60.6 MG/DL — HIGH (ref 8–20)
BUN SERPL-MCNC: 66.1 MG/DL — HIGH (ref 8–20)
CALCIUM SERPL-MCNC: 6.7 MG/DL — LOW (ref 8.4–10.5)
CALCIUM SERPL-MCNC: 7.2 MG/DL — LOW (ref 8.4–10.5)
CAST: 1 /LPF — SIGNIFICANT CHANGE UP (ref 0–4)
CHLORIDE SERPL-SCNC: 103 MMOL/L — SIGNIFICANT CHANGE UP (ref 96–108)
CHLORIDE SERPL-SCNC: 98 MMOL/L — SIGNIFICANT CHANGE UP (ref 96–108)
CO2 SERPL-SCNC: 18 MMOL/L — LOW (ref 22–29)
CO2 SERPL-SCNC: 18 MMOL/L — LOW (ref 22–29)
COLOR SPEC: YELLOW — SIGNIFICANT CHANGE UP
CREAT SERPL-MCNC: 4.13 MG/DL — HIGH (ref 0.5–1.3)
CREAT SERPL-MCNC: 4.22 MG/DL — HIGH (ref 0.5–1.3)
DIFF PNL FLD: ABNORMAL
EGFR: 13 ML/MIN/1.73M2 — LOW
EOSINOPHIL # BLD AUTO: 0.06 K/UL — SIGNIFICANT CHANGE UP (ref 0–0.5)
EOSINOPHIL # BLD MANUAL: 0.07 K/UL — SIGNIFICANT CHANGE UP (ref 0–0.5)
EOSINOPHIL NFR BLD AUTO: 0.7 % — SIGNIFICANT CHANGE UP (ref 0–6)
EOSINOPHIL NFR BLD MANUAL: 0.9 % — SIGNIFICANT CHANGE UP (ref 0–6)
GIANT PLATELETS BLD QL SMEAR: PRESENT
GLUCOSE SERPL-MCNC: 111 MG/DL — HIGH (ref 70–99)
GLUCOSE SERPL-MCNC: 144 MG/DL — HIGH (ref 70–99)
GLUCOSE UR QL: NEGATIVE MG/DL — SIGNIFICANT CHANGE UP
HCT VFR BLD CALC: 29.3 % — LOW (ref 39–50)
HGB BLD-MCNC: 9.9 G/DL — LOW (ref 13–17)
IMM GRANULOCYTES # BLD AUTO: 0.02 K/UL — SIGNIFICANT CHANGE UP (ref 0–0.07)
IMM GRANULOCYTES NFR BLD AUTO: 0.2 % — SIGNIFICANT CHANGE UP (ref 0–0.9)
KETONES UR QL: NEGATIVE MG/DL — SIGNIFICANT CHANGE UP
LEUKOCYTE ESTERASE UR-ACNC: ABNORMAL
LYMPHOCYTES # BLD AUTO: 1.07 K/UL — SIGNIFICANT CHANGE UP (ref 1–3.3)
LYMPHOCYTES # BLD MANUAL: 0.79 K/UL — LOW (ref 1–3.3)
LYMPHOCYTES NFR BLD AUTO: 13.1 % — SIGNIFICANT CHANGE UP (ref 13–44)
LYMPHOCYTES NFR BLD MANUAL: 9.6 % — LOW (ref 13–44)
MANUAL REACTIVE LYMPHOCYTES #: 0.43 K/UL — SIGNIFICANT CHANGE UP (ref 0–0.63)
MCHC RBC-ENTMCNC: 25 PG — LOW (ref 27–34)
MCHC RBC-ENTMCNC: 33.8 G/DL — SIGNIFICANT CHANGE UP (ref 32–36)
MCV RBC AUTO: 74 FL — LOW (ref 80–100)
MICROCYTES BLD QL: SLIGHT — SIGNIFICANT CHANGE UP
MONOCYTES # BLD AUTO: 0.84 K/UL — SIGNIFICANT CHANGE UP (ref 0–0.9)
MONOCYTES # BLD MANUAL: 0.29 K/UL — SIGNIFICANT CHANGE UP (ref 0–0.9)
MONOCYTES NFR BLD AUTO: 10.3 % — SIGNIFICANT CHANGE UP (ref 2–14)
MONOCYTES NFR BLD MANUAL: 3.5 % — SIGNIFICANT CHANGE UP (ref 2–14)
NEUTROPHILS # BLD AUTO: 6.19 K/UL — SIGNIFICANT CHANGE UP (ref 1.8–7.4)
NEUTROPHILS # BLD MANUAL: 6.62 K/UL — SIGNIFICANT CHANGE UP (ref 1.8–7.4)
NEUTROPHILS NFR BLD AUTO: 75.6 % — SIGNIFICANT CHANGE UP (ref 43–77)
NEUTROPHILS NFR BLD MANUAL: 80.8 % — HIGH (ref 43–77)
NITRITE UR-MCNC: NEGATIVE — SIGNIFICANT CHANGE UP
NRBC # BLD AUTO: 0 K/UL — SIGNIFICANT CHANGE UP (ref 0–0)
NRBC # FLD: 0 K/UL — SIGNIFICANT CHANGE UP (ref 0–0)
NRBC BLD AUTO-RTO: 0 /100 WBCS — SIGNIFICANT CHANGE UP (ref 0–0)
OVALOCYTES BLD QL SMEAR: SLIGHT — SIGNIFICANT CHANGE UP
PH UR: 6.5 — SIGNIFICANT CHANGE UP (ref 5–8)
PLAT MORPH BLD: NORMAL — SIGNIFICANT CHANGE UP
PLATELET # BLD AUTO: 218 K/UL — SIGNIFICANT CHANGE UP (ref 150–400)
PMV BLD: 9.7 FL — SIGNIFICANT CHANGE UP (ref 7–13)
POIKILOCYTOSIS BLD QL AUTO: SLIGHT — SIGNIFICANT CHANGE UP
POTASSIUM SERPL-MCNC: 4.9 MMOL/L — SIGNIFICANT CHANGE UP (ref 3.5–5.3)
POTASSIUM SERPL-MCNC: 5.2 MMOL/L — SIGNIFICANT CHANGE UP (ref 3.5–5.3)
POTASSIUM SERPL-SCNC: 4.9 MMOL/L — SIGNIFICANT CHANGE UP (ref 3.5–5.3)
POTASSIUM SERPL-SCNC: 5.2 MMOL/L — SIGNIFICANT CHANGE UP (ref 3.5–5.3)
PROT SERPL-MCNC: 7.1 G/DL — SIGNIFICANT CHANGE UP (ref 6.6–8.7)
PROT UR-MCNC: 100 MG/DL
RBC # BLD: 3.96 M/UL — LOW (ref 4.2–5.8)
RBC # FLD: 14.3 % — SIGNIFICANT CHANGE UP (ref 10.3–14.5)
RBC BLD AUTO: ABNORMAL
RBC CASTS # UR COMP ASSIST: 300 /HPF — HIGH (ref 0–4)
SODIUM SERPL-SCNC: 129 MMOL/L — LOW (ref 135–145)
SODIUM SERPL-SCNC: 132 MMOL/L — LOW (ref 135–145)
SP GR SPEC: 1.01 — SIGNIFICANT CHANGE UP (ref 1–1.03)
SQUAMOUS # UR AUTO: 4 /HPF — SIGNIFICANT CHANGE UP (ref 0–5)
UROBILINOGEN FLD QL: 0.2 MG/DL — SIGNIFICANT CHANGE UP (ref 0.2–1)
VARIANT LYMPHS # BLD: 5.2 % — SIGNIFICANT CHANGE UP (ref 0–6)
VARIANT LYMPHS NFR BLD MANUAL: 5.2 % — SIGNIFICANT CHANGE UP (ref 0–6)
WBC # BLD: 8.19 K/UL — SIGNIFICANT CHANGE UP (ref 3.8–10.5)
WBC # FLD AUTO: 8.19 K/UL — SIGNIFICANT CHANGE UP (ref 3.8–10.5)
WBC UR QL: 11 /HPF — HIGH (ref 0–5)

## 2025-08-17 PROCEDURE — 85025 COMPLETE CBC W/AUTO DIFF WBC: CPT

## 2025-08-17 PROCEDURE — 80048 BASIC METABOLIC PNL TOTAL CA: CPT

## 2025-08-17 PROCEDURE — 81001 URINALYSIS AUTO W/SCOPE: CPT

## 2025-08-17 PROCEDURE — 99284 EMERGENCY DEPT VISIT MOD MDM: CPT | Mod: 25

## 2025-08-17 PROCEDURE — 96375 TX/PRO/DX INJ NEW DRUG ADDON: CPT | Mod: XU

## 2025-08-17 PROCEDURE — 51705 CHANGE OF BLADDER TUBE: CPT

## 2025-08-17 PROCEDURE — 80053 COMPREHEN METABOLIC PANEL: CPT

## 2025-08-17 PROCEDURE — 96374 THER/PROPH/DIAG INJ IV PUSH: CPT | Mod: XU

## 2025-08-17 PROCEDURE — 87086 URINE CULTURE/COLONY COUNT: CPT

## 2025-08-17 PROCEDURE — 36415 COLL VENOUS BLD VENIPUNCTURE: CPT

## 2025-08-17 PROCEDURE — 51702 INSERT TEMP BLADDER CATH: CPT

## 2025-08-17 RX ORDER — ONDANSETRON HCL/PF 4 MG/2 ML
4 VIAL (ML) INJECTION ONCE
Refills: 0 | Status: COMPLETED | OUTPATIENT
Start: 2025-08-17 | End: 2025-08-17

## 2025-08-17 RX ORDER — ACETAMINOPHEN 500 MG/5ML
1000 LIQUID (ML) ORAL ONCE
Refills: 0 | Status: COMPLETED | OUTPATIENT
Start: 2025-08-17 | End: 2025-08-17

## 2025-08-17 RX ADMIN — Medication 1000 MILLILITER(S): at 07:16

## 2025-08-17 RX ADMIN — Medication 4 MILLIGRAM(S): at 05:51

## 2025-08-17 RX ADMIN — Medication 400 MILLIGRAM(S): at 05:52

## 2025-08-18 LAB
CULTURE RESULTS: NO GROWTH — SIGNIFICANT CHANGE UP
SPECIMEN SOURCE: SIGNIFICANT CHANGE UP

## 2025-08-19 ENCOUNTER — RESULT REVIEW (OUTPATIENT)
Age: 85
End: 2025-08-19

## 2025-08-19 ENCOUNTER — APPOINTMENT (OUTPATIENT)
Age: 85
End: 2025-08-19

## 2025-08-19 LAB
ALBUMIN SERPL ELPH-MCNC: 4 G/DL — SIGNIFICANT CHANGE UP (ref 3.3–5)
ALP SERPL-CCNC: 78 U/L — SIGNIFICANT CHANGE UP (ref 40–120)
ALT FLD-CCNC: 11 U/L — SIGNIFICANT CHANGE UP (ref 10–50)
ANION GAP SERPL CALC-SCNC: 15 MMOL/L — SIGNIFICANT CHANGE UP (ref 5–17)
ANISOCYTOSIS BLD QL: SLIGHT — SIGNIFICANT CHANGE UP
AST SERPL-CCNC: 12 U/L — SIGNIFICANT CHANGE UP (ref 10–40)
BASOPHILS # BLD AUTO: 0 K/UL — SIGNIFICANT CHANGE UP (ref 0–0.2)
BASOPHILS NFR BLD AUTO: 0 % — SIGNIFICANT CHANGE UP (ref 0–2)
BILIRUB SERPL-MCNC: 0.3 MG/DL — SIGNIFICANT CHANGE UP (ref 0.2–1.2)
BUN SERPL-MCNC: 57 MG/DL — HIGH (ref 7–23)
BURR CELLS BLD QL SMEAR: SLIGHT — SIGNIFICANT CHANGE UP
CALCIUM SERPL-MCNC: 6.7 MG/DL — LOW (ref 8.4–10.5)
CEA SERPL-MCNC: 5.2 NG/ML — HIGH (ref 0–3.8)
CHLORIDE SERPL-SCNC: 99 MMOL/L — SIGNIFICANT CHANGE UP (ref 96–108)
CO2 SERPL-SCNC: 16 MMOL/L — LOW (ref 22–31)
CREAT SERPL-MCNC: 3.79 MG/DL — HIGH (ref 0.5–1.3)
DACRYOCYTES BLD QL SMEAR: SLIGHT — SIGNIFICANT CHANGE UP
EGFR: 15 ML/MIN/1.73M2 — LOW
EGFR: 15 ML/MIN/1.73M2 — LOW
ELLIPTOCYTES BLD QL SMEAR: SLIGHT — SIGNIFICANT CHANGE UP
EOSINOPHIL # BLD AUTO: 0.03 K/UL — SIGNIFICANT CHANGE UP (ref 0–0.5)
EOSINOPHIL NFR BLD AUTO: 0.3 % — SIGNIFICANT CHANGE UP (ref 0–6)
GLUCOSE SERPL-MCNC: 125 MG/DL — HIGH (ref 70–99)
HCT VFR BLD CALC: 26 % — LOW (ref 39–50)
HGB BLD-MCNC: 8.9 G/DL — LOW (ref 13–17)
IMM GRANULOCYTES # BLD AUTO: 0.04 K/UL — SIGNIFICANT CHANGE UP (ref 0–0.07)
IMM GRANULOCYTES NFR BLD AUTO: 0.4 % — SIGNIFICANT CHANGE UP (ref 0–0.9)
LG PLATELETS BLD QL AUTO: SLIGHT — SIGNIFICANT CHANGE UP
LYMPHOCYTES # BLD AUTO: 0.64 K/UL — LOW (ref 1–3.3)
LYMPHOCYTES NFR BLD AUTO: 6.6 % — LOW (ref 13–44)
MACROCYTES BLD QL: SLIGHT — SIGNIFICANT CHANGE UP
MAGNESIUM SERPL-MCNC: 3.4 MG/DL — HIGH (ref 1.6–2.6)
MANUAL SMEAR VERIFICATION: SIGNIFICANT CHANGE UP
MCHC RBC-ENTMCNC: 25.4 PG — LOW (ref 27–34)
MCHC RBC-ENTMCNC: 34.2 G/DL — SIGNIFICANT CHANGE UP (ref 32–36)
MCV RBC AUTO: 74.1 FL — LOW (ref 80–100)
MICROCYTES BLD QL: SLIGHT — SIGNIFICANT CHANGE UP
MONOCYTES # BLD AUTO: 0.48 K/UL — SIGNIFICANT CHANGE UP (ref 0–0.9)
MONOCYTES NFR BLD AUTO: 4.9 % — SIGNIFICANT CHANGE UP (ref 2–14)
NEUTROPHILS # BLD AUTO: 8.57 K/UL — HIGH (ref 1.8–7.4)
NEUTROPHILS NFR BLD AUTO: 87.8 % — HIGH (ref 43–77)
NRBC # BLD AUTO: 0 K/UL — SIGNIFICANT CHANGE UP (ref 0–0)
NRBC # FLD: 0 K/UL — SIGNIFICANT CHANGE UP (ref 0–0)
NRBC BLD AUTO-RTO: 0 /100 WBCS — SIGNIFICANT CHANGE UP (ref 0–0)
OVALOCYTES BLD QL SMEAR: SLIGHT — SIGNIFICANT CHANGE UP
PLAT MORPH BLD: NORMAL — SIGNIFICANT CHANGE UP
PLATELET # BLD AUTO: 209 K/UL — SIGNIFICANT CHANGE UP (ref 150–400)
PMV BLD: 10 FL — SIGNIFICANT CHANGE UP (ref 7–13)
POTASSIUM SERPL-MCNC: 4.6 MMOL/L — SIGNIFICANT CHANGE UP (ref 3.5–5.3)
POTASSIUM SERPL-SCNC: 4.6 MMOL/L — SIGNIFICANT CHANGE UP (ref 3.5–5.3)
PROT SERPL-MCNC: 6.6 G/DL — SIGNIFICANT CHANGE UP (ref 6–8.3)
RBC # BLD: 3.51 M/UL — LOW (ref 4.2–5.8)
RBC # FLD: 14.6 % — HIGH (ref 10.3–14.5)
RBC BLD AUTO: SIGNIFICANT CHANGE UP
SODIUM SERPL-SCNC: 130 MMOL/L — LOW (ref 135–145)
TARGETS BLD QL SMEAR: SLIGHT — SIGNIFICANT CHANGE UP
WBC # BLD: 9.76 K/UL — SIGNIFICANT CHANGE UP (ref 3.8–10.5)
WBC # FLD AUTO: 9.76 K/UL — SIGNIFICANT CHANGE UP (ref 3.8–10.5)

## 2025-08-20 ENCOUNTER — NON-APPOINTMENT (OUTPATIENT)
Age: 85
End: 2025-08-20

## 2025-09-09 ENCOUNTER — APPOINTMENT (OUTPATIENT)
Dept: RHEUMATOLOGY | Facility: CLINIC | Age: 85
End: 2025-09-09

## 2025-09-09 VITALS
HEIGHT: 65 IN | SYSTOLIC BLOOD PRESSURE: 122 MMHG | DIASTOLIC BLOOD PRESSURE: 60 MMHG | BODY MASS INDEX: 22.82 KG/M2 | WEIGHT: 137 LBS | TEMPERATURE: 97.2 F

## 2025-09-09 DIAGNOSIS — M34.9 SYSTEMIC SCLEROSIS, UNSPECIFIED: ICD-10-CM

## 2025-09-09 DIAGNOSIS — I73.00 RAYNAUD'S SYNDROME W/OUT GANGRENE: ICD-10-CM

## 2025-09-09 DIAGNOSIS — R68.2 DRY MOUTH, UNSPECIFIED: ICD-10-CM

## 2025-09-09 DIAGNOSIS — D64.9 ANEMIA, UNSPECIFIED: ICD-10-CM

## 2025-09-09 DIAGNOSIS — M65.949 UNSPECIFIED SYNOVITIS AND TENOSYNOVITIS, UNSPECIFIED HAND: ICD-10-CM

## 2025-09-09 DIAGNOSIS — M21.951 UNSPECIFIED ACQUIRED DEFORMITY OF RIGHT THIGH: ICD-10-CM

## 2025-09-09 DIAGNOSIS — M15.9 POLYOSTEOARTHRITIS, UNSPECIFIED: ICD-10-CM

## 2025-09-09 DIAGNOSIS — M79.89 OTHER SPECIFIED SOFT TISSUE DISORDERS: ICD-10-CM

## 2025-09-09 DIAGNOSIS — R33.8 BENIGN PROSTATIC HYPERPLASIA WITH LOWER URINARY TRACT SYMPMS: ICD-10-CM

## 2025-09-09 DIAGNOSIS — M35.01 SICCA SYNDROME WITH KERATOCONJUNCTIVITIS: ICD-10-CM

## 2025-09-09 DIAGNOSIS — H04.123 DRY EYE SYNDROME OF BILATERAL LACRIMAL GLANDS: ICD-10-CM

## 2025-09-09 DIAGNOSIS — N18.9 CHRONIC KIDNEY DISEASE, UNSPECIFIED: ICD-10-CM

## 2025-09-09 DIAGNOSIS — N40.1 BENIGN PROSTATIC HYPERPLASIA WITH LOWER URINARY TRACT SYMPMS: ICD-10-CM

## 2025-09-09 DIAGNOSIS — M21.952 UNSPECIFIED ACQUIRED DEFORMITY OF RIGHT THIGH: ICD-10-CM

## 2025-09-09 PROCEDURE — G2212 PROLONG OUTPT/OFFICE VIS: CPT

## 2025-09-09 PROCEDURE — 99215 OFFICE O/P EST HI 40 MIN: CPT

## 2025-09-09 PROCEDURE — G2211 COMPLEX E/M VISIT ADD ON: CPT

## 2025-09-11 ENCOUNTER — APPOINTMENT (OUTPATIENT)
Dept: HEMATOLOGY ONCOLOGY | Facility: CLINIC | Age: 85
End: 2025-09-11
Payer: MEDICARE

## 2025-09-11 VITALS
HEIGHT: 65 IN | HEART RATE: 66 BPM | DIASTOLIC BLOOD PRESSURE: 70 MMHG | WEIGHT: 122.35 LBS | BODY MASS INDEX: 20.39 KG/M2 | OXYGEN SATURATION: 97 % | TEMPERATURE: 97.2 F | SYSTOLIC BLOOD PRESSURE: 118 MMHG

## 2025-09-11 LAB — CEA SERPL-MCNC: 5.2 NG/ML

## 2025-09-11 PROCEDURE — 99215 OFFICE O/P EST HI 40 MIN: CPT

## 2025-09-11 PROCEDURE — G2211 COMPLEX E/M VISIT ADD ON: CPT

## 2025-09-14 PROBLEM — N40.1 URINARY RETENTION DUE TO BENIGN PROSTATIC HYPERPLASIA: Status: RESOLVED | Noted: 2025-09-14 | Resolved: 2025-09-14

## 2025-09-14 LAB
ALBUMIN SERPL ELPH-MCNC: 4.1 G/DL
ALP BLD-CCNC: 94 U/L
ALT SERPL-CCNC: 18 U/L
ANION GAP SERPL CALC-SCNC: 17 MMOL/L
AST SERPL-CCNC: 14 U/L
BASOPHILS # BLD AUTO: 0.07 K/UL
BASOPHILS NFR BLD AUTO: 1.3 %
BILIRUB SERPL-MCNC: 0.3 MG/DL
BUN SERPL-MCNC: 32 MG/DL
CALCIUM SERPL-MCNC: 10 MG/DL
CHLORIDE SERPL-SCNC: 99 MMOL/L
CK SERPL-CCNC: 47 U/L
CO2 SERPL-SCNC: 21 MMOL/L
CREAT SERPL-MCNC: 2.01 MG/DL
CRP SERPL-MCNC: 12 MG/L
EGFRCR SERPLBLD CKD-EPI 2021: 32 ML/MIN/1.73M2
ENA RNP AB SER-ACNC: <0.2 AL
ENA SCL70 AB SER-ACNC: <0.2 AL
ENA SM AB SER-ACNC: <0.2 AL
ENA SS-A AB SER-ACNC: 5.4 AL
ENA SS-B AB SER-ACNC: <0.2 AL
EOSINOPHIL # BLD AUTO: 0.14 K/UL
EOSINOPHIL NFR BLD AUTO: 2.6 %
ERYTHROCYTE [SEDIMENTATION RATE] IN BLOOD BY WESTERGREN METHOD: 107 MM/HR
FERRITIN SERPL-MCNC: 277 NG/ML
GLUCOSE SERPL-MCNC: 96 MG/DL
HCT VFR BLD CALC: 33.7 %
HGB BLD-MCNC: 10.7 G/DL
IMM GRANULOCYTES NFR BLD AUTO: 0.2 %
IRON SATN MFR SERPL: 29 %
IRON SERPL-MCNC: 77 UG/DL
LDH SERPL-CCNC: 248 U/L
LYMPHOCYTES # BLD AUTO: 1.29 K/UL
LYMPHOCYTES NFR BLD AUTO: 23.8 %
MAGNESIUM SERPL-MCNC: 2.3 MG/DL
MAN DIFF?: NORMAL
MCHC RBC-ENTMCNC: 25.8 PG
MCHC RBC-ENTMCNC: 31.8 G/DL
MCV RBC AUTO: 81.2 FL
MONOCYTES # BLD AUTO: 0.52 K/UL
MONOCYTES NFR BLD AUTO: 9.6 %
NEUTROPHILS # BLD AUTO: 3.39 K/UL
NEUTROPHILS NFR BLD AUTO: 62.5 %
PHOSPHATE SERPL-MCNC: 3 MG/DL
PLATELET # BLD AUTO: 341 K/UL
POTASSIUM SERPL-SCNC: 5.7 MMOL/L
PROT SERPL-MCNC: 7.6 G/DL
RBC # BLD: 4.15 M/UL
RBC # BLD: 4.15 M/UL
RBC # FLD: 16.2 %
RETICS # AUTO: 0.9 %
RETICS AGGREG/RBC NFR: 37.8 K/UL
RNA POLYMERASE III IGG: 2 UNITS
SODIUM SERPL-SCNC: 137 MMOL/L
TIBC SERPL-MCNC: 269 UG/DL
UIBC SERPL-MCNC: 192 UG/DL
WBC # FLD AUTO: 5.42 K/UL